# Patient Record
Sex: FEMALE | Race: WHITE | NOT HISPANIC OR LATINO | Employment: FULL TIME | URBAN - METROPOLITAN AREA
[De-identification: names, ages, dates, MRNs, and addresses within clinical notes are randomized per-mention and may not be internally consistent; named-entity substitution may affect disease eponyms.]

---

## 2017-01-01 ENCOUNTER — MEDICAL CORRESPONDENCE (OUTPATIENT)
Dept: HEALTH INFORMATION MANAGEMENT | Facility: CLINIC | Age: 52
End: 2017-01-01

## 2017-01-16 ENCOUNTER — MYC MEDICAL ADVICE (OUTPATIENT)
Dept: PEDIATRICS | Facility: CLINIC | Age: 52
End: 2017-01-16

## 2017-01-16 DIAGNOSIS — Z90.13 S/P BILATERAL MASTECTOMY: Primary | ICD-10-CM

## 2017-01-16 DIAGNOSIS — I89.0 LYMPHEDEMA: ICD-10-CM

## 2017-01-16 DIAGNOSIS — Z85.3 PERSONAL HISTORY OF BREAST CANCER: ICD-10-CM

## 2017-01-16 NOTE — TELEPHONE ENCOUNTER
Patient requesting referral for lymphatic drainage massage for lymphedema.  Will route to provider to review and advise.

## 2017-01-17 NOTE — TELEPHONE ENCOUNTER
[employer] message sent to patient requesting fax number to [employer] to fax referral.      Paige WhaleyCMA

## 2017-01-19 NOTE — TELEPHONE ENCOUNTER
Patient has not read [employer] message sent on 01/17/17.     Attempt #1:  Left message for patient to return call to clinic or to reply to [employer] message. Clinic number given.  Need fax number for [facility] [employer] to fax referral placed on 01/16/17.  Celia Hargrove CMA

## 2017-01-24 NOTE — TELEPHONE ENCOUNTER
Attempt #2:  Left message for patient to return call to clinic or reply to [employer] message. Clinic number given.  Need fax number for [employer] [facility] [employer] to fax referral that was placed on 01/16/17.  Celia Hargrove [facility]

## 2017-01-25 NOTE — TELEPHONE ENCOUNTER
From   Chanel Israel    To   Gabbie Hicks MD    Sent   1/24/2017  6:15 PM         Here is the information that was previously sent to you via [employer] on 2/26/2016:     [employer]   Luli Bardales [employer] AFAA   [address] [address] [place] [id number] [phone] / fax [phone]             Patient responded with referral contact info. Faxed referral as requested to number provided.Verified fax was success.  Sent [employer] to inform patient.  Miah Anne, CMA

## 2017-03-22 ENCOUNTER — OFFICE VISIT (OUTPATIENT)
Dept: PEDIATRICS | Facility: CLINIC | Age: 52
End: 2017-03-22
Payer: COMMERCIAL

## 2017-03-22 VITALS
HEIGHT: 62 IN | OXYGEN SATURATION: 96 % | SYSTOLIC BLOOD PRESSURE: 119 MMHG | DIASTOLIC BLOOD PRESSURE: 54 MMHG | HEART RATE: 104 BPM | BODY MASS INDEX: 27.03 KG/M2 | TEMPERATURE: 98.7 F | WEIGHT: 146.9 LBS

## 2017-03-22 DIAGNOSIS — H65.02 ACUTE SEROUS OTITIS MEDIA OF LEFT EAR, RECURRENCE NOT SPECIFIED: Primary | ICD-10-CM

## 2017-03-22 DIAGNOSIS — J20.9 ACUTE BRONCHITIS, UNSPECIFIED ORGANISM: ICD-10-CM

## 2017-03-22 PROCEDURE — 99213 OFFICE O/P EST LOW 20 MIN: CPT | Performed by: INTERNAL MEDICINE

## 2017-03-22 RX ORDER — CODEINE PHOSPHATE AND GUAIFENESIN 10; 100 MG/5ML; MG/5ML
1 SOLUTION ORAL EVERY 4 HOURS PRN
Qty: 120 ML | Refills: 0 | Status: SHIPPED | OUTPATIENT
Start: 2017-03-22 | End: 2017-06-12

## 2017-03-22 NOTE — PATIENT INSTRUCTIONS
Fluid in the Middle Ear, No Infection (Adult)  Earaches can happen without an infection. This occurs when air and fluid build up behind the eardrum causing a feeling of fullness and discomfort and reduced hearing. This is called otitis media with effusion (OME) or serous otitis media. It means there is fluid in the middle ear. It is not the same as acute otitis media, which is typically from infection.  OME can happen when you have a cold if congestion blocks the passage that drains the middle ear (eustachian tube). It may also occur with nasal allergies or after a bacterial middle ear infection.    The pain/discomfort may come and go. You may hear clicking or popping sounds when you chew or swallow. You may feel that your balance is off. Or you may hear ringing in the ear.  It often takes from several weeks up to 3 months for the fluid to clear on its own. Oral pain relievers and ear drops help if there is pain. Decongestants and antihistamines sometimes help. Antibiotics don't help since there is no infection. Your doctor may prescribe a nasal spray to help reduce swelling in the nose and eustachian tube. This can allow the ear to drain.  If it doesn't improve after 3 months, surgery may be used to drain the fluid and insert a small tube in the eardrum to allow continued drainage.  Because the middle ear fluid can become infected, it is important to watch for signs of an ear infection which may develop later. These signs include increased ear pain, fever, or drainage from the ear.  Home care  The following guidelines will help you care for yourself at home:    You may use acetaminophen or ibuprofen to control pain, unless another medicine was prescribed. [NOTE: If you have chronic liver or kidney disease or ever had a stomach ulcer or GI bleeding, talk with your doctor before using these medicines.] (Aspirin should never be used in anyone under 18 years of age who is ill with a fever. It may cause severe liver  damage.)    While not always helpful, you may use over-the-counter decongestants such as phenylephrine or pseudoephedrine. Don't use nasal spray decongestants more than 3 days. Longer use can make congestion worse. (Prescription nasal sprays from your doctor don't typically have those restrictions.)    Antihistamines may help if you are also having allergy symptoms.    You may use medicines such as guaifenesin to thin mucus and promote drainage.  Follow-up care  Follow up with your doctor or as advised if you are not feeling better after 3 days.  When to seek medical care  Get prompt medical attention if any of the following occur:    Ear pain gets worse or does not start to improve     Fever of 100.4 F (38 C) or higher, or as directed by your health care provider    Fluid or blood draining from the ear    Headache or sinus pain    Stiff neck    Unusual drowsiness or confusion    1637-0033 The [employer]. [address] [place] [address]. All rights reserved. This information is not intended as a substitute for professional medical care. Always follow your healthcare professional's instructions.        What Is Acute Bronchitis?  Acute or short-term bronchitis last for days or weeks. It occurs when the bronchial tubes (airways in the lungs) are irritated by a virus, bacteria, or allergen. This causes a cough that produces yellow or greenish mucus.  Inside healthy lungs    Air travels in and out of the lungs through the airways. The linings of these airways produce sticky mucus. This mucus traps particles that enter the lungs. Tiny structures called cilia then sweep the particles out of the airways.     Healthy airway: Airways are normally open. Air moves in and out easily.      Healthy cilia: Tiny, hairlike cilia sweep mucus and particles up and out of the airways.   Lungs with bronchitis  Bronchitis often occurs with a cold or the flu virus. The airways become inflamed (red and swollen). There  is a deep  hacking  cough from the extra mucus. Other symptoms may include:    Wheezing    Chest discomfort    Shortness of breath    Mild fever  A second infection, this time due to bacteria, may then occur. And airways irritated by allergens or smoke are more likely to get infected.        Inflamed airway: Inflammation and extra mucus narrow the airway, causing shortness of breath.      Impaired cilia: Extra mucus impairs cilia, causing congestion and wheezing. Smoking makes the problem worse.   Making a diagnosis  A physical exam, health history, and certain tests help your healthcare provider make the diagnosis.  Health history  Your healthcare provider will ask you about your symptoms.  The exam  Your provider listens to your chest for signs of congestion. He or she may also check your ears, nose, and throat.  Possible tests    A sputum test for bacteria. This requires a sample of mucus from the lungs.    A nasal or throat swab for bacterial infection.    A chest X-ray if your healthcare provider thinks you have pneumonia.    Tests to check for an underlying condition, such as allergies, asthma, or COPD. You may need to see a specialist for more lung function testing.  Treating a cough  The main treatment for bronchitis is easing symptoms. Avoiding smoke, allergens, and other things that trigger coughing can often help. If the infection is bacterial, you may be given antibiotics. During the illness, it's important to get plenty of sleep. To ease symptoms:    Don t smoke, and avoid secondhand smoke.    Use a humidifier, or breathe in steam from a hot shower. This may help loosen mucus.    Drink a lot of water and juice. They can soothe the throat and may help thin mucus.    Sit up or use extra pillows when in bed to help lessen coughing and congestion.    Ask your provider about using cough medicine, pain and fever medicine, or a decongestant.  Antibiotics  Most cases of bronchitis are caused by cold or flu  viruses. Antibiotics don t treat viral illness. Taking antibiotics when they are not needed increases your risk of getting an infection later that is antibiotic-resistant. Your provider will prescribe antibiotics if the infection is caused by bacteria. If they are prescribed:    Take the medicine until it is used up, even if symptoms have improved. If you don t, the bronchitis may come back.    Take them as directed. For instance, some medicines should be taken with food.    Ask your provider or pharmacist what side effects are common, and what to do about them.  Follow-up care  You should see your provider again in 2 to 3 weeks. By this time, symptoms should have improved. An infection that lasts longer may mean you have a more serious problem.  Prevention    Avoid tobacco smoke. If you smoke, quit. Stay away from smoky places. Ask friends and family not to smoke around you, or in your home or car.    Get checked for allergies.    Ask your provider about getting a yearly flu shot, and pneumococcal or pneumonia shots.    Wash your hands often. This helps reduce the chance of picking up viruses that cause colds and flu.  Call your healthcare provider if:    Symptoms worsen, or new symptoms develop.    Breathing problems worsen or  become severe.    Symptoms don t get better within a week, or within 3 days of taking antibiotics.     9440-9259 [employer]. [address] [place] [address], [place] [address]. All rights reserved. This information is not intended as a substitute for professional medical care. Always follow your healthcare professional's instructions.

## 2017-03-22 NOTE — MR AVS SNAPSHOT
After Visit Summary   3/22/2017    Chanel Israel    MRN: [id number]           Patient Information     Date Of Birth          1965        Visit Information        Provider Department      3/22/2017 4:00 PM Rafa Larsen MD [facility]        Today's Diagnoses     Acute serous otitis media of left ear, recurrence not specified    -  1    Acute bronchitis, unspecified organism          Care Instructions      Fluid in the Middle Ear, No Infection (Adult)  Earaches can happen without an infection. This occurs when air and fluid build up behind the eardrum causing a feeling of fullness and discomfort and reduced hearing. This is called otitis media with effusion (OME) or serous otitis media. It means there is fluid in the middle ear. It is not the same as acute otitis media, which is typically from infection.  OME can happen when you have a cold if congestion blocks the passage that drains the middle ear (eustachian tube). It may also occur with nasal allergies or after a bacterial middle ear infection.    The pain/discomfort may come and go. You may hear clicking or popping sounds when you chew or swallow. You may feel that your balance is off. Or you may hear ringing in the ear.  It often takes from several weeks up to 3 months for the fluid to clear on its own. Oral pain relievers and ear drops help if there is pain. Decongestants and antihistamines sometimes help. Antibiotics don't help since there is no infection. Your doctor may prescribe a nasal spray to help reduce swelling in the nose and eustachian tube. This can allow the ear to drain.  If it doesn't improve after 3 months, surgery may be used to drain the fluid and insert a small tube in the eardrum to allow continued drainage.  Because the middle ear fluid can become infected, it is important to watch for signs of an ear infection which may develop later. These signs include increased ear pain, fever, or  drainage from the ear.  Home care  The following guidelines will help you care for yourself at home:    You may use acetaminophen or ibuprofen to control pain, unless another medicine was prescribed. [NOTE: If you have chronic liver or kidney disease or ever had a stomach ulcer or GI bleeding, talk with your doctor before using these medicines.] (Aspirin should never be used in anyone under 18 years of age who is ill with a fever. It may cause severe liver damage.)    While not always helpful, you may use over-the-counter decongestants such as phenylephrine or pseudoephedrine. Don't use nasal spray decongestants more than 3 days. Longer use can make congestion worse. (Prescription nasal sprays from your doctor don't typically have those restrictions.)    Antihistamines may help if you are also having allergy symptoms.    You may use medicines such as guaifenesin to thin mucus and promote drainage.  Follow-up care  Follow up with your doctor or as advised if you are not feeling better after 3 days.  When to seek medical care  Get prompt medical attention if any of the following occur:    Ear pain gets worse or does not start to improve     Fever of 100.4 F (38 C) or higher, or as directed by your health care provider    Fluid or blood draining from the ear    Headache or sinus pain    Stiff neck    Unusual drowsiness or confusion    6579-6230 [employer] [address] [place] [address], [place] [id number]. All rights reserved. This information is not intended as a substitute for professional medical care. Always follow your healthcare professional's instructions.        What Is Acute Bronchitis?  Acute or short-term bronchitis last for days or weeks. It occurs when the bronchial tubes (airways in the lungs) are irritated by a virus, bacteria, or allergen. This causes a cough that produces yellow or greenish mucus.  Inside healthy lungs    Air travels in and out of the lungs through the airways. The linings of  these airways produce sticky mucus. This mucus traps particles that enter the lungs. Tiny structures called cilia then sweep the particles out of the airways.     Healthy airway: Airways are normally open. Air moves in and out easily.      Healthy cilia: Tiny, hairlike cilia sweep mucus and particles up and out of the airways.   Lungs with bronchitis  Bronchitis often occurs with a cold or the flu virus. The airways become inflamed (red and swollen). There is a deep  hacking  cough from the extra mucus. Other symptoms may include:    Wheezing    Chest discomfort    Shortness of breath    Mild fever  A second infection, this time due to bacteria, may then occur. And airways irritated by allergens or smoke are more likely to get infected.        Inflamed airway: Inflammation and extra mucus narrow the airway, causing shortness of breath.      Impaired cilia: Extra mucus impairs cilia, causing congestion and wheezing. Smoking makes the problem worse.   Making a diagnosis  A physical exam, health history, and certain tests help your healthcare provider make the diagnosis.  Health history  Your healthcare provider will ask you about your symptoms.  The exam  Your provider listens to your chest for signs of congestion. He or she may also check your ears, nose, and throat.  Possible tests    A sputum test for bacteria. This requires a sample of mucus from the lungs.    A nasal or throat swab for bacterial infection.    A chest X-ray if your healthcare provider thinks you have pneumonia.    Tests to check for an underlying condition, such as allergies, asthma, or COPD. You may need to see a specialist for more lung function testing.  Treating a cough  The main treatment for bronchitis is easing symptoms. Avoiding smoke, allergens, and other things that trigger coughing can often help. If the infection is bacterial, you may be given antibiotics. During the illness, it's important to get plenty of sleep. To ease  symptoms:    Don t smoke, and avoid secondhand smoke.    Use a humidifier, or breathe in steam from a hot shower. This may help loosen mucus.    Drink a lot of water and juice. They can soothe the throat and may help thin mucus.    Sit up or use extra pillows when in bed to help lessen coughing and congestion.    Ask your provider about using cough medicine, pain and fever medicine, or a decongestant.  Antibiotics  Most cases of bronchitis are caused by cold or flu viruses. Antibiotics don t treat viral illness. Taking antibiotics when they are not needed increases your risk of getting an infection later that is antibiotic-resistant. Your provider will prescribe antibiotics if the infection is caused by bacteria. If they are prescribed:    Take the medicine until it is used up, even if symptoms have improved. If you don t, the bronchitis may come back.    Take them as directed. For instance, some medicines should be taken with food.    Ask your provider or pharmacist what side effects are common, and what to do about them.  Follow-up care  You should see your provider again in 2 to 3 weeks. By this time, symptoms should have improved. An infection that lasts longer may mean you have a more serious problem.  Prevention    Avoid tobacco smoke. If you smoke, quit. Stay away from smoky places. Ask friends and family not to smoke around you, or in your home or car.    Get checked for allergies.    Ask your provider about getting a yearly flu shot, and pneumococcal or pneumonia shots.    Wash your hands often. This helps reduce the chance of picking up viruses that cause colds and flu.  Call your healthcare provider if:    Symptoms worsen, or new symptoms develop.    Breathing problems worsen or  become severe.    Symptoms don t get better within a week, or within 3 days of taking antibiotics.     9553-8067 [employer]. [address] [place] [address], [place] [address]. All rights reserved. This information is not  intended as a substitute for professional medical care. Always follow your healthcare professional's instructions.              Follow-ups after your visit        Your next 10 appointments already scheduled     May 01, 2017  4:15 PM CDT   [facility]    [address] [facility] [address] [phone]            May 23, 2017  3:45 PM CDT   LAB with LAB ONC [facility]    [address] [facility] [address] [phone]           Patient must bring picture ID.  Patient should be prepared to give a urine specimen  Please do not eat 10-12 hours before your appointment if you are coming in fasting for labs on lipids, cholesterol, or glucose (sugar).  Pregnant women should follow their Care Team instructions. Water with medications is okay. Do not drink coffee or other fluids.   If you have concerns about taking  your medications, please ask at office or if scheduling via [facility], send a message by clicking on Secure Messaging, Message Your Care Team.            May 23, 2017  4:30 PM CDT   [facility]    [address] [facility] [address] [phone]              Who to contact     If you have questions or need follow up information about today's clinic visit or your schedule please contact [facility] directly at [phone].  Normal or non-critical lab and imaging results will be communicated to you by MyChart, letter or phone within 4 business days after the clinic has received the results. If you do not hear from us within 7 days, please contact the clinic through MyChart or phone. If you have a critical or abnormal lab result, we will notify you by phone as soon as possible.  Submit refill requests through [employer] or call your  "pharmacy and they will forward the refill request to us. Please allow 3 business days for your refill to be completed.          Additional Information About Your Visit        [employer] Information     [employer] gives you secure access to your electronic health record. If you see a primary care provider, you can also send messages to your care team and make appointments. If you have questions, please call your primary care clinic.  If you do not have a primary care provider, please call [phone] and they will assist you.      [employer] is an electronic gateway that provides easy, online access to your medical records. With [employer], you can request a clinic appointment, read your test results, renew a prescription or communicate with your care team.     To access your existing account, please contact your [facility] or call [phone] for assistance.        Care EveryWhere ID     This is your Care EveryWhere ID. This could be used by other organizations to access your [place] medical records  [id number]        Your Vitals Were     Pulse Temperature Height Pulse Oximetry BMI (Body Mass Index)       104 98.7  F (37.1  C) (Temporal) 5' 2\" (1.575 m) 96% 26.87 kg/m2        Blood Pressure from Last 3 Encounters:   03/22/17 119/54   08/24/16 106/66   07/03/16 120/70    Weight from Last 3 Encounters:   03/22/17 146 lb 14.4 oz (66.6 kg)   08/24/16 146 lb 12.8 oz (66.6 kg)   07/03/16 149 lb 11.2 oz (67.9 kg)              Today, you had the following     No orders found for display         Today's Medication Changes          These changes are accurate as of: 3/22/17  4:40 PM.  If you have any questions, ask your nurse or doctor.               Start taking these medicines.        Dose/Directions    guaiFENesin-codeine 100-10 MG/5ML Soln solution   Commonly known as:  ROBITUSSIN AC   Used for:  Acute bronchitis, unspecified organism   Started by:  Rafa Larsen MD        Dose:  1 tsp. "   Take 5 mLs by mouth every 4 hours as needed for cough   Quantity:  120 mL   Refills:  0            Where to get your medicines      Some of these will need a paper prescription and others can be bought over the counter.  Ask your nurse if you have questions.     Bring a paper prescription for each of these medications     guaiFENesin-codeine 100-10 MG/5ML Soln solution                Primary Care Provider Office Phone # Fax #    Gabbie Hicks [phone]       [facility] [address]  [facility] [address]        Thank you!     Thank you for choosing [facility]  for your care. Our goal is always to provide you with excellent care. Hearing back from our patients is one way we can continue to improve our services. Please take a few minutes to complete the written survey that you may receive in the mail after your visit with us. Thank you!             Your Updated Medication List - Protect others around you: Learn how to safely use, store and throw away your medicines at www.disposemymeds.org.          This list is accurate as of: 3/22/17  4:40 PM.  Always use your most recent med list.                   Brand Name Dispense Instructions for use    CALCIUM + D PO      Take by mouth 2 times daily       Co Q 10 100 MG Caps      Take 1 capsule by mouth daily       flax seed oil 1000 MG capsule      Take 2 capsules (2,000 mg) by mouth 2 times daily       fluocinolone 0.025 % cream    SYNALAR    60 g    Apply topically 2 times daily Apply to affected areas of mouth as needed.       fluticasone 50 MCG/ACT spray    FLONASE     USE TWO SPRAYS IEN QD UTD       guaiFENesin-codeine 100-10 MG/5ML Soln solution    ROBITUSSIN AC    120 mL    Take 5 mLs by mouth every 4 hours as needed for cough       ketoconazole 2 % shampoo    NIZORAL         L-GLUTAMINE      1 capsule 2 times daily       MAGNESIUM PO      Take 1 capsule by mouth daily       MULTI-VITAMIN PO      Take by mouth  daily       order for DME     2 Units    Compression sleeve to bilateral UE.       PROBIOTIC DAILY PO      Take 1 capsule by mouth 2 times daily       sertraline 50 MG tablet    ZOLOFT    90 tablet    Take 1 tablet (50 mg) by mouth daily       SINGULAIR PO      Take 5 mg by mouth daily       SYNTHROID 88 MCG tablet   Generic drug:  levothyroxine          TRIPLE FLEX PO      Take by mouth 2 times daily

## 2017-03-22 NOTE — PROGRESS NOTES
SUBJECTIVE:                                                    Chanel Israel is a 51 year old female who presents to clinic today for the following health issues:      Acute Illness   Acute illness concerns: Cough, plugged ear   Onset: 1 week    Fever: no    Chills/Sweats: YES- chills    Headache (location?): no    Sinus Pressure:YES- post-nasal drainage    Conjunctivitis:  no    Ear Pain: YES: both, left is worse, both plugged    Rhinorrhea: YES    Congestion: YES    Sore Throat: no  , was in beginning      Cough: YES-productive of yellow sputum,     Wheeze: no     Decreased Appetite: YES    Nausea: YES    Vomiting: no     Diarrhea:  no     Dysuria/Freq.: no     Fatigue/Achiness: YES    Sick/Strep Exposure: YES- special      Pt. C/o dizziness as well.   Therapies Tried and outcome: Nyquil at night (helps sleep), 12 hour sudafed, Ibuprofen, Advil (relieve plugness in ears)      HPI: Cough and respiratory symptoms for past week and then today has sensation of a plugged left ear    PMH:   Patient Active Problem List   Diagnosis     Personal history of breast cancer     S/P bilateral mastectomy     Urge incontinence of urine     Overweight (BMI 25.0-29.9)     Mild recurrent major depression (H)     Family history of diabetes mellitus (DM)     Family history of thyroid disease     Plantar fasciitis, bilateral     Seasonal allergies     Mixed hyperlipidemia     Chronic rhinitis     Breast implant asymmetry     ACP (advance care planning)     Lymphedema     Dyslipidemia, goal LDL below 130     Family history of ischemic heart disease     Seasonal allergic rhinitis     Acquired hypothyroidism     Glaucoma suspect, bilateral     Family history of glaucoma     Past Surgical History:   Procedure Laterality Date     ADENOIDECTOMY       COLONOSCOPY N/A 8/22/2014    Procedure: COLONOSCOPY;  Surgeon: Duane, William Charles, MD;  Location: [facility]     COSMETIC SURGERY       CYSTOSCOPY       HC REMOVAL OF ANAL  FISSURE  2007     MASTECTOMY, SIMPLE RT/LT/MABLE       RECONSTRUCT BREAST BILATERAL  2008     TONSILLECTOMY         Social History   Substance Use Topics     Smoking status: Never Smoker     Smokeless tobacco: Never Used     Alcohol use No     Family History   Problem Relation Age of Onset     DIABETES Mother      Thyroid Disease Mother      Glaucoma Mother      Macular Degeneration Mother      DIABETES Sister      Thyroid Disease Sister      Depression Sister      Endometrial Cancer Sister 51     CANCER Father      skin cancer     Glaucoma Father      Coronary Artery Disease Brother       at 43         Current Outpatient Prescriptions   Medication Sig Dispense Refill     Montelukast Sodium (SINGULAIR PO) Take 5 mg by mouth daily       sertraline (ZOLOFT) 50 MG tablet Take 1 tablet (50 mg) by mouth daily 90 tablet 1     fluticasone (FLONASE) 50 MCG/ACT nasal spray USE TWO SPRAYS IEN QD UTD  0     ketoconazole (NIZORAL) 2 % shampoo        Probiotic Product (PROBIOTIC DAILY PO) Take 1 capsule by mouth 2 times daily       Glucosamine-Chondroitin-MSM (TRIPLE FLEX PO) Take by mouth 2 times daily       Calcium Carbonate-Vitamin D (CALCIUM + D PO) Take by mouth 2 times daily       order for DME Compression sleeve to bilateral UE. 2 Units 6     Flaxseed, Linseed, (FLAX SEED OIL) 1000 MG capsule Take 2 capsules (2,000 mg) by mouth 2 times daily       SYNTHROID 88 MCG tablet   3     fluocinolone (SYNALAR) 0.025 % cream Apply topically 2 times daily Apply to affected areas of mouth as needed. 60 g 5     Coenzyme Q10 (CO Q 10) 100 MG CAPS Take 1 capsule by mouth daily        Multiple Vitamin (MULTI-VITAMIN PO) Take by mouth daily        MAGNESIUM PO Take 1 capsule by mouth daily        L-GLUTAMINE 1 capsule 2 times daily        Allergies   Allergen Reactions     Erythromycin Nausea       ROS:  CONSTITUTIONAL:NEGATIVE for fever, chills, change in weight and chills  ENT/MOUTH: nasal congestion, postnasal drainage and sinus  "pressure  RESP:cough-productive  CV: NEGATIVE for chest pain, palpitations or peripheral edema    OBJECTIVE:                                                    /54 (BP Location: Right arm, Patient Position: Chair, Cuff Size: Adult Regular)  Pulse 104  Temp 98.7  F (37.1  C) (Temporal)  Ht 5' 2\" (1.575 m)  Wt 146 lb 14.4 oz (66.6 kg)  SpO2 96%  BMI 26.87 kg/m2  Body mass index is 26.87 kg/(m^2).     GENERAL: healthy, alert and no distress  HENT: normal cephalic/atraumatic, right ear: normal: no effusions, no erythema, normal landmarks, left ear: clear effusion and TM retracted, nose and mouth without ulcers or lesions, oropharynx clear and oral mucous membranes moist  NECK: no adenopathy, no asymmetry, masses, or scars and thyroid normal to palpation  RESP: lungs clear to auscultation - no rales, rhonchi or wheezes  CV: regular rates and rhythm, normal S1 S2, no S3 or S4 and no murmur, click or rub    Diagnostic Test Results:  none      ASSESSMENT/PLAN:                                                    1. Acute serous otitis media of left ear, recurrence not specified    We use Afrin 2 sprays each nostril twice a day for 3 days and then stop. At that time we'll have her restart her Flonase. Sudafed when necessary      2. Acute bronchitis, unspecified organism    Push fluids    - guaiFENesin-codeine (ROBITUSSIN AC) 100-10 MG/5ML SOLN solution; Take 5 mLs by mouth every 4 hours as needed for cough  Dispense: 120 mL; Refill: 0        Will call or return to clinic if worsening or symptoms not improving as discussed.  See Patient Instructions      Rafa Larsen MD  [facility]  "

## 2017-03-22 NOTE — NURSING NOTE
"No chief complaint on file.      Initial /54 (BP Location: Right arm, Patient Position: Chair, Cuff Size: Adult Regular)  Pulse 104  Temp 98.7  F (37.1  C) (Temporal)  Ht 5' 2\" (1.575 m)  Wt 146 lb 14.4 oz (66.6 kg)  SpO2 96%  BMI 26.87 kg/m2 Estimated body mass index is 26.87 kg/(m^2) as calculated from the following:    Height as of this encounter: 5' 2\" (1.575 m).    Weight as of this encounter: 146 lb 14.4 oz (66.6 kg).  Medication Reconciliation: complete     Jaimie Cotto, LITA    "

## 2017-05-01 ENCOUNTER — OFFICE VISIT (OUTPATIENT)
Dept: ENDOCRINOLOGY | Facility: CLINIC | Age: 52
End: 2017-05-01
Attending: FAMILY MEDICINE
Payer: COMMERCIAL

## 2017-05-01 VITALS
BODY MASS INDEX: 27.09 KG/M2 | HEIGHT: 62 IN | HEART RATE: 82 BPM | SYSTOLIC BLOOD PRESSURE: 122 MMHG | WEIGHT: 147.2 LBS | DIASTOLIC BLOOD PRESSURE: 54 MMHG

## 2017-05-01 DIAGNOSIS — E06.3 HASHIMOTO'S THYROIDITIS: Primary | ICD-10-CM

## 2017-05-01 PROCEDURE — 99213 OFFICE O/P EST LOW 20 MIN: CPT | Performed by: INTERNAL MEDICINE

## 2017-05-01 NOTE — LETTER
5/1/2017       RE: Chanel Israel  [address] [place] [id number]     Dear Colleague,    Thank you for referring your patient, Chanel Israel, to the [facility] at [facility]. Please see a copy of my visit note below.         Endocrinology Note         Chanel is a 51 year old female presents today for Hashimoto's hypothyroidism.    HPI  Chanel Israel is a 51 years old female with hypothyroidism, previous hx of breast cancer diagnosed 2007 s/p left mastectomy and prophylactic right mastectomy, chemotherapy who is here for hypothyroidism.    She was previously seen  at  [facility]. Last seen was 10/2016. She would like to refer her care to [facility] because it is closer to her.     She was diagnosed with Hashimoto's hypothyroidism about a year ago and was started on Synthroid since then. Her main symptom was hair loss that made her went in to have thyroid test. Other symptoms was fatigue, sluggish and weight gain. At the diagnosis August 2015, TSH was 16.49, FT4 0.71.     She is compliant with taking thyroid medication. She has strong family hx of hypothyroidism in her mother, maternal grandmother and sister.     Today, she is feeling well. She still noticed some hair loss and low energy. Otherwise, she feels fine. She denied chest pain, SOB, altered bowel movement. She tended to feels cold. She sleeps ok. She is not taking biotin.    Past Medical History  Past Medical History:   Diagnosis Date     Asthma, mild intermittent      Breast cancer (H)      Depression      Endometriosis      Heart murmur     Patient reported.     PONV (postoperative nausea and vomiting)        Allergies  Allergies   Allergen Reactions     Erythromycin Nausea     Medications  Current Outpatient Prescriptions   Medication Sig Dispense Refill     Montelukast Sodium (SINGULAIR PO) Take 5 mg by mouth daily        guaiFENesin-codeine (ROBITUSSIN AC) 100-10 MG/5ML SOLN solution Take 5 mLs by mouth every 4 hours as needed for cough 120 mL 0     sertraline (ZOLOFT) 50 MG tablet Take 1 tablet (50 mg) by mouth daily 90 tablet 1     fluticasone (FLONASE) 50 MCG/ACT nasal spray USE TWO SPRAYS IEN QD UTD  0     ketoconazole (NIZORAL) 2 % shampoo        Probiotic Product (PROBIOTIC DAILY PO) Take 1 capsule by mouth 2 times daily       Glucosamine-Chondroitin-MSM (TRIPLE FLEX PO) Take by mouth 2 times daily       Calcium Carbonate-Vitamin D (CALCIUM + D PO) Take by mouth 2 times daily       order for DME Compression sleeve to bilateral UE. 2 Units 6     Flaxseed, Linseed, (FLAX SEED OIL) 1000 MG capsule Take 2 capsules (2,000 mg) by mouth 2 times daily       SYNTHROID 88 MCG tablet   3     fluocinolone (SYNALAR) 0.025 % cream Apply topically 2 times daily Apply to affected areas of mouth as needed. 60 g 5     Coenzyme Q10 (CO Q 10) 100 MG CAPS Take 1 capsule by mouth daily        Multiple Vitamin (MULTI-VITAMIN PO) Take by mouth daily        MAGNESIUM PO Take 1 capsule by mouth daily        L-GLUTAMINE 1 capsule 2 times daily        Family History  family history includes CANCER in her father; Coronary Artery Disease in her brother; DIABETES in her mother and sister; Depression in her sister; Endometrial Cancer (age of onset: 51) in her sister; Glaucoma in her father and mother; Macular Degeneration in her mother; Thyroid Disease in her mother and sister.     Her mother, maternal grandmother and sister have hypothyroidism.     Social History  Social History   Substance Use Topics     Smoking status: Never Smoker     Smokeless tobacco: Never Used     Alcohol use No   lives with family  She is special .    ROS  Constitutional: no weight change, OK energy  Eyes: no vision change, diplopia or red eyes   Neck: no difficulty swallowing, no choking, no neck pain, no neck swelling  Cardiovascular: no chest pain,  "palpitations  Respiratory: no dyspnea, cough, shortness of breath or wheezing   GI: no nausea, vomiting, diarrhea or constipation, no abdominal pain   : no change in urine, no dysuria or hematuria  Musculoskeletal: no joint or muscle pain or swelling   Integumentary: no concerning lesions   Neuro: no loss of strength or sensation, no numbness or tingling, no tremor, no dizziness, no headache   Endo: no polyuria or polydipsia, +cold intolerance   Heme/Lymph: no concerning bumps, no bleeding problems   Allergy: no environmental allergies   Psych: no depression or anxiety, no sleep problems.    Physical Exam  /54  Pulse 82  Ht 1.568 m (5' 1.75\")  Wt 66.8 kg (147 lb 3.2 oz)  BMI 27.14 kg/m2  Body mass index is 27.14 kg/(m^2).  Constitutional: no distress, comfortable, pleasant   Eyes: anicteric, normal extra-ocular movements, no lid lag or retraction  Neck: firm thyroid but no thyromegaly, no discrete nodule  Cardiovascular: regular rate and rhythm, normal S1 and S2, no murmurs  Respiratory: clear to auscultation, no wheezes or crackles, normal breath sounds   Gastrointestinal:  nontender, no hepatomegaly, no masses   Musculoskeletal: no edema   Skin: no jaundice   Neurological: cranial nerves intact, 2+ reflexes at patella, normal gait, no tremor on outstretched hands bilaterally  Psychological: appropriate mood   Lymphatic: no cervical  lymphadenopathy.      RESULTS      Lab from [facility]  10/20/2016; TSH 0.41, FT4 1.38, FT3 2.8    ASSESSMENT:    Chanel Israel is a 51 years old female with hypothyroidism, previous hx of breast cancer diagnosed 2007 s/p left mastectomy and prophylactic right mastectomy, chemotherapy who is here for hypothyroidism.    1) Hashimoto's hypothyroidism: diagnosed in 2015 when presented with hair loss, low energy and some weight gain. She has been on Synthroid since then. Her current dose is 88 mcg daily. Last lab test was in 10/2016, TSH was 0.41 with FT4 " 1.38.  She is clinically euthyroid. I will check lab today and adjust dose accordingly.    PLAN:   - lab today for TSH and FT4  - will contact pt with result    Mariella Tripathi MD     Division of Diabetes and Endocrinology  Department of Medicine  [phone]        Again, thank you for allowing me to participate in the care of your patient.      Sincerely,    Mariella Tripathi MD

## 2017-05-01 NOTE — NURSING NOTE
"Chanel Israel's goals for this visit include:   Chief Complaint   Patient presents with     Thyroid Problem       She requests these members of her care team be copied on today's visit information: Gabbie Hicks     PCP: Gabbie Hicks    Referring Provider:  Gabbie Hicks MD  [facility]  [address] [place] [address]    Chief Complaint   Patient presents with     Thyroid Problem       Initial /54  Pulse 82  Ht 1.568 m (5' 1.75\")  Wt 66.8 kg (147 lb 3.2 oz)  BMI 27.14 kg/m2 Estimated body mass index is 27.14 kg/(m^2) as calculated from the following:    Height as of this encounter: 1.568 m (5' 1.75\").    Weight as of this encounter: 66.8 kg (147 lb 3.2 oz).  Medication Reconciliation: complete    Do you need any medication refills at today's visit? No    Val Pichardo LPN      "

## 2017-05-01 NOTE — PATIENT INSTRUCTIONS
Please allow 7-10 business days for your lab results. You will be notified by phone, letter, or My Chart after the provider has reviewed them.  Thank you.

## 2017-05-01 NOTE — PROGRESS NOTES
Endocrinology Note         Chanel is a 51 year old female presents today for Hashimoto's hypothyroidism.    HPI  Chanel Israel is a 51 years old female with hypothyroidism, previous hx of breast cancer diagnosed 2007 s/p left mastectomy and prophylactic right mastectomy, chemotherapy who is here for hypothyroidism.    She was previously seen  at  [facility]. Last seen was 10/2016. She would like to refer her care to [facility] because it is closer to her.     She was diagnosed with Hashimoto's hypothyroidism about a year ago and was started on Synthroid since then. Her main symptom was hair loss that made her went in to have thyroid test. Other symptoms was fatigue, sluggish and weight gain. At the diagnosis August 2015, TSH was 16.49, FT4 0.71.     She is compliant with taking thyroid medication. She has strong family hx of hypothyroidism in her mother, maternal grandmother and sister.     Today, she is feeling well. She still noticed some hair loss and low energy. Otherwise, she feels fine. She denied chest pain, SOB, altered bowel movement. She tended to feels cold. She sleeps ok. She is not taking biotin.    Past Medical History  Past Medical History:   Diagnosis Date     Asthma, mild intermittent      Breast cancer (H)      Depression      Endometriosis      Heart murmur     Patient reported.     PONV (postoperative nausea and vomiting)        Allergies  Allergies   Allergen Reactions     Erythromycin Nausea     Medications  Current Outpatient Prescriptions   Medication Sig Dispense Refill     Montelukast Sodium (SINGULAIR PO) Take 5 mg by mouth daily       guaiFENesin-codeine (ROBITUSSIN AC) 100-10 MG/5ML SOLN solution Take 5 mLs by mouth every 4 hours as needed for cough 120 mL 0     sertraline (ZOLOFT) 50 MG tablet Take 1 tablet (50 mg) by mouth daily 90 tablet 1     fluticasone (FLONASE) 50 MCG/ACT nasal spray USE TWO SPRAYS IEN QD UTD  0     ketoconazole (NIZORAL) 2  % shampoo        Probiotic Product (PROBIOTIC DAILY PO) Take 1 capsule by mouth 2 times daily       Glucosamine-Chondroitin-MSM (TRIPLE FLEX PO) Take by mouth 2 times daily       Calcium Carbonate-Vitamin D (CALCIUM + D PO) Take by mouth 2 times daily       order for DME Compression sleeve to bilateral UE. 2 Units 6     Flaxseed, Linseed, (FLAX SEED OIL) 1000 MG capsule Take 2 capsules (2,000 mg) by mouth 2 times daily       SYNTHROID 88 MCG tablet   3     fluocinolone (SYNALAR) 0.025 % cream Apply topically 2 times daily Apply to affected areas of mouth as needed. 60 g 5     Coenzyme Q10 (CO Q 10) 100 MG CAPS Take 1 capsule by mouth daily        Multiple Vitamin (MULTI-VITAMIN PO) Take by mouth daily        MAGNESIUM PO Take 1 capsule by mouth daily        L-GLUTAMINE 1 capsule 2 times daily        Family History  family history includes CANCER in her father; Coronary Artery Disease in her brother; DIABETES in her mother and sister; Depression in her sister; Endometrial Cancer (age of onset: 51) in her sister; Glaucoma in her father and mother; Macular Degeneration in her mother; Thyroid Disease in her mother and sister.     Her mother, maternal grandmother and sister have hypothyroidism.     Social History  Social History   Substance Use Topics     Smoking status: Never Smoker     Smokeless tobacco: Never Used     Alcohol use No   lives with family  She is special .    ROS  Constitutional: no weight change, OK energy  Eyes: no vision change, diplopia or red eyes   Neck: no difficulty swallowing, no choking, no neck pain, no neck swelling  Cardiovascular: no chest pain, palpitations  Respiratory: no dyspnea, cough, shortness of breath or wheezing   GI: no nausea, vomiting, diarrhea or constipation, no abdominal pain   : no change in urine, no dysuria or hematuria  Musculoskeletal: no joint or muscle pain or swelling   Integumentary: no concerning lesions   Neuro: no loss of strength or sensation, no  "numbness or tingling, no tremor, no dizziness, no headache   Endo: no polyuria or polydipsia, +cold intolerance   Heme/Lymph: no concerning bumps, no bleeding problems   Allergy: no environmental allergies   Psych: no depression or anxiety, no sleep problems.    Physical Exam  /54  Pulse 82  Ht 1.568 m (5' 1.75\")  Wt 66.8 kg (147 lb 3.2 oz)  BMI 27.14 kg/m2  Body mass index is 27.14 kg/(m^2).  Constitutional: no distress, comfortable, pleasant   Eyes: anicteric, normal extra-ocular movements, no lid lag or retraction  Neck: firm thyroid but no thyromegaly, no discrete nodule  Cardiovascular: regular rate and rhythm, normal S1 and S2, no murmurs  Respiratory: clear to auscultation, no wheezes or crackles, normal breath sounds   Gastrointestinal:  nontender, no hepatomegaly, no masses   Musculoskeletal: no edema   Skin: no jaundice   Neurological: cranial nerves intact, 2+ reflexes at patella, normal gait, no tremor on outstretched hands bilaterally  Psychological: appropriate mood   Lymphatic: no cervical  lymphadenopathy.      RESULTS      Lab from [facility] [place]  10/20/2016; TSH 0.41, FT4 1.38, FT3 2.8    ASSESSMENT:    Chanel Israel is a 51 years old female with hypothyroidism, previous hx of breast cancer diagnosed 2007 s/p left mastectomy and prophylactic right mastectomy, chemotherapy who is here for hypothyroidism.    1) Hashimoto's hypothyroidism: diagnosed in 2015 when presented with hair loss, low energy and some weight gain. She has been on Synthroid since then. Her current dose is 88 mcg daily. Last lab test was in 10/2016, TSH was 0.41 with FT4 1.38.  She is clinically euthyroid. I will check lab today and adjust dose accordingly.    PLAN:   - lab today for TSH and FT4  - will contact pt with result    Mariella Tripathi MD     Division of Diabetes and Endocrinology  Department of Medicine  [phone]      "

## 2017-05-01 NOTE — MR AVS SNAPSHOT
After Visit Summary   5/1/2017    Chanel Israel    MRN: [id number]           Patient Information     Date Of Birth          1965        Visit Information        Provider Department      5/1/2017 4:15 PM Mariella Tripathi MD [facility]        Care Instructions    Please allow 7-10 business days for your lab results. You will be notified by phone, letter, or My Chart after the provider has reviewed them.  Thank you.           Follow-ups after your visit        Your next 10 appointments already scheduled     May 23, 2017  3:45 PM CDT   LAB with LAB ONC [facility] ([facility])    [address] [place] [address] [phone]           Patient must bring picture ID.  Patient should be prepared to give a urine specimen  Please do not eat 10-12 hours before your appointment if you are coming in fasting for labs on lipids, cholesterol, or glucose (sugar).  Pregnant women should follow their Care Team instructions. Water with medications is okay. Do not drink coffee or other fluids.   If you have concerns about taking  your medications, please ask at office or if scheduling via Senseg, send a message by clicking on Secure Messaging, Message Your Care Team.            May 23, 2017  4:30 PM CDT   Return Visit with Ghislaine Lopez MD   [facility] ([facility])    [address] [place] [address] [phone]              Who to contact     If you have questions or need follow up information about today's clinic visit or your schedule please contact [facility] directly at [phone].  Normal or non-critical lab and imaging results will be communicated to you by MyChart, letter or phone within 4 business days after the clinic has received the results. If you do not hear from us within 7 days, please contact the clinic through MyChart or phone. If  "you have a critical or abnormal lab result, we will notify you by phone as soon as possible.  Submit refill requests through ONE [employer] or call your pharmacy and they will forward the refill request to us. Please allow 3 business days for your refill to be completed.          Additional Information About Your Visit        moneymeetshart Information     [employer] gives you secure access to your electronic health record. If you see a primary care provider, you can also send messages to your care team and make appointments. If you have questions, please call your primary care clinic.  If you do not have a primary care provider, please call [phone] and they will assist you.      [employer] is an electronic gateway that provides easy, online access to your medical records. With ONE [employer], you can request a clinic appointment, read your test results, renew a prescription or communicate with your care team.     To access your existing account, please contact your [facility] or call [phone] for assistance.        Care EveryWhere ID     This is your Care EveryWhere ID. This could be used by other organizations to access your [place] medical records  [id number]        Your Vitals Were     Pulse Height BMI (Body Mass Index)             82 1.568 m (5' 1.75\") 27.14 kg/m2          Blood Pressure from Last 3 Encounters:   05/01/17 122/54   03/22/17 119/54   08/24/16 106/66    Weight from Last 3 Encounters:   05/01/17 66.8 kg (147 lb 3.2 oz)   03/22/17 66.6 kg (146 lb 14.4 oz)   08/24/16 66.6 kg (146 lb 12.8 oz)              Today, you had the following     No orders found for display       Primary Care Provider Office Phone # Fax #    Gabbie Hicks [phone]       [facility] [id number]  [facility] [address]        Thank you!     Thank you for choosing [facility]  for your care. Our goal is always to provide you with excellent care. Hearing back " from our patients is one way we can continue to improve our services. Please take a few minutes to complete the written survey that you may receive in the mail after your visit with us. Thank you!             Your Updated Medication List - Protect others around you: Learn how to safely use, store and throw away your medicines at www.disposemymeds.org.          This list is accurate as of: 5/1/17  4:33 PM.  Always use your most recent med list.                   Brand Name Dispense Instructions for use    CALCIUM + D PO      Take by mouth 2 times daily       Co Q 10 100 MG Caps      Take 1 capsule by mouth daily       flax seed oil 1000 MG capsule      Take 2 capsules (2,000 mg) by mouth 2 times daily       fluocinolone 0.025 % cream    SYNALAR    60 g    Apply topically 2 times daily Apply to affected areas of mouth as needed.       fluticasone 50 MCG/ACT spray    FLONASE     Reported on 5/1/2017       guaiFENesin-codeine 100-10 MG/5ML Soln solution    ROBITUSSIN AC    120 mL    Take 5 mLs by mouth every 4 hours as needed for cough       ketoconazole 2 % shampoo    NIZORAL         L-GLUTAMINE      1 capsule 2 times daily       MAGNESIUM PO      Take 1 capsule by mouth daily       MULTI-VITAMIN PO      Take by mouth daily       order for DME     2 Units    Compression sleeve to bilateral UE.       PROBIOTIC DAILY PO      Take 1 capsule by mouth 2 times daily       sertraline 50 MG tablet    ZOLOFT    90 tablet    Take 1 tablet (50 mg) by mouth daily       SINGULAIR PO      Take 5 mg by mouth daily       SYNTHROID 88 MCG tablet   Generic drug:  levothyroxine      daily       TRIPLE FLEX PO      Take by mouth 2 times daily

## 2017-05-03 DIAGNOSIS — Z85.3 PERSONAL HISTORY OF BREAST CANCER: Primary | ICD-10-CM

## 2017-05-08 ENCOUNTER — MYC MEDICAL ADVICE (OUTPATIENT)
Dept: PEDIATRICS | Facility: CLINIC | Age: 52
End: 2017-05-08

## 2017-05-08 DIAGNOSIS — F33.0 MILD RECURRENT MAJOR DEPRESSION (H): ICD-10-CM

## 2017-05-09 ENCOUNTER — MYC MEDICAL ADVICE (OUTPATIENT)
Dept: PEDIATRICS | Facility: CLINIC | Age: 52
End: 2017-05-09

## 2017-05-09 ASSESSMENT — PATIENT HEALTH QUESTIONNAIRE - PHQ9
SUM OF ALL RESPONSES TO PHQ QUESTIONS 1-9: 1
10. IF YOU CHECKED OFF ANY PROBLEMS, HOW DIFFICULT HAVE THESE PROBLEMS MADE IT FOR YOU TO DO YOUR WORK, TAKE CARE OF THINGS AT HOME, OR GET ALONG WITH OTHER PEOPLE: NOT DIFFICULT AT ALL

## 2017-05-09 NOTE — TELEPHONE ENCOUNTER
Refill completed.   PHQ-9 SCORE 8/5/2015 3/25/2016 8/24/2016   Total Score - - -   Total Score MyChart - - -   Total Score 0 - -   Total Score - 1 1       MERISSA-7 SCORE 7/9/2014 3/25/2016 8/24/2016   Total Score 1 - -   Total Score - 0 1       Can we update her PHQ 9?  Then will route to Dr. Hicks so she is aware.

## 2017-05-09 NOTE — TELEPHONE ENCOUNTER
Routing refill request to provider for review/approval because:  Patient was to return in 6 months.  PHQ-9 is due.      Office Visit 8/24/16 with Dr. Hicks:    3. Mild recurrent major depression (H)  Stable, recheck in 6 months or sooner prn  - sertraline (ZOLOFT) 50 MG tablet; Take 1 tablet (50 mg) by mouth daily Dispense: 90 tablet; Refill: 1

## 2017-05-09 NOTE — TELEPHONE ENCOUNTER
Sent mychart to inform patient of refill and new mychart message sent with attached PHQ9 questionnaire to complete. Waiting on patient response.  Miah Anne, CMA

## 2017-05-09 NOTE — TELEPHONE ENCOUNTER
sertraline (ZOLOFT) 50 MG tablet     Last Written Prescription Date: 8/24/16  Last Fill Quantity: 90, # refills: 1  [place] Office Visit with [facility] [place] provider:  3/22/17   Next 5 appointments (look out 90 days)     May 23, 2017  4:30 PM CDT   Return Visit with Ghislaine Lopez MD   [facility] ([facility])    [address] [place] [address] [phone]                   Last PHQ-9 score on record=   PHQ-9 SCORE 8/24/2016   Total Score -   Total Score MyChart -   Total Score -   Total Score 1       5/8/17 6:23 PM   Dear Dr. Hicks,   I realize I'm overdue for a med check up and I apologize for not scheduling it sooner. 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 My prescription for 50 mg sertraline/Zoloft needs to be renewed. Would it be possible for you to refill the  prescription for me until I can make an appointment in June; when school is done? If so, the prescription can be filled at [facility] ([phone]). Thank you for considering my request.   Respectfully,   Ana Israel (Kathleen)

## 2017-05-10 ASSESSMENT — PATIENT HEALTH QUESTIONNAIRE - PHQ9: SUM OF ALL RESPONSES TO PHQ QUESTIONS 1-9: 1

## 2017-05-10 NOTE — TELEPHONE ENCOUNTER
PHQ-9 SCORE 3/25/2016 8/24/2016 5/9/2017   Total Score - - -   Total Score MyChart - - 1 (Minimal depression)   Total Score - - -   Total Score 1 1 -     Patient completed PHQ9 in another encounter.  Miah Anne, [facility]

## 2017-05-23 ENCOUNTER — ONCOLOGY VISIT (OUTPATIENT)
Dept: ONCOLOGY | Facility: CLINIC | Age: 52
End: 2017-05-23
Payer: COMMERCIAL

## 2017-05-23 VITALS
RESPIRATION RATE: 18 BRPM | BODY MASS INDEX: 26.68 KG/M2 | SYSTOLIC BLOOD PRESSURE: 120 MMHG | HEART RATE: 84 BPM | DIASTOLIC BLOOD PRESSURE: 64 MMHG | WEIGHT: 145 LBS | HEIGHT: 62 IN | OXYGEN SATURATION: 95 % | TEMPERATURE: 98.8 F

## 2017-05-23 DIAGNOSIS — Z85.3 PERSONAL HISTORY OF BREAST CANCER: ICD-10-CM

## 2017-05-23 DIAGNOSIS — E06.3 HASHIMOTO'S THYROIDITIS: ICD-10-CM

## 2017-05-23 DIAGNOSIS — Z85.3 PERSONAL HISTORY OF BREAST CANCER: Primary | ICD-10-CM

## 2017-05-23 DIAGNOSIS — Z90.13 S/P BILATERAL MASTECTOMY: ICD-10-CM

## 2017-05-23 LAB
ALBUMIN SERPL-MCNC: 3.8 G/DL (ref 3.4–5)
ALP SERPL-CCNC: 83 U/L (ref 40–150)
ALT SERPL W P-5'-P-CCNC: 29 U/L (ref 0–50)
AST SERPL W P-5'-P-CCNC: 19 U/L (ref 0–45)
BASOPHILS # BLD AUTO: 0 10E9/L (ref 0–0.2)
BASOPHILS NFR BLD AUTO: 0.1 %
BILIRUB DIRECT SERPL-MCNC: <0.1 MG/DL (ref 0–0.2)
BILIRUB SERPL-MCNC: 0.3 MG/DL (ref 0.2–1.3)
CREAT SERPL-MCNC: 0.8 MG/DL (ref 0.52–1.04)
DIFFERENTIAL METHOD BLD: NORMAL
EOSINOPHIL # BLD AUTO: 0.2 10E9/L (ref 0–0.7)
EOSINOPHIL NFR BLD AUTO: 1.9 %
ERYTHROCYTE [DISTWIDTH] IN BLOOD BY AUTOMATED COUNT: 13.3 % (ref 10–15)
GFR SERPL CREATININE-BSD FRML MDRD: 76 ML/MIN/1.7M2
HCT VFR BLD AUTO: 39.6 % (ref 35–47)
HGB BLD-MCNC: 13.5 G/DL (ref 11.7–15.7)
LYMPHOCYTES # BLD AUTO: 2.5 10E9/L (ref 0.8–5.3)
LYMPHOCYTES NFR BLD AUTO: 29.3 %
MCH RBC QN AUTO: 30.8 PG (ref 26.5–33)
MCHC RBC AUTO-ENTMCNC: 34.1 G/DL (ref 31.5–36.5)
MCV RBC AUTO: 90 FL (ref 78–100)
MONOCYTES # BLD AUTO: 0.5 10E9/L (ref 0–1.3)
MONOCYTES NFR BLD AUTO: 6.2 %
NEUTROPHILS # BLD AUTO: 5.2 10E9/L (ref 1.6–8.3)
NEUTROPHILS NFR BLD AUTO: 62.5 %
PLATELET # BLD AUTO: 217 10E9/L (ref 150–450)
PROT SERPL-MCNC: 7.6 G/DL (ref 6.8–8.8)
RBC # BLD AUTO: 4.38 10E12/L (ref 3.8–5.2)
T4 FREE SERPL-MCNC: 1.31 NG/DL (ref 0.76–1.46)
TSH SERPL DL<=0.05 MIU/L-ACNC: 0.36 MU/L (ref 0.4–4)
WBC # BLD AUTO: 8.4 10E9/L (ref 4–11)

## 2017-05-23 PROCEDURE — 84443 ASSAY THYROID STIM HORMONE: CPT | Performed by: INTERNAL MEDICINE

## 2017-05-23 PROCEDURE — 36415 COLL VENOUS BLD VENIPUNCTURE: CPT | Performed by: INTERNAL MEDICINE

## 2017-05-23 PROCEDURE — 85025 COMPLETE CBC W/AUTO DIFF WBC: CPT | Performed by: INTERNAL MEDICINE

## 2017-05-23 PROCEDURE — 80076 HEPATIC FUNCTION PANEL: CPT | Performed by: INTERNAL MEDICINE

## 2017-05-23 PROCEDURE — 82565 ASSAY OF CREATININE: CPT | Performed by: INTERNAL MEDICINE

## 2017-05-23 PROCEDURE — 99214 OFFICE O/P EST MOD 30 MIN: CPT | Performed by: INTERNAL MEDICINE

## 2017-05-23 PROCEDURE — 84439 ASSAY OF FREE THYROXINE: CPT | Performed by: INTERNAL MEDICINE

## 2017-05-23 ASSESSMENT — PAIN SCALES - GENERAL: PAINLEVEL: NO PAIN (0)

## 2017-05-23 NOTE — PROGRESS NOTES
"Oncology Follow-up visit:  Date on this visit: 5/23/2017      Primary Care Physician: Gabbie Pate   Dx: Breast cancer  She has a history of stage IIB breast cancer. She underwent L MRM and right prophylactic mastectomy on 5/14/2007. Pathology from the surgery showed  Grade 3 invasive ductal carcinoma, measuring 3.5x3.3 cm. One left axillary SLN was positive for malignancy and additional 13 resected left axillary LNs were negative for malignancy. 3 right  SLNs were negative for malignancy. The tumor was ER negative AL negative HER2 Lisa negative by FISH.  She was given adjuvant chemotherapy per clinical trial  with weekly Adriamycin IV x15 weeks and also took oral Cytoxan daily x15 weeks. She was seen by the genetic counselor and the testing was negative for BRCA 1 and 2.  She was under the care of Dr. Ramirez at [facility] and preferred to continue to f/up with oncologist and has transferred her care to us in 05/2014.         History Of Present Illness:  Ms. Israel is a 51 year old postmenopausal female who presents for f/up of Hx of triple negative breast cancer. Her main complaint today is encapsulation of left breast implant following revisional breast reconstruction. She underwent bilateral removal and replacement with alloderm and larger silicone implants by  Dr. Sarah Todd on 6/21/2016 (op note reviewed from [facility]).  She did well for about 6-7 months with the exception of s/o L \"frozen\" shoulder but in February developed s/o encapsulation of left implant with hardening of left implant. She has been seeing PT without relief. She has also been on Singulair which is apparently used to treat this condition. Her symptoms have not improved. She is pain free. She is otherwise feeling well. She has occasional loose stools. She is on Zoloft for depression. In addition, a complete 12 point  review of systems is negative.      Past Medical/Surgical History:  Past Medical History:   Diagnosis Date     Asthma, " "mild intermittent      Breast cancer (H)      Depression      Endometriosis      Heart murmur     Patient reported.     PONV (postoperative nausea and vomiting)      Past Surgical History:   Procedure Laterality Date     ADENOIDECTOMY       COLONOSCOPY N/A 8/22/2014    Procedure: COLONOSCOPY;  Surgeon: Duane, William Charles, MD;  Location: [facility] OR     COSMETIC SURGERY       CYSTOSCOPY       HC REMOVAL OF ANAL FISSURE  2007     MASTECTOMY, SIMPLE RT/LT/MABLE       RECONSTRUCT BREAST BILATERAL  2008     TONSILLECTOMY       FHx and social Hx reviewed.  Employer: [employer]. Never smoker.    Allergies:  Allergies as of 05/23/2017 - Teja as Reviewed 05/23/2017   Allergen Reaction Noted     Erythromycin Nausea 07/08/2013     Current Medications:  Current Outpatient Prescriptions   Medication     sertraline (ZOLOFT) 50 MG tablet     Montelukast Sodium (SINGULAIR PO)     guaiFENesin-codeine (ROBITUSSIN AC) 100-10 MG/5ML SOLN solution     fluticasone (FLONASE) 50 MCG/ACT nasal spray     ketoconazole (NIZORAL) 2 % shampoo     Probiotic Product (PROBIOTIC DAILY PO)     Glucosamine-Chondroitin-MSM (TRIPLE FLEX PO)     Calcium Carbonate-Vitamin D (CALCIUM + D PO)     order for DME     Flaxseed, Linseed, (FLAX SEED OIL) 1000 MG capsule     SYNTHROID 88 MCG tablet     fluocinolone (SYNALAR) 0.025 % cream     Coenzyme Q10 (CO Q 10) 100 MG CAPS     Multiple Vitamin (MULTI-VITAMIN PO)     MAGNESIUM PO     L-GLUTAMINE     No current facility-administered medications for this visit.        Physical Exam:  /64  Pulse 84  Temp 98.8  F (37.1  C) (Oral)  Resp 18  Ht 1.568 m (5' 1.73\")  Wt 65.8 kg (145 lb)  SpO2 95%  BMI 26.75 kg/m2      GENERAL APPEARANCE: healthy, alert and no distress     NECK: no adenopathy, no asymmetry or masses     LYMPHATICS: No cervical, supraclavicular, axillary  lymphadenopathy     RESP: lungs clear to auscultation - no rales, rhonchi or wheezes     CARDIOVASCULAR: regular rates and " rhythm, normal S1 S2, no S3 or S4 and no murmur.     ABDOMEN:  soft, nontender, no HSM or masses and bowel sounds normal     MUSCULOSKELETAL: extremities normal- no gross deformities noted, no evidence of inflammation in joints, FROM in all extremities. No edema b/l LE. + Lymphedema LUE, stable.     SKIN: no suspicious lesions or rashes     PSYCHIATRIC: mentation appears normal and affect normal  Chest wall: s/p bilateral mastectomy and reconstruction. L implant hardened and raised. No chest wall masses. No axillary lymphadenopathy b/l    Laboratory/Imaging Studies  Results for orders placed or performed in visit on 05/23/17   T4 free   Result Value Ref Range    T4 Free 1.31 0.76 - 1.46 ng/dL   TSH   Result Value Ref Range    TSH 0.36 (L) 0.40 - 4.00 mU/L   *CBC with platelets differential   Result Value Ref Range    WBC 8.4 4.0 - 11.0 10e9/L    RBC Count 4.38 3.8 - 5.2 10e12/L    Hemoglobin 13.5 11.7 - 15.7 g/dL    Hematocrit 39.6 35.0 - 47.0 %    MCV 90 78 - 100 fl    MCH 30.8 26.5 - 33.0 pg    MCHC 34.1 31.5 - 36.5 g/dL    RDW 13.3 10.0 - 15.0 %    Platelet Count 217 150 - 450 10e9/L    Diff Method Automated Method     % Neutrophils 62.5 %    % Lymphocytes 29.3 %    % Monocytes 6.2 %    % Eosinophils 1.9 %    % Basophils 0.1 %    Absolute Neutrophil 5.2 1.6 - 8.3 10e9/L    Absolute Lymphocytes 2.5 0.8 - 5.3 10e9/L    Absolute Monocytes 0.5 0.0 - 1.3 10e9/L    Absolute Eosinophils 0.2 0.0 - 0.7 10e9/L    Absolute Basophils 0.0 0.0 - 0.2 10e9/L   Creatinine   Result Value Ref Range    Creatinine 0.80 0.52 - 1.04 mg/dL    GFR Estimate 76 >60 mL/min/1.7m2    GFR Estimate If Black >90   GFR Calc   >60 mL/min/1.7m2   Hepatic panel   Result Value Ref Range    Bilirubin Direct <0.1 0.0 - 0.2 mg/dL    Bilirubin Total 0.3 0.2 - 1.3 mg/dL    Albumin 3.8 3.4 - 5.0 g/dL    Protein Total 7.6 6.8 - 8.8 g/dL    Alkaline Phosphatase 83 40 - 150 U/L    ALT 29 0 - 50 U/L    AST 19 0 - 45 U/L      ASSESSMENT/PLAN:  Chanel is a very pleasant 51 year old woman with Hx of stage IIB invasive ductal breast cancer, grade 3, triple negative, s/p L MRM in 05/2007, s/p adjuvant Adriamycin and Cytoxan, with no evidence of disease recurrence since.   1. Personal Hx of triple negative breast cancer - She prefers to continue to follow-up with us annually  with cbcd, creat, LFTs given prior exposure to chemotherapy. She is 10 years out from her treatment and could transfer all her f/up care to Dr. Hicks with annual labs as above when she is ready.  2. L breast implant encapsulation - patient plans to f/up with Dr. Todd in June. She has been on Singulair. She had multiple questions about utility of breast MRI or other chest wall imaging in this setting and I have asked her to check with Dr. Todd. If she were to have another surgery, I would probably obtain a breast MRI to r/o an underlying malignancy (though very, very unlikely) or to evaluate for another etiology of encapsulation.   3. Hypothyroidism- TSH slightly low but free T4 is normal today. Continue f/up with endocrinology. Continue Levothyroxine.   4. She will f/up with Dr. Hicks for all her other medical needs.  At the end of our visit patient verbalized understanding and concurred with the plan.

## 2017-05-23 NOTE — NURSING NOTE
"Oncology Rooming Note    May 23, 2017 4:23 PM   Chanel Israel is a 51 year old female who presents for:    Chief Complaint   Patient presents with     Oncology Clinic Visit     1 yr f/u     Initial Vitals: /64  Pulse 84  Temp 98.8  F (37.1  C) (Oral)  Resp 18  Ht 1.568 m (5' 1.73\")  Wt 65.8 kg (145 lb)  SpO2 95%  BMI 26.75 kg/m2 Estimated body mass index is 26.75 kg/(m^2) as calculated from the following:    Height as of this encounter: 1.568 m (5' 1.73\").    Weight as of this encounter: 65.8 kg (145 lb). Body surface area is 1.69 meters squared.  No Pain (0) Comment: Data Unavailable   No LMP recorded. Patient is postmenopausal.  Allergies reviewed: Yes  Medications reviewed: Yes    Medications: Medication refills not needed today.  Pharmacy name entered into VerbalizeIt: [facility] [address] [place] [address] [address] [facility]  [address]    Clinical concerns:     8 minutes for nursing intake (face to face time)     STEPHEN DODSON LPN              "

## 2017-05-23 NOTE — MR AVS SNAPSHOT
After Visit Summary   5/23/2017    Chanel Israel    MRN: [id number]           Patient Information     Date Of Birth          1965        Visit Information        Provider Department      5/23/2017 4:30 PM Ghislaine Lopez MD [facility]        Today's Diagnoses     Personal history of breast cancer    -  1    S/P bilateral mastectomy           Follow-ups after your visit        Your next 10 appointments already scheduled     Jun 27, 2017  8:00 AM CDT   Lab visit with LAB FIRST FLOOR [facility])    [address] [place] [address]   [phone]           Please do not eat 10-12 hours before your appointment if you are coming in fasting for labs on lipids, cholesterol, or glucose (sugar). Does not apply to pregnant women.  Water with medications is okay. Do not drink coffee or other fluids.  If you have concerns about taking  your medications, please send a message by clicking on Secure Messaging, Message Your Care Team.            Jun 27, 2017  8:20 AM CDT   Long Office Call with Gabbie Hicks MD   [facility])    [address] [place] [id number] [phone]            May 24, 2018  1:15 PM CDT   LAB with LAB ONC [facility])    [address] [place] [id number] [phone]           Patient must bring picture ID.  Patient should be prepared to give a urine specimen  Please do not eat 10-12 hours before your appointment if you are coming in fasting for labs on lipids, cholesterol, or glucose (sugar).  Pregnant women should follow their Care Team instructions. Water with medications is okay. Do not drink coffee or other fluids.   If you have concerns about taking  your medications, please ask at office or if scheduling via Guia[employer]olso, send a message by clicking on  Secure Messaging, Message Your Care Team.            May 24, 2018  2:00 PM CDT   Return Visit with Ghislaine Lopez MD   [facility] ([facility])    [address] [address] [place] [address] [phone]              Who to contact     If you have questions or need follow up information about today's clinic visit or your schedule please contact [facility] directly at [phone].  Normal or non-critical lab and imaging results will be communicated to you by [employer], letter or phone within 4 business days after the clinic has received the results. If you do not hear from us within 7 days, please contact the clinic through [employer] or phone. If you have a critical or abnormal lab result, we will notify you by phone as soon as possible.  Submit refill requests through Deligic or call your pharmacy and they will forward the refill request to us. Please allow 3 business days for your refill to be completed.          Additional Information About Your Visit        [employer] Information     Deligic gives you secure access to your electronic health record. If you see a primary care provider, you can also send messages to your care team and make appointments. If you have questions, please call your primary care clinic.  If you do not have a primary care provider, please call [phone] and they will assist you.      Deligic is an electronic gateway that provides easy, online access to your medical records. With Deligic, you can request a clinic appointment, read your test results, renew a prescription or communicate with your care team.     To access your existing account, please contact your [facility] or call [phone] for assistance.        Care EveryWhere ID     This is your Care EveryWhere ID. This could be used by other organizations to access your [place] medical records  [id number]        Your Vitals Were     Pulse  "Temperature Respirations Height Pulse Oximetry BMI (Body Mass Index)    84 98.8  F (37.1  C) (Oral) 18 1.568 m (5' 1.73\") 95% 26.75 kg/m2       Blood Pressure from Last 3 Encounters:   05/23/17 120/64   05/01/17 122/54   03/22/17 119/54    Weight from Last 3 Encounters:   05/23/17 65.8 kg (145 lb)   05/01/17 66.8 kg (147 lb 3.2 oz)   03/22/17 66.6 kg (146 lb 14.4 oz)               Primary Care Provider Office Phone # Fax #    Gabbie Hicks [phone]       [facility] [address]  [facility] [address]        Thank you!     Thank you for choosing [facility]  for your care. Our goal is always to provide you with excellent care. Hearing back from our patients is one way we can continue to improve our services. Please take a few minutes to complete the written survey that you may receive in the mail after your visit with us. Thank you!             Your Updated Medication List - Protect others around you: Learn how to safely use, store and throw away your medicines at www.disposemymeds.org.          This list is accurate as of: 5/23/17 11:59 PM.  Always use your most recent med list.                   Brand Name Dispense Instructions for use    CALCIUM + D PO      Take by mouth 2 times daily       Co Q 10 100 MG Caps      Take 1 capsule by mouth daily       flax seed oil 1000 MG capsule      Take 2 capsules (2,000 mg) by mouth 2 times daily       fluocinolone 0.025 % cream    SYNALAR    60 g    Apply topically 2 times daily Apply to affected areas of mouth as needed.       fluticasone 50 MCG/ACT spray    FLONASE     Reported on 5/1/2017       guaiFENesin-codeine 100-10 MG/5ML Soln solution    ROBITUSSIN AC    120 mL    Take 5 mLs by mouth every 4 hours as needed for cough       ketoconazole 2 % shampoo    NIZORAL         L-GLUTAMINE      1 capsule 2 times daily       MAGNESIUM PO      Take 1 capsule by mouth daily       MULTI-VITAMIN PO      Take by mouth daily    "    order for DME     2 Units    Compression sleeve to bilateral UE.       PROBIOTIC DAILY PO      Take 1 capsule by mouth 2 times daily       sertraline 50 MG tablet    ZOLOFT    90 tablet    Take 1 tablet (50 mg) by mouth daily       SINGULAIR PO      Take 5 mg by mouth daily       SYNTHROID 88 MCG tablet   Generic drug:  levothyroxine      daily       TRIPLE FLEX PO      Take by mouth 2 times daily

## 2017-05-25 ENCOUNTER — MYC MEDICAL ADVICE (OUTPATIENT)
Dept: ENDOCRINOLOGY | Facility: CLINIC | Age: 52
End: 2017-05-25

## 2017-05-25 DIAGNOSIS — E03.9 ACQUIRED HYPOTHYROIDISM: Primary | ICD-10-CM

## 2017-05-27 ENCOUNTER — MYC MEDICAL ADVICE (OUTPATIENT)
Dept: PEDIATRICS | Facility: CLINIC | Age: 52
End: 2017-05-27

## 2017-05-27 DIAGNOSIS — Z85.3 PERSONAL HISTORY OF BREAST CANCER: ICD-10-CM

## 2017-05-27 DIAGNOSIS — N64.89 BREAST ASYMMETRY: Primary | ICD-10-CM

## 2017-05-27 DIAGNOSIS — I89.0 LYMPHEDEMA: ICD-10-CM

## 2017-05-27 DIAGNOSIS — Z90.13 S/P BILATERAL MASTECTOMY: ICD-10-CM

## 2017-05-30 NOTE — TELEPHONE ENCOUNTER
Faxed new referrals to to [facility] at fax #: [phone]. Sent mychart to inform patient.  Miah Anne, CMA

## 2017-05-30 NOTE — TELEPHONE ENCOUNTER
Please inform Chanel-New referrals were placed made for plastic surgery and lymphedema therapy as mentioned in Patient's my chart message.  Please fax to both the places and update patient when done.

## 2017-05-30 NOTE — TELEPHONE ENCOUNTER
Routing [employer] message to Dr. Hicks  to please review when able.      Marybeth Hu RN, [facility]

## 2017-05-31 ENCOUNTER — MYC MEDICAL ADVICE (OUTPATIENT)
Dept: PEDIATRICS | Facility: CLINIC | Age: 52
End: 2017-05-31

## 2017-05-31 NOTE — TELEPHONE ENCOUNTER
5/31/17 5:18 PM   Hi Dr. Hicks,   Here's an update on the encapsulation. Dr. Todd will be performing  replacement of the left breast implant on Friday, June 16 at 1:00 p.m. at [facility]. I will schedule a pre-op physical with you .     Can you take care of the necessary paperwork regarding this surgery that is required by my health insurance. Thank you for your time.   Respectfully,   Ana (Chanel) Lindy     Patient sent above update (copied from new mychart message).    Received [facility] Provider Recommendation Form to be completed in response to recent referrals faxed. Unclear if form needs to be completed for both referrals placed.  Miah Anne, CMA

## 2017-06-02 NOTE — TELEPHONE ENCOUNTER
Provider Recommendation Form, Lymphedema Therapy Referral, and Plastic Surgery Referral faxed to [facility] [employer] Department attn: Referral entry at fax #: [phone]. Provider Recommendation Form was completed with both Lymphedema Therapy and Plastic Surgery referral information.  Staff received confirmation from fax machine that fax was sent successfully.  Provider Recommendation Form placed to be scanned into patient's chart.  Sent patient MyChart reply stating referrals have been placed and faxed to [facility].  Celia Hargrove CMA

## 2017-06-09 NOTE — TELEPHONE ENCOUNTER
Received fax from [facility] indicating that clinic needs to resubmit the provider recommendations form for each service category and also each facility. Staff completed new provider recommendation forms for the Lymphedema Therapy Referral and the Plastic Surgery Referral. Faxed completed forms to [facility] attn: Referral entry at fax #: [phone]. Verified fax was success.  Miah Anne, CMA

## 2017-06-12 ENCOUNTER — OFFICE VISIT (OUTPATIENT)
Dept: PEDIATRICS | Facility: CLINIC | Age: 52
End: 2017-06-12
Payer: COMMERCIAL

## 2017-06-12 VITALS
HEART RATE: 91 BPM | WEIGHT: 144 LBS | BODY MASS INDEX: 26.57 KG/M2 | SYSTOLIC BLOOD PRESSURE: 104 MMHG | TEMPERATURE: 97.7 F | OXYGEN SATURATION: 97 % | DIASTOLIC BLOOD PRESSURE: 60 MMHG

## 2017-06-12 DIAGNOSIS — Z01.818 PREOP GENERAL PHYSICAL EXAM: Primary | ICD-10-CM

## 2017-06-12 DIAGNOSIS — Z85.3 PERSONAL HISTORY OF BREAST CANCER: ICD-10-CM

## 2017-06-12 DIAGNOSIS — Z90.13 S/P BILATERAL MASTECTOMY: ICD-10-CM

## 2017-06-12 DIAGNOSIS — E03.9 ACQUIRED HYPOTHYROIDISM: ICD-10-CM

## 2017-06-12 DIAGNOSIS — F33.0 MILD RECURRENT MAJOR DEPRESSION (H): ICD-10-CM

## 2017-06-12 PROCEDURE — 99214 OFFICE O/P EST MOD 30 MIN: CPT | Performed by: FAMILY MEDICINE

## 2017-06-12 PROCEDURE — 93000 ELECTROCARDIOGRAM COMPLETE: CPT | Performed by: FAMILY MEDICINE

## 2017-06-12 ASSESSMENT — ANXIETY QUESTIONNAIRES
3. WORRYING TOO MUCH ABOUT DIFFERENT THINGS: NOT AT ALL
GAD7 TOTAL SCORE: 0
6. BECOMING EASILY ANNOYED OR IRRITABLE: NOT AT ALL
2. NOT BEING ABLE TO STOP OR CONTROL WORRYING: NOT AT ALL
1. FEELING NERVOUS, ANXIOUS, OR ON EDGE: NOT AT ALL
5. BEING SO RESTLESS THAT IT IS HARD TO SIT STILL: NOT AT ALL
7. FEELING AFRAID AS IF SOMETHING AWFUL MIGHT HAPPEN: NOT AT ALL

## 2017-06-12 ASSESSMENT — PATIENT HEALTH QUESTIONNAIRE - PHQ9: 5. POOR APPETITE OR OVEREATING: NOT AT ALL

## 2017-06-12 ASSESSMENT — PAIN SCALES - GENERAL: PAINLEVEL: MODERATE PAIN (4)

## 2017-06-12 NOTE — PROGRESS NOTES
[address] [address]  [phone]  Dept: [phone]    PRE-OP EVALUATION:  Today's date: 2017    Chanel Israel (: 1965) presents for pre-operative evaluation assessment as requested by Dr. Sarah Todd.  She requires evaluation and anesthesia risk assessment prior to undergoing surgery/procedure for treatment of left breast.  Proposed procedure: Left breast reconstruction    Date of Surgery/ Procedure: 17  Time of Surgery/ Procedure: 1:00pm  Hospital/Surgical Facility: [facility]  Fax number for surgical facility: [phone]  Primary Physician: Gabbie Hicks  Type of Anesthesia Anticipated: General    Patient has a Health Care Directive or Living Will:  NO    Preop Questions 2017   1.  Do you have a history of heart attack, stroke, stent, bypass or surgery on an artery in the head, neck, heart or legs? No   2.  Do you ever have any pain or discomfort in your chest? No   3.  Do you have a history of  Heart Failure? No   4.   Are you troubled by shortness of breath when:  walking on a level surface, or up a slight hill, or at night? No   5.  Do you currently have a cold, bronchitis or other respiratory infection? No   6.  Do you have a cough, shortness of breath, or wheezing? No   7.  Do you sometimes get pains in the calves of your legs when you walk? No   8. Do you or anyone in your family have previous history of blood clots? No   9.  Do you or does anyone in your family have a serious bleeding problem such as prolonged bleeding following surgeries or cuts? No   10. Have you ever had problems with anemia or been told to take iron pills? No   11. Have you had any abnormal blood loss such as black, tarry or bloody stools, or abnormal vaginal bleeding? No   12. Have you ever had a blood transfusion? No   13. Have you or any of your relatives ever had problems with anesthesia? YES - Nausea-requires Zofran    14. Do you have sleep apnea, excessive snoring or daytime drowsiness? No   15. Do you have any prosthetic heart valves? No   16. Do you have prosthetic joints? No   17. Is there any chance that you may be pregnant? No         HPI:                                                      Brief HPI related to upcoming procedure: A shunt is here for preop exam for upcoming breast reconstruction status post mastectomy-left      See problem list for active medical problems.  Problems all longstanding and stable, except as noted/documented.  See ROS for pertinent symptoms related to these conditions.                                                                                                  .    MEDICAL HISTORY:                                                      Patient Active Problem List    Diagnosis Date Noted     Glaucoma suspect, bilateral 09/14/2016     Priority: Medium     Family history of glaucoma 09/14/2016     Priority: Medium     Acquired hypothyroidism 06/13/2016     Priority: Medium     Seasonal allergic rhinitis 05/06/2016     Priority: Medium     Dyslipidemia, goal LDL below 130 03/25/2016     Priority: Medium     Pooled Cohorts Equation Calculator:  Risk factor: 0.9%         Family history of ischemic heart disease 03/25/2016     Priority: Medium     Lymphedema 03/01/2016     Priority: Medium     ACP (advance care planning) 08/28/2015     Priority: Medium     Advance Care Planning 8/29/2016: Receipt of ACP document:  Received: invalid HCD document dated 6-17-16.  Document not previously scanned. Validation form completed indicating invalid document. Copy sent to client with information and facilitation resources. Validation form sent to be scanned as notation of invalid document received.  Confirmed/documented designated decision maker(s).  Added by Claudette Kay RN Advance Care Planning Liaison with [facility]  Advance Care Planning 8/28/2015: Receipt of ACP document:  Received: [facility]  Directive which was witnessed or notarized on 3-5-13.  Document previously scanned on 8-25-15.  Validation form completed and sent to be scanned.  Code Status needs to be updated to reflect choices in most recent ACP document.  Confirmed/documented designated decision maker(s).  Added by Claudette Kay RN Advance Care Planning Liaison with [employer]           Breast implant asymmetry 08/25/2015     Priority: Medium     Mixed hyperlipidemia 08/24/2015     Priority: Medium     Chronic rhinitis 08/24/2015     Priority: Medium     Seasonal allergies 07/09/2014     Priority: Medium     Plantar fasciitis, bilateral 06/20/2014     Priority: Medium     Family history of diabetes mellitus (DM) 01/20/2014     Priority: Medium     Family history of thyroid disease 01/20/2014     Priority: Medium     Personal history of breast cancer 07/08/2013     Priority: Medium     S/P bilateral mastectomy 07/08/2013     Priority: Medium     Urge incontinence of urine 07/08/2013     Priority: Medium     Overweight (BMI 25.0-29.9) 07/08/2013     Priority: Medium     Mild recurrent major depression (H) 07/08/2013     Priority: Medium      Past Medical History:   Diagnosis Date     Asthma, mild intermittent      Breast cancer (H)      Depression      Endometriosis      Heart murmur     Patient reported.     PONV (postoperative nausea and vomiting)      Past Surgical History:   Procedure Laterality Date     ADENOIDECTOMY       COLONOSCOPY N/A 8/22/2014    Procedure: COLONOSCOPY;  Surgeon: Duane, William Charles, MD;  Location: [facility] OR     COSMETIC SURGERY       CYSTOSCOPY       HC REMOVAL OF ANAL FISSURE  2007     MASTECTOMY, SIMPLE RT/LT/MABLE       RECONSTRUCT BREAST BILATERAL  2008     TONSILLECTOMY       Current Outpatient Prescriptions   Medication Sig Dispense Refill     SLO-NIACIN PO Take 2 tablets by mouth At Bedtime       sertraline (ZOLOFT) 50 MG tablet Take 1 tablet (50 mg) by mouth daily 90 tablet 1     fluticasone (FLONASE) 50  MCG/ACT nasal spray Reported on 5/1/2017  0     Probiotic Product (PROBIOTIC DAILY PO) Take 1 capsule by mouth 2 times daily       Glucosamine-Chondroitin-MSM (TRIPLE FLEX PO) Take by mouth 2 times daily       Calcium Carbonate-Vitamin D (CALCIUM + D PO) Take by mouth 2 times daily       order for DME Compression sleeve to bilateral UE. 2 Units 6     Flaxseed, Linseed, (FLAX SEED OIL) 1000 MG capsule Take 2 capsules (2,000 mg) by mouth 2 times daily       SYNTHROID 88 MCG tablet Take 88 mcg by mouth Take once daily for 6 days and skip day 7  3     fluocinolone (SYNALAR) 0.025 % cream Apply topically 2 times daily Apply to affected areas of mouth as needed. 60 g 5     Coenzyme Q10 (CO Q 10) 100 MG CAPS Take 1 capsule by mouth daily        Multiple Vitamin (MULTI-VITAMIN PO) Take by mouth daily        MAGNESIUM PO Take 1 capsule by mouth daily        L-GLUTAMINE 1 capsule 2 times daily        ASPIRIN PO Take 81 mg by mouth daily       [DISCONTINUED] sertraline (ZOLOFT) 50 MG tablet Take 1 tablet (50 mg) by mouth daily 90 tablet 0     OTC products: None, except as noted above    Allergies   Allergen Reactions     Erythromycin Nausea      Latex Allergy: NO    Social History   Substance Use Topics     Smoking status: Never Smoker     Smokeless tobacco: Never Used     Alcohol use No     History   Drug Use No       REVIEW OF SYSTEMS:                                                    C: NEGATIVE for fever, chills, change in weight  I: NEGATIVE for worrisome rashes, moles or lesions  E: NEGATIVE for vision changes or irritation  E/M: NEGATIVE for ear, mouth and throat problems  R: NEGATIVE for significant cough or SOB  BREAST: History of left breast cancer-status post mastectomy  CV: NEGATIVE for chest pain, palpitations or peripheral edema  GI: NEGATIVE for nausea, abdominal pain, heartburn, or change in bowel habits  : NEGATIVE for frequency, dysuria, or hematuria  M: NEGATIVE for significant arthralgias or myalgia  N:  NEGATIVE for weakness, dizziness or paresthesias  E: NEGATIVE for temperature intolerance, skin/hair changes  ENDOCRINE: History of hypothyroidism  H: NEGATIVE for bleeding problems  P: NEGATIVE for changes in mood or affect  PSYCHIATRIC: History of depression    EXAM:                                                    /60  Pulse 91  Temp 97.7  F (36.5  C) (Oral)  Wt 144 lb (65.3 kg)  SpO2 97%  BMI 26.57 kg/m2    GENERAL APPEARANCE: healthy, alert and no distress     EYES: EOMI, PERRL     HENT: ear canals and TM's normal and nose and mouth without ulcers or lesions     NECK: no adenopathy, no asymmetry, masses, or scars and thyroid normal to palpation     RESP: lungs clear to auscultation - no rales, rhonchi or wheezes     CV: regular rates and rhythm, normal S1 S2, no S3 or S4 and no murmur, click or rub     ABDOMEN:  soft, nontender, no HSM or masses and bowel sounds normal     MS: extremities normal- no gross deformities noted, no evidence of inflammation in joints, FROM in all extremities.     SKIN: no suspicious lesions or rashes     NEURO: Normal strength and tone, sensory exam grossly normal, mentation intact and speech normal     PSYCH: mentation appears normal. and affect normal/bright     LYMPHATICS: No axillary, cervical, or supraclavicular nodes    DIAGNOSTICS:                                                    EKG: appears normal, NSR, normal axis, normal intervals, no acute ST/T changes c/w ischemia, no LVH by voltage criteria, unchanged from previous tracings  Lab Results   Component Value Date    WBC 8.4 05/23/2017     Lab Results   Component Value Date    RBC 4.38 05/23/2017     Lab Results   Component Value Date    HGB 13.5 05/23/2017     Lab Results   Component Value Date    HCT 39.6 05/23/2017     No components found for: MCT  Lab Results   Component Value Date    MCV 90 05/23/2017     Lab Results   Component Value Date    MCH 30.8 05/23/2017     Lab Results   Component Value Date     [facility] 34.1 05/23/2017     Lab Results   Component Value Date    RDW 13.3 05/23/2017     Lab Results   Component Value Date     05/23/2017         Recent Labs   Lab Test  05/23/17   1551  08/24/16   0913  05/03/12   HGB  13.5  14.1   < >   --    PLT  217  202   < >   --    POTASSIUM   --    --    --   4.4   CR  0.80  0.88   < >  0.84    < > = values in this interval not displayed.        IMPRESSION:                                                    Reason for surgery/procedure: Status post left mastectomy for left breast cancer//reconstruction  Diagnosis/reason for consult: Preoperative clearance    ASSESSMENT / PLAN:  (Z01.818) Preop general physical exam  (primary encounter diagnosis)  Comment:   Plan: EKG 12-lead complete w/read - Clinics        Patient is cleared for the procedure, recommended to hold Baby aspirin one week before the procedure    (Z90.13) S/P bilateral mastectomy  Comment:   Plan: For reconstruction    (Z85.3) Personal history of breast cancer  Comment:   Plan: as above    (F33.0) Mild recurrent major depression (H)  Comment:   Plan: sertraline (ZOLOFT) 50 MG tablet            (E03.9) Acquired hypothyroidism  Comment:   Plan: Continue current dose of levothyroxine        The proposed surgical procedure is considered LOW risk.    REVISED CARDIAC RISK INDEX  The patient has the following serious cardiovascular risks for perioperative complications such as (MI, PE, VFib and 3  AV Block):  No serious cardiac risks  INTERPRETATION: 0 risks: Class I (very low risk - 0.4% complication rate)    The patient has the following additional risks for perioperative complications:  No identified additional risks      ICD-10-CM    1. Preop general physical exam Z01.818 EKG 12-lead complete w/read - Clinics   2. S/P bilateral mastectomy Z90.13    3. Personal history of breast cancer Z85.3    4. Mild recurrent major depression (H) F33.0 sertraline (ZOLOFT) 50 MG tablet   5. Acquired hypothyroidism E03.9         RECOMMENDATIONS:                                                          --Patient is to take all scheduled medications on the day of surgery EXCEPT for modifications listed below.    APPROVAL GIVEN to proceed with proposed procedure, without further diagnostic evaluation       Signed Electronically by: Gabbie Hicks MD    Copy of this evaluation report is provided to requesting physician.    [facility] Preop Guidelines  Chart documentation done in part with Dragon Voice recognition Software. Although reviewed after completion, some word and grammatical error may remain.

## 2017-06-12 NOTE — MR AVS SNAPSHOT
After Visit Summary   6/12/2017    Chanel Israel    MRN: [id number]           Patient Information     Date Of Birth          1965        Visit Information        Provider Department      6/12/2017 2:40 PM Gabbie Hicks MD [facility]        Today's Diagnoses     Preop general physical exam    -  1    S/P bilateral mastectomy        Personal history of breast cancer        Mild recurrent major depression (H)        Acquired hypothyroidism          Care Instructions    Schedule for fasting labs and physical in 10/2017  Before Your Surgery      Call your surgeon if there is any change in your health. This includes signs of a cold or flu (such as a sore throat, runny nose, cough, rash or fever).    Do not smoke, drink alcohol or take over the counter medicine (unless your surgeon or primary care doctor tells you to) for the 24 hours before and after surgery.    If you take prescribed drugs: Follow your doctor s orders about which medicines to take and which to stop until after surgery.    Eating and drinking prior to surgery: follow the instructions from your surgeon    Take a shower or bath the night before surgery. Use the soap your surgeon gave you to gently clean your skin. If you do not have soap from your surgeon, use your regular soap. Do not shave or scrub the surgery site.  Wear clean pajamas and have clean sheets on your bed.           Follow-ups after your visit        Your next 10 appointments already scheduled     Jun 27, 2017  8:00 AM CDT   Lab visit with LAB FIRST FLOOR [facility] ([facility])    [address] [place] [address] [phone]           Please do not eat 10-12 hours before your appointment if you are coming in fasting for labs on lipids, cholesterol, or glucose (sugar). Does not apply to pregnant women.  Water with medications is okay. Do not drink coffee or other fluids.  If you  have concerns about taking  your medications, please send a message by clicking on Secure Messaging, Message Your Care Team.            Jul 24, 2017  9:40 AM CDT   MyChart Long with Gabbie Hicks MD   [facility])    [address] [place] [address] [phone]            May 24, 2018  1:15 PM CDT   LAB with LAB ONC [facility] ([facility])    [address] [place] [address] [phone]           Patient must bring picture ID.  Patient should be prepared to give a urine specimen  Please do not eat 10-12 hours before your appointment if you are coming in fasting for labs on lipids, cholesterol, or glucose (sugar).  Pregnant women should follow their Care Team instructions. Water with medications is okay. Do not drink coffee or other fluids.   If you have concerns about taking  your medications, please ask at office or if scheduling via [employer], send a message by clicking on Secure Messaging, Message Your Care Team.            May 24, 2018  2:00 PM CDT   Return Visit with Ghislaine Lopez MD   [facility])    [address] [place] [address] [phone]              Who to contact     If you have questions or need follow up information about today's clinic visit or your schedule please contact [facility] directly at [phone].  Normal or non-critical lab and imaging results will be communicated to you by MyChart, letter or phone within 4 business days after the clinic has received the results. If you do not hear from us within 7 days, please contact the clinic through [employer] or phone. If you have a critical or abnormal lab result, we will notify you by phone as soon as possible.  Submit refill requests through [employer] or call your pharmacy and they will forward the refill request to us. Please allow 3  business days for your refill to be completed.          Additional Information About Your Visit        [employer] Information     [email or web address] gives you secure access to your electronic health record. If you see a primary care provider, you can also send messages to your care team and make appointments. If you have questions, please call your primary care clinic.  If you do not have a primary care provider, please call [phone] and they will assist you.      [email or web address] is an electronic gateway that provides easy, online access to your medical records. With [email or web address], you can request a clinic appointment, read your test results, renew a prescription or communicate with your care team.     To access your existing account, please contact your [facility] or call [phone] for assistance.        Care EveryWhere ID     This is your Care EveryWhere ID. This could be used by other organizations to access your [place] medical records  [id number]        Your Vitals Were     Pulse Temperature Pulse Oximetry BMI (Body Mass Index)          91 97.7  F (36.5  C) (Oral) 97% 26.57 kg/m2         Blood Pressure from Last 3 Encounters:   06/12/17 104/60   05/23/17 120/64   05/01/17 122/54    Weight from Last 3 Encounters:   06/12/17 144 lb (65.3 kg)   05/23/17 145 lb (65.8 kg)   05/01/17 147 lb 3.2 oz (66.8 kg)              We Performed the Following     EKG 12-lead complete w/read - Clinics          Where to get your medicines      These medications were sent to [id number] - [facility] MYERS [facility] [place] AT [facility] [address] [place], [place] [address]     Phone:  [phone]     sertraline 50 MG tablet          Primary Care Provider Office Phone # Fax #    Gabbie Hicks [phone]       [facility] [address]  [facility] [address]        Thank you!     Thank you for choosing [facility]  for your care.  Our goal is always to provide you with excellent care. Hearing back from our patients is one way we can continue to improve our services. Please take a few minutes to complete the written survey that you may receive in the mail after your visit with us. Thank you!             Your Updated Medication List - Protect others around you: Learn how to safely use, store and throw away your medicines at www.disposemymeds.org.          This list is accurate as of: 6/12/17  3:22 PM.  Always use your most recent med list.                   Brand Name Dispense Instructions for use    ASPIRIN PO      Take 81 mg by mouth daily       CALCIUM + D PO      Take by mouth 2 times daily       Co Q 10 100 MG Caps      Take 1 capsule by mouth daily       flax seed oil 1000 MG capsule      Take 2 capsules (2,000 mg) by mouth 2 times daily       fluocinolone 0.025 % cream    SYNALAR    60 g    Apply topically 2 times daily Apply to affected areas of mouth as needed.       fluticasone 50 MCG/ACT spray    FLONASE     Reported on 5/1/2017       L-GLUTAMINE      1 capsule 2 times daily       MAGNESIUM PO      Take 1 capsule by mouth daily       MULTI-VITAMIN PO      Take by mouth daily       order for DME     2 Units    Compression sleeve to bilateral UE.       PROBIOTIC DAILY PO      Take 1 capsule by mouth 2 times daily       sertraline 50 MG tablet    ZOLOFT    90 tablet    Take 1 tablet (50 mg) by mouth daily       SLO-NIACIN PO      Take 2 tablets by mouth At Bedtime       SYNTHROID 88 MCG tablet   Generic drug:  levothyroxine      Take 88 mcg by mouth Take once daily for 6 days and skip day 7       TRIPLE FLEX PO      Take by mouth 2 times daily

## 2017-06-12 NOTE — LETTER
My Depression Action Plan  Name: Chanel Israel   Date of Birth 1965  Date: 6/12/2017    My doctor: Gabbie Hicks   My clinic: [address] [address] [phone]          GREEN    ZONE   Good Control    What it looks like:     Things are going generally well. You have normal up s and down s. You may even feel depressed from time to time, but bad moods usually last less than a day.   What you need to do:  1. Continue to care for yourself (see self care plan)  2. Check your depression survival kit and update it as needed  3. Follow your physician s recommendations including any medication.  4. Do not stop taking medication unless you consult with your physician first.           YELLOW         ZONE Getting Worse    What it looks like:     Depression is starting to interfere with your life.     It may be hard to get out of bed; you may be starting to isolate yourself from others.    Symptoms of depression are starting to last most all day and this has happened for several days.     You may have suicidal thoughts but they are not constant.   What you need to do:     1. Call your care team, your response to treatment will improve if you keep your care team informed of your progress. Yellow periods are signs an adjustment may need to be made.     2. Continue your self-care, even if you have to fake it!    3. Talk to someone in your support network    4. Open up your depression survival kit           RED    ZONE Medical Alert - Get Help    What it looks like:     Depression is seriously interfering with your life.     You may experience these or other symptoms: You can t get out of bed most days, can t work or engage in other necessary activities, you have trouble taking care of basic hygiene, or basic responsibilities, thoughts of suicide or death that will not go away, self-injurious behavior.     What you need to do:  1. Call your care team and  request a same-day appointment. If they are not available (weekends or after hours) call your local crisis line, emergency room or 911.      Electronically signed by: Gabbie Hicks, June 12, 2017    Depression Self Care Plan / Survival Kit    Self-Care for Depression  Here s the deal. Your body and mind are really not as separate as most people think.  What you do and think affects how you feel and how you feel influences what you do and think. This means if you do things that people who feel good do, it will help you feel better.  Sometimes this is all it takes.  There is also a place for medication and therapy depending on how severe your depression is, so be sure to consult with your medical provider and/ or Behavioral Health Consultant if your symptoms are worsening or not improving.     In order to better manage my stress, I will:    Exercise  Get some form of exercise, every day. This will help reduce pain and release endorphins, the  feel good  chemicals in your brain. This is almost as good as taking antidepressants!  This is not the same as joining a gym and then never going! (they count on that by the way ) It can be as simple as just going for a walk or doing some gardening, anything that will get you moving.      Hygiene   Maintain good hygiene (Get out of bed in the morning, Make your bed, Brush your teeth, Take a shower, and Get dressed like you were going to work, even if you are unemployed).  If your clothes don't fit try to get ones that do.    Diet  I will strive to eat foods that are good for me, drink plenty of water, and avoid excessive sugar, caffeine, alcohol, and other mood-altering substances.  Some foods that are helpful in depression are: complex carbohydrates, B vitamins, flaxseed, fish or fish oil, fresh fruits and vegetables.    Psychotherapy  I agree to participate in Individual Therapy (if recommended).    Medication  If prescribed medications, I agree to take them.  Missing  doses can result in serious side effects.  I understand that drinking alcohol, or other illicit drug use, may cause potential side effects.  I will not stop my medication abruptly without first discussing it with my provider.    Staying Connected With Others  I will stay in touch with my friends, family members, and my primary care provider/team.    Use your imagination  Be creative.  We all have a creative side; it doesn t matter if it s oil painting, sand castles, or mud pies! This will also kick up the endorphins.    Witness Beauty  (AKA stop and smell the roses) Take a look outside, even in mid-winter. Notice colors, textures. Watch the squirrels and birds.     Service to others  Be of service to others.  There is always someone else in need.  By helping others we can  get out of ourselves  and remember the really important things.  This also provides opportunities for practicing all the other parts of the program.    Humor  Laugh and be silly!  Adjust your TV habits for less news and crime-drama and more comedy.    Control your stress  Try breathing deep, massage therapy, biofeedback, and meditation. Find time to relax each day.     My support system    Clinic Contact:  Phone number:    Contact 1:  Phone number:    Contact 2:  Phone number:    [employer]/:  Phone number:    Therapist:  Phone number:    Local crisis center:    Phone number:    Other community support:  Phone number:

## 2017-06-13 ENCOUNTER — MYC MEDICAL ADVICE (OUTPATIENT)
Dept: PEDIATRICS | Facility: CLINIC | Age: 52
End: 2017-06-13

## 2017-06-13 ASSESSMENT — PATIENT HEALTH QUESTIONNAIRE - PHQ9: SUM OF ALL RESPONSES TO PHQ QUESTIONS 1-9: 2

## 2017-06-13 ASSESSMENT — ANXIETY QUESTIONNAIRES: GAD7 TOTAL SCORE: 0

## 2017-06-13 NOTE — TELEPHONE ENCOUNTER
Preop was faxed to hospital already with correct surgery date and hospital will be aware of surgery time change. No need to refax preop.    [facility] referral form completed and placed on Dr. Hicks's desk to review and enter number of visits information on form.    MA will send [employer] update to patient once form is completed and faxed.  Miah Anne, CMA

## 2017-06-13 NOTE — TELEPHONE ENCOUNTER
[facility] provider recommendation form faxed to : [phone].  Patient advised via my chart.    Nancy Mancilla CMA

## 2017-06-14 ENCOUNTER — MYC MEDICAL ADVICE (OUTPATIENT)
Dept: PEDIATRICS | Facility: CLINIC | Age: 52
End: 2017-06-14

## 2017-07-22 ENCOUNTER — OFFICE VISIT (OUTPATIENT)
Dept: URGENT CARE | Facility: URGENT CARE | Age: 52
End: 2017-07-22
Payer: COMMERCIAL

## 2017-07-22 VITALS
SYSTOLIC BLOOD PRESSURE: 116 MMHG | DIASTOLIC BLOOD PRESSURE: 73 MMHG | TEMPERATURE: 98.3 F | RESPIRATION RATE: 16 BRPM | OXYGEN SATURATION: 95 % | HEART RATE: 79 BPM | WEIGHT: 145.6 LBS | BODY MASS INDEX: 26.86 KG/M2

## 2017-07-22 DIAGNOSIS — L08.9: Primary | ICD-10-CM

## 2017-07-22 DIAGNOSIS — S40.812A: Primary | ICD-10-CM

## 2017-07-22 PROCEDURE — 99213 OFFICE O/P EST LOW 20 MIN: CPT | Performed by: FAMILY MEDICINE

## 2017-07-22 RX ORDER — KETOCONAZOLE 20 MG/G
CREAM TOPICAL 2 TIMES DAILY
Qty: 60 G | Refills: 3 | Status: SHIPPED | OUTPATIENT
Start: 2017-07-22 | End: 2018-06-28

## 2017-07-22 RX ORDER — SULFAMETHOXAZOLE/TRIMETHOPRIM 800-160 MG
1 TABLET ORAL 2 TIMES DAILY
Qty: 20 TABLET | Refills: 0 | Status: SHIPPED | OUTPATIENT
Start: 2017-07-22 | End: 2017-10-31

## 2017-07-22 RX ORDER — MUPIROCIN 20 MG/G
OINTMENT TOPICAL 2 TIMES DAILY
Qty: 22 G | Refills: 3 | Status: SHIPPED | OUTPATIENT
Start: 2017-07-22 | End: 2017-07-29

## 2017-07-22 NOTE — NURSING NOTE
"Chief Complaint   Patient presents with     Derm Problem     Rash under left arm x 1 day. Patient reports that she noticed the rash after shaving yesterday. The rash is red and raised. She has a history of lymphedema on left arm.       Initial /73  Pulse 79  Temp 98.3  F (36.8  C) (Oral)  Resp 16  Wt 145 lb 9.6 oz (66 kg)  SpO2 95%  BMI 26.86 kg/m2 Estimated body mass index is 26.86 kg/(m^2) as calculated from the following:    Height as of 5/23/17: 5' 1.73\" (1.568 m).    Weight as of this encounter: 145 lb 9.6 oz (66 kg).  Medication Reconciliation: complete   Izabella Bowser CMA      "

## 2017-07-22 NOTE — PATIENT INSTRUCTIONS
Understanding Impetigo  Impetigo is a common bacterial infection of the skin. It most often affects the face, arms, and legs. But it can appear on any part of the body. Anyone can have it, regardless of age. But it is most common in children. Impetigo is very contagious. This means it spreads easily to other people.  How to say it  [id number]   What causes impetigo?  Many types of bacteria live on normal, healthy skin. The bacteria usually don t cause problems. Impetigo happens when bacteria enter the skin through a scratch, break, sore, bite, or irritated spot. They then begin to grow out of control, leading to infection. There are two types of staphylococcus bacteria that cause impetigo. In certain cases, impetigo appears on skin that has no visible break. It may be more likely to occur on skin that has another skin problem, such as eczema. It may also be more common after a cold or other virus.  Symptoms of impetigo  Symptoms of this problem include:    Small, fluid-filled blisters on the skin that may itch, ooze, or crust    A yellow, honey-colored crust on the infected skin    Skin sores that spread with scratching    An itchy rash that spreads with scratching    Swollen lymph nodes  Treatment for impetigo  The goal is to treat the infection and prevent it from spreading to others.    You will likely be given an antibiotic to treat the infection. This may be a cream or ointment called muporicin to put on your skin. If the infection is severe or spreading, you may be given antibiotic medicine to take by mouth. Be sure to use this medicine as directed. Do not stop using it until you are told to stop, even if your skin gets better. If you stop too soon, the infection may come back and be harder to treat.    Avoid scratching or picking at your sores. It may help to cover affected areas with a bandage.    To prevent spreading the infection, wash your hands often. Avoid sharing personal items, towels, clothes,  pillows, and sheets with others. After each use, wash these items in hot water.    Clean the affected skin several times a day. Don t scrub. Instead, soak the area in warm, soapy water. This will help remove the crust that forms. For places that you can't soak, such as the face, place a clean, warm (not hot) washcloth on the affected area. Use a new washcloth and towel each time.  When to call your healthcare provider  Call your healthcare provider right away if you have any of these:    Fever of 100.4 F (38 C) or higher, or as directed    Increasing number of sores or spreading areas of redness after 2 days of treatment with antibiotics    Increasing swelling or pain    Increased amounts of fluid or pus coming from the sores    Unusual drowsiness, weakness, or change in behavior    Loss of appetite or vomiting   Date Last Reviewed: 5/1/2016 2000-2017 [employer]. [address] [place] [address]. All rights reserved. This information is not intended as a substitute for professional medical care. Always follow your healthcare professional's instructions.        Fungal Skin Infection (Tinea)  A fungal infection occurs when too much fungus grows on or in the body. Fungus normally lives on the skin in small amounts and does not cause harm. But when too much grows on the skin, it causes an infection. This is also known as tinea. Fungal skin infections are common and not usually serious.  The infection often starts as a small red area the size of a pea. The skin may turn dry and flaky. The area may itch. As the fungus grows, it spreads out in a red [place]. Because of how it looks, fungal skin infection is often called ringworm, but it is not caused by a worm. Fungal skin infections can occur on many parts of the body. They can grow on the head, chest, arms, or legs. They can occur on the buttocks. On the feet, fungal infection is known as  athlete s foot.  It causes itchy, sometimes painful  sores between the toes and the bottom or sides of the feet. In the groin, the rash is called  jock itch.   People with weak immune systems can get a fungal infection more easily. This includes people with diabetes or HIV, or who are being treated for cancer. In these cases, the fungal infection can spread and cause severe illness. Fungal infections are also more common in people who are overweight.  In most cases, treatment is done with antifungal cream or ointment. If the infection is on your scalp, you may take oral medicine. In some cases, a tiny piece of the skin (biopsy) may be taken. This is so it can be tested in a lab.  Common fungal infections are treated with creams on the skin or oral medicine.  Home care  Follow all instructions when using antifungal cream or ointment on your skin. Your healthcare provider may advise using cornstarch powder to keep your skin dry or petroleum jelly to provide a barrier.  General care:    If you were prescribed an oral medicine, read the patient information. Talk with your healthcare provider about the risks and side effects.    Let your skin dry completely after bathing. Carefully dry your feet and between your toes.    Dress in loose cotton clothing.    Don t scratch the affected area. This can delay healing and may spread the infection. It can also cause a bacterial infection.    Keep your skin clean, but don t wash the skin too much. This can irritate your skin.    Keep in mind that it may take a week before the fungus starts to go away. It can take 2 to 4 weeks to fully clear. To prevent it from coming back, use the medicine until the rash is all gone.  Follow-up care  Follow up with your healthcare provider if the rash does not get better after 10 days of treatment. Also follow up if the rash spreads to other parts of your body.  When to seek medical advice  Call your healthcare provider right away if any of these occur:    Fever of 100.4 F (38 C) or  higher    Redness or swelling that gets worse    Pain that gets worse    Foul-smelling fluid leaking from the skin  Date Last Reviewed: 11/1/2016 2000-2017 The "[employer]. [address] [place] [address], [place] [address]. All rights reserved. This information is not intended as a substitute for professional medical care. Always follow your healthcare professional's instructions.

## 2017-07-22 NOTE — PROGRESS NOTES
SUBJECTIVE:  Chief Complaint   Patient presents with     Derm Problem     Rash under left arm x 1 day. Patient reports that she noticed the rash after shaving yesterday. The rash is red and raised. She has a history of lymphedema on left arm.       Chanel Israel is a 52 year old female who presents to the clinic today for a rash.  Onset of rash was 1 day(s) ago.   Rash is sudden onset, still present and constant.  Location of the rash: left axilla.  Quality/symptoms of rash: itching, burning and redness   Symptoms are moderate and rash seems to be worsening.  Previous history of a similar rash? No  Recent exposure history: none known-  Started after shaving the axilla  Associated symptoms include: nothing.  Patient denies: fever, throat tightness, wheezing, cough, sore throat and URI symptoms.    Past Medical History:   Diagnosis Date     Asthma, mild intermittent      Breast cancer (H)      Depression      Endometriosis      Heart murmur     Patient reported.     PONV (postoperative nausea and vomiting)        ALLERGIES:  Erythromycin      Current Outpatient Prescriptions on File Prior to Visit:  SLO-NIACIN PO Take 2 tablets by mouth At Bedtime   ASPIRIN PO Take 81 mg by mouth daily   sertraline (ZOLOFT) 50 MG tablet Take 1 tablet (50 mg) by mouth daily   fluticasone (FLONASE) 50 MCG/ACT nasal spray Reported on 5/1/2017   Probiotic Product (PROBIOTIC DAILY PO) Take 1 capsule by mouth 2 times daily   Glucosamine-Chondroitin-MSM (TRIPLE FLEX PO) Take by mouth 2 times daily   Calcium Carbonate-Vitamin D (CALCIUM + D PO) Take by mouth 2 times daily   order for DME Compression sleeve to bilateral UE.   Flaxseed, Linseed, (FLAX SEED OIL) 1000 MG capsule Take 2 capsules (2,000 mg) by mouth 2 times daily   SYNTHROID 88 MCG tablet Take 88 mcg by mouth Take once daily for 6 days and skip day 7   fluocinolone (SYNALAR) 0.025 % cream Apply topically 2 times daily Apply to affected areas of mouth as needed.   Coenzyme  Q10 (CO Q 10) 100 MG CAPS Take 1 capsule by mouth daily    Multiple Vitamin (MULTI-VITAMIN PO) Take by mouth daily    MAGNESIUM PO Take 1 capsule by mouth daily    L-GLUTAMINE 1 capsule 2 times daily      No current facility-administered medications on file prior to visit.     Social History   Substance Use Topics     Smoking status: Never Smoker     Smokeless tobacco: Never Used     Alcohol use No       Family History   Problem Relation Age of Onset     DIABETES Mother      Thyroid Disease Mother      Glaucoma Mother      Macular Degeneration Mother      DIABETES Sister      Thyroid Disease Sister      Depression Sister      Endometrial Cancer Sister 51     CANCER Father      skin cancer     Glaucoma Father      Coronary Artery Disease Brother       at 43         ROS:  EYES: NEGATIVE for vision changes or irritation  ENT/MOUTH: NEGATIVE for ear, mouth and throat problems  RESP:NEGATIVE for significant cough or SOB  GI: NEGATIVE for nausea, abdominal pain, heartburn, or change in bowel habits    EXAM:   /73  Pulse 79  Temp 98.3  F (36.8  C) (Oral)  Resp 16  Wt 145 lb 9.6 oz (66 kg)  SpO2 95%  BMI 26.86 kg/m2  GENERAL: alert, no acute distress.  SKIN: Rash description:    Distribution: localized  Location: axilla  left  Color: red,  Lesion type: papular, patch, round, confluent with warmth and inflammation  GENERAL APPEARANCE: healthy, alert and no distress  EYES: EOMI,  PERRL, conjunctiva clear  NECK: supple, non-tender to palpation, no adenopathy noted  RESP: lungs clear to auscultation - no rales, rhonchi or wheezes  CV: regular rates and rhythm, normal S1 S2, no murmur noted    ASSESSMENT:  Abrasion of left axilla with infection, initial encounter      - sulfamethoxazole-trimethoprim (BACTRIM DS/SEPTRA DS) 800-160 MG per tablet; Take 1 tablet by mouth 2 times daily  - mupirocin (BACTROBAN) 2 % ointment; Apply topically 2 times daily for 7 days  - ketoconazole (NIZORAL) 2 % cream; Apply topically 2  times daily        Follow-up with primary clinic if not improving

## 2017-07-22 NOTE — MR AVS SNAPSHOT
After Visit Summary   7/22/2017    Chanel Israel    MRN: [id number]           Patient Information     Date Of Birth          1965        Visit Information        Provider Department      7/22/2017 1:05 PM Belem Borja MD [facility]        Today's Diagnoses     Abrasion of left axilla with infection, initial encounter    -  1      Care Instructions      Understanding Impetigo  Impetigo is a common bacterial infection of the skin. It most often affects the face, arms, and legs. But it can appear on any part of the body. Anyone can have it, regardless of age. But it is most common in children. Impetigo is very contagious. This means it spreads easily to other people.  How to say it  [id number]   What causes impetigo?  Many types of bacteria live on normal, healthy skin. The bacteria usually don t cause problems. Impetigo happens when bacteria enter the skin through a scratch, break, sore, bite, or irritated spot. They then begin to grow out of control, leading to infection. There are two types of staphylococcus bacteria that cause impetigo. In certain cases, impetigo appears on skin that has no visible break. It may be more likely to occur on skin that has another skin problem, such as eczema. It may also be more common after a cold or other virus.  Symptoms of impetigo  Symptoms of this problem include:    Small, fluid-filled blisters on the skin that may itch, ooze, or crust    A yellow, honey-colored crust on the infected skin    Skin sores that spread with scratching    An itchy rash that spreads with scratching    Swollen lymph nodes  Treatment for impetigo  The goal is to treat the infection and prevent it from spreading to others.    You will likely be given an antibiotic to treat the infection. This may be a cream or ointment called muporicin to put on your skin. If the infection is severe or spreading, you may be given antibiotic medicine to take by  mouth. Be sure to use this medicine as directed. Do not stop using it until you are told to stop, even if your skin gets better. If you stop too soon, the infection may come back and be harder to treat.    Avoid scratching or picking at your sores. It may help to cover affected areas with a bandage.    To prevent spreading the infection, wash your hands often. Avoid sharing personal items, towels, clothes, pillows, and sheets with others. After each use, wash these items in hot water.    Clean the affected skin several times a day. Don t scrub. Instead, soak the area in warm, soapy water. This will help remove the crust that forms. For places that you can't soak, such as the face, place a clean, warm (not hot) washcloth on the affected area. Use a new washcloth and towel each time.  When to call your healthcare provider  Call your healthcare provider right away if you have any of these:    Fever of 100.4 F (38 C) or higher, or as directed    Increasing number of sores or spreading areas of redness after 2 days of treatment with antibiotics    Increasing swelling or pain    Increased amounts of fluid or pus coming from the sores    Unusual drowsiness, weakness, or change in behavior    Loss of appetite or vomiting   Date Last Reviewed: 5/1/2016 2000-2017 [employer]. [address] [place] [address], [place] [id number]. All rights reserved. This information is not intended as a substitute for professional medical care. Always follow your healthcare professional's instructions.        Fungal Skin Infection (Tinea)  A fungal infection occurs when too much fungus grows on or in the body. Fungus normally lives on the skin in small amounts and does not cause harm. But when too much grows on the skin, it causes an infection. This is also known as tinea. Fungal skin infections are common and not usually serious.  The infection often starts as a small red area the size of a pea. The skin may turn dry and flaky. The  area may itch. As the fungus grows, it spreads out in a red [place]. Because of how it looks, fungal skin infection is often called ringworm, but it is not caused by a worm. Fungal skin infections can occur on many parts of the body. They can grow on the head, chest, arms, or legs. They can occur on the buttocks. On the feet, fungal infection is known as  athlete s foot.  It causes itchy, sometimes painful sores between the toes and the bottom or sides of the feet. In the groin, the rash is called  jock itch.   People with weak immune systems can get a fungal infection more easily. This includes people with diabetes or HIV, or who are being treated for cancer. In these cases, the fungal infection can spread and cause severe illness. Fungal infections are also more common in people who are overweight.  In most cases, treatment is done with antifungal cream or ointment. If the infection is on your scalp, you may take oral medicine. In some cases, a tiny piece of the skin (biopsy) may be taken. This is so it can be tested in a lab.  Common fungal infections are treated with creams on the skin or oral medicine.  Home care  Follow all instructions when using antifungal cream or ointment on your skin. Your healthcare provider may advise using cornstarch powder to keep your skin dry or petroleum jelly to provide a barrier.  General care:    If you were prescribed an oral medicine, read the patient information. Talk with your healthcare provider about the risks and side effects.    Let your skin dry completely after bathing. Carefully dry your feet and between your toes.    Dress in loose cotton clothing.    Don t scratch the affected area. This can delay healing and may spread the infection. It can also cause a bacterial infection.    Keep your skin clean, but don t wash the skin too much. This can irritate your skin.    Keep in mind that it may take a week before the fungus starts to go away. It can take 2 to 4 weeks to  fully clear. To prevent it from coming back, use the medicine until the rash is all gone.  Follow-up care  Follow up with your healthcare provider if the rash does not get better after 10 days of treatment. Also follow up if the rash spreads to other parts of your body.  When to seek medical advice  Call your healthcare provider right away if any of these occur:    Fever of 100.4 F (38 C) or higher    Redness or swelling that gets worse    Pain that gets worse    Foul-smelling fluid leaking from the skin  Date Last Reviewed: 11/1/2016 2000-2017 [employer]. [address] [place] [address]. All rights reserved. This information is not intended as a substitute for professional medical care. Always follow your healthcare professional's instructions.                Follow-ups after your visit        Your next 10 appointments already scheduled     May 24, 2018  3:45 PM CDT   LAB with LAB ONC [facility] ([facility])    [address] [address] [place] [address] [phone]           Patient must bring picture ID.  Patient should be prepared to give a urine specimen  Please do not eat 10-12 hours before your appointment if you are coming in fasting for labs on lipids, cholesterol, or glucose (sugar).  Pregnant women should follow their Care Team instructions. Water with medications is okay. Do not drink coffee or other fluids.   If you have concerns about taking  your medications, please ask at office or if scheduling via [email or web address], send a message by clicking on Secure Messaging, Message Your Care Team.            May 24, 2018  4:30 PM CDT   Return Visit with Ghislaine Lopez MD   [facility])    [address] [facility] [address] [phone]              Who to contact     If you have questions or need follow up information about today's clinic visit or your schedule please contact  [facility] directly at [phone].  Normal or non-critical lab and imaging results will be communicated to you by MyChart, letter or phone within 4 business days after the clinic has received the results. If you do not hear from us within 7 days, please contact the clinic through [employer] or phone. If you have a critical or abnormal lab result, we will notify you by phone as soon as possible.  Submit refill requests through [employer] or call your pharmacy and they will forward the refill request to us. Please allow 3 business days for your refill to be completed.          Additional Information About Your Visit        [employer] Information     [employer] gives you secure access to your electronic health record. If you see a primary care provider, you can also send messages to your care team and make appointments. If you have questions, please call your primary care clinic.  If you do not have a primary care provider, please call [phone] and they will assist you.        Care EveryWhere ID     This is your Care EveryWhere ID. This could be used by other organizations to access your [facility] medical records  [id number]        Your Vitals Were     Pulse Temperature Respirations Pulse Oximetry BMI (Body Mass Index)       79 98.3  F (36.8  C) (Oral) 16 95% 26.86 kg/m2        Blood Pressure from Last 3 Encounters:   07/22/17 116/73   06/12/17 104/60   05/23/17 120/64    Weight from Last 3 Encounters:   07/22/17 145 lb 9.6 oz (66 kg)   06/12/17 144 lb (65.3 kg)   05/23/17 145 lb (65.8 kg)              Today, you had the following     No orders found for display         Today's Medication Changes          These changes are accurate as of: 7/22/17  1:27 PM.  If you have any questions, ask your nurse or doctor.               Start taking these medicines.        Dose/Directions    ketoconazole 2 % cream   Commonly known as:  NIZORAL   Used for:  Abrasion of left axilla with infection, initial encounter    Started by:  Belem Borja MD        Apply topically 2 times daily   Quantity:  60 g   Refills:  3       mupirocin 2 % ointment   Commonly known as:  BACTROBAN   Used for:  Abrasion of left axilla with infection, initial encounter   Started by:  Belem Borja MD        Apply topically 2 times daily for 7 days   Quantity:  22 g   Refills:  3       sulfamethoxazole-trimethoprim 800-160 MG per tablet   Commonly known as:  BACTRIM DS/SEPTRA DS   Used for:  Abrasion of left axilla with infection, initial encounter   Started by:  Belem Borja MD        Dose:  1 tablet   Take 1 tablet by mouth 2 times daily   Quantity:  20 tablet   Refills:  0            Where to get your medicines      These medications were sent to [facility] [address] [place] [address] [place] - [address] [place] AT [facility] [address]  [address] [place] [address]     Phone:  [phone]     ketoconazole 2 % cream    mupirocin 2 % ointment    sulfamethoxazole-trimethoprim 800-160 MG per tablet                Primary Care Provider Office Phone # Fax #    Gabbie Hicks [phone]       [facility] [address]  [facility] [address]        Equal Access to Services     MIRIAM KOHLER AH: Hadii aad ku hadasho Soomaali, waaxda luqadaha, qaybta kaalmada adeegyada, waxay idiin hayaan ademerrill kharash luba . So [facility] [phone].    ATENCIÓN: Si habla español, tiene a ortiz disposición servicios gratuitos de asistencia lingüística. [facility] [phone].    We comply with applicable federal civil rights laws and [place] laws. We do not discriminate on the basis of race, color, national origin, age, disability sex, sexual orientation or gender identity.            Thank you!     Thank you for choosing [facility]  for your care. Our goal is always to provide you with excellent care. Hearing back from our patients is one way we can continue to improve our services. Please  take a few minutes to complete the written survey that you may receive in the mail after your visit with us. Thank you!             Your Updated Medication List - Protect others around you: Learn how to safely use, store and throw away your medicines at www.disposemymeds.org.          This list is accurate as of: 7/22/17  1:27 PM.  Always use your most recent med list.                   Brand Name Dispense Instructions for use Diagnosis    ASPIRIN PO      Take 81 mg by mouth daily        CALCIUM + D PO      Take by mouth 2 times daily        Co Q 10 100 MG Caps      Take 1 capsule by mouth daily        flax seed oil 1000 MG capsule      Take 2 capsules (2,000 mg) by mouth 2 times daily        fluocinolone 0.025 % cream    SYNALAR    60 g    Apply topically 2 times daily Apply to affected areas of mouth as needed.    Perleche       fluticasone 50 MCG/ACT spray    FLONASE     Reported on 5/1/2017        ketoconazole 2 % cream    NIZORAL    60 g    Apply topically 2 times daily    Abrasion of left axilla with infection, initial encounter       L-GLUTAMINE      1 capsule 2 times daily        MAGNESIUM PO      Take 1 capsule by mouth daily        MULTI-VITAMIN PO      Take by mouth daily        mupirocin 2 % ointment    BACTROBAN    22 g    Apply topically 2 times daily for 7 days    Abrasion of left axilla with infection, initial encounter       order for DME     2 Units    Compression sleeve to bilateral UE.    S/P bilateral mastectomy, Lymphedema       PROBIOTIC DAILY PO      Take 1 capsule by mouth 2 times daily        sertraline 50 MG tablet    ZOLOFT    90 tablet    Take 1 tablet (50 mg) by mouth daily    Mild recurrent major depression (H)       SLO-NIACIN PO      Take 2 tablets by mouth At Bedtime        sulfamethoxazole-trimethoprim 800-160 MG per tablet    BACTRIM DS/SEPTRA DS    20 tablet    Take 1 tablet by mouth 2 times daily    Abrasion of left axilla with infection, initial encounter       SYNTHROID 88 MCG  tablet   Generic drug:  levothyroxine      Take 88 mcg by mouth Take once daily for 6 days and skip day 7        TRIPLE FLEX PO      Take by mouth 2 times daily

## 2017-09-01 ENCOUNTER — OFFICE VISIT (OUTPATIENT)
Dept: OPTOMETRY | Facility: CLINIC | Age: 52
End: 2017-09-01
Payer: COMMERCIAL

## 2017-09-01 DIAGNOSIS — H52.223 REGULAR ASTIGMATISM OF BOTH EYES: ICD-10-CM

## 2017-09-01 DIAGNOSIS — H25.13 AGE-RELATED NUCLEAR CATARACT OF BOTH EYES: ICD-10-CM

## 2017-09-01 DIAGNOSIS — H40.003 GLAUCOMA SUSPECT, BILATERAL: ICD-10-CM

## 2017-09-01 DIAGNOSIS — H52.13 MYOPIA, BILATERAL: Primary | ICD-10-CM

## 2017-09-01 PROCEDURE — 92015 DETERMINE REFRACTIVE STATE: CPT | Performed by: OPTOMETRIST

## 2017-09-01 PROCEDURE — 92014 COMPRE OPH EXAM EST PT 1/>: CPT | Performed by: OPTOMETRIST

## 2017-09-01 ASSESSMENT — VISUAL ACUITY
METHOD: SNELLEN - LINEAR
OD_CC: 20/20
OS_CC: 20/40
CORRECTION_TYPE: GLASSES
OD_CC: 20/30
OS_SC: 20/20
OD_SC: 20/25
OS_CC: 20/20

## 2017-09-01 ASSESSMENT — REFRACTION_WEARINGRX
OD_SPHERE: -1.25
OD_SPHERE: -1.50
OD_CYLINDER: +0.50
OD_AXIS: 172
OS_AXIS: 176
OS_CYLINDER: +0.25
OD_ADD: +2.25
OS_SPHERE: -0.75
OS_ADD: +2.25
OS_SPHERE: -0.75
OS_ADD: +2.25
OS_CYLINDER: +0.25
OS_AXIS: 010
SPECS_TYPE: AUTO/PAL
OD_ADD: +2.25
OD_AXIS: 162
OD_CYLINDER: +0.50

## 2017-09-01 ASSESSMENT — TONOMETRY
OD_IOP_MMHG: 18
IOP_METHOD: TONOPEN
OS_IOP_MMHG: 20

## 2017-09-01 ASSESSMENT — EXTERNAL EXAM - RIGHT EYE: OD_EXAM: NORMAL

## 2017-09-01 ASSESSMENT — CONF VISUAL FIELD
OD_NORMAL: 1
METHOD: COUNTING FINGERS
OS_NORMAL: 1

## 2017-09-01 ASSESSMENT — CUP TO DISC RATIO
OD_RATIO: .5/.4
OS_RATIO: 0.65

## 2017-09-01 ASSESSMENT — REFRACTION_MANIFEST
OS_ADD: +2.25
OD_AXIS: 172
OD_SPHERE: -1.00
OS_CYLINDER: +0.25
OS_AXIS: 010
OS_SPHERE: -0.50
OD_ADD: +2.25
OD_CYLINDER: +0.50

## 2017-09-01 ASSESSMENT — EXTERNAL EXAM - LEFT EYE: OS_EXAM: NORMAL

## 2017-09-01 ASSESSMENT — SLIT LAMP EXAM - LIDS
COMMENTS: MEIBOMIAN GLAND DYSFUNCTION
COMMENTS: MEIBOMIAN GLAND DYSFUNCTION

## 2017-09-01 NOTE — PROGRESS NOTES
Chief Complaint   Patient presents with     COMPREHENSIVE EYE EXAM         Last Eye Exam: 1 year  Dilated Previously: Yes    What are you currently using to see?  glasses       Distance Vision Acuity: Satisfied with vision    Near Vision Acuity: Not satisfied     Eye Comfort: good  Do you use eye drops? : No  Occupation or Hobbies: special     [employer]of[employer]          Medical, surgical and family histories reviewed and updated 9/1/2017.       OBJECTIVE: See Ophthalmology exam    ASSESSMENT:    ICD-10-CM    1. Myopia, bilateral H52.13 REFRACTION   2. Regular astigmatism of both eyes H52.223 REFRACTION   3. Age-related nuclear cataract of both eyes H25.13 EYE EXAM (SIMPLE-NONBILLABLE)   4. Glaucoma suspect, bilateral H40.003 EYE EXAM (SIMPLE-NONBILLABLE)      PLAN:     Patient Instructions   Recommend new glasses.    You have the start of mild cataracts.  You may notice some blurred vision or glare with night driving.  It is important that you wear good sunglasses to protect your eyes from the ultraviolet light from the sun.  I recommend that you return in 1 year for an eye exam unless there are any sudden changes in your vision.    You are a glaucoma suspect.  We will continue to monitor your intraocular pressures and optic nerves.  Return for OCT/Visual Field.    Return in 1 year for a complete eye exam or sooner if needed.    Jeet Mccabe, OD

## 2017-09-01 NOTE — MR AVS SNAPSHOT
After Visit Summary   9/1/2017    Chanel Israel    MRN: [id number]           Patient Information     Date Of Birth          1965        Visit Information        Provider Department      9/1/2017 1:30 PM Jeet Mccabe OD [facility]        Today's Diagnoses     Myopia, bilateral    -  1    Regular astigmatism of both eyes        Age-related nuclear cataract of both eyes        Glaucoma suspect, bilateral          Care Instructions    Recommend new glasses.    You have the start of mild cataracts.  You may notice some blurred vision or glare with night driving.  It is important that you wear good sunglasses to protect your eyes from the ultraviolet light from the sun.  I recommend that you return in 1 year for an eye exam unless there are any sudden changes in your vision.    You are a glaucoma suspect.  We will continue to monitor your intraocular pressures and optic nerves.  Return for OCT/Visual Field.    Return in 1 year for a complete eye exam or sooner if needed.    Jeet Mccabe OD            Follow-ups after your visit        Your next 10 appointments already scheduled     Oct 20, 2017  9:30 AM CDT   MyChart Physical Adult with Gabbie Hicks MD   [facility])    [address] [place] [address] [phone]            May 24, 2018  3:45 PM CDT   LAB with LAB ONC [facility])    [address] [place] [address] [phone]           Patient must bring picture ID. Patient should be prepared to give a urine specimen  Please do not eat 10-12 hours before your appointment if you are coming in fasting for labs on lipids, cholesterol, or glucose (sugar). Pregnant women should follow their Care Team instructions. Water with medications is okay. Do not drink coffee or other fluids. If you have concerns about taking  your medications,  please ask at office or if scheduling via [employer], send a message by clicking on Secure Messaging, Message Your Care Team.            May 24, 2018  4:30 PM CDT   Return Visit with Ghislaine Lopez MD   [facility] ([facility])    [address] [address] [place] [address] [phone]              Who to contact     If you have questions or need follow up information about today's clinic visit or your schedule please contact [facility] directly at [phone].  Normal or non-critical lab and imaging results will be communicated to you by Onarohart, letter or phone within 4 business days after the clinic has received the results. If you do not hear from us within 7 days, please contact the clinic through [employer] or phone. If you have a critical or abnormal lab result, we will notify you by phone as soon as possible.  Submit refill requests through [employer] or call your pharmacy and they will forward the refill request to us. Please allow 3 business days for your refill to be completed.          Additional Information About Your Visit        [employer]aroharIn [employer] Information     [employer] gives you secure access to your electronic health record. If you see a primary care provider, you can also send messages to your care team and make appointments. If you have questions, please call your primary care clinic.  If you do not have a primary care provider, please call [phone] and they will assist you.      [employer] is an electronic gateway that provides easy, online access to your medical records. With [employer], you can request a clinic appointment, read your test results, renew a prescription or communicate with your care team.     To access your existing account, please contact your [facility] or call [phone] for assistance.        Care EveryWhere ID     This is your Care EveryWhere ID. This could be used by other organizations to access  your [facility] medical records  [id number]         Blood Pressure from Last 3 Encounters:   07/22/17 116/73   06/12/17 104/60   05/23/17 120/64    Weight from Last 3 Encounters:   07/22/17 66 kg (145 lb 9.6 oz)   06/12/17 65.3 kg (144 lb)   05/23/17 65.8 kg (145 lb)              We Performed the Following     EYE EXAM (SIMPLE-NONBILLABLE)     REFRACTION        Primary Care Provider Office Phone # Fax #    Gabbie Hicks [phone]       [address]  [facility] [address]        Equal Access to Services     [facility]: Hadii aad ku hadasho Soadalgisaali, waaxda luqadaha, qaybta kaalmada ademerrillyada, ezio verduzco . So [facility] [phone].    ATENCIÓN: Si habla español, tiene a ortiz disposición servicios gratuitos de asistencia lingüística. Llame al [phone].    We comply with applicable federal civil rights laws and [place] laws. We do not discriminate on the basis of race, color, national origin, age, disability sex, sexual orientation or gender identity.            Thank you!     Thank you for choosing [facility]  for your care. Our goal is always to provide you with excellent care. Hearing back from our patients is one way we can continue to improve our services. Please take a few minutes to complete the written survey that you may receive in the mail after your visit with us. Thank you!             Your Updated Medication List - Protect others around you: Learn how to safely use, store and throw away your medicines at www.disposemymeds.org.          This list is accurate as of: 9/1/17  2:43 PM.  Always use your most recent med list.                   Brand Name Dispense Instructions for use Diagnosis    ASPIRIN PO      Take 81 mg by mouth daily        CALCIUM + D PO      Take by mouth 2 times daily        Co Q 10 100 MG Caps      Take 1 capsule by mouth daily        flax seed oil 1000 MG capsule      Take 2 capsules (2,000 mg) by mouth 2 times  daily        fluocinolone 0.025 % cream    SYNALAR    60 g    Apply topically 2 times daily Apply to affected areas of mouth as needed.    Perleche       fluticasone 50 MCG/ACT spray    FLONASE     Reported on 5/1/2017        ketoconazole 2 % cream    NIZORAL    60 g    Apply topically 2 times daily    Abrasion of left axilla with infection, initial encounter       L-GLUTAMINE      1 capsule 2 times daily        MAGNESIUM PO      Take 1 capsule by mouth daily        MULTI-VITAMIN PO      Take by mouth daily        order for DME     2 Units    Compression sleeve to bilateral UE.    S/P bilateral mastectomy, Lymphedema       PROBIOTIC DAILY PO      Take 1 capsule by mouth 2 times daily        sertraline 50 MG tablet    ZOLOFT    90 tablet    Take 1 tablet (50 mg) by mouth daily    Mild recurrent major depression (H)       SLO-NIACIN PO      Take 2 tablets by mouth At Bedtime        sulfamethoxazole-trimethoprim 800-160 MG per tablet    BACTRIM DS/SEPTRA DS    20 tablet    Take 1 tablet by mouth 2 times daily    Abrasion of left axilla with infection, initial encounter       SYNTHROID 88 MCG tablet   Generic drug:  levothyroxine      Take 88 mcg by mouth Take once daily for 6 days and skip day 7        TRIPLE FLEX PO      Take by mouth 2 times daily

## 2017-10-05 ENCOUNTER — ALLIED HEALTH/NURSE VISIT (OUTPATIENT)
Dept: PEDIATRICS | Facility: CLINIC | Age: 52
End: 2017-10-05
Payer: COMMERCIAL

## 2017-10-05 DIAGNOSIS — Z23 NEED FOR PROPHYLACTIC VACCINATION AND INOCULATION AGAINST INFLUENZA: Primary | ICD-10-CM

## 2017-10-05 PROCEDURE — 90686 IIV4 VACC NO PRSV 0.5 ML IM: CPT

## 2017-10-05 PROCEDURE — 90471 IMMUNIZATION ADMIN: CPT

## 2017-10-05 PROCEDURE — 99207 ZZC NO CHARGE NURSE ONLY: CPT

## 2017-10-05 NOTE — PROGRESS NOTES
Injectable Influenza Immunization Documentation    1.  Is the person to be vaccinated sick today?   No    2. Does the person to be vaccinated have an allergy to a component   of the vaccine?   No    3. Has the person to be vaccinated ever had a serious reaction   to influenza vaccine in the past?   No    4. Has the person to be vaccinated ever had Guillain-Barré syndrome?   No    Form completed by Nancy Mancilla CMA

## 2017-10-05 NOTE — MR AVS SNAPSHOT
After Visit Summary   10/5/2017    Chanel Israel    MRN: [id number]           Patient Information     Date Of Birth          1965        Visit Information        Provider Department      10/5/2017 4:40 PM [facility]        Today's Diagnoses     Need for prophylactic vaccination and inoculation against influenza    -  1       Follow-ups after your visit        Your next 10 appointments already scheduled     Oct 31, 2017  7:30 AM CDT   MyChart Physical Adult with Gabbie Hicks MD   [facility])    [id number] [place] [address] [phone]            May 24, 2018  3:45 PM CDT   LAB with LAB ONC Replaced by [facility] ([facility])    [address] [place] [address] [phone]           Patient must bring picture ID. Patient should be prepared to give a urine specimen  Please do not eat 10-12 hours before your appointment if you are coming in fasting for labs on lipids, cholesterol, or glucose (sugar). Pregnant women should follow their Care Team instructions. Water with medications is okay. Do not drink coffee or other fluids. If you have concerns about taking  your medications, please ask at office or if scheduling via [employer], send a message by clicking on Secure Messaging, Message Your Care Team.            May 24, 2018  4:30 PM CDT   Return Visit with Ghislaine Lopez MD   [facility])    [address] [place] [address] [phone]              Who to contact     If you have questions or need follow up information about today's clinic visit or your schedule please contact [facility] directly at [phone].  Normal or non-critical lab and imaging results will be communicated to you by MyChart, letter or phone within 4 business days after the clinic has  received the results. If you do not hear from us within 7 days, please contact the clinic through [employer] or phone. If you have a critical or abnormal lab result, we will notify you by phone as soon as possible.  Submit refill requests through [employer] or call your pharmacy and they will forward the refill request to us. Please allow 3 business days for your refill to be completed.          Additional Information About Your Visit        [employer] Information     [employer] gives you secure access to your electronic health record. If you see a primary care provider, you can also send messages to your care team and make appointments. If you have questions, please call your primary care clinic.  If you do not have a primary care provider, please call [phone] and they will assist you.      [employer] is an electronic gateway that provides easy, online access to your medical records. With [employer], you can request a clinic appointment, read your test results, renew a prescription or communicate with your care team.     To access your existing account, please contact your [facility] or call [phone] for assistance.        Care EveryWhere ID     This is your Care EveryWhere ID. This could be used by other organizations to access your [place] medical records  [id number]         Blood Pressure from Last 3 Encounters:   07/22/17 116/73   06/12/17 104/60   05/23/17 120/64    Weight from Last 3 Encounters:   07/22/17 145 lb 9.6 oz (66 kg)   06/12/17 144 lb (65.3 kg)   05/23/17 145 lb (65.8 kg)              We Performed the Following     FLU VAC, SPLIT VIRUS IM > 3 YO (QUADRIVALENT) [88516]     Vaccine Administration, Initial [89885]        Primary Care Provider Office Phone # Fax #    Gabbie Hicks [phone] [address]  [facility] [address]        Equal Access to Services     MIRIAM KOHLER : Marci Brady, manav zaragoza, joshua woodruff  ezio adanmerrill espinoza'aan ah. So [facility] [phone].    ATENCIÓN: Si habla sarah, tiene a ortiz disposición servicios gratuitos de asistencia lingüística. Dora al [phone].    We comply with applicable federal civil rights laws and [place] laws. We do not discriminate on the basis of race, color, national origin, age, disability, sex, sexual orientation, or gender identity.            Thank you!     Thank you for choosing [facility]  for your care. Our goal is always to provide you with excellent care. Hearing back from our patients is one way we can continue to improve our services. Please take a few minutes to complete the written survey that you may receive in the mail after your visit with us. Thank you!             Your Updated Medication List - Protect others around you: Learn how to safely use, store and throw away your medicines at www.disposemymeds.org.          This list is accurate as of: 10/5/17  4:51 PM.  Always use your most recent med list.                   Brand Name Dispense Instructions for use Diagnosis    ASPIRIN PO      Take 81 mg by mouth daily        CALCIUM + D PO      Take by mouth 2 times daily        Co Q 10 100 MG Caps      Take 1 capsule by mouth daily        flax seed oil 1000 MG capsule      Take 2 capsules (2,000 mg) by mouth 2 times daily        fluocinolone 0.025 % cream    SYNALAR    60 g    Apply topically 2 times daily Apply to affected areas of mouth as needed.    Perleche       fluticasone 50 MCG/ACT spray    FLONASE     Reported on 5/1/2017        ketoconazole 2 % cream    NIZORAL    60 g    Apply topically 2 times daily    Abrasion of left axilla with infection, initial encounter       L-GLUTAMINE      1 capsule 2 times daily        MAGNESIUM PO      Take 1 capsule by mouth daily        MULTI-VITAMIN PO      Take by mouth daily        order for DME     2 Units    Compression sleeve to bilateral UE.    S/P bilateral mastectomy,  Lymphedema       PROBIOTIC DAILY PO      Take 1 capsule by mouth 2 times daily        sertraline 50 MG tablet    ZOLOFT    90 tablet    Take 1 tablet (50 mg) by mouth daily    Mild recurrent major depression (H)       SLO-NIACIN PO      Take 2 tablets by mouth At Bedtime        sulfamethoxazole-trimethoprim 800-160 MG per tablet    BACTRIM DS/SEPTRA DS    20 tablet    Take 1 tablet by mouth 2 times daily    Abrasion of left axilla with infection, initial encounter       SYNTHROID 88 MCG tablet   Generic drug:  levothyroxine      Take 88 mcg by mouth Take once daily for 6 days and skip day 7        TRIPLE FLEX PO      Take by mouth 2 times daily

## 2017-10-05 NOTE — NURSING NOTE
Chanelizzy Israel comes into clinic today at the request of Dr. Hicks Ordering Provider for Flu Shot.          This service provided today was under the supervising provider of the day Dr. Laureano, who was available if needed.    Reason for visit: Flu shot    Nancy Mancilla

## 2017-10-23 DIAGNOSIS — E78.2 MIXED HYPERLIPIDEMIA: Primary | ICD-10-CM

## 2017-10-31 ENCOUNTER — OFFICE VISIT (OUTPATIENT)
Dept: PEDIATRICS | Facility: CLINIC | Age: 52
End: 2017-10-31
Payer: COMMERCIAL

## 2017-10-31 VITALS
DIASTOLIC BLOOD PRESSURE: 64 MMHG | OXYGEN SATURATION: 96 % | BODY MASS INDEX: 26.5 KG/M2 | HEIGHT: 62 IN | HEART RATE: 90 BPM | TEMPERATURE: 99.4 F | SYSTOLIC BLOOD PRESSURE: 100 MMHG | WEIGHT: 144 LBS

## 2017-10-31 DIAGNOSIS — J30.89 SEASONAL ALLERGIC RHINITIS DUE TO OTHER ALLERGIC TRIGGER, UNSPECIFIED CHRONICITY: ICD-10-CM

## 2017-10-31 DIAGNOSIS — E03.9 ACQUIRED HYPOTHYROIDISM: ICD-10-CM

## 2017-10-31 DIAGNOSIS — Z90.13 S/P BILATERAL MASTECTOMY: ICD-10-CM

## 2017-10-31 DIAGNOSIS — F33.0 MILD RECURRENT MAJOR DEPRESSION (H): ICD-10-CM

## 2017-10-31 DIAGNOSIS — Z85.3 PERSONAL HISTORY OF BREAST CANCER: ICD-10-CM

## 2017-10-31 DIAGNOSIS — E78.2 MIXED HYPERLIPIDEMIA: ICD-10-CM

## 2017-10-31 DIAGNOSIS — Z00.00 ROUTINE GENERAL MEDICAL EXAMINATION AT A HEALTH CARE FACILITY: Primary | ICD-10-CM

## 2017-10-31 LAB
CHOLEST SERPL-MCNC: 192 MG/DL
HDLC SERPL-MCNC: 60 MG/DL
LDLC SERPL CALC-MCNC: 104 MG/DL
NONHDLC SERPL-MCNC: 132 MG/DL
T4 FREE SERPL-MCNC: 1.36 NG/DL (ref 0.76–1.46)
TRIGL SERPL-MCNC: 141 MG/DL
TSH SERPL DL<=0.005 MIU/L-ACNC: 1.44 MU/L (ref 0.4–4)

## 2017-10-31 PROCEDURE — 84443 ASSAY THYROID STIM HORMONE: CPT | Performed by: INTERNAL MEDICINE

## 2017-10-31 PROCEDURE — 36415 COLL VENOUS BLD VENIPUNCTURE: CPT | Performed by: INTERNAL MEDICINE

## 2017-10-31 PROCEDURE — 80061 LIPID PANEL: CPT | Performed by: INTERNAL MEDICINE

## 2017-10-31 PROCEDURE — 84439 ASSAY OF FREE THYROXINE: CPT | Performed by: INTERNAL MEDICINE

## 2017-10-31 PROCEDURE — 99396 PREV VISIT EST AGE 40-64: CPT | Performed by: FAMILY MEDICINE

## 2017-10-31 ASSESSMENT — ANXIETY QUESTIONNAIRES
1. FEELING NERVOUS, ANXIOUS, OR ON EDGE: NOT AT ALL
2. NOT BEING ABLE TO STOP OR CONTROL WORRYING: SEVERAL DAYS
GAD7 TOTAL SCORE: 2
3. WORRYING TOO MUCH ABOUT DIFFERENT THINGS: SEVERAL DAYS
5. BEING SO RESTLESS THAT IT IS HARD TO SIT STILL: NOT AT ALL
7. FEELING AFRAID AS IF SOMETHING AWFUL MIGHT HAPPEN: NOT AT ALL
6. BECOMING EASILY ANNOYED OR IRRITABLE: NOT AT ALL

## 2017-10-31 ASSESSMENT — PAIN SCALES - GENERAL: PAINLEVEL: NO PAIN (0)

## 2017-10-31 ASSESSMENT — PATIENT HEALTH QUESTIONNAIRE - PHQ9
5. POOR APPETITE OR OVEREATING: NOT AT ALL
SUM OF ALL RESPONSES TO PHQ QUESTIONS 1-9: 3

## 2017-10-31 NOTE — PATIENT INSTRUCTIONS
Schedule for recheck in 6 months  Have a copy of plastic surgery referrals      Preventive Health Recommendations  Female Ages 50 - 64    Yearly exam: See your health care provider every year in order to  o Review health changes.   o Discuss preventive care.    o Review your medicines if your doctor has prescribed any.      Get a Pap test every three years (unless you have an abnormal result and your provider advises testing more often).    If you get Pap tests with HPV test, you only need to test every 5 years, unless you have an abnormal result.     You do not need a Pap test if your uterus was removed (hysterectomy) and you have not had cancer.    You should be tested each year for STDs (sexually transmitted diseases) if you're at risk.     Have a mammogram every 1 to 2 years.    Have a colonoscopy at age 50, or have a yearly FIT test (stool test). These exams screen for colon cancer.      Have a cholesterol test every 5 years, or more often if advised.    Have a diabetes test (fasting glucose) every three years. If you are at risk for diabetes, you should have this test more often.     If you are at risk for osteoporosis (brittle bone disease), think about having a bone density scan (DEXA).    Shots: Get a flu shot each year. Get a tetanus shot every 10 years.    Nutrition:     Eat at least 5 servings of fruits and vegetables each day.    Eat whole-grain bread, whole-wheat pasta and brown rice instead of white grains and rice.    Talk to your provider about Calcium and Vitamin D.     Lifestyle    Exercise at least 150 minutes a week (30 minutes a day, 5 days a week). This will help you control your weight and prevent disease.    Limit alcohol to one drink per day.    No smoking.     Wear sunscreen to prevent skin cancer.     See your dentist every six months for an exam and cleaning.    See your eye doctor every 1 to 2 years.

## 2017-10-31 NOTE — MR AVS SNAPSHOT
After Visit Summary   10/31/2017    Chanel Israel    MRN: [id number]           Patient Information     Date Of Birth          1965        Visit Information        Provider Department      10/31/2017 7:30 AM Gabbie Hicks MD [facility]        Today's Diagnoses     Routine general medical examination at a health care facility    -  1    Mild recurrent major depression (H)        S/P bilateral mastectomy        Personal history of breast cancer          Care Instructions    Schedule for recheck in 6 months  Have a copy of plastic surgery referrals      Preventive Health Recommendations  Female Ages 50 - 64    Yearly exam: See your health care provider every year in order to  o Review health changes.   o Discuss preventive care.    o Review your medicines if your doctor has prescribed any.      Get a Pap test every three years (unless you have an abnormal result and your provider advises testing more often).    If you get Pap tests with HPV test, you only need to test every 5 years, unless you have an abnormal result.     You do not need a Pap test if your uterus was removed (hysterectomy) and you have not had cancer.    You should be tested each year for STDs (sexually transmitted diseases) if you're at risk.     Have a mammogram every 1 to 2 years.    Have a colonoscopy at age 50, or have a yearly FIT test (stool test). These exams screen for colon cancer.      Have a cholesterol test every 5 years, or more often if advised.    Have a diabetes test (fasting glucose) every three years. If you are at risk for diabetes, you should have this test more often.     If you are at risk for osteoporosis (brittle bone disease), think about having a bone density scan (DEXA).    Shots: Get a flu shot each year. Get a tetanus shot every 10 years.    Nutrition:     Eat at least 5 servings of fruits and vegetables each day.    Eat whole-grain bread, whole-wheat pasta and brown rice  instead of white grains and rice.    Talk to your provider about Calcium and Vitamin D.     Lifestyle    Exercise at least 150 minutes a week (30 minutes a day, 5 days a week). This will help you control your weight and prevent disease.    Limit alcohol to one drink per day.    No smoking.     Wear sunscreen to prevent skin cancer.     See your dentist every six months for an exam and cleaning.    See your eye doctor every 1 to 2 years.            Follow-ups after your visit        Additional Services     PLASTIC SURGERY REFERRAL       Your provider has referred you to: Dr. KELY DORANTES at [facility] on 12/28/17    Please be aware that coverage of these services is subject to the terms and limitations of your health insurance plan.  Call member services at your health plan with any benefit or coverage questions.      Please bring the following with you to your appointment:    (1) Any X-Rays, CTs or MRIs which have been performed.  Contact the facility where they were done to arrange for  prior to your scheduled appointment.    (2) List of current medications  (3) This referral request   (4) Any documents/labs given to you for this referral                  Follow-up notes from your care team     Return in about 6 months (around 4/30/2018) for Medication check.      Your next 10 appointments already scheduled     May 24, 2018  3:45 PM CDT   LAB with LAB ONC Critical access hospital ([facility])    [address] [place] [address] [phone]           Please do not eat 10-12 hours before your appointment if you are coming in fasting for labs on lipids, cholesterol, or glucose (sugar). This does not apply to pregnant women. Water, hot tea and black coffee (with nothing added) are okay. Do not drink other fluids, diet soda or chew gum.            May 24, 2018  4:30 PM CDT   Return Visit with Ghislaine Lopez MD   [facility]  "([facility])    [address] [place] [address] [phone]              Who to contact     If you have questions or need follow up information about today's clinic visit or your schedule please contact [facility] directly at [phone].  Normal or non-critical lab and imaging results will be communicated to you by MyChart, letter or phone within 4 business days after the clinic has received the results. If you do not hear from us within 7 days, please contact the clinic through [employer] or phone. If you have a critical or abnormal lab result, we will notify you by phone as soon as possible.  Submit refill requests through [employer] or call your pharmacy and they will forward the refill request to us. Please allow 3 business days for your refill to be completed.          Additional Information About Your Visit        [employer] Information     [employer] gives you secure access to your electronic health record. If you see a primary care provider, you can also send messages to your care team and make appointments. If you have questions, please call your primary care clinic.  If you do not have a primary care provider, please call [phone] and they will assist you.      [employer] is an electronic gateway that provides easy, online access to your medical records. With [employer], you can request a clinic appointment, read your test results, renew a prescription or communicate with your care team.     To access your existing account, please contact your [facility] or call [phone] for assistance.        Care EveryWhere ID     This is your Care EveryWhere ID. This could be used by other organizations to access your [place] medical records  [id number]        Your Vitals Were     Pulse Temperature Height Pulse Oximetry BMI (Body Mass Index)       90 99.4  F (37.4  C) (Oral) 5' 2\" (1.575 m) 96% 26.34 kg/m2        Blood Pressure from Last 3 " Encounters:   10/31/17 100/64   07/22/17 116/73   06/12/17 104/60    Weight from Last 3 Encounters:   10/31/17 144 lb (65.3 kg)   07/22/17 145 lb 9.6 oz (66 kg)   06/12/17 144 lb (65.3 kg)              We Performed the Following     PLASTIC SURGERY REFERRAL          Where to get your medicines      These medications were sent to [facility] [id number] - [place] MYERS  [address] [place] AT [facility] [address] [place] [address]     Phone:  [phone]     sertraline 50 MG tablet          Primary Care Provider Office Phone # Fax #    Gabbie Hicks [phone] [address]  [facility] [address]        Equal Access to Services     [facility]: Hadii aad ku hadasho Soomaali, waaxda luqadaha, qaybta kaalmada adeegyada, ezio asherin hayaan izzy verduzco . So [facility] [phone].    ATENCIÓN: Si habla español, tiene a ortiz disposición servicios gratuitos de asistencia lingüística. Llame al [phone].    We comply with applicable federal civil rights laws and [place] laws. We do not discriminate on the basis of race, color, national origin, age, disability, sex, sexual orientation, or gender identity.            Thank you!     Thank you for choosing [facility]  for your care. Our goal is always to provide you with excellent care. Hearing back from our patients is one way we can continue to improve our services. Please take a few minutes to complete the written survey that you may receive in the mail after your visit with us. Thank you!             Your Updated Medication List - Protect others around you: Learn how to safely use, store and throw away your medicines at www.disposemymeds.org.          This list is accurate as of: 10/31/17  8:11 AM.  Always use your most recent med list.                   Brand Name Dispense Instructions for use Diagnosis    ASPIRIN PO      Take 81 mg by mouth daily        CALCIUM + D PO      Take  by mouth 2 times daily        Co Q 10 100 MG Caps      Take 1 capsule by mouth daily        flax seed oil 1000 MG capsule      Take 2 capsules (2,000 mg) by mouth 2 times daily        fluocinolone 0.025 % cream    SYNALAR    60 g    Apply topically 2 times daily Apply to affected areas of mouth as needed.    Perleche       fluticasone 50 MCG/ACT spray    FLONASE     Reported on 5/1/2017        ketoconazole 2 % cream    NIZORAL    60 g    Apply topically 2 times daily    Abrasion of left axilla with infection, initial encounter       L-GLUTAMINE      1 capsule 2 times daily        MAGNESIUM PO      Take 1 capsule by mouth daily        MULTI-VITAMIN PO      Take by mouth daily        order for DME     2 Units    Compression sleeve to bilateral UE.    S/P bilateral mastectomy, Lymphedema       PROBIOTIC DAILY PO      Take 1 capsule by mouth 2 times daily        sertraline 50 MG tablet    ZOLOFT    90 tablet    Take 1 tablet (50 mg) by mouth daily    Mild recurrent major depression (H)       SLO-NIACIN PO      Take 2 tablets by mouth At Bedtime        SYNTHROID 88 MCG tablet   Generic drug:  levothyroxine      Take 88 mcg by mouth Take once daily for 6 days and skip day 7        TRIPLE FLEX PO      Take by mouth 2 times daily

## 2017-10-31 NOTE — NURSING NOTE
"Chief Complaint   Patient presents with     Physical       Initial /64  Pulse 90  Temp 99.4  F (37.4  C) (Oral)  Ht 5' 2\" (1.575 m)  Wt 144 lb (65.3 kg)  SpO2 96%  BMI 26.34 kg/m2 Estimated body mass index is 26.34 kg/(m^2) as calculated from the following:    Height as of this encounter: 5' 2\" (1.575 m).    Weight as of this encounter: 144 lb (65.3 kg).  BP completed using cuff size: alta Anne CMA    "

## 2017-10-31 NOTE — LETTER
My Depression Action Plan  Name: Chanel Israel   Date of Birth 1965  Date: 10/31/2017    My doctor: Gabbie Hicks   My clinic: [address] [address] [phone]          GREEN    ZONE   Good Control    What it looks like:     Things are going generally well. You have normal up s and down s. You may even feel depressed from time to time, but bad moods usually last less than a day.   What you need to do:  1. Continue to care for yourself (see self care plan)  2. Check your depression survival kit and update it as needed  3. Follow your physician s recommendations including any medication.  4. Do not stop taking medication unless you consult with your physician first.           YELLOW         ZONE Getting Worse    What it looks like:     Depression is starting to interfere with your life.     It may be hard to get out of bed; you may be starting to isolate yourself from others.    Symptoms of depression are starting to last most all day and this has happened for several days.     You may have suicidal thoughts but they are not constant.   What you need to do:     1. Call your care team, your response to treatment will improve if you keep your care team informed of your progress. Yellow periods are signs an adjustment may need to be made.     2. Continue your self-care, even if you have to fake it!    3. Talk to someone in your support network    4. Open up your depression survival kit           RED    ZONE Medical Alert - Get Help    What it looks like:     Depression is seriously interfering with your life.     You may experience these or other symptoms: You can t get out of bed most days, can t work or engage in other necessary activities, you have trouble taking care of basic hygiene, or basic responsibilities, thoughts of suicide or death that will not go away, self-injurious behavior.     What you need to do:  1. Call your care team and  request a same-day appointment. If they are not available (weekends or after hours) call your local crisis line, emergency room or 911.      Electronically signed by: Gabbie Hicks, October 31, 2017    Depression Self Care Plan / Survival Kit    Self-Care for Depression  Here s the deal. Your body and mind are really not as separate as most people think.  What you do and think affects how you feel and how you feel influences what you do and think. This means if you do things that people who feel good do, it will help you feel better.  Sometimes this is all it takes.  There is also a place for medication and therapy depending on how severe your depression is, so be sure to consult with your medical provider and/ or Behavioral Health Consultant if your symptoms are worsening or not improving.     In order to better manage my stress, I will:    Exercise  Get some form of exercise, every day. This will help reduce pain and release endorphins, the  feel good  chemicals in your brain. This is almost as good as taking antidepressants!  This is not the same as joining a gym and then never going! (they count on that by the way ) It can be as simple as just going for a walk or doing some gardening, anything that will get you moving.      Hygiene   Maintain good hygiene (Get out of bed in the morning, Make your bed, Brush your teeth, Take a shower, and Get dressed like you were going to work, even if you are unemployed).  If your clothes don't fit try to get ones that do.    Diet  I will strive to eat foods that are good for me, drink plenty of water, and avoid excessive sugar, caffeine, alcohol, and other mood-altering substances.  Some foods that are helpful in depression are: complex carbohydrates, B vitamins, flaxseed, fish or fish oil, fresh fruits and vegetables.    Psychotherapy  I agree to participate in Individual Therapy (if recommended).    Medication  If prescribed medications, I agree to take them.  Missing  doses can result in serious side effects.  I understand that drinking alcohol, or other illicit drug use, may cause potential side effects.  I will not stop my medication abruptly without first discussing it with my provider.    Staying Connected With Others  I will stay in touch with my friends, family members, and my primary care provider/team.    Use your imagination  Be creative.  We all have a creative side; it doesn t matter if it s oil painting, sand castles, or mud pies! This will also kick up the endorphins.    Witness Beauty  (AKA stop and smell the roses) Take a look outside, even in mid-winter. Notice colors, textures. Watch the squirrels and birds.     Service to others  Be of service to others.  There is always someone else in need.  By helping others we can  get out of ourselves  and remember the really important things.  This also provides opportunities for practicing all the other parts of the program.    Humor  Laugh and be silly!  Adjust your TV habits for less news and crime-drama and more comedy.    Control your stress  Try breathing deep, massage therapy, biofeedback, and meditation. Find time to relax each day.     My support system    Clinic Contact:  Phone number:    Contact 1:  Phone number:    Contact 2:  Phone number:    Latter-day/:  Phone number:    Therapist:  Phone number:    Local crisis center:    Phone number:    Other community support:  Phone number:
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
## 2017-11-01 ASSESSMENT — ANXIETY QUESTIONNAIRES: GAD7 TOTAL SCORE: 2

## 2017-11-06 ENCOUNTER — DOCUMENTATION ONLY (OUTPATIENT)
Dept: LAB | Facility: CLINIC | Age: 52
End: 2017-11-06

## 2017-11-06 DIAGNOSIS — Z85.3 PERSONAL HISTORY OF BREAST CANCER: Primary | ICD-10-CM

## 2017-11-06 DIAGNOSIS — Z85.3 PERSONAL HISTORY OF BREAST CANCER: ICD-10-CM

## 2017-11-06 DIAGNOSIS — T14.8XXA BRUISE: ICD-10-CM

## 2017-11-06 LAB
BASOPHILS # BLD AUTO: 0 10E9/L (ref 0–0.2)
BASOPHILS NFR BLD AUTO: 0.3 %
DIFFERENTIAL METHOD BLD: NORMAL
EOSINOPHIL # BLD AUTO: 0.1 10E9/L (ref 0–0.7)
EOSINOPHIL NFR BLD AUTO: 1.1 %
ERYTHROCYTE [DISTWIDTH] IN BLOOD BY AUTOMATED COUNT: 12.7 % (ref 10–15)
HCT VFR BLD AUTO: 38.4 % (ref 35–47)
HGB BLD-MCNC: 13.2 G/DL (ref 11.7–15.7)
LYMPHOCYTES # BLD AUTO: 2.5 10E9/L (ref 0.8–5.3)
LYMPHOCYTES NFR BLD AUTO: 33 %
MCH RBC QN AUTO: 30.9 PG (ref 26.5–33)
MCHC RBC AUTO-ENTMCNC: 34.4 G/DL (ref 31.5–36.5)
MCV RBC AUTO: 90 FL (ref 78–100)
MONOCYTES # BLD AUTO: 0.4 10E9/L (ref 0–1.3)
MONOCYTES NFR BLD AUTO: 5.8 %
NEUTROPHILS # BLD AUTO: 4.5 10E9/L (ref 1.6–8.3)
NEUTROPHILS NFR BLD AUTO: 59.8 %
PLATELET # BLD AUTO: 231 10E9/L (ref 150–450)
RBC # BLD AUTO: 4.27 10E12/L (ref 3.8–5.2)
WBC # BLD AUTO: 7.6 10E9/L (ref 4–11)

## 2017-11-06 PROCEDURE — 36415 COLL VENOUS BLD VENIPUNCTURE: CPT | Performed by: FAMILY MEDICINE

## 2017-11-06 PROCEDURE — 85025 COMPLETE CBC W/AUTO DIFF WBC: CPT | Performed by: FAMILY MEDICINE

## 2017-11-06 NOTE — PROGRESS NOTES
Routing pended orders to Dr. Hicks to review. Patient is requesting CBC done.    Marybeth Hu RN, [facility]

## 2017-11-07 NOTE — PROGRESS NOTES
Con Honeycutt,  Your test results for hemoglobin and blood counts are both normal. This is reassuring.   Let me know if you have any questions. Take care.  Gabibe Hicks MD

## 2017-11-08 ENCOUNTER — MYC MEDICAL ADVICE (OUTPATIENT)
Dept: ENDOCRINOLOGY | Facility: CLINIC | Age: 52
End: 2017-11-08

## 2017-11-08 ENCOUNTER — TELEPHONE (OUTPATIENT)
Dept: ENDOCRINOLOGY | Facility: CLINIC | Age: 52
End: 2017-11-08

## 2017-11-08 DIAGNOSIS — E03.9 HYPOTHYROIDISM, UNSPECIFIED TYPE: Primary | ICD-10-CM

## 2017-11-08 NOTE — TELEPHONE ENCOUNTER
Received result note from Dr. Petty as follows:    Please let the pt know that her thyroid test is normal. How much Synthroid is she currently taking now?     Thanks,  Mariella Khan voicemail for patient to contact our office.     Sadie Flores RN  Endocrine Care Coordinator  [facility]

## 2017-11-09 RX ORDER — LEVOTHYROXINE SODIUM 75 MCG
75 TABLET ORAL DAILY
Qty: 90 TABLET | Refills: 3 | Status: SHIPPED | OUTPATIENT
Start: 2017-11-09 | End: 2018-08-22

## 2017-11-09 NOTE — TELEPHONE ENCOUNTER
See [facility] messages from 11/8/17 for further details.       Sadie Flores, RN  Endocrine Care Coordinator  [facility]

## 2017-12-27 ENCOUNTER — MYC MEDICAL ADVICE (OUTPATIENT)
Dept: PEDIATRICS | Facility: CLINIC | Age: 52
End: 2017-12-27

## 2017-12-27 DIAGNOSIS — Z90.13 S/P BILATERAL MASTECTOMY: ICD-10-CM

## 2017-12-27 DIAGNOSIS — I89.0 LYMPHEDEMA: Primary | ICD-10-CM

## 2017-12-27 DIAGNOSIS — Z85.3 PERSONAL HISTORY OF BREAST CANCER: ICD-10-CM

## 2017-12-28 ENCOUNTER — TRANSFERRED RECORDS (OUTPATIENT)
Dept: HEALTH INFORMATION MANAGEMENT | Facility: CLINIC | Age: 52
End: 2017-12-28

## 2018-01-02 NOTE — TELEPHONE ENCOUNTER
"Patient responded to [employer] message stating that she needs a referral \"from 01/01/17 to 12/31/17. So yes a new referral would be fine to cover the dates of service\". Routing to PCP for referral.  Celia Hargrove CMA    "

## 2018-01-03 NOTE — TELEPHONE ENCOUNTER
Patient states the only referral that needs to be faxed is the one for [employer].  [facility] at [phone] and they advised me to fax the referral to [facility] Claim Department Attn: Referral Entry Fax # [phone].  Referral faxed patient advised.    Nancy Mancilla CMA

## 2018-01-11 NOTE — TELEPHONE ENCOUNTER
Received [facility] Provider Recommendation Form for referral placed. Completed form and faxed to  at fax# [phone]. Placed form to be scanned into chart.  Miah Anne, CMA

## 2018-02-14 ENCOUNTER — OFFICE VISIT (OUTPATIENT)
Dept: OPTOMETRY | Facility: CLINIC | Age: 53
End: 2018-02-14
Payer: COMMERCIAL

## 2018-02-14 DIAGNOSIS — H40.003 GLAUCOMA SUSPECT, BILATERAL: Primary | ICD-10-CM

## 2018-02-14 PROCEDURE — 92083 EXTENDED VISUAL FIELD XM: CPT | Performed by: OPTOMETRIST

## 2018-02-14 PROCEDURE — 99213 OFFICE O/P EST LOW 20 MIN: CPT | Performed by: OPTOMETRIST

## 2018-02-14 PROCEDURE — 92133 CPTRZD OPH DX IMG PST SGM ON: CPT | Performed by: OPTOMETRIST

## 2018-02-14 ASSESSMENT — VISUAL ACUITY
OS_CC: 20/20
OD_CC: 20/20
METHOD: SNELLEN - LINEAR

## 2018-02-14 ASSESSMENT — TONOMETRY
OD_IOP_MMHG: 14
IOP_METHOD: TONOPEN
OS_IOP_MMHG: 13

## 2018-02-14 ASSESSMENT — SLIT LAMP EXAM - LIDS
COMMENTS: MEIBOMIAN GLAND DYSFUNCTION
COMMENTS: MEIBOMIAN GLAND DYSFUNCTION

## 2018-02-14 ASSESSMENT — EXTERNAL EXAM - RIGHT EYE: OD_EXAM: NORMAL

## 2018-02-14 ASSESSMENT — EXTERNAL EXAM - LEFT EYE: OS_EXAM: NORMAL

## 2018-02-14 ASSESSMENT — CUP TO DISC RATIO
OS_RATIO: 0.65
OD_RATIO: .5/.4

## 2018-02-14 NOTE — LETTER
2/14/2018         RE: Chanel Israel  [address]  [place] [facility] [address]        Dear Colleague,    Thank you for referring your patient, Chanel Israel, to the [facility]. Please see a copy of my visit note below.    CHIEF COMPLAINT:   Chief Complaint   Patient presents with     Glaucoma Suspect Evaluation          OBJECTIVE:     See ophthalmology exam    ASSESSMENT:         ICD-10-CM    1. Glaucoma suspect, bilateral H40.003 OCT Optic Nerve RNFL Optovue OU (both eyes)     HVF 24-2 OU   Suspicious optic nerves both eyes  IOPs- within normal limits both eyes   Baseline OCT- within normal limits  Visual field- stable- within normal limits  Thick corneas both eyes     PLAN:      Patient Instructions   You are a glaucoma suspect.  We will continue to monitor your intraocular pressures and optic nerves.     Return in 1 year for a complete eye exam or sooner if needed.    Jeet Mccabe, OD            Again, thank you for allowing me to participate in the care of your patient.        Sincerely,        Jeet Mccabe, OD

## 2018-02-14 NOTE — PROGRESS NOTES
CHIEF COMPLAINT:   Chief Complaint   Patient presents with     Glaucoma Suspect Evaluation          OBJECTIVE:     See ophthalmology exam    ASSESSMENT:         ICD-10-CM    1. Glaucoma suspect, bilateral H40.003 OCT Optic Nerve RNFL Optovue OU (both eyes)     HVF 24-2 OU   Suspicious optic nerves both eyes  IOPs- within normal limits both eyes   Baseline OCT- within normal limits  Visual field- stable- within normal limits  Thick corneas both eyes     PLAN:      Patient Instructions   You are a glaucoma suspect.  We will continue to monitor your intraocular pressures and optic nerves.     Return in 1 year for a complete eye exam or sooner if needed.    Jeet Mccabe, OD

## 2018-02-14 NOTE — PATIENT INSTRUCTIONS
You are a glaucoma suspect.  We will continue to monitor your intraocular pressures and optic nerves.     Return in 1 year for a complete eye exam or sooner if needed.    Jeet Mccabe, ABY

## 2018-02-14 NOTE — MR AVS SNAPSHOT
After Visit Summary   2/14/2018    Chanel Israel    MRN: [id number]           Patient Information     Date Of Birth          1965        Visit Information        Provider Department      2/14/2018 4:00 PM Jeet Mccabe, ABY [facility]        Today's Diagnoses     Glaucoma suspect, bilateral    -  1      Care Instructions    You are a glaucoma suspect.  We will continue to monitor your intraocular pressures and optic nerves.     Return in 1 year for a complete eye exam or sooner if needed.    Jeet Mccabe OD            Follow-ups after your visit        Follow-up notes from your care team     Return in about 1 year (around 2/14/2019) for Annual Visit.      Your next 10 appointments already scheduled     May 24, 2018  3:45 PM CDT   LAB with LAB ONC [facility] ([facility])    [address] [place] [address] [id number] [phone]           Please do not eat 10-12 hours before your appointment if you are coming in fasting for labs on lipids, cholesterol, or glucose (sugar). This does not apply to pregnant women. Water, hot tea and black coffee (with nothing added) are okay. Do not drink other fluids, diet soda or chew gum.            May 24, 2018  4:30 PM CDT   Return Visit with Ghislaine Lopez MD   [facility]    [address] [place] [address] [id number] [phone]              Who to contact     If you have questions or need follow up information about today's clinic visit or your schedule please contact [facility] directly at [phone].  Normal or non-critical lab and imaging results will be communicated to you by MyChart, letter or phone within 4 business days after the clinic has received the results. If you do not hear from us within 7 days, please contact the clinic through MyChart or phone. If you have a critical or abnormal lab result, we  will notify you by phone as soon as possible.  Submit refill requests through [employer] or call your pharmacy and they will forward the refill request to us. Please allow 3 business days for your refill to be completed.          Additional Information About Your Visit        [email or web address] Information     [employer] gives you secure access to your electronic health record. If you see a primary care provider, you can also send messages to your care team and make appointments. If you have questions, please call your primary care clinic.  If you do not have a primary care provider, please call [phone] and they will assist you.      Strutta is an electronic gateway that provides easy, online access to your medical records. With [employer], you can request a clinic appointment, read your test results, renew a prescription or communicate with your care team.     To access your existing account, please contact your [facility] or call [phone] for assistance.        Care EveryWhere ID     This is your Care EveryWhere ID. This could be used by other organizations to access your [facility] medical records  [id number]         Blood Pressure from Last 3 Encounters:   10/31/17 100/64   07/22/17 116/73   06/12/17 104/60    Weight from Last 3 Encounters:   10/31/17 65.3 kg (144 lb)   07/22/17 66 kg (145 lb 9.6 oz)   06/12/17 65.3 kg (144 lb)              We Performed the Following     HVF 24-2 OU     OCT Optic Nerve RNFL Optovue OU (both eyes)        Primary Care Provider Office Phone # Fax #    Gabbie Hicks [phone]       [address]  [facility] [address]        Equal Access to Services     [facility]: Hadii aad ku hadasho Soomaali, waaxda luqadaha, qaybta kaalmada adeallen, ezio velasquez. So [facility] [phone].    ATENCIÓN: Si habla español, tiene a ortiz disposición servicios gratuitos de asistencia lingüística. Llame al [phone].    We  comply with applicable federal civil rights laws and [place] laws. We do not discriminate on the basis of race, color, national origin, age, disability, sex, sexual orientation, or gender identity.            Thank you!     Thank you for choosing [facility]  for your care. Our goal is always to provide you with excellent care. Hearing back from our patients is one way we can continue to improve our services. Please take a few minutes to complete the written survey that you may receive in the mail after your visit with us. Thank you!             Your Updated Medication List - Protect others around you: Learn how to safely use, store and throw away your medicines at www.disposemymeds.org.          This list is accurate as of 2/14/18  5:03 PM.  Always use your most recent med list.                   Brand Name Dispense Instructions for use Diagnosis    ASPIRIN PO      Take 81 mg by mouth daily        CALCIUM + D PO      Take by mouth 2 times daily        Co Q 10 100 MG Caps      Take 1 capsule by mouth daily        flax seed oil 1000 MG capsule      Take 2 capsules (2,000 mg) by mouth 2 times daily        fluocinolone 0.025 % cream    SYNALAR    60 g    Apply topically 2 times daily Apply to affected areas of mouth as needed.    Perleche       fluticasone 50 MCG/ACT spray    FLONASE     Reported on 5/1/2017        ketoconazole 2 % cream    NIZORAL    60 g    Apply topically 2 times daily    Abrasion of left axilla with infection, initial encounter       L-GLUTAMINE      1 capsule 2 times daily        MAGNESIUM PO      Take 1 capsule by mouth daily        MULTI-VITAMIN PO      Take by mouth daily        order for DME     2 Units    Compression sleeve to bilateral UE.    S/P bilateral mastectomy, Lymphedema       PROBIOTIC DAILY PO      Take 1 capsule by mouth 2 times daily        sertraline 50 MG tablet    ZOLOFT    90 tablet    Take 1 tablet (50 mg) by mouth daily    Mild recurrent major depression  (H)       SLO-NIACIN PO      Take 2 tablets by mouth At Bedtime        SYNTHROID 75 MCG tablet   Generic drug:  levothyroxine     90 tablet    Take 1 tablet (75 mcg) by mouth daily    Hypothyroidism, unspecified type       TRIPLE FLEX PO      Take by mouth 2 times daily

## 2018-02-16 ENCOUNTER — NURSE TRIAGE (OUTPATIENT)
Dept: NURSING | Facility: CLINIC | Age: 53
End: 2018-02-16

## 2018-02-17 NOTE — TELEPHONE ENCOUNTER
"  Additional Information    Negative: Severe difficulty breathing (e.g., struggling for each breath, speaks in single words, stridor)    Negative: Sounds like a life-threatening emergency to the triager    Negative: [1] Diagnosed strep throat AND [2] taking antibiotic AND [3] symptoms continue    Negative: Throat culture results, call about    Negative: Productive cough is main symptom    Negative: Non-productive cough is main symptom    Negative: Hoarseness is main symptom    Negative: Runny nose is main symptom    Negative: [1] Drooling or spitting out saliva (because can't swallow) AND [2] normal breathing    Negative: Unable to open mouth completely    Negative: [1] Difficulty breathing AND [2] not severe    Negative: Fever > 104 F (40 C)    Negative: [1] Refuses to drink anything AND [2] for > 12 hours    Negative: [1] Drinking very little AND [2] dehydration suspected (e.g., no urine > 12 hours, very dry mouth, very lightheaded)    Negative: Patient sounds very sick or weak to the triager    Negative: SEVERE (e.g., excruciating) throat pain    Negative: [1] Pus on tonsils (back of throat) AND [2]  fever AND [3] swollen neck lymph nodes (\"glands\")    Negative: [1] Rash AND [2] widespread (especially chest and abdomen)    Negative: Earache also present    Negative: Fever present > 3 days (72 hours)    Negative: Diabetes mellitus or weak immune system (e.g., HIV positive, cancer chemo, splenectomy, organ transplant)    Negative: History of rheumatic fever    Negative: [1] Adult is leaving on a trip AND [2] requests an antibiotic NOW    Negative: [1] Positive throat culture or rapid strep test (according to lab, PCP, caller, etc.) AND [2] NO  standing order to call in prescription for antibiotic    Negative: [1] Exposure to family member (or spouse or boyfriend/girlfriend) with test-proven strep AND [2] within last 10 days    Negative: [1] Sore throat is the only symptom AND [2] present > 48 hours    Negative: [1] " Sore throat with cough/cold symptoms AND [2] present > 5 days    Negative: [1] Sore throat is the only symptom AND [2] sore throat present < 48 hours  (all triage questions negative)    [1] Sore throat with cough/cold symptoms AND [2] present < 5 days   (all triage questions negative)    Protocols used: SORE THROAT-ADULT-AH    Pt. Calls and says that she has a fever. Fever = 101-oral. Pt. Also has a sore throat, aches, and lethargy. Denies any cough.

## 2018-03-02 ENCOUNTER — TELEPHONE (OUTPATIENT)
Dept: PEDIATRICS | Facility: CLINIC | Age: 53
End: 2018-03-02

## 2018-03-02 NOTE — TELEPHONE ENCOUNTER
Left message for patient to return clinic call regarding scheduling. Patient needs a Return  appointment for 6 month med check  with Dr Hicks on or after 4/30/18. Number to clinic and Mychart option given, please assist in scheduling once patient returns clinic call.    Call Center OKAY TO SCHEDULE.    mychart msg sent 2/5/18    Thanks,   Georgie Mccormick  Primary Care   [facility] [place]

## 2018-04-11 ENCOUNTER — OFFICE VISIT (OUTPATIENT)
Dept: PEDIATRICS | Facility: CLINIC | Age: 53
End: 2018-04-11
Payer: COMMERCIAL

## 2018-04-11 VITALS
WEIGHT: 146.1 LBS | TEMPERATURE: 97 F | OXYGEN SATURATION: 97 % | DIASTOLIC BLOOD PRESSURE: 78 MMHG | HEART RATE: 81 BPM | HEIGHT: 62 IN | SYSTOLIC BLOOD PRESSURE: 112 MMHG | BODY MASS INDEX: 26.89 KG/M2

## 2018-04-11 DIAGNOSIS — F33.0 MILD RECURRENT MAJOR DEPRESSION (H): ICD-10-CM

## 2018-04-11 PROCEDURE — 99213 OFFICE O/P EST LOW 20 MIN: CPT | Performed by: FAMILY MEDICINE

## 2018-04-11 ASSESSMENT — ANXIETY QUESTIONNAIRES
7. FEELING AFRAID AS IF SOMETHING AWFUL MIGHT HAPPEN: NOT AT ALL
2. NOT BEING ABLE TO STOP OR CONTROL WORRYING: NOT AT ALL
6. BECOMING EASILY ANNOYED OR IRRITABLE: NOT AT ALL
1. FEELING NERVOUS, ANXIOUS, OR ON EDGE: NOT AT ALL
3. WORRYING TOO MUCH ABOUT DIFFERENT THINGS: NOT AT ALL
5. BEING SO RESTLESS THAT IT IS HARD TO SIT STILL: NOT AT ALL
GAD7 TOTAL SCORE: 0

## 2018-04-11 ASSESSMENT — PAIN SCALES - GENERAL: PAINLEVEL: NO PAIN (0)

## 2018-04-11 ASSESSMENT — PATIENT HEALTH QUESTIONNAIRE - PHQ9: 5. POOR APPETITE OR OVEREATING: NOT AT ALL

## 2018-04-11 NOTE — NURSING NOTE
"Chief Complaint   Patient presents with     Recheck Medication       Initial /78 (BP Location: Right arm, Patient Position: Sitting, Cuff Size: Adult Regular)  Pulse 81  Temp 97  F (36.1  C) (Oral)  Ht 5' 2\" (1.575 m)  Wt 146 lb 1.6 oz (66.3 kg)  SpO2 97%  BMI 26.72 kg/m2 Estimated body mass index is 26.72 kg/(m^2) as calculated from the following:    Height as of this encounter: 5' 2\" (1.575 m).    Weight as of this encounter: 146 lb 1.6 oz (66.3 kg).  Medication Reconciliation: complete  Miah Anne, LITA    "

## 2018-04-11 NOTE — MR AVS SNAPSHOT
After Visit Summary   4/11/2018    Chanel Israel    MRN: [id number]           Patient Information     Date Of Birth          1965        Visit Information        Provider Department      4/11/2018 5:30 PM Gabbie Hicks MD [facility]        Today's Diagnoses     Mild recurrent major depression (H)          Care Instructions    Schedule for fasting labs and physical in 6 months          Follow-ups after your visit        Follow-up notes from your care team     Return in about 6 months (around 10/11/2018) for Fasting lab work, Physical Exam.      Your next 10 appointments already scheduled     Jun 19, 2018  3:45 PM CDT   LAB with LAB ON[facility] ([facility])    [address] [place] [address] [id number] [phone]           Please do not eat 10-12 hours before your appointment if you are coming in fasting for labs on lipids, cholesterol, or glucose (sugar). This does not apply to pregnant women. Water, hot tea and black coffee (with nothing added) are okay. Do not drink other fluids, diet soda or chew gum.            Jun 19, 2018  4:30 PM CDT   Return Visit with Ghislaine Lopez MD   [facility]    [address] [place] [address] [id number] [phone]              Who to contact     If you have questions or need follow up information about today's clinic visit or your schedule please contact [facility] directly at [phone].  Normal or non-critical lab and imaging results will be communicated to you by MyChart, letter or phone within 4 business days after the clinic has received the results. If you do not hear from us within 7 days, please contact the clinic through MyChart or phone. If you have a critical or abnormal lab result, we will notify you by phone as soon as possible.  Submit refill requests through [employer] or call your  "pharmacy and they will forward the refill request to us. Please allow 3 business days for your refill to be completed.          Additional Information About Your Visit        [address] Information     [employer] gives you secure access to your electronic health record. If you see a primary care provider, you can also send messages to your care team and make appointments. If you have questions, please call your primary care clinic.  If you do not have a primary care provider, please call [phone] and they will assist you.      [employer]You is an electronic gateway that provides easy, online access to your medical records. With [employer], you can request a clinic appointment, read your test results, renew a prescription or communicate with your care team.     To access your existing account, please contact your [facility] or call [phone] for assistance.        Care EveryWhere ID     This is your Care EveryWhere ID. This could be used by other organizations to access your [facility] medical records  [id number]        Your Vitals Were     Pulse Temperature Height Pulse Oximetry BMI (Body Mass Index)       81 97  F (36.1  C) (Oral) 5' 2\" (1.575 m) 97% 26.72 kg/m2        Blood Pressure from Last 3 Encounters:   04/11/18 112/78   10/31/17 100/64   07/22/17 116/73    Weight from Last 3 Encounters:   04/11/18 146 lb 1.6 oz (66.3 kg)   10/31/17 144 lb (65.3 kg)   07/22/17 145 lb 9.6 oz (66 kg)              Today, you had the following     No orders found for display         Where to get your medicines      These medications were sent to [id number] - [place] MYERS  [address] [place] AT [facility] [address]  [address] [place] [address]     Phone:  [phone]     sertraline 50 MG tablet          Primary Care Provider Office Phone # Fax #    Gabbie Hicks [phone] [address]  [facility] [address]        Equal Access to " Services     [facility]: Hadii aad ku hadparasjarocho Yakelinmelissa, wadipeshda luqadaha, qaybta kaalmada antionette, ezio verduzco . So [facility] [phone].    ATENCIÓN: Si habla sarah, tiene a ortiz disposición servicios gratuitos de asistencia lingüística. Llame al [phone].    We comply with applicable federal civil rights laws and [place] laws. We do not discriminate on the basis of race, color, national origin, age, disability, sex, sexual orientation, or gender identity.            Thank you!     Thank you for choosing [facility]  for your care. Our goal is always to provide you with excellent care. Hearing back from our patients is one way we can continue to improve our services. Please take a few minutes to complete the written survey that you may receive in the mail after your visit with us. Thank you!             Your Updated Medication List - Protect others around you: Learn how to safely use, store and throw away your medicines at www.disposemymeds.org.          This list is accurate as of 4/11/18  6:01 PM.  Always use your most recent med list.                   Brand Name Dispense Instructions for use Diagnosis    ASPIRIN PO      Take 81 mg by mouth daily        CALCIUM + D PO      Take by mouth 2 times daily        Co Q 10 100 MG Caps      Take 1 capsule by mouth daily        flax seed oil 1000 MG capsule      Take 2 capsules (2,000 mg) by mouth 2 times daily        fluocinolone 0.025 % cream    SYNALAR    60 g    Apply topically 2 times daily Apply to affected areas of mouth as needed.    Perleche       fluticasone 50 MCG/ACT spray    FLONASE     Reported on 5/1/2017        ketoconazole 2 % cream    NIZORAL    60 g    Apply topically 2 times daily    Abrasion of left axilla with infection, initial encounter       L-GLUTAMINE      1 capsule 2 times daily        MAGNESIUM PO      Take 1 capsule by mouth daily        MULTI-VITAMIN PO      Take by mouth daily         order for DME     2 Units    Compression sleeve to bilateral UE.    S/P bilateral mastectomy, Lymphedema       PROBIOTIC DAILY PO      Take 1 capsule by mouth 2 times daily        sertraline 50 MG tablet    ZOLOFT    90 tablet    Take 1 tablet (50 mg) by mouth daily    Mild recurrent major depression (H)       SLO-NIACIN PO      Take 2 tablets by mouth At Bedtime        SYNTHROID 75 MCG tablet   Generic drug:  levothyroxine     90 tablet    Take 1 tablet (75 mcg) by mouth daily    Hypothyroidism, unspecified type       TRIPLE FLEX PO      Take by mouth 2 times daily

## 2018-04-11 NOTE — PROGRESS NOTES
SUBJECTIVE:   Chanel Israel is a 52 year old female who presents to clinic today for the following health issues:      Depression Followup    Status since last visit: Stable     See PHQ-9 for current symptoms.  Other associated symptoms: None    Complicating factors:   Significant life event:  No   Current substance abuse:  None  Anxiety or Panic symptoms:  No    PHQ-9 8/24/2016 6/12/2017 10/31/2017   Total Score 1 2 3   Q9: Suicide Ideation Not at all Not at all Not at all     In the past two weeks have you had thoughts of suicide or self-harm?  No.    Do you have concerns about your personal safety or the safety of others?   No  PHQ-9  English  PHQ-9   Any Language  Suicide Assessment Five-step Evaluation and Treatment (SAFE-T)    Amount of exercise or physical activity: 4-5 days/week for an average of 15-30 minutes    Problems taking medications regularly: No    Medication side effects: none    Diet: regular (no restrictions)            Problem list and histories reviewed & adjusted, as indicated.  Additional history: as documented    Patient Active Problem List   Diagnosis     Personal history of breast cancer     S/P bilateral mastectomy     Urge incontinence of urine     Overweight (BMI 25.0-29.9)     Mild recurrent major depression (H)     Family history of diabetes mellitus (DM)     Family history of thyroid disease     Plantar fasciitis, bilateral     Seasonal allergies     Mixed hyperlipidemia     Chronic rhinitis     Breast implant asymmetry     ACP (advance care planning)     Lymphedema     Dyslipidemia, goal LDL below 130     Family history of ischemic heart disease     Seasonal allergic rhinitis     Acquired hypothyroidism     Glaucoma suspect, bilateral     Family history of glaucoma     Age-related nuclear cataract of both eyes     Seasonal allergic rhinitis due to other allergic trigger, unspecified chronicity     Past Surgical History:   Procedure Laterality Date     ADENOIDECTOMY        COLONOSCOPY N/A 2014    Procedure: COLONOSCOPY;  Surgeon: Duane, William Charles, MD;  Location: [facility] OR     COSMETIC SURGERY       CYSTOSCOPY       HC REMOVAL OF ANAL FISSURE  2007     MASTECTOMY, SIMPLE RT/LT/MABLE       RECONSTRUCT BREAST BILATERAL  2008     TONSILLECTOMY         Social History   Substance Use Topics     Smoking status: Never Smoker     Smokeless tobacco: Never Used     Alcohol use No     Family History   Problem Relation Age of Onset     DIABETES Mother      Thyroid Disease Mother      Glaucoma Mother      Macular Degeneration Mother      Hypertension Mother      DIABETES Sister      Thyroid Disease Sister      Depression Sister      Endometrial Cancer Sister 51     CANCER Father      skin cancer     Glaucoma Father      Hypertension Father      Coronary Artery Disease Brother       at 43     CEREBROVASCULAR DISEASE Maternal Grandfather          Current Outpatient Prescriptions   Medication Sig Dispense Refill     sertraline (ZOLOFT) 50 MG tablet Take 1 tablet (50 mg) by mouth daily 90 tablet 3     SYNTHROID 75 MCG tablet Take 1 tablet (75 mcg) by mouth daily 90 tablet 3     ketoconazole (NIZORAL) 2 % cream Apply topically 2 times daily 60 g 3     SLO-NIACIN PO Take 2 tablets by mouth At Bedtime       ASPIRIN PO Take 81 mg by mouth daily       fluticasone (FLONASE) 50 MCG/ACT nasal spray Reported on 2017  0     Probiotic Product (PROBIOTIC DAILY PO) Take 1 capsule by mouth 2 times daily       Glucosamine-Chondroitin-MSM (TRIPLE FLEX PO) Take by mouth 2 times daily       Calcium Carbonate-Vitamin D (CALCIUM + D PO) Take by mouth 2 times daily       order for DME Compression sleeve to bilateral UE. 2 Units 6     Flaxseed, Linseed, (FLAX SEED OIL) 1000 MG capsule Take 2 capsules (2,000 mg) by mouth 2 times daily       fluocinolone (SYNALAR) 0.025 % cream Apply topically 2 times daily Apply to affected areas of mouth as needed. 60 g 5     Coenzyme Q10 (CO Q 10) 100 MG CAPS Take 1 capsule by  "mouth daily        Multiple Vitamin (MULTI-VITAMIN PO) Take by mouth daily        MAGNESIUM PO Take 1 capsule by mouth daily        L-GLUTAMINE 1 capsule 2 times daily        [DISCONTINUED] sertraline (ZOLOFT) 50 MG tablet Take 1 tablet (50 mg) by mouth daily 90 tablet 1     Allergies   Allergen Reactions     Erythromycin Nausea     Recent Labs   Lab Test  10/31/17   0731  05/23/17   1551   08/24/16   0913  05/19/16   1617  03/25/16   0900  05/03/12   LDL  104*   --    --   156*   --   139*   < >   --    HDL  60   --    --   65   --   62   < >   --    TRIG  141   --    --   114   --   167*   < >   --    ALT   --   29   --   49  44   --    < >  28   CR   --   0.80   --   0.88  0.80   --    < >  0.84   GFRESTIMATED   --   76   --   68  75   --    < >  >60   GFRESTBLACK   --   >90   GFR Calc     --   82  >90   GFR Calc     --    < >  >60   POTASSIUM   --    --    --    --    --    --    --   4.4   TSH  1.44  0.36*   < >  0.56   --    --    < >   --     < > = values in this interval not displayed.      BP Readings from Last 3 Encounters:   04/11/18 112/78   10/31/17 100/64   07/22/17 116/73    Wt Readings from Last 3 Encounters:   04/11/18 146 lb 1.6 oz (66.3 kg)   10/31/17 144 lb (65.3 kg)   07/22/17 145 lb 9.6 oz (66 kg)                  Labs reviewed in EPIC    Reviewed and updated as needed this visit by clinical staff       Reviewed and updated as needed this visit by Provider         ROS:  CONSTITUTIONAL: NEGATIVE for fever, chills, change in weight  RESP: NEGATIVE for significant cough or SOB  CV: NEGATIVE for chest pain, palpitations or peripheral edema  ENDOCRINE: Hx thyroid disease  PSYCHIATRIC: NEGATIVE for changes in mood or affect and HX depression    OBJECTIVE:     /78 (BP Location: Right arm, Patient Position: Sitting, Cuff Size: Adult Regular)  Pulse 81  Temp 97  F (36.1  C) (Oral)  Ht 5' 2\" (1.575 m)  Wt 146 lb 1.6 oz (66.3 kg)  SpO2 97%  BMI 26.72 kg/m2  Body " mass index is 26.72 kg/(m^2).  GENERAL: healthy, alert and no distress  NECK: no adenopathy, no asymmetry, masses, or scars and thyroid normal to palpation  RESP: lungs clear to auscultation - no rales, rhonchi or wheezes  CV: regular rate and rhythm, normal S1 S2, no S3 or S4, no murmur, click or rub, no peripheral edema and peripheral pulses strong  PSYCH: mentation appears normal, affect normal/bright    Diagnostic Test Results:  none     ASSESSMENT/PLAN:     Depression; recurrent episode-- Mild   Associated with the following complications:    None   Plan:  No changes in the patient's current treatment plan          1. Mild recurrent major depression (H)  Stable, continue with Zoloft 50 mg daily, recheck in 6 months at the time of physical or sooner if needed.  - sertraline (ZOLOFT) 50 MG tablet; Take 1 tablet (50 mg) by mouth daily  Dispense: 90 tablet; Refill: 3    Chart documentation done in part with Dragon Voice recognition Software. Although reviewed after completion, some word and grammatical error may remain.    See Patient Instructions    Gabbie Hicks MD  [facility]

## 2018-04-12 ASSESSMENT — PATIENT HEALTH QUESTIONNAIRE - PHQ9: SUM OF ALL RESPONSES TO PHQ QUESTIONS 1-9: 3

## 2018-04-12 ASSESSMENT — ANXIETY QUESTIONNAIRES: GAD7 TOTAL SCORE: 0

## 2018-04-17 DIAGNOSIS — F33.0 MILD RECURRENT MAJOR DEPRESSION (H): ICD-10-CM

## 2018-04-17 NOTE — TELEPHONE ENCOUNTER
sertraline (ZOLOFT) 50 MG tablet 90 tablet 3 4/11/2018  No   Sig: Take 1 tablet (50 mg) by mouth daily     SSRIs Protocol Passed4/17 1:19 AM   PHQ-9 score less than 5 in past 6 months    Patient is age 18 or older    No active pregnancy on record    No positive pregnancy test in last 12 months    Recent (6 mo) or future (30 days) visit within the authorizing provider's specialty     Please note that a different pharmacy is requesting the above Rx. Resent refills. Estela Amin RN

## 2018-06-19 ENCOUNTER — ONCOLOGY VISIT (OUTPATIENT)
Dept: ONCOLOGY | Facility: CLINIC | Age: 53
End: 2018-06-19
Payer: COMMERCIAL

## 2018-06-19 VITALS
BODY MASS INDEX: 26.68 KG/M2 | WEIGHT: 145 LBS | RESPIRATION RATE: 16 BRPM | SYSTOLIC BLOOD PRESSURE: 103 MMHG | HEIGHT: 62 IN | OXYGEN SATURATION: 98 % | TEMPERATURE: 97.8 F | DIASTOLIC BLOOD PRESSURE: 61 MMHG | HEART RATE: 88 BPM

## 2018-06-19 DIAGNOSIS — Z85.3 PERSONAL HISTORY OF BREAST CANCER: ICD-10-CM

## 2018-06-19 DIAGNOSIS — Z85.3 PERSONAL HISTORY OF BREAST CANCER: Primary | ICD-10-CM

## 2018-06-19 DIAGNOSIS — Z80.3 FHX: BREAST CANCER: ICD-10-CM

## 2018-06-19 DIAGNOSIS — Z90.13 S/P BILATERAL MASTECTOMY: ICD-10-CM

## 2018-06-19 LAB
ALBUMIN SERPL-MCNC: 3.5 G/DL (ref 3.4–5)
ALP SERPL-CCNC: 80 U/L (ref 40–150)
ALT SERPL W P-5'-P-CCNC: 41 U/L (ref 0–50)
AST SERPL W P-5'-P-CCNC: 30 U/L (ref 0–45)
BASOPHILS # BLD AUTO: 0 10E9/L (ref 0–0.2)
BASOPHILS NFR BLD AUTO: 0.5 %
BILIRUB DIRECT SERPL-MCNC: <0.1 MG/DL (ref 0–0.2)
BILIRUB SERPL-MCNC: 0.3 MG/DL (ref 0.2–1.3)
CREAT SERPL-MCNC: 0.82 MG/DL (ref 0.52–1.04)
DIFFERENTIAL METHOD BLD: NORMAL
EOSINOPHIL # BLD AUTO: 0.1 10E9/L (ref 0–0.7)
EOSINOPHIL NFR BLD AUTO: 1.9 %
ERYTHROCYTE [DISTWIDTH] IN BLOOD BY AUTOMATED COUNT: 12.4 % (ref 10–15)
GFR SERPL CREATININE-BSD FRML MDRD: 73 ML/MIN/1.7M2
HCT VFR BLD AUTO: 40.5 % (ref 35–47)
HGB BLD-MCNC: 13.4 G/DL (ref 11.7–15.7)
IMM GRANULOCYTES # BLD: 0 10E9/L (ref 0–0.4)
IMM GRANULOCYTES NFR BLD: 0.3 %
LYMPHOCYTES # BLD AUTO: 1.9 10E9/L (ref 0.8–5.3)
LYMPHOCYTES NFR BLD AUTO: 29 %
MCH RBC QN AUTO: 30 PG (ref 26.5–33)
MCHC RBC AUTO-ENTMCNC: 33.1 G/DL (ref 31.5–36.5)
MCV RBC AUTO: 91 FL (ref 78–100)
MONOCYTES # BLD AUTO: 0.4 10E9/L (ref 0–1.3)
MONOCYTES NFR BLD AUTO: 6.5 %
NEUTROPHILS # BLD AUTO: 4 10E9/L (ref 1.6–8.3)
NEUTROPHILS NFR BLD AUTO: 61.8 %
PLATELET # BLD AUTO: 234 10E9/L (ref 150–450)
PROT SERPL-MCNC: 7.3 G/DL (ref 6.8–8.8)
RBC # BLD AUTO: 4.46 10E12/L (ref 3.8–5.2)
WBC # BLD AUTO: 6.4 10E9/L (ref 4–11)

## 2018-06-19 PROCEDURE — 80076 HEPATIC FUNCTION PANEL: CPT | Performed by: INTERNAL MEDICINE

## 2018-06-19 PROCEDURE — 99214 OFFICE O/P EST MOD 30 MIN: CPT | Performed by: INTERNAL MEDICINE

## 2018-06-19 PROCEDURE — 36415 COLL VENOUS BLD VENIPUNCTURE: CPT | Performed by: INTERNAL MEDICINE

## 2018-06-19 PROCEDURE — 85025 COMPLETE CBC W/AUTO DIFF WBC: CPT | Performed by: INTERNAL MEDICINE

## 2018-06-19 PROCEDURE — 82565 ASSAY OF CREATININE: CPT | Performed by: INTERNAL MEDICINE

## 2018-06-19 ASSESSMENT — PAIN SCALES - GENERAL: PAINLEVEL: NO PAIN (0)

## 2018-06-19 NOTE — LETTER
6/19/2018         RE: Chanel Israel  [address]  [facility] [address]        Dear Colleague,    Thank you for referring your patient, Chanel Israel, to the [facility]. Please see a copy of my visit note below.    Oncology Follow-up visit:  Date on this visit: Jun 19, 2018      Primary Care Physician: Gabbie Pate   Dx: Breast cancer  She has a history of stage IIB breast cancer. She underwent L MRM and right prophylactic mastectomy on 5/14/2007. Pathology from the surgery showed  Grade 3 invasive ductal carcinoma, measuring 3.5x3.3 cm. One left axillary SLN was positive for malignancy and additional 13 resected left axillary LNs were negative for malignancy. 3 right  SLNs were negative for malignancy. The tumor was ER negative UT negative HER2 Lisa negative by FISH.  She was given adjuvant chemotherapy per clinical trial  with weekly Adriamycin IV x15 weeks and also took oral Cytoxan daily x15 weeks. She was seen by the genetic counselor and the testing was negative for BRCA 1 and 2.  She was under the care of Dr. Ramirez at [facility] and preferred to continue to f/up with oncologist and has transferred her care to us in 05/2014.     History Of Present Illness:  Ms. Israel is a 52 year old postmenopausal female who presents for f/up of Hx of triple negative breast cancer.  She underwent bilateral removal and replacement with alloderm and larger silicone implants by  Dr. Sarah Todd on 6/21/2016 (op note reviewed from [facility]).  In February developed s/o encapsulation of left implant with hardening of left implant. She ended up undergoing fat grafting from the abdomen and flanks to the bilateral breasts for contour abnormalities, by Dr. Todd on 12/28/2017 ([facility] records reviewed). The patient is happy with the cosmetic outcome.  She is pain free. She is otherwise feeling well. Unfortunately, her sister was diagnosed with breast cancer at the age of 56. Her  sister had endometrial cancer 5 years ago. Her sister's cancer was ER+MI+ and her sister was on oral HRT for years prior. They are two youngest sisters of 7 siblings. None of the other siblings have malignancy. Pt tested negative for BRCA1/2. Her sister will be tested for Juan syndrome.   Chanel is feeling well.  She remains on Zoloft for depression.   In addition, a complete 12 point  review of systems is negative.      Past Medical/Surgical History:  Past Medical History:   Diagnosis Date     Asthma, mild intermittent      Breast cancer (H)      Depression      Endometriosis      Heart murmur     Patient reported.     PONV (postoperative nausea and vomiting)      Past Surgical History:   Procedure Laterality Date     ADENOIDECTOMY       COLONOSCOPY N/A 8/22/2014    Procedure: COLONOSCOPY;  Surgeon: Duane, William Charles, MD;  Location: [facility] OR     COSMETIC SURGERY       CYSTOSCOPY       HC REMOVAL OF ANAL FISSURE  2007     MASTECTOMY, SIMPLE RT/LT/MABLE       RECONSTRUCT BREAST BILATERAL  2008     TONSILLECTOMY       FHx and social Hx reviewed.  Employer: [employer]. Never smoker.    Allergies:  Allergies as of 06/19/2018 - Teja as Reviewed 04/11/2018   Allergen Reaction Noted     Erythromycin Nausea 07/08/2013     Current Medications:  Current Outpatient Prescriptions   Medication     ASPIRIN PO     Calcium Carbonate-Vitamin D (CALCIUM + D PO)     Coenzyme Q10 (CO Q 10) 100 MG CAPS     Flaxseed, Linseed, (FLAX SEED OIL) 1000 MG capsule     fluocinolone (SYNALAR) 0.025 % cream     fluticasone (FLONASE) 50 MCG/ACT nasal spray     Glucosamine-Chondroitin-MSM (TRIPLE FLEX PO)     ketoconazole (NIZORAL) 2 % cream     L-GLUTAMINE     MAGNESIUM PO     Multiple Vitamin (MULTI-VITAMIN PO)     order for DME     Probiotic Product (PROBIOTIC DAILY PO)     sertraline (ZOLOFT) 50 MG tablet     SLO-NIACIN PO     SYNTHROID 75 MCG tablet     No current facility-administered medications for this visit.   "      Physical Exam:    /61  Pulse 88  Temp 97.8  F (36.6  C)  Resp 16  Ht 1.575 m (5' 2\")  Wt 65.8 kg (145 lb)  SpO2 98%  BMI 26.52 kg/m2      GENERAL APPEARANCE: healthy, alert and no distress     NECK: no adenopathy, no asymmetry or masses     LYMPHATICS: No cervical, supraclavicular, axillary  lymphadenopathy     RESP: lungs clear to auscultation - no rales, rhonchi or wheezes     CARDIOVASCULAR: regular rates and rhythm, normal S1 S2, no S3 or S4 and no murmur.     ABDOMEN:  soft, nontender, no HSM or masses and bowel sounds normal     MUSCULOSKELETAL: extremities normal- no gross deformities noted, no evidence of inflammation in joints, FROM in all extremities. No edema b/l LE.     SKIN: no suspicious lesions or rashes     PSYCHIATRIC: mentation appears normal and affect normal  Chest wall: s/p bilateral mastectomy and reconstruction. L implant hardened and raised. No chest wall masses. No axillary lymphadenopathy b/l    Laboratory/Imaging Studies  Orders Only on 06/19/2018   Component Date Value Ref Range Status     WBC 06/19/2018 6.4  4.0 - 11.0 10e9/L Final     RBC Count 06/19/2018 4.46  3.8 - 5.2 10e12/L Final     Hemoglobin 06/19/2018 13.4  11.7 - 15.7 g/dL Final     Hematocrit 06/19/2018 40.5  35.0 - 47.0 % Final     MCV 06/19/2018 91  78 - 100 fl Final     MCH 06/19/2018 30.0  26.5 - 33.0 pg Final     MCHC 06/19/2018 33.1  31.5 - 36.5 g/dL Final     RDW 06/19/2018 12.4  10.0 - 15.0 % Final     Platelet Count 06/19/2018 234  150 - 450 10e9/L Final     Diff Method 06/19/2018 Automated Method   Final     % Neutrophils 06/19/2018 61.8  % Final     % Lymphocytes 06/19/2018 29.0  % Final     % Monocytes 06/19/2018 6.5  % Final     % Eosinophils 06/19/2018 1.9  % Final     % Basophils 06/19/2018 0.5  % Final     % Immature Granulocytes 06/19/2018 0.3  % Final     Absolute Neutrophil 06/19/2018 4.0  1.6 - 8.3 10e9/L Final     Absolute Lymphocytes 06/19/2018 1.9  0.8 - 5.3 10e9/L Final     Absolute " Monocytes 06/19/2018 0.4  0.0 - 1.3 10e9/L Final     Absolute Eosinophils 06/19/2018 0.1  0.0 - 0.7 10e9/L Final     Absolute Basophils 06/19/2018 0.0  0.0 - 0.2 10e9/L Final     Abs Immature Granulocytes 06/19/2018 0.0  0 - 0.4 10e9/L Final     Creatinine 06/19/2018 0.82  0.52 - 1.04 mg/dL Final     GFR Estimate 06/19/2018 73  >60 mL/min/1.7m2 Final    Non  GFR Calc     GFR Estimate If Black 06/19/2018 88  >60 mL/min/1.7m2 Final    African American GFR Calc     Bilirubin Direct 06/19/2018 <0.1  0.0 - 0.2 mg/dL Final     Bilirubin Total 06/19/2018 0.3  0.2 - 1.3 mg/dL Final     Albumin 06/19/2018 3.5  3.4 - 5.0 g/dL Final     Protein Total 06/19/2018 7.3  6.8 - 8.8 g/dL Final     Alkaline Phosphatase 06/19/2018 80  40 - 150 U/L Final     ALT 06/19/2018 41  0 - 50 U/L Final     AST 06/19/2018 30  0 - 45 U/L Final       ASSESSMENT/PLAN:  Chanel is a very pleasant 52 year old woman with Hx of stage IIB invasive ductal breast cancer, grade 3, triple negative, s/p L MRM in 05/2007, s/p adjuvant Adriamycin and Cytoxan, with no evidence of disease recurrence since.   1. Personal Hx of triple negative breast cancer - She prefers to continue to follow-up with us annually  with cbcd, creat, LFTs given prior exposure to chemotherapy. She is 11 years out from her treatment and could transfer all her f/up care to Dr. Hicks with annual labs as above when she is ready.  2. Hypothyroidism- TSH and free T4 normal in Oct 2017. Continue f/up with endocrinology. Continue Levothyroxine.   3. She will f/up with Dr. Hicks for all her other medical needs.  4. FHx of breast cancer in sister at 56- sister will be undergoing genetic testing for inherited breast cancer predisposition with her oncologist. Patient tested negative for BRCA 1 and 2.  At the end of our visit patient verbalized understanding and concurred with the plan.      Again, thank you for allowing me to participate in the care of your patient.         Sincerely,        Ghislaine Lopez MD, MD

## 2018-06-19 NOTE — NURSING NOTE
"Oncology Rooming Note    June 19, 2018 4:38 PM   Chanel Israel is a 52 year old female who presents for:    Chief Complaint   Patient presents with     Oncology Clinic Visit     f/u 1 year breast ca     Initial Vitals: /61  Pulse 88  Temp 97.8  F (36.6  C)  Resp 16  Ht 1.575 m (5' 2\")  Wt 65.8 kg (145 lb)  SpO2 98%  BMI 26.52 kg/m2 Estimated body mass index is 26.52 kg/(m^2) as calculated from the following:    Height as of this encounter: 1.575 m (5' 2\").    Weight as of this encounter: 65.8 kg (145 lb). Body surface area is 1.7 meters squared.  No Pain (0) Comment: Data Unavailable   No LMP recorded. Patient is postmenopausal.  Allergies reviewed: Yes  Medications reviewed: Yes    Medications: Medication refills not needed today.  Pharmacy name entered into [facility]: [facility] [address] [place] [address] [address] [facility]  [address]        5 minutes for nursing intake (face to face time)     Val Pichardo LPN              "

## 2018-06-19 NOTE — MR AVS SNAPSHOT
After Visit Summary   6/19/2018    Chanel Israel    MRN: [id number]           Patient Information     Date Of Birth          1965        Visit Information        Provider Department      6/19/2018 4:30 PM Ghislaine Lopez MD [facility]        Today's Diagnoses     Personal history of breast cancer    -  1    FHx: breast cancer           Follow-ups after your visit        Your next 10 appointments already scheduled     Jun 20, 2019  3:30 PM CDT   LAB with LAB ONC [facility] ([facility])    [address] [place] [id number] [phone]           Please do not eat 10-12 hours before your appointment if you are coming in fasting for labs on lipids, cholesterol, or glucose (sugar). This does not apply to pregnant women. Water, hot tea and black coffee (with nothing added) are okay. Do not drink other fluids, diet soda or chew gum.            Jun 20, 2019  4:30 PM CDT   Return Visit with Ghislaine Lopez MD   [facility] ([facility])    [address] [address] [place] [id number] [phone]              Future tests that were ordered for you today     Open Future Orders        Priority Expected Expires Ordered    CBC with platelets differential Routine  6/19/2019 6/19/2018    Hepatic panel Routine  6/19/2019 6/19/2018    Creatinine Routine  6/19/2019 6/19/2018            Who to contact     If you have questions or need follow up information about today's clinic visit or your schedule please contact [facility] directly at [phone].  Normal or non-critical lab and imaging results will be communicated to you by MyChart, letter or phone within 4 business days after the clinic has received the results. If you do not hear from us within 7 days, please contact the clinic through MyChart or phone. If you have a critical or abnormal lab result, we will notify  "you by phone as soon as possible.  Submit refill requests through HelpAround or call your pharmacy and they will forward the refill request to us. Please allow 3 business days for your refill to be completed.          Additional Information About Your Visit        HelpAround Information     HelpAround gives you secure access to your electronic health record. If you see a primary care provider, you can also send messages to your care team and make appointments. If you have questions, please call your primary care clinic.  If you do not have a primary care provider, please call [phone] and they will assist you.      HelpAround is an electronic gateway that provides easy, online access to your medical records. With HelpAround, you can request a clinic appointment, read your test results, renew a prescription or communicate with your care team.     To access your existing account, please contact your [facility] or call [phone] for assistance.        Care EveryWhere ID     This is your Care EveryWhere ID. This could be used by other organizations to access your [facility] medical records  [id number]        Your Vitals Were     Pulse Temperature Respirations Height Pulse Oximetry BMI (Body Mass Index)    88 97.8  F (36.6  C) 16 1.575 m (5' 2\") 98% 26.52 kg/m2       Blood Pressure from Last 3 Encounters:   06/19/18 103/61   04/11/18 112/78   10/31/17 100/64    Weight from Last 3 Encounters:   06/19/18 65.8 kg (145 lb)   04/11/18 66.3 kg (146 lb 1.6 oz)   10/31/17 65.3 kg (144 lb)               Primary Care Provider Office Phone # Fax #    Gabbie Hicks [phone]       [address]  [facility] [address]        Equal Access to Services     FLORENTINO KOHLER : Marci Brady, wadipeshda sydniadaha, qaheidy kaalmada antionette, ezio velasquez. So [facility] [phone].    ATENCIÓN: Si habla español, tiene a ortiz disposición servicios gratuitos de " asistencia lingüística. Dora al [phone].    We comply with applicable federal civil rights laws and [place] laws. We do not discriminate on the basis of race, color, national origin, age, disability, sex, sexual orientation, or gender identity.            Thank you!     Thank you for choosing [facility]  for your care. Our goal is always to provide you with excellent care. Hearing back from our patients is one way we can continue to improve our services. Please take a few minutes to complete the written survey that you may receive in the mail after your visit with us. Thank you!             Your Updated Medication List - Protect others around you: Learn how to safely use, store and throw away your medicines at www.disposemymeds.org.          This list is accurate as of 6/19/18 11:59 PM.  Always use your most recent med list.                   Brand Name Dispense Instructions for use Diagnosis    ASPIRIN PO      Take 81 mg by mouth daily        CALCIUM + D PO      Take by mouth 2 times daily        Co Q 10 100 MG Caps      Take 1 capsule by mouth daily        flax seed oil 1000 MG capsule      Take 2 capsules (2,000 mg) by mouth 2 times daily        fluocinolone 0.025 % cream    SYNALAR    60 g    Apply topically 2 times daily Apply to affected areas of mouth as needed.    Perleche       fluticasone 50 MCG/ACT spray    FLONASE     Reported on 5/1/2017        ketoconazole 2 % cream    NIZORAL    60 g    Apply topically 2 times daily    Abrasion of left axilla with infection, initial encounter       L-GLUTAMINE      1 capsule 2 times daily        MAGNESIUM PO      Take 1 capsule by mouth daily        MULTI-VITAMIN PO      Take by mouth daily        order for DME     2 Units    Compression sleeve to bilateral UE.    S/P bilateral mastectomy, Lymphedema       PROBIOTIC DAILY PO      Take 1 capsule by mouth 2 times daily        sertraline 50 MG tablet    ZOLOFT    90 tablet    TAKE ONE TABLET BY  MOUTH ONCE DAILY    Mild recurrent major depression (H)       SLO-NIACIN PO      Take 2 tablets by mouth At Bedtime        SYNTHROID 75 MCG tablet   Generic drug:  levothyroxine     90 tablet    Take 1 tablet (75 mcg) by mouth daily    Hypothyroidism, unspecified type       TRIPLE FLEX PO      Take by mouth 2 times daily        VITAMIN B-12 PO      Take by mouth daily    Personal history of breast cancer, FHx: breast cancer       ZINC PO      Take by mouth daily    Personal history of breast cancer, FHx: breast cancer

## 2018-06-19 NOTE — PROGRESS NOTES
Oncology Follow-up visit:  Date on this visit: Jun 19, 2018      Primary Care Physician: Gabbie Pate   Dx: Breast cancer  She has a history of stage IIB breast cancer. She underwent L MRM and right prophylactic mastectomy on 5/14/2007. Pathology from the surgery showed  Grade 3 invasive ductal carcinoma, measuring 3.5x3.3 cm. One left axillary SLN was positive for malignancy and additional 13 resected left axillary LNs were negative for malignancy. 3 right  SLNs were negative for malignancy. The tumor was ER negative SC negative HER2 Lisa negative by FISH.  She was given adjuvant chemotherapy per clinical trial  with weekly Adriamycin IV x15 weeks and also took oral Cytoxan daily x15 weeks. She was seen by the genetic counselor and the testing was negative for BRCA 1 and 2.  She was under the care of Dr. Ramirez at [facility] and preferred to continue to f/up with oncologist and has transferred her care to us in 05/2014.     History Of Present Illness:  Ms. Israel is a 52 year old postmenopausal female who presents for f/up of Hx of triple negative breast cancer.  She underwent bilateral removal and replacement with alloderm and larger silicone implants by  Dr. Sarah Todd on 6/21/2016 (op note reviewed from [facility]).  In February developed s/o encapsulation of left implant with hardening of left implant. She ended up undergoing fat grafting from the abdomen and flanks to the bilateral breasts for contour abnormalities, by Dr. Todd on 12/28/2017 ([facility] records reviewed). The patient is happy with the cosmetic outcome.  She is pain free. She is otherwise feeling well. Unfortunately, her sister was diagnosed with breast cancer at the age of 56. Her sister had endometrial cancer 5 years ago. Her sister's cancer was ER+SC+ and her sister was on oral HRT for years prior. They are two youngest sisters of 7 siblings. None of the other siblings have malignancy. Pt tested negative for BRCA1/2. Her sister will be tested  "for Juan syndrome.   Chanel is feeling well.  She remains on Zoloft for depression.   In addition, a complete 12 point  review of systems is negative.      Past Medical/Surgical History:  Past Medical History:   Diagnosis Date     Asthma, mild intermittent      Breast cancer (H)      Depression      Endometriosis      Heart murmur     Patient reported.     PONV (postoperative nausea and vomiting)      Past Surgical History:   Procedure Laterality Date     ADENOIDECTOMY       COLONOSCOPY N/A 8/22/2014    Procedure: COLONOSCOPY;  Surgeon: Duane, William Charles, MD;  Location: [facility] OR     COSMETIC SURGERY       CYSTOSCOPY       HC REMOVAL OF ANAL FISSURE  2007     MASTECTOMY, SIMPLE RT/LT/MABLE       RECONSTRUCT BREAST BILATERAL  2008     TONSILLECTOMY       FHx and social Hx reviewed.  Employer: [employer]. Never smoker.    Allergies:  Allergies as of 06/19/2018 - Teja as Reviewed 04/11/2018   Allergen Reaction Noted     Erythromycin Nausea 07/08/2013     Current Medications:  Current Outpatient Prescriptions   Medication     ASPIRIN PO     Calcium Carbonate-Vitamin D (CALCIUM + D PO)     Coenzyme Q10 (CO Q 10) 100 MG CAPS     Flaxseed, Linseed, (FLAX SEED OIL) 1000 MG capsule     fluocinolone (SYNALAR) 0.025 % cream     fluticasone (FLONASE) 50 MCG/ACT nasal spray     Glucosamine-Chondroitin-MSM (TRIPLE FLEX PO)     ketoconazole (NIZORAL) 2 % cream     L-GLUTAMINE     MAGNESIUM PO     Multiple Vitamin (MULTI-VITAMIN PO)     order for DME     Probiotic Product (PROBIOTIC DAILY PO)     sertraline (ZOLOFT) 50 MG tablet     SLO-NIACIN PO     SYNTHROID 75 MCG tablet     No current facility-administered medications for this visit.        Physical Exam:    /61  Pulse 88  Temp 97.8  F (36.6  C)  Resp 16  Ht 1.575 m (5' 2\")  Wt 65.8 kg (145 lb)  SpO2 98%  BMI 26.52 kg/m2      GENERAL APPEARANCE: healthy, alert and no distress     NECK: no adenopathy, no asymmetry or masses     LYMPHATICS: No " cervical, supraclavicular, axillary  lymphadenopathy     RESP: lungs clear to auscultation - no rales, rhonchi or wheezes     CARDIOVASCULAR: regular rates and rhythm, normal S1 S2, no S3 or S4 and no murmur.     ABDOMEN:  soft, nontender, no HSM or masses and bowel sounds normal     MUSCULOSKELETAL: extremities normal- no gross deformities noted, no evidence of inflammation in joints, FROM in all extremities. No edema b/l LE.     SKIN: no suspicious lesions or rashes     PSYCHIATRIC: mentation appears normal and affect normal  Chest wall: s/p bilateral mastectomy and reconstruction. L implant hardened and raised. No chest wall masses. No axillary lymphadenopathy b/l    Laboratory/Imaging Studies  Orders Only on 06/19/2018   Component Date Value Ref Range Status     WBC 06/19/2018 6.4  4.0 - 11.0 10e9/L Final     RBC Count 06/19/2018 4.46  3.8 - 5.2 10e12/L Final     Hemoglobin 06/19/2018 13.4  11.7 - 15.7 g/dL Final     Hematocrit 06/19/2018 40.5  35.0 - 47.0 % Final     MCV 06/19/2018 91  78 - 100 fl Final     MCH 06/19/2018 30.0  26.5 - 33.0 pg Final     MCHC 06/19/2018 33.1  31.5 - 36.5 g/dL Final     RDW 06/19/2018 12.4  10.0 - 15.0 % Final     Platelet Count 06/19/2018 234  150 - 450 10e9/L Final     Diff Method 06/19/2018 Automated Method   Final     % Neutrophils 06/19/2018 61.8  % Final     % Lymphocytes 06/19/2018 29.0  % Final     % Monocytes 06/19/2018 6.5  % Final     % Eosinophils 06/19/2018 1.9  % Final     % Basophils 06/19/2018 0.5  % Final     % Immature Granulocytes 06/19/2018 0.3  % Final     Absolute Neutrophil 06/19/2018 4.0  1.6 - 8.3 10e9/L Final     Absolute Lymphocytes 06/19/2018 1.9  0.8 - 5.3 10e9/L Final     Absolute Monocytes 06/19/2018 0.4  0.0 - 1.3 10e9/L Final     Absolute Eosinophils 06/19/2018 0.1  0.0 - 0.7 10e9/L Final     Absolute Basophils 06/19/2018 0.0  0.0 - 0.2 10e9/L Final     Abs Immature Granulocytes 06/19/2018 0.0  0 - 0.4 10e9/L Final     Creatinine 06/19/2018 0.82   0.52 - 1.04 mg/dL Final     GFR Estimate 06/19/2018 73  >60 mL/min/1.7m2 Final    Non  GFR Calc     GFR Estimate If Black 06/19/2018 88  >60 mL/min/1.7m2 Final    African American GFR Calc     Bilirubin Direct 06/19/2018 <0.1  0.0 - 0.2 mg/dL Final     Bilirubin Total 06/19/2018 0.3  0.2 - 1.3 mg/dL Final     Albumin 06/19/2018 3.5  3.4 - 5.0 g/dL Final     Protein Total 06/19/2018 7.3  6.8 - 8.8 g/dL Final     Alkaline Phosphatase 06/19/2018 80  40 - 150 U/L Final     ALT 06/19/2018 41  0 - 50 U/L Final     AST 06/19/2018 30  0 - 45 U/L Final       ASSESSMENT/PLAN:  Chanel is a very pleasant 52 year old woman with Hx of stage IIB invasive ductal breast cancer, grade 3, triple negative, s/p L MRM in 05/2007, s/p adjuvant Adriamycin and Cytoxan, with no evidence of disease recurrence since.   1. Personal Hx of triple negative breast cancer - She prefers to continue to follow-up with us annually  with cbcd, creat, LFTs given prior exposure to chemotherapy. She is 11 years out from her treatment and could transfer all her f/up care to Dr. Hicks with annual labs as above when she is ready.  2. Hypothyroidism- TSH and free T4 normal in Oct 2017. Continue f/up with endocrinology. Continue Levothyroxine.   3. She will f/up with Dr. Hicks for all her other medical needs.  4. FHx of breast cancer in sister at 56- sister will be undergoing genetic testing for inherited breast cancer predisposition with her oncologist. Patient tested negative for BRCA 1 and 2.  At the end of our visit patient verbalized understanding and concurred with the plan.    Addendum: Pt proceeded with genetic testing for 28 genes associated with myRisk panel after meeting with genetic counselor - (APC, LENNY, BARD1, BRCA1, BRCA2, BRIP1, BMPR1A, CDH1, CDK4, CDKN2A (p14ARF and c13XGS3s), CHEK2, EPCAM, GREM1, MLH1, MSH2, MSH6, MUTYH, NBN, PALB2, PMS2, POLD1, POLE, PTEN, RAD51C, RAD51D, SMAD4, STK11, and TP53). Results will be available in  3-4 weeks.

## 2018-06-28 ENCOUNTER — OFFICE VISIT (OUTPATIENT)
Dept: FAMILY MEDICINE | Facility: CLINIC | Age: 53
End: 2018-06-28
Payer: COMMERCIAL

## 2018-06-28 VITALS
WEIGHT: 147 LBS | HEIGHT: 62 IN | SYSTOLIC BLOOD PRESSURE: 118 MMHG | BODY MASS INDEX: 27.05 KG/M2 | RESPIRATION RATE: 16 BRPM | OXYGEN SATURATION: 96 % | TEMPERATURE: 98.8 F | DIASTOLIC BLOOD PRESSURE: 72 MMHG | HEART RATE: 88 BPM

## 2018-06-28 DIAGNOSIS — M25.50 MULTIPLE JOINT PAIN: ICD-10-CM

## 2018-06-28 DIAGNOSIS — E03.9 ACQUIRED HYPOTHYROIDISM: ICD-10-CM

## 2018-06-28 DIAGNOSIS — R53.83 FATIGUE, UNSPECIFIED TYPE: Primary | ICD-10-CM

## 2018-06-28 LAB
ALBUMIN SERPL-MCNC: 3.6 G/DL (ref 3.4–5)
ALP SERPL-CCNC: 73 U/L (ref 40–150)
ALT SERPL W P-5'-P-CCNC: 44 U/L (ref 0–50)
ANION GAP SERPL CALCULATED.3IONS-SCNC: 6 MMOL/L (ref 3–14)
AST SERPL W P-5'-P-CCNC: 30 U/L (ref 0–45)
BASOPHILS # BLD AUTO: 0 10E9/L (ref 0–0.2)
BASOPHILS NFR BLD AUTO: 0.3 %
BILIRUB SERPL-MCNC: 0.3 MG/DL (ref 0.2–1.3)
BUN SERPL-MCNC: 14 MG/DL (ref 7–30)
CALCIUM SERPL-MCNC: 9.9 MG/DL (ref 8.5–10.1)
CHLORIDE SERPL-SCNC: 103 MMOL/L (ref 94–109)
CO2 SERPL-SCNC: 31 MMOL/L (ref 20–32)
CREAT SERPL-MCNC: 0.94 MG/DL (ref 0.52–1.04)
CRP SERPL-MCNC: <2.9 MG/L (ref 0–8)
DIFFERENTIAL METHOD BLD: NORMAL
EOSINOPHIL # BLD AUTO: 0.1 10E9/L (ref 0–0.7)
EOSINOPHIL NFR BLD AUTO: 1.7 %
ERYTHROCYTE [DISTWIDTH] IN BLOOD BY AUTOMATED COUNT: 13 % (ref 10–15)
ERYTHROCYTE [SEDIMENTATION RATE] IN BLOOD BY WESTERGREN METHOD: 14 MM/H (ref 0–30)
GFR SERPL CREATININE-BSD FRML MDRD: 62 ML/MIN/1.7M2
GLUCOSE SERPL-MCNC: 91 MG/DL (ref 70–99)
HCT VFR BLD AUTO: 41 % (ref 35–47)
HGB BLD-MCNC: 13.9 G/DL (ref 11.7–15.7)
LYMPHOCYTES # BLD AUTO: 2.2 10E9/L (ref 0.8–5.3)
LYMPHOCYTES NFR BLD AUTO: 33.6 %
MCH RBC QN AUTO: 30.9 PG (ref 26.5–33)
MCHC RBC AUTO-ENTMCNC: 33.9 G/DL (ref 31.5–36.5)
MCV RBC AUTO: 91 FL (ref 78–100)
MONOCYTES # BLD AUTO: 0.5 10E9/L (ref 0–1.3)
MONOCYTES NFR BLD AUTO: 8 %
NEUTROPHILS # BLD AUTO: 3.7 10E9/L (ref 1.6–8.3)
NEUTROPHILS NFR BLD AUTO: 56.4 %
PLATELET # BLD AUTO: 243 10E9/L (ref 150–450)
POTASSIUM SERPL-SCNC: 4.1 MMOL/L (ref 3.4–5.3)
PROT SERPL-MCNC: 7.1 G/DL (ref 6.8–8.8)
RBC # BLD AUTO: 4.5 10E12/L (ref 3.8–5.2)
SODIUM SERPL-SCNC: 140 MMOL/L (ref 133–144)
TSH SERPL DL<=0.005 MIU/L-ACNC: 0.73 MU/L (ref 0.4–4)
WBC # BLD AUTO: 6.5 10E9/L (ref 4–11)

## 2018-06-28 PROCEDURE — 85025 COMPLETE CBC W/AUTO DIFF WBC: CPT | Performed by: PHYSICIAN ASSISTANT

## 2018-06-28 PROCEDURE — 99214 OFFICE O/P EST MOD 30 MIN: CPT | Performed by: PHYSICIAN ASSISTANT

## 2018-06-28 PROCEDURE — 80053 COMPREHEN METABOLIC PANEL: CPT | Performed by: PHYSICIAN ASSISTANT

## 2018-06-28 PROCEDURE — 84443 ASSAY THYROID STIM HORMONE: CPT | Performed by: PHYSICIAN ASSISTANT

## 2018-06-28 PROCEDURE — 85652 RBC SED RATE AUTOMATED: CPT | Performed by: PHYSICIAN ASSISTANT

## 2018-06-28 PROCEDURE — 36415 COLL VENOUS BLD VENIPUNCTURE: CPT | Performed by: PHYSICIAN ASSISTANT

## 2018-06-28 PROCEDURE — 86140 C-REACTIVE PROTEIN: CPT | Performed by: PHYSICIAN ASSISTANT

## 2018-06-28 PROCEDURE — 83516 IMMUNOASSAY NONANTIBODY: CPT | Performed by: PHYSICIAN ASSISTANT

## 2018-06-28 ASSESSMENT — PAIN SCALES - GENERAL: PAINLEVEL: NO PAIN (0)

## 2018-06-28 NOTE — PATIENT INSTRUCTIONS
Follow up with us if no improvement over the next week.   Return urgently if any change in symptoms  Like increasing pain, fever,  Or other change in symptoms.       At [facility], we strive to deliver an exceptional experience to you, every time we see you.  If you receive a survey in the mail, please send us back your thoughts. We really do value your feedback.    Suggested websites for health information:  Www.[facility].org : Up to date and easily searchable information on multiple topics.  Www.medlineplus.gov : medication info, interactive tutorials, watch real surgeries online  Www.familydoctor.org : good info from the Academy of Family Physicians  Www.cdc.gov : public health info, travel advisories, epidemics (H1N1)  Www.aap.org : children's health info, normal development, vaccinations  Www.health.[facility].[place].us : [place] dept of health, public health issues in [place], N1N1    Your care team:     Family Medicine   MAX Bonds MD Emily Bunt, APRN [facility]   S. MD Em Kiran MD Angela Wermerskirchen, MD         Clinic hours: Monday - Wednesday 7 am-7 pm   Thursdays and Fridays 7 am-5 pm.     [facility]: Monday - Friday 11 am-9 pm,   Saturday and Sunday 9 am-5 pm.    [facility]: Monday -Thursday 8 am-8 pm; Friday 8 am-6 pm; Saturday and Sunday 9 am-5 pm.     [facility]: Monday Thursday 8 am   7 pm; Friday 8 am   6 pm    Clinic: [phone]   [facility]: [phone]   [facility]: [phone]

## 2018-06-28 NOTE — MR AVS SNAPSHOT
After Visit Summary   6/28/2018    Chanel Israel    MRN: [id number]           Patient Information     Date Of Birth          1965        Visit Information        Provider Department      6/28/2018 1:40 PM Juli Lieberman PA-C [facility]        Today's Diagnoses     Fatigue, unspecified type    -  1    Multiple joint pain        Acquired hypothyroidism          Care Instructions    Follow up with us if no improvement over the next week.   Return urgently if any change in symptoms  Like increasing pain, fever,  Or other change in symptoms.       At [facility], we strive to deliver an exceptional experience to you, every time we see you.  If you receive a survey in the mail, please send us back your thoughts. We really do value your feedback.    Suggested websites for health information:  Www.[facility].[employer] : Up to date and easily searchable information on multiple topics.  Www.[employer]ToTally.gov : medication info, interactive tutorials, watch real surgeries online  Www.familydoctor.org : good info from the Academy of Family Physicians  Www.cdc.gov : public health info, travel advisories, epidemics (H1N1)  Www.aap.org : children's health info, normal development, vaccinations  Www.health.[facility].[place].us : [place] dept of health, public health issues in [place], N1N1    Your care team:     Family Medicine   MAX Bonds MD Emily Bunt, APRN [facility]   S. MD Em Kiran MD Angela Wermerskirchen, MD         Clinic hours: Monday - Wednesday 7 am-7 pm   Thursdays and Fridays 7 am-5 pm.     [facility]: Monday - Friday 11 am-9 pm,   Saturday and Sunday 9 am-5 pm.    [facility]: Monday -Thursday 8 am-8 pm; Friday 8 am-6 pm; Saturday and Sunday 9 am-5 pm.     [facility]: Monday - Thursday 8 am - 7 pm; Friday 8 am - 6 pm    Clinic: [phone]   [facility]: [phone]   [facility]: [phone]                  Follow-ups after your visit        Your next 10 appointments already scheduled     Jun 20, 2019  3:30 PM CDT   LAB with LAB ONC [facility] ([facility])    [address] [place] [address] [phone]           Please do not eat 10-12 hours before your appointment if you are coming in fasting for labs on lipids, cholesterol, or glucose (sugar). This does not apply to pregnant women. Water, hot tea and black coffee (with nothing added) are okay. Do not drink other fluids, diet soda or chew gum.            Jun 20, 2019  4:30 PM CDT   Return Visit with Ghislaine Lopez MD   [facility]    [address] [facility] [address] [phone]              Who to contact     If you have questions or need follow up information about today's clinic visit or your schedule please contact [facility] directly at [phone].  Normal or non-critical lab and imaging results will be communicated to you by MyChart, letter or phone within 4 business days after the clinic has received the results. If you do not hear from us within 7 days, please contact the clinic through Cloud[employer] or phone. If you have a critical or abnormal lab result, we will notify you by phone as soon as possible.  Submit refill requests through My Digital Shield or call your pharmacy and they will forward the refill request to us. Please allow 3 business days for your refill to be completed.          Additional Information About Your Visit        CloudFactoryhart Information     My Digital Shield gives you secure access to your electronic health record. If you see a primary care provider, you can also send messages to your care team and make appointments. If you have questions, please call your primary care clinic.  If you do not have a primary care provider, please call [phone]  "and they will assist you.        Care EveryWhere ID     This is your Care EveryWhere ID. This could be used by other organizations to access your [place] medical records  [id number]        Your Vitals Were     Pulse Temperature Respirations Height Pulse Oximetry Breastfeeding?    88 98.8  F (37.1  C) (Oral) 16 1.575 m (5' 2\") 96% No    BMI (Body Mass Index)                   26.89 kg/m2            Blood Pressure from Last 3 Encounters:   06/28/18 118/72   06/19/18 103/61   04/11/18 112/78    Weight from Last 3 Encounters:   06/28/18 66.7 kg (147 lb)   06/19/18 65.8 kg (145 lb)   04/11/18 66.3 kg (146 lb 1.6 oz)              We Performed the Following     CBC with platelets differential     Comprehensive metabolic panel     CRP inflammation     ESR: Erythrocyte sedimentation rate     Tissue transglutaminase paras IgA and IgG     TSH with free T4 reflex        Primary Care Provider Office Phone # Fax #    Gabbie Hicks [phone]       [address]  [facility] [address]        Equal Access to Services     [facility]: Hadii valentian ku hadasho Somelissa, waaxda luqadaha, qaybta kaalmajessy adan, ezio verduzco . [facility] [place] [phone].    ATENCIÓN: Si habla español, tiene a ortiz disposición servicios gratuitos de asistencia lingüística. [facility] [phone].    We comply with applicable federal civil rights laws and [place] laws. We do not discriminate on the basis of race, color, national origin, age, disability, sex, sexual orientation, or gender identity.            Thank you!     Thank you for choosing [facility]  for your care. Our goal is always to provide you with excellent care. Hearing back from our patients is one way we can continue to improve our services. Please take a few minutes to complete the written survey that you may receive in the mail after your visit with us. Thank you!             Your Updated Medication List - Protect others " around you: Learn how to safely use, store and throw away your medicines at www.disposemymeds.org.          This list is accurate as of 6/28/18  2:24 PM.  Always use your most recent med list.                   Brand Name Dispense Instructions for use Diagnosis    ASPIRIN PO      Take 81 mg by mouth daily        CALCIUM + D PO      Take by mouth 2 times daily        Co Q 10 100 MG Caps      Take 1 capsule by mouth daily        flax seed oil 1000 MG capsule      Take 2 capsules (2,000 mg) by mouth 2 times daily        fluocinolone 0.025 % cream    SYNALAR    60 g    Apply topically 2 times daily Apply to affected areas of mouth as needed.    Perleche       fluticasone 50 MCG/ACT spray    FLONASE     Reported on 5/1/2017        L-GLUTAMINE      1 capsule 2 times daily        MAGNESIUM PO      Take 1 capsule by mouth daily        MULTI-VITAMIN PO      Take by mouth daily        order for DME     2 Units    Compression sleeve to bilateral UE.    S/P bilateral mastectomy, Lymphedema       PROBIOTIC DAILY PO      Take 1 capsule by mouth 2 times daily        sertraline 50 MG tablet    ZOLOFT    90 tablet    TAKE ONE TABLET BY MOUTH ONCE DAILY    Mild recurrent major depression (H)       SYNTHROID 75 MCG tablet   Generic drug:  levothyroxine     90 tablet    Take 1 tablet (75 mcg) by mouth daily    Hypothyroidism, unspecified type       TRIPLE FLEX PO      Take by mouth 2 times daily        VITAMIN B-12 PO      Take by mouth daily    Personal history of breast cancer, FHx: breast cancer       ZINC PO      Take by mouth daily    Personal history of breast cancer, FHx: breast cancer

## 2018-06-28 NOTE — PROGRESS NOTES
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    Monday had eyebrows done and eyelids yellow but went away but rest of day all wanted to do was sleep.   Does have hashimotos.   No ticks or rashes  No fever.  No sore throat.   Hips hurt and knees hurt.  Feet have been stiff. No pain of finger joints  Wonders about celiac disease  Just saw oncologist- survivor of breast cancer    Problem list and histories reviewed & adjusted, as indicated.  Additional history: as documented    Patient Active Problem List   Diagnosis     Personal history of breast cancer     S/P bilateral mastectomy     Urge incontinence of urine     Overweight (BMI 25.0-29.9)     Mild recurrent major depression (H)     Family history of diabetes mellitus (DM)     Family history of thyroid disease     Plantar fasciitis, bilateral     Seasonal allergies     Mixed hyperlipidemia     Chronic rhinitis     Breast implant asymmetry     ACP (advance care planning)     Lymphedema     Dyslipidemia, goal LDL below 130     Family history of ischemic heart disease     Seasonal allergic rhinitis     Acquired hypothyroidism     Glaucoma suspect, bilateral     Family history of glaucoma     Age-related nuclear cataract of both eyes     Seasonal allergic rhinitis due to other allergic trigger, unspecified chronicity     Past Surgical History:   Procedure Laterality " Date     ADENOIDECTOMY       COLONOSCOPY N/A 2014    Procedure: COLONOSCOPY;  Surgeon: Duane, William Charles, MD;  Location: [facility] OR     COSMETIC SURGERY       CYSTOSCOPY       HC REMOVAL OF ANAL FISSURE       MASTECTOMY, SIMPLE RT/LT/MABLE       RECONSTRUCT BREAST BILATERAL       TONSILLECTOMY         Social History   Substance Use Topics     Smoking status: Never Smoker     Smokeless tobacco: Never Used     Alcohol use No     Family History   Problem Relation Age of Onset     Diabetes Mother      Thyroid Disease Mother      Glaucoma Mother      Macular Degeneration Mother      Hypertension Mother      Diabetes Sister      Thyroid Disease Sister      Depression Sister      Endometrial Cancer Sister 51     Cancer Father      skin cancer     Glaucoma Father      Hypertension Father      Coronary Artery Disease Brother       at 43     Cerebrovascular Disease Maternal Grandfather          Current Outpatient Prescriptions   Medication Sig Dispense Refill     Calcium Carbonate-Vitamin D (CALCIUM + D PO) Take by mouth 2 times daily       Coenzyme Q10 (CO Q 10) 100 MG CAPS Take 1 capsule by mouth daily        Cyanocobalamin (VITAMIN B-12 PO) Take by mouth daily       Flaxseed, Linseed, (FLAX SEED OIL) 1000 MG capsule Take 2 capsules (2,000 mg) by mouth 2 times daily       fluocinolone (SYNALAR) 0.025 % cream Apply topically 2 times daily Apply to affected areas of mouth as needed. 60 g 5     fluticasone (FLONASE) 50 MCG/ACT nasal spray Reported on 2017  0     Glucosamine-Chondroitin-MSM (TRIPLE FLEX PO) Take by mouth 2 times daily       L-GLUTAMINE 1 capsule 2 times daily        MAGNESIUM PO Take 1 capsule by mouth daily        Multiple Vitamin (MULTI-VITAMIN PO) Take by mouth daily        Multiple Vitamins-Minerals (ZINC PO) Take by mouth daily       order for DME Compression sleeve to bilateral UE. 2 Units 6     Probiotic Product (PROBIOTIC DAILY PO) Take 1 capsule by mouth 2 times daily        "sertraline (ZOLOFT) 50 MG tablet TAKE ONE TABLET BY MOUTH ONCE DAILY 90 tablet 3     SYNTHROID 75 MCG tablet Take 1 tablet (75 mcg) by mouth daily 90 tablet 3     ASPIRIN PO Take 81 mg by mouth daily         Reviewed and updated as needed this visit by clinical staff  Tobacco  Allergies  Meds  Problems  Med Hx  Surg Hx  Fam Hx  Soc Hx        Reviewed and updated as needed this visit by Provider  Tobacco  Allergies  Meds  Problems  Med Hx  Surg Hx  Fam Hx  Soc Hx          ROS:  Constitutional, HEENT, cardiovascular, pulmonary, gi and gu systems are negative, except as otherwise noted.    OBJECTIVE:     /72 (BP Location: Right arm, Patient Position: Chair, Cuff Size: Adult Regular)  Pulse 88  Temp 98.8  F (37.1  C) (Oral)  Resp 16  Ht 1.575 m (5' 2\")  Wt 66.7 kg (147 lb)  SpO2 96%  Breastfeeding? No  BMI 26.89 kg/m2  Body mass index is 26.89 kg/(m^2).  GENERAL: healthy, alert and no distress  EYES: Eyes grossly normal to inspection, PERRL and conjunctivae and sclerae normal  EYES: Eyes grossly normal to inspection, PERRL, EOMI and no jaundice   NECK: no adenopathy, no asymmetry, masses, or scars and thyroid normal to palpation  RESP: lungs clear to auscultation - no rales, rhonchi or wheezes  CV: regular rate and rhythm, normal S1 S2, no S3 or S4, no murmur, click or rub, no peripheral edema and peripheral pulses strong  ABDOMEN: soft, nontender, no hepatosplenomegaly, no masses and bowel sounds normal  MS: no gross musculoskeletal defects noted, no edema  PSYCH: mentation appears normal, affect normal/bright, judgement and insight intact and appearance well groomed    Diagnostic Test Results:  Results for orders placed or performed in visit on 06/28/18   CBC with platelets differential   Result Value Ref Range    WBC 6.5 4.0 - 11.0 10e9/L    RBC Count 4.50 3.8 - 5.2 10e12/L    Hemoglobin 13.9 11.7 - 15.7 g/dL    Hematocrit 41.0 35.0 - 47.0 %    MCV 91 78 - 100 fl    MCH 30.9 26.5 - 33.0 pg " "   MCHC 33.9 31.5 - 36.5 g/dL    RDW 13.0 10.0 - 15.0 %    Platelet Count 243 150 - 450 10e9/L    Diff Method Automated Method     % Neutrophils 56.4 %    % Lymphocytes 33.6 %    % Monocytes 8.0 %    % Eosinophils 1.7 %    % Basophils 0.3 %    Absolute Neutrophil 3.7 1.6 - 8.3 10e9/L    Absolute Lymphocytes 2.2 0.8 - 5.3 10e9/L    Absolute Monocytes 0.5 0.0 - 1.3 10e9/L    Absolute Eosinophils 0.1 0.0 - 0.7 10e9/L    Absolute Basophils 0.0 0.0 - 0.2 10e9/L   CRP inflammation   Result Value Ref Range    CRP Inflammation <2.9 0.0 - 8.0 mg/L   ESR: Erythrocyte sedimentation rate   Result Value Ref Range    Sed Rate 14 0 - 30 mm/h   TSH with free T4 reflex   Result Value Ref Range    TSH 0.73 0.40 - 4.00 mU/L   Comprehensive metabolic panel   Result Value Ref Range    Sodium 140 133 - 144 mmol/L    Potassium 4.1 3.4 - 5.3 mmol/L    Chloride 103 94 - 109 mmol/L    Carbon Dioxide 31 20 - 32 mmol/L    Anion Gap 6 3 - 14 mmol/L    Glucose 91 70 - 99 mg/dL    Urea Nitrogen 14 7 - 30 mg/dL    Creatinine 0.94 0.52 - 1.04 mg/dL    GFR Estimate 62 >60 mL/min/1.7m2    GFR Estimate If Black 75 >60 mL/min/1.7m2    Calcium 9.9 8.5 - 10.1 mg/dL    Bilirubin Total 0.3 0.2 - 1.3 mg/dL    Albumin 3.6 3.4 - 5.0 g/dL    Protein Total 7.1 6.8 - 8.8 g/dL    Alkaline Phosphatase 73 40 - 150 U/L    ALT 44 0 - 50 U/L    AST 30 0 - 45 U/L   Tissue transglutaminase paras IgA and IgG   Result Value Ref Range    Tissue Transglutaminase Antibody IgA 1 <7 U/mL    Tissue Transglutaminase Paras IgG PENDING <7 U/mL       ASSESSMENT/PLAN:         BMI:   Estimated body mass index is 26.89 kg/(m^2) as calculated from the following:    Height as of this encounter: 1.575 m (5' 2\").    Weight as of this encounter: 66.7 kg (147 lb).   Weight management plan: Discussed healthy diet and exercise guidelines and patient will follow up in 6 months in clinic to re-evaluate.      1. Fatigue, unspecified type  Rule out anemia and check thyroid function-on replacement.  " Rule out celiac since patient concerned.  Exam unremarkable.   No known ticks.  No rashes.  If symptoms not improving consider lyme and ehrlichiosis   - CBC with platelets differential  - CRP inflammation  - ESR: Erythrocyte sedimentation rate  - Comprehensive metabolic panel  - Tissue transglutaminase paras IgA and IgG    2. Multiple joint pain    - CBC with platelets differential  - CRP inflammation  - ESR: Erythrocyte sedimentation rate  - Comprehensive metabolic panel  - Tissue transglutaminase paras IgA and IgG    3. Acquired hypothyroidism    - TSH with free T4 reflex    Patient Instructions     Follow up with us if no improvement over the next week.   Return urgently if any change in symptoms  Like increasing pain, fever,  Or other change in symptoms.       At [facility], we strive to deliver an exceptional experience to you, every time we see you.  If you receive a survey in the mail, please send us back your thoughts. We really do value your feedback.    Suggested websites for health information:  Www.[employer] : Up to date and easily searchable information on multiple topics.  Www.OROS.gov : medication info, interactive tutorials, watch real surgeries online  Www.familydoctor.org : good info from the Academy of Family Physicians  Www.cdc.gov : public health info, travel advisories, epidemics (H1N1)  Www.aap.org : children's health info, normal development, vaccinations  Www.health.[facility].[place].us : [place] dept of health, public health issues in [place], N1N1    Your care team:     Family Medicine   MAX Bonds MD Emily Bunt, APRN CNP   S. MD Em Kiran MD Angela Wermerskirchen, MD         Clinic hours: Monday - Wednesday 7 am-7 pm   Thursdays and Fridays 7 am-5 pm.     [facility]: Monday - Friday 11 am-9 pm,   Saturday and Sunday 9 am-5 pm.    [facility]: Monday -Thursday 8 am-8 pm; Friday 8 am-6 pm;  Saturday and Sunday 9 am-5 pm.     [facility]: Monday - Thursday 8 am - 7 pm; Friday 8 am - 6 pm    Clinic: [phone]   [facility]: [phone]   [facility]: [phone]           CC chart to PCP for sergei Lieberman PA-C  [facility]

## 2018-06-28 NOTE — PROGRESS NOTES
Nereida Buchanan  Your thyroid function, inflammatory markers, electrolytes, kidney function and liver function were normal.   Your blood counts and hemoglobin (iron stores) were normal.   Please call or MyChart my office with any questions or concerns.    Juli Lieberman, PAC

## 2018-06-29 ENCOUNTER — MYC MEDICAL ADVICE (OUTPATIENT)
Dept: PEDIATRICS | Facility: CLINIC | Age: 53
End: 2018-06-29

## 2018-06-29 DIAGNOSIS — Z80.3 FAMILY HISTORY OF BREAST CANCER IN SISTER: Primary | ICD-10-CM

## 2018-06-29 LAB
TTG IGA SER-ACNC: 1 U/ML
TTG IGG SER-ACNC: <1 U/ML

## 2018-06-29 NOTE — TELEPHONE ENCOUNTER
[place] Alarm message to Dr. Hicks's covering providers.    Ayana Coronado RN,   [facility], [facility]

## 2018-06-29 NOTE — PROGRESS NOTES
Nereida Buchanan  Your celiac tests were negative.   Please call or MyChart my office with any questions or concerns.    Follow up if no improvement in symptoms over the next week. Return urgently if any change in symptoms.    Juli Lieberman, PAC

## 2018-07-05 DIAGNOSIS — M25.50 MULTIPLE JOINT PAIN: ICD-10-CM

## 2018-07-05 DIAGNOSIS — R53.83 OTHER FATIGUE: Primary | ICD-10-CM

## 2018-07-06 ENCOUNTER — TELEPHONE (OUTPATIENT)
Dept: ENDOCRINOLOGY | Facility: CLINIC | Age: 53
End: 2018-07-06

## 2018-07-06 ENCOUNTER — ONCOLOGY VISIT (OUTPATIENT)
Dept: ONCOLOGY | Facility: CLINIC | Age: 53
End: 2018-07-06
Payer: COMMERCIAL

## 2018-07-06 DIAGNOSIS — R53.83 OTHER FATIGUE: ICD-10-CM

## 2018-07-06 DIAGNOSIS — M25.50 MULTIPLE JOINT PAIN: ICD-10-CM

## 2018-07-06 DIAGNOSIS — Z85.3 PERSONAL HISTORY OF BREAST CANCER: Primary | ICD-10-CM

## 2018-07-06 LAB — MISCELLANEOUS TEST: NORMAL

## 2018-07-06 PROCEDURE — 86431 RHEUMATOID FACTOR QUANT: CPT | Performed by: INTERNAL MEDICINE

## 2018-07-06 PROCEDURE — 36415 COLL VENOUS BLD VENIPUNCTURE: CPT | Performed by: INTERNAL MEDICINE

## 2018-07-06 PROCEDURE — 86038 ANTINUCLEAR ANTIBODIES: CPT | Performed by: INTERNAL MEDICINE

## 2018-07-06 PROCEDURE — 96040 ZZC GENETIC COUNSELING, EACH 30 MIN: CPT | Performed by: GENETIC COUNSELOR, MS

## 2018-07-06 NOTE — MR AVS SNAPSHOT
After Visit Summary   7/6/2018    Chanel Israel    MRN: [id number]           Patient Information     Date Of Birth          1965        Visit Information        Provider Department      7/6/2018 9:00 AM Jaimie Mak [facility]        Today's Diagnoses     Personal history of breast cancer    -  1      Care Instructions        Assessing Cancer Risk  Only about 5-10% of cancers are thought to be due to an inherited cancer susceptibility gene.    These families often have:    Several people with the same or related types of cancer    Cancers diagnosed at a young age (before age 50)    Individuals with more than one primary cancer    Multiple generations of the family affected with cancer    Some people may be candidates for genetic testing of more than one gene.  For these families, genetic testing using a cancer panel may be offered.  These panels will test different genes known to increase the risk for breast, ovarian, uterine, and/or other cancers. All of the genes discussed below have published clinical management guidelines for individuals who are found to carry a mutation. The purpose of this handout is to serve as a brief summary of the genes analyzed by the panels used to inquire about hereditary breast and gynecologic cancer:  LENNY, BRCA1, BRCA2, BRIP1, CDH1, CHEK2, MLH1, MSH2, MSH6, PMS2, EPCAM, PTEN, PALB2, RAD51C, RAD51D, and TP53.  ______________________________________________________________________________  Hereditary Breast and Ovarian Cancer Syndrome   (BRCA1 and BRCA2)  A single mutation in one of the copies of BRCA1 or BRCA2 increases the risk for breast and ovarian cancer, among others.  The risk for pancreatic cancer and melanoma may also be slightly increased in some families.  The chart below shows the chance that someone with a BRCA mutation would develop cancer in his or her lifetime1,2,3,4.        A person s ethnic background is also  important to consider, as individuals of Ashkenazi Amish ancestry have a higher chance of having a BRCA gene mutation.  There are three BRCA mutations that occur more frequently in this population.    Juan Syndrome   (MLH1, MSH2, MSH6, PMS2, and EPCAM)  Currently five genes are known to cause Juan Syndrome: MLH1, MSH2, MSH6, PMS2, and EPCAM.  A single mutation in one of the Juan Syndrome genes increases the risk for colon, endometrial, ovarian, and stomach cancers.  Other cancers that occur less commonly in Juan Syndrome include urinary tract, skin, and brain cancers.  The chart below shows the chance that a person with Juan syndrome would develop cancer in his or her lifetime5.      *Cancer risk varies depending on Juan syndrome gene found    Cowden Syndrome   (PTEN)  Cowden syndrome is a hereditary condition that increases the risk for breast, thyroid, endometrial, colon, and kidney cancer.  Cowden syndrome is caused by a mutation in the PTEN gene.  A single mutation in one of the copies of PTEN causes Cowden syndrome and increases cancer risk.  The chart below shows the chance that someone with a PTEN mutation would develop cancer in their lifetime6,7.  Other benign features seen in some individuals with Cowden syndrome include benign skin lesions (facial papules, keratoses, lipomas), learning disability, autism, thyroid nodules, colon polyps, and larger head size.      *One recent study found breast cancer risk to be increased to 85%    Li-Fraumeni Syndrome   (TP53)  Li-Fraumeni Syndrome (LFS) is a cancer predisposition syndrome caused by a mutation in the TP53 gene. A single mutation in one of the copies of TP53 increases the risk for multiple cancers. Individuals with LFS are at an increased risk for developing cancer at a young age. The lifetime risk for development of a LFS-associated cancer is 50% by age 30 and 90% by age 60.     Core Cancers: Sarcomas, Breast, Brain, Lung, Leukemias/Lymphomas,  Adrenocortical carcinomas  Other Cancers: Gastrointestinal, Thyroid, Skin, Genitourinary    Hereditary Diffuse Gastric Cancer   (CDH1)  Currently, one gene is known to cause hereditary diffuse gastric cancer (HDGC): CDH1.  Individuals with HDGC are at increased risk for diffuse gastric cancer and lobular breast cancer. Of people diagnosed with HDGC, 30-50% have a mutation in the CDH1 gene.  This suggests there are likely other genes that may cause HDGC that have not been identified yet.      Lifetime Cancer Risks    General Population HDGC    Diffuse Gastric  <1% ~80%   Breast 12% 39-52%         Additional Genes  LENNY  LENNY is a moderate-risk breast cancer gene. Women who have a mutation in LENNY can have between a 2-4 fold increased risk for breast cancer compared to the general population8. LENNY mutations have also been associated with increased risk for pancreatic cancer, however an estimate of this cancer risk is not well understood9. Individuals who inherit two LENNY mutations have a condition called ataxia-telangiectasia (AT).  This rare autosomal recessive condition affects the nervous system and immune system, and is associated with progressive cerebellar ataxia beginning in childhood.  Individuals with ataxia-telangiectasia often have a weakened immune system and have an increased risk for childhood cancers.    PALB2  Mutations in PALB2 have been shown to increase the risk of breast cancer up to 33-58% in some families; where individuals fall within this risk range is dependent upon family [id number]. PALB2 mutations have also been associated with increased risk for pancreatic cancer, although this risk has not been quantified yet.  Individuals who inherit two PALB2 mutations--one from their mother and one from their father--have a condition called Fanconi Anemia.  This rare autosomal recessive condition is associated with short stature, developmental delay, bone marrow failure, and increased risk for childhood  cancers.    CHEK2   CHEK2 is a moderate-risk breast cancer gene.  Women who have a mutation in CHEK2 have around a 2-fold increased risk for breast cancer compared to the general population, and this risk may be higher depending upon family history.11,12,13 Mutations in CHEK2 have also been shown to increase the risk of a number of other cancers, including colon and prostate, however these cancer risks are currently not well understood.    BRIP1, RAD51C and RAD51D  Mutations in BRIP1, RAD51C, and RAD51D have been shown to increase the risk of ovarian cancer and possibly female breast cancer as well14,15 .       Lifetime Cancer Risk    General Population BRIP1 RAD51C RAD51D   Ovarian 1-2% ~5-8% ~5-9% ~7-15%               Inheritance  All of the cancer syndromes reviewed above are inherited in an autosomal dominant pattern.  This means that if a parent has a mutation, each of his or her children will have a 50% chance of inheriting that same mutation.  Therefore, each child--male or female--would have a 50% chance of being at increased risk for developing cancer.      Image obtained from Genetics Home Reference, 2013     Mutations in some genes can occur de aby, which means that a person s mutation occurred for the first time in them and was not inherited from a parent.  Now that they have the mutation, however, it can be passed on to future generations.    Genetic Testing  Genetic testing involves a blood test and will look at the genetic information in the LENNY, BRCA1, BRCA2, BRIP1, CDH1, CHEK2, MLH1, MSH2, MSH6, PMS2, EPCAM, PTEN, PALB2, RAD51C, RAD51D, and TP53 genes for any harmful mutations that are associated with increased cancer risk.  If possible, it is recommended that the person(s) who has had cancer be tested before other family members.  That person will give us the most useful information about whether or not a specific gene is associated with the cancer in the family.    Results  There are three  possible results of genetic testing:    Positive--a harmful mutation was identified in one or more of the genes    Negative--no mutation was identified in any of the genes on this panel    Variant of unknown significance--a variation in one of the genes was identified, but it is unclear how this impacts cancer risk in the family    Advantages and Disadvantages   There are advantages and disadvantages to genetic testing.    Advantages    May clarify your cancer risk    Can help you make medical decisions    May explain the cancers in your family    May give useful information to your family members (if you share your results)    Disadvantages    Possible negative emotional impact of learning about inherited cancer risk    Uncertainty in interpreting a negative test result in some situations    Possible genetic discrimination concerns (see below)    Genetic Information Nondiscrimination Act (NAVNEET)  NAVNEET is a federal law that protects individuals from health insurance or employment discrimination based on a genetic test result alone.  Although rare, there are currently no legal discrimination protections in terms of life insurance, long term care, or disability insurances.  Visit the National [employer] [place] website to learn more.    Reducing Cancer Risk  All of the genes described above have nationally recognized cancer screening guidelines that would be recommended for individuals who test positive.  In addition to increased cancer screening, surgeries may be offered or recommended to reduce cancer risk.  Recommendations are based upon an individual s genetic test result as well as their personal and family history of cancer.    Questions to Think About Regarding Genetic Testing:    What effect will the test result have on me and my relationship with my family members if I have an inherited gene mutation?  If I don t have a gene mutation?    Should I share my test results, and how will my family react  to this news, which may also affect them?    Are my children ready to learn new information that may one day affect their own health?    Hereditary Cancer Resources    FORCE: Facing Our Risk of Cancer Empowered facingourrisk.org   Bright Pink bebrightpink.org   Li-Fraumeni Syndrome Association lfsassociation.org   PTEN World PTENworld.W-locate   No stomach for cancer, Inc. nostomachforcancer.org   Stomach cancer relief network Scrnet.org   Collaborative Group of the Americas on Inherited Colorectal Cancer (CGA) cgaicc.com    Cancer Care cancercare.org   American Cancer Society (ACS) cancer.org   National Cancer [place]man (NCI) cancer.gov     Cancer Risk Management Program [phone]-CANCER ([phone])  ? Komal Caitlin, MS, [facility]  [phone]  X Jaimie Obdulio, MS, [facility]  [phone]  ? Lulu Olea, MS, [facility]  [phone]  ? La Dalton, MS, [facility]  [phone]  ? Drew Cha, MS, [facility]  [phone]    References  1. Day SNYDER, Spenser PDP, Angela S, Risch VALE, Rommel JE, Tarah JL, Luz Marinaman N, Vitor H, Cathryn O, Harika A, Walter B, Francisca P, Aaron S, Iván DM, Del N, Huyen E, Terrence H, Cedeno E, Lubinski J, Gronwald J, Shahnaz B, Tulinius H, Thorlacius S, Eerola H, Lynn H, Irina K, Fatimah OP. Average risks of breast and ovarian cancer associated with BRCA1 or BRCA2 mutations detected in case series unselected for family history: a combined analysis of 222 studies. Am J Hum Sharon. 2003;72:1117-30.  2. Isatu N, Naheed M, Eladio G.  BRCA1 and BRCA2 Hereditary Breast and Ovarian Cancer. Gene Reviews online. 2013.  3. Dg YC, Darcy S, Kyree G, Vega S. Breast cancer risk among male BRCA1 and BRCA2 mutation carriers. J Natl Cancer Inst. 2007;99:1811-4.  4. Taco FORMAN, Mell I, Austin J, Andrei E, Shavonne ER, Nirali F. Risk of breast cancer in male BRCA2 carriers. J Med Sharon. 2010;47:710-1.  5. National Comprehensive Cancer Network. Clinical practice guidelines in oncology, colorectal cancer  screening. Available online (registration required). 2015.  6. Coy MH, Frank J, Garrison J, Luan LA, Shaheen MS, Eng C. Lifetime cancer risks in individuals with germline PTEN mutations. Clin Cancer Res. 2012;18:400-7.  7. Saray CARTAGENA. Cowden Syndrome: A Critical Review of the Clinical Literature. J Sharon . 2009:18:13-27.  8. Breana A, Deondre D, Amanda S, Nannette P, Lacey T, Shameka M, Ramón B, Suellen H, Live R, Audrey K, Delia L, Taco DG, Iván D, Aldo DF, Tayo MR, The Breast Cancer Susceptibility Collaboration (UK) & Umesh WASHBURN. LENNY mutations that cause ataxia-telangiectasia are breast cancer susceptibility alleles. Nature Genetics. 2006;38:873-875  9. Davis N , Marisol Y, Nadege J, Ethan L, John GM , Vickie ML, Romaninger S, Ham AG, Syngal S, Ana ML, Lilia J , Vivi R, Niya SZ, August JR, Oz VE, Garth M, Vogelstein B, Amy N, Leonor RH, John KW, and Zepeda AP. LENNY mutations in patients with hereditary pancreatic cancer. Cancer Discover. 2012;2:41-46  10. Day BOLANOS et al. Breast-Cancer Risk in Families with Mutations in PALB2. NEJM. 2014; 371(6):497-506.  11. CHEK2 Breast Cancer Case-Control Consortium. CHEK2*1100delC and susceptibility to breast cancer: A collaborative analysis involving 10,860 breast cancer cases and 9,065 controls from 10 studies. Am J Hum Sharon, 74 (2004), pp. 6478-7326  12. Sergio T, Caro S, Bonnie K, et al. Spectrum of Mutations in BRCA1, BRCA2, CHEK2, and TP53 in Families at High Risk of Breast Cancer. PATTI. 2006;295(12):5760-4166.   13. Neal ALMAGUER, Ellis TREJO, Yuriy SNYDER, et al. Risk of breast cancer in women with a CHEK2 mutation with and without a family history of breast cancer. J Clin Oncol. 2011;29:5171-5588.  14. Alejo H, Courtney E, Faustina SJ, et al. Contribution of germline mutations in the RAD51B, RAD51C, and RAD51D genes to ovarian cancer in the population. J Clin Oncol. 2015;33(26):9595-4796.  Doi:10.1200/JCO.2015.61.2408.  15. Seda T, Flor DF, Tiff P, et al. Mutations in BRIP1 confer high risk of ovarian cancer. Cinthia Sharon. 2011;43(11):2431-7156. doi:10.1038/ng.955.            Follow-ups after your visit        Your next 10 appointments already scheduled     Aug 22, 2018  1:15 PM CDT   Return Visit with Jaimie Mak GC   [facility])    [address] [place] [id number] [phone]            Jun 21, 2019 11:45 AM CDT   LAB with LAB ONC [facility])    [address] [place] [id number] [phone]           Please do not eat 10-12 hours before your appointment if you are coming in fasting for labs on lipids, cholesterol, or glucose (sugar). This does not apply to pregnant women. Water, hot tea and black coffee (with nothing added) are okay. Do not drink other fluids, diet soda or chew gum.            Jun 21, 2019 12:30 PM CDT   Return Visit with Ghislaine Lopez MD   [facility])    [address] [place] [id number] [phone]              Who to contact     If you have questions or need follow up information about today's clinic visit or your schedule please contact [facility] directly at [phone].  Normal or non-critical lab and imaging results will be communicated to you by MyChart, letter or phone within 4 business days after the clinic has received the results. If you do not hear from us within 7 days, please contact the clinic through MyChart or phone. If you have a critical or abnormal lab result, we will notify you by phone as soon as possible.  Submit refill requests through Pay with a Tweet or call your pharmacy and they will forward the refill request to us. Please allow 3 business days for your refill to be completed.          Additional Information About Your Visit         [employer] Information     [employer] gives you secure access to your electronic health record. If you see a primary care provider, you can also send messages to your care team and make appointments. If you have questions, please call your primary care clinic.  If you do not have a primary care provider, please call [phone] and they will assist you.      [employer] is an electronic gateway that provides easy, online access to your medical records. With [employer], you can request a clinic appointment, read your test results, renew a prescription or communicate with your care team.     To access your existing account, please contact your [facility] or call [phone] for assistance.        Care EveryWhere ID     This is your Care EveryWhere ID. This could be used by other organizations to access your [place] medical records  [id number]         Blood Pressure from Last 3 Encounters:   06/28/18 118/72   06/19/18 103/61   04/11/18 112/78    Weight from Last 3 Encounters:   06/28/18 66.7 kg (147 lb)   06/19/18 65.8 kg (145 lb)   04/11/18 66.3 kg (146 lb 1.6 oz)               Primary Care Provider Office Phone # Fax #    Gabbie Hicks [phone]       [address] [facility] [address]        Equal Access to Services     [facility] : Hadii aad ku hadasho Soomaali, waaxda luqadaha, qaybta kaalmada adeegyada, waxay idiin hayaan izzy verduzco . So [facility] [phone].    ATENCIÓN: Si habla español, tiene a ortiz disposición servicios gratuitos de asistencia lingüística. Llame al [phone].    We comply with applicable federal civil rights laws and [place] laws. We do not discriminate on the basis of race, color, national origin, age, disability, sex, sexual orientation, or gender identity.            Thank you!     Thank you for choosing [facility]  for your care. Our goal is always to provide you with excellent care. Hearing back from our  patients is one way we can continue to improve our services. Please take a few minutes to complete the written survey that you may receive in the mail after your visit with us. Thank you!             Your Updated Medication List - Protect others around you: Learn how to safely use, store and throw away your medicines at www.disposemymeds.org.          This list is accurate as of 7/6/18  9:56 AM.  Always use your most recent med list.                   Brand Name Dispense Instructions for use Diagnosis    ASPIRIN PO      Take 81 mg by mouth daily        CALCIUM + D PO      Take by mouth 2 times daily        Co Q 10 100 MG Caps      Take 1 capsule by mouth daily        flax seed oil 1000 MG capsule      Take 2 capsules (2,000 mg) by mouth 2 times daily        fluocinolone 0.025 % cream    SYNALAR    60 g    Apply topically 2 times daily Apply to affected areas of mouth as needed.    Perleche       fluticasone 50 MCG/ACT spray    FLONASE     Reported on 5/1/2017        L-GLUTAMINE      1 capsule 2 times daily        MAGNESIUM PO      Take 1 capsule by mouth daily        MULTI-VITAMIN PO      Take by mouth daily        order for DME     2 Units    Compression sleeve to bilateral UE.    S/P bilateral mastectomy, Lymphedema       PROBIOTIC DAILY PO      Take 1 capsule by mouth 2 times daily        sertraline 50 MG tablet    ZOLOFT    90 tablet    TAKE ONE TABLET BY MOUTH ONCE DAILY    Mild recurrent major depression (H)       SYNTHROID 75 MCG tablet   Generic drug:  levothyroxine     90 tablet    Take 1 tablet (75 mcg) by mouth daily    Hypothyroidism, unspecified type       TRIPLE FLEX PO      Take by mouth 2 times daily        VITAMIN B-12 PO      Take by mouth daily    Personal history of breast cancer, FHx: breast cancer       ZINC PO      Take by mouth daily    Personal history of breast cancer, FHx: breast cancer

## 2018-07-06 NOTE — PATIENT INSTRUCTIONS
Assessing Cancer Risk  Only about 5-10% of cancers are thought to be due to an inherited cancer susceptibility gene.    These families often have:    Several people with the same or related types of cancer    Cancers diagnosed at a young age (before age 50)    Individuals with more than one primary cancer    Multiple generations of the family affected with cancer    Some people may be candidates for genetic testing of more than one gene.  For these families, genetic testing using a cancer panel may be offered.  These panels will test different genes known to increase the risk for breast, ovarian, uterine, and/or other cancers. All of the genes discussed below have published clinical management guidelines for individuals who are found to carry a mutation. The purpose of this handout is to serve as a brief summary of the genes analyzed by the panels used to inquire about hereditary breast and gynecologic cancer:  LENNY, BRCA1, BRCA2, BRIP1, CDH1, CHEK2, MLH1, MSH2, MSH6, PMS2, EPCAM, PTEN, PALB2, RAD51C, RAD51D, and TP53.  ______________________________________________________________________________  Hereditary Breast and Ovarian Cancer Syndrome   (BRCA1 and BRCA2)  A single mutation in one of the copies of BRCA1 or BRCA2 increases the risk for breast and ovarian cancer, among others.  The risk for pancreatic cancer and melanoma may also be slightly increased in some families.  The chart below shows the chance that someone with a BRCA mutation would develop cancer in his or her lifetime1,2,3,4.        A person s ethnic background is also important to consider, as individuals of Ashkenazi Faith ancestry have a higher chance of having a BRCA gene mutation.  There are three BRCA mutations that occur more frequently in this population.    Juan Syndrome   (MLH1, MSH2, MSH6, PMS2, and EPCAM)  Currently five genes are known to cause Juan Syndrome: MLH1, MSH2, MSH6, PMS2, and EPCAM.  A single mutation in one of the  Juan Syndrome genes increases the risk for colon, endometrial, ovarian, and stomach cancers.  Other cancers that occur less commonly in Juan Syndrome include urinary tract, skin, and brain cancers.  The chart below shows the chance that a person with Juan syndrome would develop cancer in his or her lifetime5.      *Cancer risk varies depending on Juan syndrome gene found    Cowden Syndrome   (PTEN)  Cowden syndrome is a hereditary condition that increases the risk for breast, thyroid, endometrial, colon, and kidney cancer.  Cowden syndrome is caused by a mutation in the PTEN gene.  A single mutation in one of the copies of PTEN causes Cowden syndrome and increases cancer risk.  The chart below shows the chance that someone with a PTEN mutation would develop cancer in their lifetime6,7.  Other benign features seen in some individuals with Cowden syndrome include benign skin lesions (facial papules, keratoses, lipomas), learning disability, autism, thyroid nodules, colon polyps, and larger head size.      *One recent study found breast cancer risk to be increased to 85%    Li-Fraumeni Syndrome   (TP53)  Li-Fraumeni Syndrome (LFS) is a cancer predisposition syndrome caused by a mutation in the TP53 gene. A single mutation in one of the copies of TP53 increases the risk for multiple cancers. Individuals with LFS are at an increased risk for developing cancer at a young age. The lifetime risk for development of a LFS-associated cancer is 50% by age 30 and 90% by age 60.     Core Cancers: Sarcomas, Breast, Brain, Lung, Leukemias/Lymphomas, Adrenocortical carcinomas  Other Cancers: Gastrointestinal, Thyroid, Skin, Genitourinary    Hereditary Diffuse Gastric Cancer   (CDH1)  Currently, one gene is known to cause hereditary diffuse gastric cancer (HDGC): CDH1.  Individuals with HDGC are at increased risk for diffuse gastric cancer and lobular breast cancer. Of people diagnosed with HDGC, 30-50% have a mutation in the  CDH1 gene.  This suggests there are likely other genes that may cause HDGC that have not been identified yet.      Lifetime Cancer Risks    General Population HDGC    Diffuse Gastric  <1% ~80%   Breast 12% 39-52%         Additional Genes  LENNY  LENNY is a moderate-risk breast cancer gene. Women who have a mutation in LENNY can have between a 2-4 fold increased risk for breast cancer compared to the general population8. LENNY mutations have also been associated with increased risk for pancreatic cancer, however an estimate of this cancer risk is not well understood9. Individuals who inherit two LENNY mutations have a condition called ataxia-telangiectasia (AT).  This rare autosomal recessive condition affects the nervous system and immune system, and is associated with progressive cerebellar ataxia beginning in childhood.  Individuals with ataxia-telangiectasia often have a weakened immune system and have an increased risk for childhood cancers.    PALB2  Mutations in PALB2 have been shown to increase the risk of breast cancer up to 33-58% in some families; where individuals fall within this risk range is dependent upon family [id number]. PALB2 mutations have also been associated with increased risk for pancreatic cancer, although this risk has not been quantified yet.  Individuals who inherit two PALB2 mutations--one from their mother and one from their father--have a condition called Fanconi Anemia.  This rare autosomal recessive condition is associated with short stature, developmental delay, bone marrow failure, and increased risk for childhood cancers.    CHEK2   CHEK2 is a moderate-risk breast cancer gene.  Women who have a mutation in CHEK2 have around a 2-fold increased risk for breast cancer compared to the general population, and this risk may be higher depending upon family history.11,12,13 Mutations in CHEK2 have also been shown to increase the risk of a number of other cancers, including colon and prostate,  however these cancer risks are currently not well understood.    BRIP1, RAD51C and RAD51D  Mutations in BRIP1, RAD51C, and RAD51D have been shown to increase the risk of ovarian cancer and possibly female breast cancer as well14,15 .       Lifetime Cancer Risk    General Population BRIP1 RAD51C RAD51D   Ovarian 1-2% ~5-8% ~5-9% ~7-15%               Inheritance  All of the cancer syndromes reviewed above are inherited in an autosomal dominant pattern.  This means that if a parent has a mutation, each of his or her children will have a 50% chance of inheriting that same mutation.  Therefore, each child--male or female--would have a 50% chance of being at increased risk for developing cancer.      Image obtained from [employer] Home Reference, 2013     Mutations in some genes can occur de aby, which means that a person s mutation occurred for the first time in them and was not inherited from a parent.  Now that they have the mutation, however, it can be passed on to future generations.    Genetic Testing  Genetic testing involves a blood test and will look at the genetic information in the LENNY, BRCA1, BRCA2, BRIP1, CDH1, CHEK2, MLH1, MSH2, MSH6, PMS2, EPCAM, PTEN, PALB2, RAD51C, RAD51D, and TP53 genes for any harmful mutations that are associated with increased cancer risk.  If possible, it is recommended that the person(s) who has had cancer be tested before other family members.  That person will give us the most useful information about whether or not a specific gene is associated with the cancer in the family.    Results  There are three possible results of genetic testing:    Positive--a harmful mutation was identified in one or more of the genes    Negative--no mutation was identified in any of the genes on this panel    Variant of unknown significance--a variation in one of the genes was identified, but it is unclear how this impacts cancer risk in the family    Advantages and Disadvantages   There are advantages  and disadvantages to genetic testing.    Advantages    May clarify your cancer risk    Can help you make medical decisions    May explain the cancers in your family    May give useful information to your family members (if you share your results)    Disadvantages    Possible negative emotional impact of learning about inherited cancer risk    Uncertainty in interpreting a negative test result in some situations    Possible genetic discrimination concerns (see below)    Genetic Information Nondiscrimination Act (NAVNEET)  NAVNEET is a federal law that protects individuals from health insurance or employment discrimination based on a genetic test result alone.  Although rare, there are currently no legal discrimination protections in terms of life insurance, long term care, or disability insurances.  Visit the [employer] [place] website to learn more.    Reducing Cancer Risk  All of the genes described above have nationally recognized cancer screening guidelines that would be recommended for individuals who test positive.  In addition to increased cancer screening, surgeries may be offered or recommended to reduce cancer risk.  Recommendations are based upon an individual s genetic test result as well as their personal and family history of cancer.    Questions to Think About Regarding Genetic Testing:    What effect will the test result have on me and my relationship with my family members if I have an inherited gene mutation?  If I don t have a gene mutation?    Should I share my test results, and how will my family react to this news, which may also affect them?    Are my children ready to learn new information that may one day affect their own health?    Hereditary Cancer Resources    FORCE: Facing Our Risk of Cancer Empowered facingourrisk.org   Bright Pink bebrightpink.org   Li-Fraumeni Syndrome Association lfsassociation.org   PTEN World PTENworld.com   No stomach for cancer, Inc.  nostomachforcancer.org   Stomach cancer relief network Scrnet.org   Collaborative Group of the Americas on Inherited Colorectal Cancer (CGA) cgaicc.com    Cancer Care cancercare.org   American Cancer Society (ACS) cancer.org   National Cancer Scott (NCI) cancer.gov     Cancer Risk Management Program [phone]-CANCER ([phone])  ? Komal Tucker, MS, [facility]  [phone]  X Jaimie Mak, MS, [facility]  [phone]  ? Lulu Olea, MS, [facility]  [phone]  ? La Dalton, MS, [facility]  [phone]  ? Drew Cha, MS, [facility]  [phone]    References  1. Day SNYDER, Spenser PDP, Angela S, Manuel VALE, Rommel JE, Tarah JL, Syed N, Vitor H, Cathryn O, Harika A, Walter B, Francisca P, Manelías S, Iván DM, Del N, Huyen E, Terrence H, Wilian E, Rosalino J, Groncharli J, Shahnaz B, Tulinius H, Thorlacius S, Eerola H, Nevcallylinna H, Irina K, Fatimah OP. Average risks of breast and ovarian cancer associated with BRCA1 or BRCA2 mutations detected in case series unselected for family history: a combined analysis of 222 studies. Am J Hum Sharon. 2003;72:1117-30.  2. Isatu N, Naheed M, Hendricks G.  BRCA1 and BRCA2 Hereditary Breast and Ovarian Cancer. Gene Reviews online. 2013.  3. Dg YC, Darcy S, Kyree G, Vega S. Breast cancer risk among male BRCA1 and BRCA2 mutation carriers. J Natl Cancer Inst. 2007;99:1811-4.  4. Taco FORMAN, Mell I, Austin J, Andrei E, Shavonne ER, Nirali F. Risk of breast cancer in male BRCA2 carriers. J Med Sharon. 2010;47:710-1.  5. National Comprehensive Cancer Network. Clinical practice guidelines in oncology, colorectal cancer screening. Available online (registration required). 2015.  6. Coy ROSARIO, Frank OLSON, Garrison OLSON, Luan LA, Shaheen MS, Eng C. Lifetime cancer risks in individuals with germline PTEN mutations. Clin Cancer Res. 2012;18:400-7.  7. Saray CARTAGENA. Cowden Syndrome: A Critical Review of the Clinical Literature. J Sharon . 2009:18:13-27.  8. Breana SNYDER, Deondre TREJO, Amanda S, Nannette P,  Lacey T, Shameka M, Ramón B, Suellen H, Live R, Audrey K, Delia L, Taco DG, Iván D, Aldo DF, Tayo MR, The Breast Cancer Susceptibility Collaboration () & Umesh WASHBURN. LENNY mutations that cause ataxia-telangiectasia are breast cancer susceptibility alleles. Nature Genetics. 2006;38:873-875  9. Davis N , Marisol Y, Nadege J, Ethan L, John GM , Vickie ML, Gallinger S, Ham AG, Syngal S, Ana ML, Lilia J , Vivi R, Niya SZ, Estaylor JR, Oz VE, Garth M, Vogelstein B, Amy N, Leonor RH, John KW, and Katelyn AP. LENNY mutations in patients with hereditary pancreatic cancer. Cancer Discover. 2012;2:41-46  10. Day BOLANOS, et al. Breast-Cancer Risk in Families with Mutations in PALB2. NEJM. 2014; 371(6):497-506.  11. CHEK2 Breast Cancer Case-Control Consortium. CHEK2*1100delC and susceptibility to breast cancer: A collaborative analysis involving 10,860 breast cancer cases and 9,065 controls from 10 studies. Am J Hum Sharon, 74 (2004), pp. 6666-0749  12. Sergio T, Caro S, Bonnie K, et al. Spectrum of Mutations in BRCA1, BRCA2, CHEK2, and TP53 in Families at High Risk of Breast Cancer. PATTI. 2006;295(12):5376-1351.   13. Neal C, Ellis D, Yuriy A, et al. Risk of breast cancer in women with a CHEK2 mutation with and without a family history of breast cancer. J Clin Oncol. 2011;29:2576-3304.  14. Alejo H, Courtney E, Faustina SJ, et al. Contribution of germline mutations in the RAD51B, RAD51C, and RAD51D genes to ovarian cancer in the population. J Clin Oncol. 2015;33(26):7835-6953. Doi:10.1200/JCO.2015.61.2408.  15. Seda LIMA, Flor GREEN, Tiff P, et al. Mutations in BRIP1 confer high risk of ovarian cancer. Cinthia Sharon. 2011;43(11):5558-5359. doi:10.1038/ng.955.

## 2018-07-06 NOTE — LETTER
Cancer Risk Management  Program Locations    [facility]  [facility]  Mailing Address  Cancer Risk Management Program  [facility]  [address]  [place] [address]    New patient appointments  [phone]  July 6, 2018    Chanel Israel  [address]  [place] [address]      Dear Chanel,    It was a pleasure meeting with you at the [facility] on 7/6/2018.  Here is a copy of the progress note from your recent genetic counseling visit to the Cancer Risk Management Program.  If you have any additional questions, please feel free to call.    Referring Provider: Yenifer Laureano MD PhD    Presenting Information:   I met with Chanel Israel today for genetic counseling at the Cancer Risk Management Program at the [facility] to discuss her personal and family history of breast cancer.  She is here today to review this history, cancer screening recommendations, and available genetic testing options.    Personal History:  Chanel is a 53 year old female.  She was diagnosed with invasive ductal triple negative breast cancer in 2007 at the age of 41. She underwent a bilateral mastectomy and chemotherapy. She does not have any other personal history of cancer.  She reports she now has clinical breast exams. She retains her uterus and ovaries, and has not used hormone replacement therapy. She reported past OCP use for about 15 years. She reports having a normal colonoscopy in 2014; she states she has never had colon polyps. She does not report any skin abnormalities. She does not smoke, and reports having about one alcoholic drink per week.     Family History: (Please see scanned pedigree for detailed family history information)    Sister, age 57, had DCIS of the right breast at 57,  and endometrial cancer at 52. She had genetic testing via the Comprehensive Cancer panel at [facility] (46 genes, APC, LENNY, AXIN2, BAP1, BARD1, BMPR1A, BRCA1, BRCA2, BRIP1, CDH1, CDK4, CDKN2A, CHEK2, EPCAM, FANCC, FH, FLCN, HOXB13, MET, MITF, MLH1, MSH2, MSH6, MUTYH, NBN, NF1, NTHL1, PALB2, PMS2, POLD1, POLE, POT1, PTEN, RAD51C, RAD51D, RECQL, SCG5/GREM1, SDHB, SDHC, SDHD, SMAD4, STK11, TP53, TSC1, TSC2, VHL). She was negative for harmful mutations in these genes, but did have a variant of uncertain significance in PMS2. Chanel had attended her sister's genetic counseling appointments with her.    Two sisters and her mother have had hysterectomies; these have been for non-cancerous indications.    Her brother and father both had non-melanoma skin cancer of the head.    Maternal uncle had prostate cancer in his 80s.    Maternal great-grandmother had suspected breast cancer in her 50s.    Paternal grandmother had lung cancer in her 80s; she was a smoker.    Paternal aunt had bladder cancer in her 70s; she was a smoker.     Paternal grandfather had prostate cancer.     Her maternal ethnicity is English, Ethiopian, and Maori. Her paternal ethnicity is English and Maori.  There is no known Ashkenazi Synagogue ancestry on either side of her family.     Discussion:    Chanel's personal history of early-onset, triple negative breast cancer is suggestive of a hereditary cancer syndrome.    We discussed the natural history and genetics of hereditary breast cancer. A detailed handout regarding BRCA1/2 and other breast and gynecologic cancer risk genes and the information we discussed was provided to Chanel at the end of our appointment today and can be found in the after visit summary.  Topics included: inheritance pattern, cancer risks, cancer screening recommendations, and also risks, benefits and limitations of testing.    We did review the PMS2 VUS in her sister. We discussed that it is not clear if this variant in the PMS2  gene is associated with increased cancer risk. This variant may be a benign change that does not increase cancer risk, or it may be a harmful mutation that causes Juan syndrome.  Juan syndrome can be caused by a mutation in one of five genes:  MLH1, MSH2, MSH6, PMS2, and EPCAM.  Juan syndrome may present with polyps, but typically a small number.  The highest cancer risks associated with Juan syndrome include colon cancer, endometrial/uterine cancer, gastric cancer, and ovarian cancer. We discussed that at this time it is uncertain if her sister has Juan syndrome. We reviewed that there is a 50/50 chance for Chanel to also carry this variant. However, medical management decisions are not based on VUSs, due to the uncertainty.     Based on her personal and family history, Chanel meets current National Comprehensive Cancer Network (NCCN) criteria for genetic testing of BRCA1/2.  She did have previous negative testing for BRCA1/2 in 2007. However, NCCN guidelines state that there may be a role for multi-gene panel genetic testing for patients who have previously tested negative for a single hereditary cancer syndrome, but have a suspicious personal or family history.    We discussed that there are additional genes that could cause increased risk for breast cancer.  As many of these genes present with overlapping features in a family and accurate cancer risk cannot always be established based upon the pedigree analysis alone, it would be reasonable for Chanel to consider panel genetic testing to analyze multiple genes at once. Chanel had already heard about this type of testing, and was interested in the [facility] update test. This testing is available to individuals who have had previous testing at [facility] that was negative.     Genetic testing is available for 28 genes associated with hereditary cancer: Broadcast [employer] panel (APC, LENNY, BARD1, BRCA1, BRCA2, BRIP1, BMPR1A, CDH1, CDK4, CDKN2A (p14ARF and  [id number]), CHEK2, EPCAM, GREM1, MLH1, MSH2, MSH6, MUTYH, NBN, PALB2, PMS2, POLD1, POLE, PTEN, RAD51C, RAD51D, SMAD4, STK11, and TP53).    We discussed that many of the genes in the myRisk panel have known risks and published management guidelines: Hereditary Breast and Ovarian Cancer syndrome (BRCA1, BRCA2), Juan syndrome (MLH1, MSH2, MSH6, PMS2, EPCAM), Familial Adenomatous Polyposis syndrome (APC), Hereditary Gastric Cancer syndrome (CDH1), Familial Atypical Multiple Mole Melanoma syndrome (CDK4, CDKN2A), Juvenile Polyposis syndrome (BMPR1A, SMAD4), Cowden syndrome (PTEN), Li Fraumeni syndrome (TP53), Peutz-Jeghers syndrome (STK11), and MUTYH Associated Polyposis syndrome (MUTYH).     The LENNY, BRIP1, CHEK2, GREM1, NBN , PALB2, POLD1, POLE, RAD51C, and RAD51D genes are associated with increased cancer risk and have published management guidelines for certain cancers.      The BARD1 gene is associated with increased cancer risk and may allow us to make medical recommendations when mutations are identified.    Consent was obtained and genetic testing for the myRisk panel was sent to "[facility]. Turnaround time: 2-3 weeks.    Medical Management: For Chanel, we reviewed that the information from genetic testing may determine:    additional cancer screening for which Chanel may qualify (i.e. more frequent colonoscopies, more frequent dermatologic exams, etc.),    options for risk reducing surgeries Chanel could consider (i.e. surgery to remove the ovaries and/or uterus),      and targeted chemotherapies if she were to develop certain cancers in the future (i.e. immunotherapy for individuals with Juan syndrome, PARP inhibitors, etc.).     These recommendations and possible targeted chemotherapies will be discussed in detail once genetic testing is completed.     Plan:  1) Today Chanel elected to proceed with Ray.  2) This information should be available in 2-3 weeks.  3) Chanel will return  to clinic to discuss the results in four weeks.     Face to face time: 35 minutes    Jaimie Mak MS, [facility]  Licensed Genetic Counselor  P. [phone]  F. [phone]

## 2018-07-06 NOTE — PROGRESS NOTES
7/6/2018    Referring Provider: Yenifer Laureano MD PhD    Presenting Information:   I met with Chanel Israel today for genetic counseling at the Cancer Risk Management Program at the [facility] to discuss her personal and family history of breast cancer.  She is here today to review this history, cancer screening recommendations, and available genetic testing options.    Personal History:  Chanel is a 53 year old female.  She was diagnosed with invasive ductal triple negative breast cancer in 2007 at the age of 41. She underwent a bilateral mastectomy and chemotherapy. She does not have any other personal history of cancer.  She reports she now has clinical breast exams. She retains her uterus and ovaries, and has not used hormone replacement therapy. She reported past OCP use for about 15 years. She reports having a normal colonoscopy in 2014; she states she has never had colon polyps. She does not report any skin abnormalities. She does not smoke, and reports having about one alcoholic drink per week.     Family History: (Please see scanned pedigree for detailed family history information)    Sister, age 57, had DCIS of the right breast at 57, and endometrial cancer at 52. She had genetic testing via the Comprehensive Cancer panel at [facility] (46 genes, APC, LENNY, AXIN2, BAP1, BARD1, BMPR1A, BRCA1, BRCA2, BRIP1, CDH1, CDK4, CDKN2A, CHEK2, EPCAM, FANCC, FH, FLCN, HOXB13, MET, MITF, MLH1, MSH2, MSH6, MUTYH, NBN, NF1, NTHL1, PALB2, PMS2, POLD1, POLE, POT1, PTEN, RAD51C, RAD51D, RECQL, SCG5/GREM1, SDHB, SDHC, SDHD, SMAD4, STK11, TP53, TSC1, TSC2, VHL). She was negative for harmful mutations in these genes, but did have a variant of uncertain significance in PMS2. Chanel had attended her sister's genetic counseling appointments with her.    Two sisters and her mother have had hysterectomies; these have been for non-cancerous indications.    Her brother and father both had non-melanoma skin cancer of the  head.    Maternal uncle had prostate cancer in his 80s.    Maternal great-grandmother had suspected breast cancer in her 50s.    Paternal grandmother had lung cancer in her 80s; she was a smoker.    Paternal aunt had bladder cancer in her 70s; she was a smoker.     Paternal grandfather had prostate cancer.     Her maternal ethnicity is English, Syriac, and Croatian. Her paternal ethnicity is English and Croatian.  There is no known Ashkenazi Sikh ancestry on either side of her family.     Discussion:    Chanel's personal history of early-onset, triple negative breast cancer is suggestive of a hereditary cancer syndrome.    We discussed the natural history and genetics of hereditary breast cancer. A detailed handout regarding BRCA1/2 and other breast and gynecologic cancer risk genes and the information we discussed was provided to Chanel at the end of our appointment today and can be found in the after visit summary.  Topics included: inheritance pattern, cancer risks, cancer screening recommendations, and also risks, benefits and limitations of testing.    We did review the PMS2 VUS in her sister. We discussed that it is not clear if this variant in the PMS2 gene is associated with increased cancer risk. This variant may be a benign change that does not increase cancer risk, or it may be a harmful mutation that causes Juan syndrome.  Juan syndrome can be caused by a mutation in one of five genes:  MLH1, MSH2, MSH6, PMS2, and EPCAM.  Juan syndrome may present with polyps, but typically a small number.  The highest cancer risks associated with Juan syndrome include colon cancer, endometrial/uterine cancer, gastric cancer, and ovarian cancer. We discussed that at this time it is uncertain if her sister has Juan syndrome. We reviewed that there is a 50/50 chance for Chanel to also carry this variant. However, medical management decisions are not based on VUSs, due to the uncertainty.     Based on her personal  and family history, Chanel meets current National Comprehensive Cancer Network (NCCN) criteria for genetic testing of BRCA1/2.  She did have previous negative testing for BRCA1/2 in 2007. However, NCCN guidelines state that there may be a role for multi-gene panel genetic testing for patients who have previously tested negative for a single hereditary cancer syndrome, but have a suspicious personal or family history.    We discussed that there are additional genes that could cause increased risk for breast cancer.  As many of these genes present with overlapping features in a family and accurate cancer risk cannot always be established based upon the pedigree analysis alone, it would be reasonable for Chanel to consider panel genetic testing to analyze multiple genes at once. Chanel had already heard about this type of testing, and was interested in the [employer] update test. This testing is available to individuals who have had previous testing at [facility] that was negative.     Genetic testing is available for 28 genes associated with hereditary cancer: [facility] panel (APC, LENNY, BARD1, BRCA1, BRCA2, BRIP1, BMPR1A, CDH1, CDK4, CDKN2A (p14ARF and [id number]), CHEK2, EPCAM, GREM1, MLH1, MSH2, MSH6, MUTYH, NBN, PALB2, PMS2, POLD1, POLE, PTEN, RAD51C, RAD51D, SMAD4, STK11, and TP53).    We discussed that many of the genes in the [employer] panel have known risks and published management guidelines: Hereditary Breast and Ovarian Cancer syndrome (BRCA1, BRCA2), Juan syndrome (MLH1, MSH2, MSH6, PMS2, EPCAM), Familial Adenomatous Polyposis syndrome (APC), Hereditary Gastric Cancer syndrome (CDH1), Familial Atypical Multiple Mole Melanoma syndrome (CDK4, CDKN2A), Juvenile Polyposis syndrome (BMPR1A, SMAD4), Cowden syndrome (PTEN), Li Fraumeni syndrome (TP53), Peutz-Jeghers syndrome (STK11), and MUTYH Associated Polyposis syndrome (MUTYH).     The LENNY, BRIP1, CHEK2, GREM1, NBN , PALB2, POLD1, POLE, RAD51C, and RAD51D genes  are associated with increased cancer risk and have published management guidelines for certain cancers.      The BARD1 gene is associated with increased cancer risk and may allow us to make medical recommendations when mutations are identified.    Consent was obtained and genetic testing for the Ray panel was sent to [facility]. Turnaround time: 2-3 weeks.    Medical Management: For Chanel, we reviewed that the information from genetic testing may determine:    additional cancer screening for which Chanel may qualify (i.e. more frequent colonoscopies, more frequent dermatologic exams, etc.),    options for risk reducing surgeries Chanel could consider (i.e. surgery to remove the ovaries and/or uterus),      and targeted chemotherapies if she were to develop certain cancers in the future (i.e. immunotherapy for individuals with Juan syndrome, PARP inhibitors, etc.).     These recommendations and possible targeted chemotherapies will be discussed in detail once genetic testing is completed.     Plan:  1) Today Chanel elected to proceed with Ray.  2) This information should be available in 2-3 weeks.  3) Chanel will return to clinic to discuss the results in four weeks.     Face to face time: 35 minutes    Jaimie Mak MS, [facility]  Licensed Genetic Counselor  P. [phone]  F. [phone]

## 2018-07-06 NOTE — TELEPHONE ENCOUNTER
Patient is calling to see if she can get in sooner for her Hashimoto s Disease follow up. She states she has not been feeling good. Please advise.

## 2018-07-06 NOTE — LETTER
7/6/2018         RE: Chanel Israel  [address]  [place] [address]        Dear Colleague,    Thank you for referring your patient, Chanel Israel, to the [facility]. Please see a copy of my visit note below.    7/6/2018    Referring Provider: Yenifer Laureano MD PhD    Presenting Information:   I met with Chanel Israel today for genetic counseling at the Cancer Risk Management Program at the [facility] to discuss her personal and family history of breast cancer.  She is here today to review this history, cancer screening recommendations, and available genetic testing options.    Personal History:  Chanel is a 53 year old female.  She was diagnosed with invasive ductal triple negative breast cancer in 2007 at the age of 41. She underwent a bilateral mastectomy and chemotherapy. She does not have any other personal history of cancer.  She reports she now has clinical breast exams. She retains her uterus and ovaries, and has not used hormone replacement therapy. She reported past OCP use for about 15 years. She reports having a normal colonoscopy in 2014; she states she has never had colon polyps. She does not report any skin abnormalities. She does not smoke, and reports having about one alcoholic drink per week.     Family History: (Please see scanned pedigree for detailed family history information)    Sister, age 57, had DCIS of the right breast at 57, and endometrial cancer at 52. She had genetic testing via the Comprehensive Cancer panel at [facility] (46 genes, APC, LENNY, AXIN2, BAP1, BARD1, BMPR1A, BRCA1, BRCA2, BRIP1, CDH1, CDK4, CDKN2A, CHEK2, EPCAM, FANCC, FH, FLCN, HOXB13, MET, MITF, MLH1, MSH2, MSH6, MUTYH, NBN, NF1, NTHL1, PALB2, PMS2, POLD1, POLE, POT1, PTEN, RAD51C, RAD51D, RECQL, SCG5/GREM1, SDHB, SDHC, SDHD, SMAD4, STK11, TP53, TSC1, TSC2, VHL). She was negative for harmful mutations in these genes, but did have a variant of uncertain  significance in PMS2. Chanel had attended her sister's genetic counseling appointments with her.    Two sisters and her mother have had hysterectomies; these have been for non-cancerous indications.    Her brother and father both had non-melanoma skin cancer of the head.    Maternal uncle had prostate cancer in his 80s.    Maternal great-grandmother had suspected breast cancer in her 50s.    Paternal grandmother had lung cancer in her 80s; she was a smoker.    Paternal aunt had bladder cancer in her 70s; she was a smoker.     Paternal grandfather had prostate cancer.     Her maternal ethnicity is English, Norwegian, and Arabic. Her paternal ethnicity is English and Arabic.  There is no known Ashkenazi Gnosticist ancestry on either side of her family.     Discussion:    Chanel's personal history of early-onset, triple negative breast cancer is suggestive of a hereditary cancer syndrome.    We discussed the natural history and genetics of hereditary breast cancer. A detailed handout regarding BRCA1/2 and other breast and gynecologic cancer risk genes and the information we discussed was provided to Chanel at the end of our appointment today and can be found in the after visit summary.  Topics included: inheritance pattern, cancer risks, cancer screening recommendations, and also risks, benefits and limitations of testing.    We did review the PMS2 VUS in her sister. We discussed that it is not clear if this variant in the PMS2 gene is associated with increased cancer risk. This variant may be a benign change that does not increase cancer risk, or it may be a harmful mutation that causes Juan syndrome.  Juan syndrome can be caused by a mutation in one of five genes:  MLH1, MSH2, MSH6, PMS2, and EPCAM.  Juan syndrome may present with polyps, but typically a small number.  The highest cancer risks associated with Juan syndrome include colon cancer, endometrial/uterine cancer, gastric cancer, and ovarian cancer. We  discussed that at this time it is uncertain if her sister has Juan syndrome. We reviewed that there is a 50/50 chance for Chanel to also carry this variant. However, medical management decisions are not based on VUSs, due to the uncertainty.     Based on her personal and family history, Chanel meets current National Comprehensive Cancer Network (NCCN) criteria for genetic testing of BRCA1/2.  She did have previous negative testing for BRCA1/2 in 2007. However, NCCN guidelines state that there may be a role for multi-gene panel genetic testing for patients who have previously tested negative for a single hereditary cancer syndrome, but have a suspicious personal or family history.    We discussed that there are additional genes that could cause increased risk for breast cancer.  As many of these genes present with overlapping features in a family and accurate cancer risk cannot always be established based upon the pedigree analysis alone, it would be reasonable for Chanel to consider panel genetic testing to analyze multiple genes at once. Chanel had already heard about this type of testing, and was interested in the [employer] update test. This testing is available to individuals who have had previous testing at [facility] that was negative.     Genetic testing is available for 28 genes associated with hereditary cancer: [facility] panel (APC, LENNY, BARD1, BRCA1, BRCA2, BRIP1, BMPR1A, CDH1, CDK4, CDKN2A (p14ARF and s04GTK5e), CHEK2, EPCAM, GREM1, MLH1, MSH2, MSH6, MUTYH, NBN, PALB2, PMS2, POLD1, POLE, PTEN, RAD51C, RAD51D, SMAD4, STK11, and TP53).    We discussed that many of the genes in the [facility] panel have known risks and published management guidelines: Hereditary Breast and Ovarian Cancer syndrome (BRCA1, BRCA2), Juan syndrome (MLH1, MSH2, MSH6, PMS2, EPCAM), Familial Adenomatous Polyposis syndrome (APC), Hereditary Gastric Cancer syndrome (CDH1), Familial Atypical Multiple Mole Melanoma syndrome (CDK4,  CDKN2A), Juvenile Polyposis syndrome (BMPR1A, SMAD4), Cowden syndrome (PTEN), Li Fraumeni syndrome (TP53), Peutz-Jeghers syndrome (STK11), and MUTYH Associated Polyposis syndrome (MUTYH).     The LENNY, BRIP1, CHEK2, GREM1, NBN , PALB2, POLD1, POLE, RAD51C, and RAD51D genes are associated with increased cancer risk and have published management guidelines for certain cancers.      The BARD1 gene is associated with increased cancer risk and may allow us to make medical recommendations when mutations are identified.    Consent was obtained and genetic testing for the myRisk panel was sent to [facility]. Turnaround time: 2-3 weeks.    Medical Management: For Chanel, we reviewed that the information from genetic testing may determine:    additional cancer screening for which Chanel may qualify (i.e. more frequent colonoscopies, more frequent dermatologic exams, etc.),    options for risk reducing surgeries Chanel could consider (i.e. surgery to remove the ovaries and/or uterus),      and targeted chemotherapies if she were to develop certain cancers in the future (i.e. immunotherapy for individuals with Juan syndrome, PARP inhibitors, etc.).     These recommendations and possible targeted chemotherapies will be discussed in detail once genetic testing is completed.     Plan:  1) Today Chanel elected to proceed with Ray.  2) This information should be available in 2-3 weeks.  3) Chanel will return to clinic to discuss the results in four weeks.     Face to face time: 35 minutes    Jaimie Mak MS, [facility]  Licensed Genetic Counselor  P. [phone]  F. [phone]        Again, thank you for allowing me to participate in the care of your patient.        Sincerely,        Jaimie Mak, GC

## 2018-07-09 LAB
ANA SER QL IF: NEGATIVE
RHEUMATOID FACT SER NEPH-ACNC: <20 IU/ML (ref 0–20)

## 2018-07-09 NOTE — TELEPHONE ENCOUNTER
Left message for patient to contact clinic.    Sadie Flores, RN  Endocrine Care Coordinator  [facility]

## 2018-07-12 ENCOUNTER — MYC MEDICAL ADVICE (OUTPATIENT)
Dept: PEDIATRICS | Facility: CLINIC | Age: 53
End: 2018-07-12

## 2018-07-12 DIAGNOSIS — I89.0 LYMPHEDEMA: Primary | ICD-10-CM

## 2018-07-12 DIAGNOSIS — Z90.13 S/P BILATERAL MASTECTOMY: ICD-10-CM

## 2018-07-12 DIAGNOSIS — Z85.3 PERSONAL HISTORY OF BREAST CANCER: ICD-10-CM

## 2018-07-12 NOTE — TELEPHONE ENCOUNTER
Sent patient a [employer] message with 's note.  Informed patient  will return 7/23/18.    Ayana Coronado RN,   [facility]

## 2018-07-12 NOTE — TELEPHONE ENCOUNTER
It sounds like she needs retroactive referral for the last year. I'm not certain if retroactive referral will work.     Sometimes, the insurance company decides how many visits to approve. It is best to wait for Dr. Hicks to review and address.

## 2018-07-12 NOTE — TELEPHONE ENCOUNTER
Routed [employer] message to Dr. Hicks's covering provider.    Ayana Coronado RN,   [facility]

## 2018-07-13 NOTE — TELEPHONE ENCOUNTER
Chanel Israel   to Patient Referral Request Pool   12:55 PM   Miah, thanks for your reply. According to [facility] they don t have this on file. Could you please resubmit it?   Thank you,   Ana

## 2018-07-13 NOTE — TELEPHONE ENCOUNTER
In original [employer] message sent by patient on 7/11/18 she indicated a message was sent in late June regarding the referral. Unclear if she is indicating this year. Chart reviewed and no messages have been received regarding patients ongoing referral need (and accompanying [facility] Provider Recommendation Form) this year. There were multiple requests for other referrals this year (genetic counseling and dermatology).     See mychart encounter 12/27/17. Referral and form completed for PT for 29 visits for 1/1/17-12/31/17. Is patient needing the referral placed for 2018 visits now?    Printed form and holding on my desk. Left voicemail for patient to return call to me specifically regarding this. Sent [employer] message as well updating patient.  Miah Anne, CMA

## 2018-07-13 NOTE — TELEPHONE ENCOUNTER
July 12, 2018   Chanel Prince Lindy   to Patient Referral Request Pool  7:12 PM   Dear Ayana,   I have submitted these claims retroactively with success in the past.   Dr. Hicks staff has completed this for me. Could you please forward the request to them, as they originally made the error in the dates of service?   Respectfully, kalee cramer          Patient responded with above message.  Miah Anne, CMA

## 2018-07-13 NOTE — TELEPHONE ENCOUNTER
Printed original 2017 referral and form scanned to chart and faxed to [facility] again. Received confirmation from [employer] that fax was sent successfully.  Mailed copy to patient.  Miah Anne, CMA

## 2018-08-05 LAB — LAB SCANNED RESULT: NORMAL

## 2018-08-22 ENCOUNTER — ONCOLOGY VISIT (OUTPATIENT)
Dept: ONCOLOGY | Facility: CLINIC | Age: 53
End: 2018-08-22
Payer: COMMERCIAL

## 2018-08-22 DIAGNOSIS — Z80.3 FHX: BREAST CANCER: ICD-10-CM

## 2018-08-22 DIAGNOSIS — Z85.3 PERSONAL HISTORY OF BREAST CANCER: Primary | ICD-10-CM

## 2018-08-22 PROCEDURE — 99207 ZZC NO CHARGE LOS: CPT | Performed by: GENETIC COUNSELOR, MS

## 2018-08-22 NOTE — PROGRESS NOTES
"8/22/2018    Referring Provider: Yenifer Laureano MD PhD    Presenting Information:  Chanel Israel returned to the Cancer Risk Management Program at the [facility] to discuss her genetic testing results. Her blood was drawn on 7/6/2018.  MyRisk testing was ordered from [facility]. This testing was done because of her personal and family history of breast cancer and family history of uterine cancer.    Genetic Testing Result: NEGATIVE  Chanel is negative for mutations in APC, LENNY, BARD1, BRCA1, BRCA2, BRIP1, BMPR1A, CDH1, CDK4, CDKN2A, CHEK2, EPCAM, GREM1, MLH1, MSH2, MSH6, MUTYH, NBN, PALB2, PMS2, PTEN, POLD1, POLE, RAD51C, RAD51D, SMAD4, STK11, and TP53 genes. No mutations were found in any of the 28 genes analyzed.  This test involved sequencing and deletion/duplication analysis of all genes with the exception of EPCAM (deletions only), select regions of GREM1, and select regions of POLE and POLD1.    Testing did not detect a mutation associated with  Hereditary Breast and Ovarian Cancer syndrome (BRCA1, BRCA2), Juan syndrome (MLH1, MSH2, MSH6, PMS2, EPCAM), Familial Adenomatous Polyposis syndrome (APC), Hereditary Diffuse Gastric Cancer syndrome (CDH1), Familial Atypical Multiple Mole Melanoma syndrome (CDK4, CDKN2A), Juvenile Polyposis syndrome (BMPR1A, SMAD4), Cowden syndrome (PTEN), Li Fraumeni syndrome (TP53), Peutz-Jeghers syndrome (STK11), or MUTYH Associated Polyposis syndrome (MUTYH).    A copy of the test report can be found in the Laboratory tab, dated 7/6/2018, and named \"[facility]\". The report is scanned in as a linked document.    Interpretation:  We discussed several different interpretations of this negative test result.    1. One explanation may be that there is a different gene or combination of genes and environment that are associated with the cancers in Chanel and/or her relatives.    2. There is also a small possibility that there is a mutation in one of these " genes, and we could not find it with our current testing methods.       Screening:  Based on this negative test result, it is important for Chanel and her relatives to refer back to the family history for appropriate cancer screening.      Due to Chanel's family history of uterine cancer, she (and other close relatives of her sister who had uterine cancer) remains at slightly increased risk for uterine cancer.  There are significant limitations of uterine cancer screening; as such, this screening is not typically recommended.  That being said, women in this family should discuss the signs and symptoms of uterine cancer with their primary OB/GYN provider, as they may have individualized recommendations.    Chanel s close female relatives remain at increased risk for breast cancer given their family history.  Breast cancer screening is generally recommended to begin approximately 10 years younger than the earliest age of breast cancer diagnosis in the family, or at age 40, whichever comes first.  In this family, screening may begin at age 32.  Breast screening options should be discussed with an individual's primary care provider and a genetic counselor, to determine what screening is appropriate, or if additional screening (such as breast MRI) is necessary based on personal/family history factors.      Other population cancer screening, such as recommendations by the American Cancer Society and the National Comprehensive Cancer Network (NCCN), are also appropriate for Chanel and her family.  These screening recommendations may change if there are changes to Chanel's personal and/or family history.  Final screening recommendations should be made by each individual's managing physician.    Inheritance:  We discussed that Chanel did not pass on an identifiable mutation in these genes to her children based on this test result.  Mutations in these genes do not skip generations.      Summary:  We do not have an  explanation for Chanel's personal or family history of cancer.  Because of that, it is important that she continue with cancer screening based on her personal and family history as discussed above. Genetic testing is rapidly advancing, and new cancer susceptibility genes will most likely be identified in the future.  Therefore, I encouraged Chanel to contact me annually or if there are changes in her personal or family history.  This may change how we assess her cancer risk, screening, and the testing we would offer.    Plan:  1. I provided Chanel with a copy of her test results today.  2. She plans to follow-up with her physicians as needed.  3. She should contact me annually, or sooner if her family history changes.    If Chanel has any further questions, I encouraged her to contact me at [phone].    Time spent face to face: 10 minutes.    Jaimie Mak MS, [facility]  Licensed Genetic Counselor  P. [phone]  F. [phone]

## 2018-08-22 NOTE — MR AVS SNAPSHOT
After Visit Summary   8/22/2018    Chanel Israel    MRN: [id number]           Patient Information     Date Of Birth          1965        Visit Information        Provider Department      8/22/2018 1:15 PM Jaimie Mak GC [facility]        Today's Diagnoses     Personal history of breast cancer    -  1    FHx: breast cancer           Follow-ups after your visit        Your next 10 appointments already scheduled     Oct 19, 2018  9:40 AM CDT   LAB with LAB FIRST FLOOR [facility])    [id number] [place] [address]   [phone]           Please do not eat 10-12 hours before your appointment if you are coming in fasting for labs on lipids, cholesterol, or glucose (sugar). This does not apply to pregnant women. Water, hot tea and black coffee (with nothing added) are okay. Do not drink other fluids, diet soda or chew gum.            Oct 19, 2018 10:10 AM CDT   MyChart Physical Adult with Gabbie Hicks MD   [facility])    [address] [place] [address]   [phone]            Nov 19, 2018  8:30 AM CST   Return Visit with Mariella Tripathi MD   [facility])    [address] [place] [address]   [phone]            Jun 21, 2019 11:45 AM CDT   LAB with LAB ONC [facility])    [address] [place] [address]   [phone]           Please do not eat 10-12 hours before your appointment if you are coming in fasting for labs on lipids, cholesterol, or glucose (sugar). This does not apply to pregnant women. Water, hot tea and black coffee (with nothing added) are okay. Do not drink other fluids, diet soda or chew gum.            Jun 21, 2019 12:30 PM CDT   Return Visit with Ghislaine Lopez MD     [facility] ([facility])    [address] [place] [address] [phone]              Who to contact     If you have questions or need follow up information about today's clinic visit or your schedule please contact [facility] directly at [phone].  Normal or non-critical lab and imaging results will be communicated to you by MyChart, letter or phone within 4 business days after the clinic has received the results. If you do not hear from us within 7 days, please contact the clinic through [employer] or phone. If you have a critical or abnormal lab result, we will notify you by phone as soon as possible.  Submit refill requests through [employer] or call your pharmacy and they will forward the refill request to us. Please allow 3 business days for your refill to be completed.          Additional Information About Your Visit        [employer] Information     [employer] gives you secure access to your electronic health record. If you see a primary care provider, you can also send messages to your care team and make appointments. If you have questions, please call your primary care clinic.  If you do not have a primary care provider, please call [phone] and they will assist you.      [employer] is an electronic gateway that provides easy, online access to your medical records. With [employer], you can request a clinic appointment, read your test results, renew a prescription or communicate with your care team.     To access your existing account, please contact your [facility] or call [phone] for assistance.        Care EveryWhere ID     This is your Care EveryWhere ID. This could be used by other organizations to access your [place] medical records  [id number]         Blood Pressure from Last 3 Encounters:   06/28/18 118/72   06/19/18 103/61   04/11/18 112/78    Weight from Last 3 Encounters:   06/28/18 66.7 kg (147 lb)    06/19/18 65.8 kg (145 lb)   04/11/18 66.3 kg (146 lb 1.6 oz)              Today, you had the following     No orders found for display       Primary Care Provider Office Phone # Fax #    Gabbie Hicks [phone] [address]  [facility] [address]        Equal Access to Services     [facility]: Hadii aad ku hadasho Soomaali, waaxda luqadaha, qaybta kaalmada adeegyada, waxay idiin hayaan adeeg khangelikash la'aan . So [facility] [phone].    ATENCIÓN: Si habla español, tiene a ortiz disposición servicios gratuitos de asistencia lingüística. Llame al [phone].    We comply with applicable federal civil rights laws and [place] laws. We do not discriminate on the basis of race, color, national origin, age, disability, sex, sexual orientation, or gender identity.            Thank you!     Thank you for choosing [facility]  for your care. Our goal is always to provide you with excellent care. Hearing back from our patients is one way we can continue to improve our services. Please take a few minutes to complete the written survey that you may receive in the mail after your visit with us. Thank you!             Your Updated Medication List - Protect others around you: Learn how to safely use, store and throw away your medicines at www.disposemymeds.org.          This list is accurate as of 8/22/18  1:38 PM.  Always use your most recent med list.                   Brand Name Dispense Instructions for use Diagnosis    ASPIRIN PO      Take 81 mg by mouth daily        CALCIUM + D PO      Take by mouth 2 times daily        Co Q 10 100 MG Caps      Take 1 capsule by mouth daily        flax seed oil 1000 MG capsule      Take 2 capsules (2,000 mg) by mouth 2 times daily        fluocinolone 0.025 % cream    SYNALAR    60 g    Apply topically 2 times daily Apply to affected areas of mouth as needed.    Perleche       fluticasone 50 MCG/ACT spray    FLONASE     Reported on 5/1/2017         L-GLUTAMINE      1 capsule 2 times daily        MAGNESIUM PO      Take 1 capsule by mouth daily        MULTI-VITAMIN PO      Take by mouth daily        order for DME     2 Units    Compression sleeve to bilateral UE.    S/P bilateral mastectomy, Lymphedema       PROBIOTIC DAILY PO      Take 1 capsule by mouth 2 times daily        sertraline 50 MG tablet    ZOLOFT    90 tablet    TAKE ONE TABLET BY MOUTH ONCE DAILY    Mild recurrent major depression (H)       SYNTHROID 75 MCG tablet   Generic drug:  levothyroxine     90 tablet    Take 1 tablet (75 mcg) by mouth daily    Hypothyroidism, unspecified type       TRIPLE FLEX PO      Take by mouth 2 times daily        VITAMIN B-12 PO      Take by mouth daily    Personal history of breast cancer, FHx: breast cancer       ZINC PO      Take by mouth daily    Personal history of breast cancer, FHx: breast cancer

## 2018-08-22 NOTE — LETTER
Cancer Risk Management  Program Locations    [facility]  [facility]  Mailing Address  Cancer Risk Management Program  [address]  [place] [address]    New patient appointments  [phone]  August 22, 2018    Chanel Israel  [address]  [facility] [address]      Dear Chanel,    It was a pleasure meeting with you at the [facility] on 8/22.  Here is a copy of the progress note from your recent genetic counseling visit to the Cancer Risk Management Program.  If you have any additional questions, please feel free to call.    Referring Provider: Yenifer Laureano MD PhD    Presenting Information:  Chanel Israel returned to the Cancer Risk Management Program at the [facility] to discuss her genetic testing results. Her blood was drawn on 7/6/2018.  MyRisk testing was ordered from [facility]. This testing was done because of her personal and family history of breast cancer and family history of uterine cancer.    Genetic Testing Result: NEGATIVE  Chanel is negative for mutations in APC, LENNY, BARD1, BRCA1, BRCA2, BRIP1, BMPR1A, CDH1, CDK4, CDKN2A, CHEK2, EPCAM, GREM1, MLH1, MSH2, MSH6, MUTYH, NBN, PALB2, PMS2, PTEN, POLD1, POLE, RAD51C, RAD51D, SMAD4, STK11, and TP53 genes. No mutations were found in any of the 28 genes analyzed.  This test involved sequencing and deletion/duplication analysis of all genes with the exception of EPCAM (deletions only), select regions of GREM1, and select regions of POLE and POLD1.    Testing did not detect a mutation associated with  Hereditary Breast and Ovarian Cancer syndrome (BRCA1, BRCA2), Juan syndrome (MLH1, MSH2, MSH6, PMS2, EPCAM), Familial Adenomatous Polyposis syndrome (APC), Hereditary Diffuse Gastric Cancer  syndrome (CDH1), Familial Atypical Multiple Mole Melanoma syndrome (CDK4, CDKN2A), Juvenile Polyposis syndrome (BMPR1A, SMAD4), Cowden syndrome (PTEN), Li Fraumeni syndrome (TP53), Peutz-Jeghers syndrome (STK11), or MUTYH Associated Polyposis syndrome (MUTYH).    Interpretation:  We discussed several different interpretations of this negative test result.    1. One explanation may be that there is a different gene or combination of genes and environment that are associated with the cancers in Chanel and/or her relatives.    2. There is also a small possibility that there is a mutation in one of these genes, and we could not find it with our current testing methods.       Screening:  Based on this negative test result, it is important for Chanel and her relatives to refer back to the family history for appropriate cancer screening.      Due to Chanel's family history of uterine cancer, she (and other close relatives of her sister who had uterine cancer) remains at slightly increased risk for uterine cancer.  There are significant limitations of uterine cancer screening; as such, this screening is not typically recommended.  That being said, women in this family should discuss the signs and symptoms of uterine cancer with their primary OB/GYN provider, as they may have individualized recommendations.    Chanel s close female relatives remain at increased risk for breast cancer given their family history.  Breast cancer screening is generally recommended to begin approximately 10 years younger than the earliest age of breast cancer diagnosis in the family, or at age 40, whichever comes first.  In this family, screening may begin at age 32.  Breast screening options should be discussed with an individual's primary care provider and a genetic counselor, to determine what screening is appropriate, or if additional screening (such as breast MRI) is necessary based on personal/family history factors.      Other  population cancer screening, such as recommendations by the American Cancer Society and the National Comprehensive Cancer Network (NCCN), are also appropriate for Chanel and her family.  These screening recommendations may change if there are changes to Chanel's personal and/or family history.  Final screening recommendations should be made by each individual's managing physician.    Inheritance:  We discussed that Chanel did not pass on an identifiable mutation in these genes to her children based on this test result.  Mutations in these genes do not skip generations.      Summary:  We do not have an explanation for Chanel's personal or family history of cancer.  Because of that, it is important that she continue with cancer screening based on her personal and family history as discussed above. Genetic testing is rapidly advancing, and new cancer susceptibility genes will most likely be identified in the future.  Therefore, I encouraged Chanel to contact me annually or if there are changes in her personal or family history.  This may change how we assess her cancer risk, screening, and the testing we would offer.    Plan:  1. I provided Chanel with a copy of her test results today.  2. She plans to follow-up with her physicians as needed.  3. She should contact me annually, or sooner if her family history changes.    If Chanel has any further questions, I encouraged her to contact me at [phone].    Jaimie Mak MS, [facility]  Licensed Genetic Counselor  P. [phone], F. [phone]

## 2018-08-22 NOTE — LETTER
"    8/22/2018         RE: Chanel Israel  [address]  [facility] [address]        Dear Colleague,    Thank you for referring your patient, Chanel Israel, to the [facility]. Please see a copy of my visit note below.    8/22/2018    Referring Provider: Yenifer Laureano MD PhD    Presenting Information:  Chanel Israel returned to the Cancer Risk Management Program at the [facility] to discuss her genetic testing results. Her blood was drawn on 7/6/2018.  MyRisk testing was ordered from [facility]. This testing was done because of her personal and family history of breast cancer and family history of uterine cancer.    Genetic Testing Result: NEGATIVE  Chanel is negative for mutations in APC, LENNY, BARD1, BRCA1, BRCA2, BRIP1, BMPR1A, CDH1, CDK4, CDKN2A, CHEK2, EPCAM, GREM1, MLH1, MSH2, MSH6, MUTYH, NBN, PALB2, PMS2, PTEN, POLD1, POLE, RAD51C, RAD51D, SMAD4, STK11, and TP53 genes. No mutations were found in any of the 28 genes analyzed.  This test involved sequencing and deletion/duplication analysis of all genes with the exception of EPCAM (deletions only), select regions of GREM1, and select regions of POLE and POLD1.    Testing did not detect a mutation associated with  Hereditary Breast and Ovarian Cancer syndrome (BRCA1, BRCA2), Juan syndrome (MLH1, MSH2, MSH6, PMS2, EPCAM), Familial Adenomatous Polyposis syndrome (APC), Hereditary Diffuse Gastric Cancer syndrome (CDH1), Familial Atypical Multiple Mole Melanoma syndrome (CDK4, CDKN2A), Juvenile Polyposis syndrome (BMPR1A, SMAD4), Cowden syndrome (PTEN), Li Fraumeni syndrome (TP53), Peutz-Jeghers syndrome (STK11), or MUTYH Associated Polyposis syndrome (MUTYH).    A copy of the test report can be found in the Laboratory tab, dated 7/6/2018, and named \"[facility]\". The report is scanned in as a linked document.    Interpretation:  We discussed several different interpretations of this negative " test result.    1. One explanation may be that there is a different gene or combination of genes and environment that are associated with the cancers in Chanel and/or her relatives.    2. There is also a small possibility that there is a mutation in one of these genes, and we could not find it with our current testing methods.       Screening:  Based on this negative test result, it is important for Chanel and her relatives to refer back to the family history for appropriate cancer screening.      Due to Chanel's family history of uterine cancer, she (and other close relatives of her sister who had uterine cancer) remains at slightly increased risk for uterine cancer.  There are significant limitations of uterine cancer screening; as such, this screening is not typically recommended.  That being said, women in this family should discuss the signs and symptoms of uterine cancer with their primary OB/GYN provider, as they may have individualized recommendations.    Chanel s close female relatives remain at increased risk for breast cancer given their family history.  Breast cancer screening is generally recommended to begin approximately 10 years younger than the earliest age of breast cancer diagnosis in the family, or at age 40, whichever comes first.  In this family, screening may begin at age 32.  Breast screening options should be discussed with an individual's primary care provider and a genetic counselor, to determine what screening is appropriate, or if additional screening (such as breast MRI) is necessary based on personal/family history factors.      Other population cancer screening, such as recommendations by the American Cancer Society and the National Comprehensive Cancer Network (NCCN), are also appropriate for Chanel and her family.  These screening recommendations may change if there are changes to Chanel's personal and/or family history.  Final screening recommendations should be made by  each individual's managing physician.    Inheritance:  We discussed that Chanel did not pass on an identifiable mutation in these genes to her children based on this test result.  Mutations in these genes do not skip generations.      Summary:  We do not have an explanation for Chanel's personal or family history of cancer.  Because of that, it is important that she continue with cancer screening based on her personal and family history as discussed above. Genetic testing is rapidly advancing, and new cancer susceptibility genes will most likely be identified in the future.  Therefore, I encouraged Chanel to contact me annually or if there are changes in her personal or family history.  This may change how we assess her cancer risk, screening, and the testing we would offer.    Plan:  1. I provided Chanel with a copy of her test results today.  2. She plans to follow-up with her physicians as needed.  3. She should contact me annually, or sooner if her family history changes.    If Chanel has any further questions, I encouraged her to contact me at [phone].    Time spent face to face: 10 minutes.    Jaimie Mak MS, [facility]  Licensed Genetic Counselor  P. [phone]  F. [phone]        Again, thank you for allowing me to participate in the care of your patient.        Sincerely,        Jaimie Mak, GC

## 2018-09-08 ENCOUNTER — MYC MEDICAL ADVICE (OUTPATIENT)
Dept: PEDIATRICS | Facility: CLINIC | Age: 53
End: 2018-09-08

## 2018-10-15 DIAGNOSIS — Z00.00 ROUTINE GENERAL MEDICAL EXAMINATION AT A HEALTH CARE FACILITY: Primary | ICD-10-CM

## 2018-10-15 DIAGNOSIS — Z13.6 CARDIOVASCULAR SCREENING; LDL GOAL LESS THAN 160: ICD-10-CM

## 2018-10-22 ENCOUNTER — OFFICE VISIT (OUTPATIENT)
Dept: PEDIATRICS | Facility: CLINIC | Age: 53
End: 2018-10-22
Payer: COMMERCIAL

## 2018-10-22 VITALS
TEMPERATURE: 98.1 F | HEART RATE: 87 BPM | WEIGHT: 143.3 LBS | BODY MASS INDEX: 26.37 KG/M2 | OXYGEN SATURATION: 96 % | DIASTOLIC BLOOD PRESSURE: 73 MMHG | HEIGHT: 62 IN | SYSTOLIC BLOOD PRESSURE: 109 MMHG

## 2018-10-22 DIAGNOSIS — Z23 NEED FOR PROPHYLACTIC VACCINATION AND INOCULATION AGAINST INFLUENZA: ICD-10-CM

## 2018-10-22 DIAGNOSIS — Z85.3 PERSONAL HISTORY OF BREAST CANCER: ICD-10-CM

## 2018-10-22 DIAGNOSIS — L57.0 ACTINIC KERATOSES: ICD-10-CM

## 2018-10-22 DIAGNOSIS — Z80.3 FHX: BREAST CANCER: ICD-10-CM

## 2018-10-22 DIAGNOSIS — E78.5 HYPERLIPIDEMIA LDL GOAL <160: ICD-10-CM

## 2018-10-22 DIAGNOSIS — Z90.13 S/P BILATERAL MASTECTOMY: ICD-10-CM

## 2018-10-22 DIAGNOSIS — E66.3 OVERWEIGHT (BMI 25.0-29.9): ICD-10-CM

## 2018-10-22 DIAGNOSIS — Z11.4 SCREENING FOR HUMAN IMMUNODEFICIENCY VIRUS: ICD-10-CM

## 2018-10-22 DIAGNOSIS — F33.0 MILD RECURRENT MAJOR DEPRESSION (H): ICD-10-CM

## 2018-10-22 DIAGNOSIS — Z00.00 ROUTINE GENERAL MEDICAL EXAMINATION AT A HEALTH CARE FACILITY: ICD-10-CM

## 2018-10-22 DIAGNOSIS — Z00.00 ROUTINE GENERAL MEDICAL EXAMINATION AT A HEALTH CARE FACILITY: Primary | ICD-10-CM

## 2018-10-22 DIAGNOSIS — Z80.8 FAMILY HISTORY OF SKIN CANCER: ICD-10-CM

## 2018-10-22 DIAGNOSIS — E03.9 ACQUIRED HYPOTHYROIDISM: ICD-10-CM

## 2018-10-22 DIAGNOSIS — Z13.6 CARDIOVASCULAR SCREENING; LDL GOAL LESS THAN 160: ICD-10-CM

## 2018-10-22 LAB
ALBUMIN SERPL-MCNC: 3.8 G/DL (ref 3.4–5)
ALP SERPL-CCNC: 81 U/L (ref 40–150)
ALT SERPL W P-5'-P-CCNC: 39 U/L (ref 0–50)
AST SERPL W P-5'-P-CCNC: 29 U/L (ref 0–45)
BASOPHILS # BLD AUTO: 0 10E9/L (ref 0–0.2)
BASOPHILS NFR BLD AUTO: 0.4 %
BILIRUB DIRECT SERPL-MCNC: 0.1 MG/DL (ref 0–0.2)
BILIRUB SERPL-MCNC: 0.5 MG/DL (ref 0.2–1.3)
CHOLEST SERPL-MCNC: 268 MG/DL
CREAT SERPL-MCNC: 0.87 MG/DL (ref 0.52–1.04)
DIFFERENTIAL METHOD BLD: NORMAL
EOSINOPHIL # BLD AUTO: 0.1 10E9/L (ref 0–0.7)
EOSINOPHIL NFR BLD AUTO: 1.2 %
ERYTHROCYTE [DISTWIDTH] IN BLOOD BY AUTOMATED COUNT: 12.5 % (ref 10–15)
GFR SERPL CREATININE-BSD FRML MDRD: 68 ML/MIN/1.7M2
HCT VFR BLD AUTO: 41.1 % (ref 35–47)
HDLC SERPL-MCNC: 65 MG/DL
HGB BLD-MCNC: 13.7 G/DL (ref 11.7–15.7)
IMM GRANULOCYTES # BLD: 0 10E9/L (ref 0–0.4)
IMM GRANULOCYTES NFR BLD: 0.2 %
LDLC SERPL CALC-MCNC: 175 MG/DL
LYMPHOCYTES # BLD AUTO: 1.5 10E9/L (ref 0.8–5.3)
LYMPHOCYTES NFR BLD AUTO: 29.8 %
MCH RBC QN AUTO: 30.1 PG (ref 26.5–33)
MCHC RBC AUTO-ENTMCNC: 33.3 G/DL (ref 31.5–36.5)
MCV RBC AUTO: 90 FL (ref 78–100)
MONOCYTES # BLD AUTO: 0.4 10E9/L (ref 0–1.3)
MONOCYTES NFR BLD AUTO: 6.9 %
NEUTROPHILS # BLD AUTO: 3.1 10E9/L (ref 1.6–8.3)
NEUTROPHILS NFR BLD AUTO: 61.5 %
NONHDLC SERPL-MCNC: 203 MG/DL
PLATELET # BLD AUTO: 224 10E9/L (ref 150–450)
PROT SERPL-MCNC: 7.5 G/DL (ref 6.8–8.8)
RBC # BLD AUTO: 4.55 10E12/L (ref 3.8–5.2)
TRIGL SERPL-MCNC: 139 MG/DL
WBC # BLD AUTO: 5.1 10E9/L (ref 4–11)

## 2018-10-22 PROCEDURE — 99396 PREV VISIT EST AGE 40-64: CPT | Mod: 25 | Performed by: FAMILY MEDICINE

## 2018-10-22 PROCEDURE — 90471 IMMUNIZATION ADMIN: CPT | Performed by: FAMILY MEDICINE

## 2018-10-22 PROCEDURE — 90682 RIV4 VACC RECOMBINANT DNA IM: CPT | Performed by: FAMILY MEDICINE

## 2018-10-22 PROCEDURE — 82565 ASSAY OF CREATININE: CPT | Performed by: INTERNAL MEDICINE

## 2018-10-22 PROCEDURE — 36415 COLL VENOUS BLD VENIPUNCTURE: CPT | Performed by: FAMILY MEDICINE

## 2018-10-22 PROCEDURE — 87389 HIV-1 AG W/HIV-1&-2 AB AG IA: CPT | Performed by: FAMILY MEDICINE

## 2018-10-22 PROCEDURE — 85025 COMPLETE CBC W/AUTO DIFF WBC: CPT | Performed by: INTERNAL MEDICINE

## 2018-10-22 PROCEDURE — 80076 HEPATIC FUNCTION PANEL: CPT | Performed by: INTERNAL MEDICINE

## 2018-10-22 PROCEDURE — 80061 LIPID PANEL: CPT | Performed by: INTERNAL MEDICINE

## 2018-10-22 ASSESSMENT — ANXIETY QUESTIONNAIRES
GAD7 TOTAL SCORE: 0
3. WORRYING TOO MUCH ABOUT DIFFERENT THINGS: NOT AT ALL
1. FEELING NERVOUS, ANXIOUS, OR ON EDGE: NOT AT ALL
6. BECOMING EASILY ANNOYED OR IRRITABLE: NOT AT ALL
5. BEING SO RESTLESS THAT IT IS HARD TO SIT STILL: NOT AT ALL
7. FEELING AFRAID AS IF SOMETHING AWFUL MIGHT HAPPEN: NOT AT ALL
2. NOT BEING ABLE TO STOP OR CONTROL WORRYING: NOT AT ALL

## 2018-10-22 ASSESSMENT — PAIN SCALES - GENERAL: PAINLEVEL: NO PAIN (0)

## 2018-10-22 ASSESSMENT — PATIENT HEALTH QUESTIONNAIRE - PHQ9: 5. POOR APPETITE OR OVEREATING: NOT AT ALL

## 2018-10-22 NOTE — PROGRESS NOTES
SUBJECTIVE:   CC: Chanel Israel is an 53 year old woman who presents for preventive health visit.     Healthy Habits:    Do you get at least three servings of calcium containing foods daily (dairy, green leafy vegetables, etc.)? yes and no, taking calcium and/or vitamin D supplement: yes    Amount of exercise or daily activities, outside of work: 3-4 day(s) per week    Problems taking medications regularly No    Medication side effects: No    Have you had an eye exam in the past two years? yes    Do you see a dentist twice per year? yes    Do you have sleep apnea, excessive snoring or daytime drowsiness?no    Depression Followup    Status since last visit: Stable     See PHQ-9 for current symptoms.  Other associated symptoms: None    Complicating factors:   Significant life event:  No   Current substance abuse:  None  Anxiety or Panic symptoms:  No    PHQ 6/12/2017 10/31/2017 4/11/2018   PHQ-9 Total Score 2 3 3   Q9: Suicide Ideation Not at all Not at all Not at all     In the past two weeks have you had thoughts of suicide or self-harm?  No.    Do you have concerns about your personal safety or the safety of others?   No  PHQ-9  English  PHQ-9   Any Language  Suicide Assessment Five-step Evaluation and Treatment (SAFE-T)    Today's PHQ-2 Score:   PHQ-2 ( 1999 [employer]) 10/22/2018 4/11/2018   Q1: Little interest or pleasure in doing things 0 0   Q2: Feeling down, depressed or hopeless 0 0   PHQ-2 Score 0 0   Q1: Little interest or pleasure in doing things - -   Q2: Feeling down, depressed or hopeless - -   PHQ-2 Score - -       Abuse: Current or Past(Physical, Sexual or Emotional)- No  Do you feel safe in your environment - Yes    Social History   Substance Use Topics     Smoking status: Never Smoker     Smokeless tobacco: Never Used     Alcohol use No     If you drink alcohol do you typically have >3 drinks per day or >7 drinks per week? No                     Reviewed orders with patient.  Reviewed  health maintenance and updated orders accordingly - Yes  Labs reviewed in EPIC  BP Readings from Last 3 Encounters:   10/22/18 109/73   06/28/18 118/72   06/19/18 103/61    Wt Readings from Last 3 Encounters:   10/22/18 143 lb 4.8 oz (65 kg)   06/28/18 147 lb (66.7 kg)   06/19/18 145 lb (65.8 kg)                  Patient Active Problem List   Diagnosis     Personal history of breast cancer     S/P bilateral mastectomy     Urge incontinence of urine     Overweight (BMI 25.0-29.9)     Mild recurrent major depression (H)     Family history of diabetes mellitus (DM)     Family history of thyroid disease     Plantar fasciitis, bilateral     Seasonal allergies     Mixed hyperlipidemia     Chronic rhinitis     Breast implant asymmetry     ACP (advance care planning)     Lymphedema     Family history of ischemic heart disease     Seasonal allergic rhinitis     Acquired hypothyroidism     Glaucoma suspect, bilateral     Family history of glaucoma     Age-related nuclear cataract of both eyes     Seasonal allergic rhinitis due to other allergic trigger, unspecified chronicity     Family history of breast cancer in sister     Family history of skin cancer     Actinic keratoses     Hyperlipidemia LDL goal <160     Past Surgical History:   Procedure Laterality Date     ADENOIDECTOMY       COLONOSCOPY N/A 8/22/2014    Procedure: COLONOSCOPY;  Surgeon: Duane, William Charles, MD;  Location: [facility]     COSMETIC SURGERY       CYSTOSCOPY       HC REMOVAL OF ANAL FISSURE  2007     MASTECTOMY, SIMPLE RT/LT/MABLE       RECONSTRUCT BREAST BILATERAL  2008     TONSILLECTOMY         Social History   Substance Use Topics     Smoking status: Never Smoker     Smokeless tobacco: Never Used     Alcohol use No     Family History   Problem Relation Age of Onset     Diabetes Mother      Thyroid Disease Mother      Glaucoma Mother      Macular Degeneration Mother      Hypertension Mother      Diabetes Sister      Thyroid Disease Sister      Depression  Sister      Endometrial Cancer Sister 51     Cancer Father      skin cancer     Glaucoma Father      Hypertension Father      Coronary Artery Disease Brother       at 43     Cerebrovascular Disease Maternal Grandfather          Current Outpatient Prescriptions   Medication Sig Dispense Refill     ASPIRIN PO Take 81 mg by mouth daily       Calcium Carbonate-Vitamin D (CALCIUM + D PO) Take by mouth 2 times daily       Coenzyme Q10 (CO Q 10) 100 MG CAPS Take 1 capsule by mouth daily        Cyanocobalamin (VITAMIN B-12 PO) Take by mouth daily       Flaxseed, Linseed, (FLAX SEED OIL) 1000 MG capsule Take 2 capsules (2,000 mg) by mouth 2 times daily       fluocinolone (SYNALAR) 0.025 % cream Apply topically 2 times daily Apply to affected areas of mouth as needed. 60 g 5     fluticasone (FLONASE) 50 MCG/ACT nasal spray Reported on 2017  0     Glucosamine-Chondroitin-MSM (TRIPLE FLEX PO) Take by mouth 2 times daily       L-GLUTAMINE 1 capsule 2 times daily        MAGNESIUM PO Take 1 capsule by mouth daily        Multiple Vitamin (MULTI-VITAMIN PO) Take by mouth daily        Multiple Vitamins-Minerals (ZINC PO) Take by mouth daily       order for DME Compression sleeve to bilateral UE. 2 Units 6     Probiotic Product (PROBIOTIC DAILY PO) Take 1 capsule by mouth 2 times daily       sertraline (ZOLOFT) 50 MG tablet Take 1 tablet (50 mg) by mouth daily 90 tablet 3     SYNTHROID 75 MCG tablet Take 1 tablet (75 mcg) by mouth daily 90 tablet 1     [DISCONTINUED] sertraline (ZOLOFT) 50 MG tablet TAKE ONE TABLET BY MOUTH ONCE DAILY 90 tablet 3     Allergies   Allergen Reactions     Erythromycin Nausea     Recent Labs   Lab Test  10/22/18   0939  18   1403  18   1607  10/31/17   0731   16   0913  12   LDL  175*   --    --   104*   --   156*   < >   --    HDL  65   --    --   60   --   65   < >   --    TRIG  139   --    --   141   --   114   < >   --    ALT  39  44  41   --    < >  49   < >  28   CR   0.87  0.94  0.82   --    < >  0.88   < >  0.84   GFRESTIMATED  68  62  73   --    < >  68   < >  >60   GFRESTBLACK  82  75  88   --    < >  82   < >  >60   POTASSIUM   --   4.1   --    --    --    --    --   4.4   TSH   --   0.73   --   1.44   < >  0.56   < >   --     < > = values in this interval not displayed.        Alternate mammogram schedule due to breast cancer history -status post bilateral mastectomy    Pertinent mammograms are reviewed under the imaging tab.  History of abnormal Pap smear: NO - age 30- 65 PAP every 3 years recommended  PAP / HPV 8/24/2016   PAP NIL     Reviewed and updated as needed this visit by clinical staff  Tobacco  Allergies  Meds  Med Hx  Surg Hx  Fam Hx  Soc Hx        Reviewed and updated as needed this visit by Provider          Past Medical History:   Diagnosis Date     Asthma, mild intermittent      Breast cancer (H)      Depression      Endometriosis      Heart murmur     Patient reported.     PONV (postoperative nausea and vomiting)       Past Surgical History:   Procedure Laterality Date     ADENOIDECTOMY       COLONOSCOPY N/A 8/22/2014    Procedure: COLONOSCOPY;  Surgeon: Duane, William Charles, MD;  Location: [facility]     COSMETIC SURGERY       CYSTOSCOPY       HC REMOVAL OF ANAL FISSURE  2007     MASTECTOMY, SIMPLE RT/LT/MABLE       RECONSTRUCT BREAST BILATERAL  2008     TONSILLECTOMY       Obstetric History     No data available          ROS:  CONSTITUTIONAL: NEGATIVE for fever, chills, change in weight  INTEGUMENTARY/SKIN: NEGATIVE for worrisome rashes, moles or lesions  EYES: NEGATIVE for vision changes or irritation  ENT: NEGATIVE for ear, mouth and throat problems  RESP: NEGATIVE for significant cough or SOB  BREAST: History of breast cancer, status post bilateral mastectomy  CV: NEGATIVE for chest pain, palpitations or peripheral edema  GI: NEGATIVE for nausea, abdominal pain, heartburn, or change in bowel habits  : NEGATIVE for unusual urinary or vaginal  "symptoms. No vaginal bleeding.  MUSCULOSKELETAL: NEGATIVE for significant arthralgias or myalgia  NEURO: NEGATIVE for weakness, dizziness or paresthesias  ENDOCRINE: NEGATIVE for temperature intolerance, skin/hair changes  ENDOCRINE: POSITIVE  for HX thyroid disease  HEME/ALLERGY/IMMUNE: NEGATIVE for bleeding problems  PSYCHIATRIC: NEGATIVE for changes in mood or affect  PSYCHIATRIC: History of depression    OBJECTIVE:   /73 (BP Location: Right arm, Patient Position: Sitting, Cuff Size: Adult Regular)  Pulse 87  Temp 98.1  F (36.7  C) (Oral)  Ht 5' 2.25\" (1.581 m)  Wt 143 lb 4.8 oz (65 kg)  SpO2 96%  BMI 26 kg/m2  EXAM:  GENERAL APPEARANCE: healthy, alert and no distress  EYES: Eyes grossly normal to inspection, PERRL and conjunctivae and sclerae normal  HENT: ear canals and TM's normal, nose and mouth without ulcers or lesions, oropharynx clear and oral mucous membranes moist  NECK: no adenopathy, no asymmetry, masses, or scars and thyroid normal to palpation  RESP: lungs clear to auscultation - no rales, rhonchi or wheezes  BREAST: Deferred, patient sees oncology, status post bilateral mastectomy  CV: regular rate and rhythm, normal S1 S2, no S3 or S4, no murmur, click or rub, no peripheral edema and peripheral pulses strong  ABDOMEN: soft, nontender, no hepatosplenomegaly, no masses and bowel sounds normal   (female): normal female external genitalia, normal urethral meatus, vaginal mucosal atrophy noted, normal cervix, adnexae, and uterus without masses or abnormal discharge  MS: no musculoskeletal defects are noted and gait is age appropriate without ataxia  SKIN: Actinic keratosis spots on the face on the left thigh  NEURO: Normal strength and tone, sensory exam grossly normal, mentation intact and speech normal  PSYCH: mentation appears normal and affect normal/bright    Diagnostic Test Results:  Results for orders placed or performed in visit on 10/22/18 (from the past 24 hour(s))   CBC with " platelets differential   Result Value Ref Range    WBC 5.1 4.0 - 11.0 10e9/L    RBC Count 4.55 3.8 - 5.2 10e12/L    Hemoglobin 13.7 11.7 - 15.7 g/dL    Hematocrit 41.1 35.0 - 47.0 %    MCV 90 78 - 100 fl    MCH 30.1 26.5 - 33.0 pg    MCHC 33.3 31.5 - 36.5 g/dL    RDW 12.5 10.0 - 15.0 %    Platelet Count 224 150 - 450 10e9/L    Diff Method Automated Method     % Neutrophils 61.5 %    % Lymphocytes 29.8 %    % Monocytes 6.9 %    % Eosinophils 1.2 %    % Basophils 0.4 %    % Immature Granulocytes 0.2 %    Absolute Neutrophil 3.1 1.6 - 8.3 10e9/L    Absolute Lymphocytes 1.5 0.8 - 5.3 10e9/L    Absolute Monocytes 0.4 0.0 - 1.3 10e9/L    Absolute Eosinophils 0.1 0.0 - 0.7 10e9/L    Absolute Basophils 0.0 0.0 - 0.2 10e9/L    Abs Immature Granulocytes 0.0 0 - 0.4 10e9/L   Hepatic panel   Result Value Ref Range    Bilirubin Direct 0.1 0.0 - 0.2 mg/dL    Bilirubin Total 0.5 0.2 - 1.3 mg/dL    Albumin 3.8 3.4 - 5.0 g/dL    Protein Total 7.5 6.8 - 8.8 g/dL    Alkaline Phosphatase 81 40 - 150 U/L    ALT 39 0 - 50 U/L    AST 29 0 - 45 U/L   Creatinine   Result Value Ref Range    Creatinine 0.87 0.52 - 1.04 mg/dL    GFR Estimate 68 >60 mL/min/1.7m2    GFR Estimate If Black 82 >60 mL/min/1.7m2   Lipid panel reflex to direct LDL Fasting   Result Value Ref Range    Cholesterol 268 (H) <200 mg/dL    Triglycerides 139 <150 mg/dL    HDL Cholesterol 65 >49 mg/dL    LDL Cholesterol Calculated 175 (H) <100 mg/dL    Non HDL Cholesterol 203 (H) <130 mg/dL       ASSESSMENT/PLAN:   1. Routine general medical examination at a health care facility  Discussed on regular exercises, daily calcium intake, healthy eating, self breast exams monthly and routine dental checks  - HIV Antigen Antibody Combo    2. Need for prophylactic vaccination and inoculation against influenza    - FLU VACCINE, (RIV4) RECOMBINANT HA  , IM (FluBlok, egg free) [28034]- >18 YRS ([facility] recommended  50-64 YRS)  - Vaccine Administration, Initial [20087]    3. Mild recurrent  major depression (H)  Stable, continue with Zoloft 50 mg daily, follow for recheck in 1 year or sooner if needed.  - sertraline (ZOLOFT) 50 MG tablet; Take 1 tablet (50 mg) by mouth daily  Dispense: 90 tablet; Refill: 3    4. Screening for human immunodeficiency virus    - HIV Antigen Antibody Combo    5. Family history of skin cancer    - DERMATOLOGY REFERRAL    6. Actinic keratoses    - DERMATOLOGY REFERRAL    7. S/P bilateral mastectomy  Continue to follow-up with oncology as scheduled    8. Hyperlipidemia LDL goal <160  Lab Results   Component Value Date    CHOL 268 10/22/2018     Lab Results   Component Value Date    HDL 65 10/22/2018     Lab Results   Component Value Date     10/22/2018     Lab Results   Component Value Date    TRIG 139 10/22/2018     Lab Results   Component Value Date    CHOLHDLRATIO 4.7 08/24/2015     Reviewed moderately elevated fasting cholesterol numbers, patient has stopped taking niacin.    Will start on Niacin   patient is not sure what dose she was on before on the over-the-counter niacin  She will my chart provided with the dose that she has been taking  We will recheck cholesterol in 3 months  Emphasized on healthy eating, regular exercises, continue with weight loss    9. Overweight (BMI 25.0-29.9)  Wt Readings from Last 5 Encounters:   10/22/18 143 lb 4.8 oz (65 kg)   06/28/18 147 lb (66.7 kg)   06/19/18 145 lb (65.8 kg)   04/11/18 146 lb 1.6 oz (66.3 kg)   10/31/17 144 lb (65.3 kg)       Emphasized on weight loss, portion control, low calorie and low fat diet, healthy eating, regular exercises.      10. Acquired hypothyroidism  Patient sees endocrinologist      COUNSELING:   Reviewed preventive health counseling, as reflected in patient instructions  Special attention given to:        Regular exercise       Healthy diet/nutrition       Vision screening       Immunizations    Vaccinated for: Influenza             Osteoporosis Prevention/Bone Health       Colon cancer  "screening       HIV screeninx in teen years, 1x in adult years, and at intervals if high risk       (Julienne)menopause management       The 10-year ASCVD risk score (Candyloi DE JESUS Jr, et al., 2013) is: 1.5%    Values used to calculate the score:      Age: 53 years      Sex: Female      Is Non- : No      Diabetic: No      Tobacco smoker: No      Systolic Blood Pressure: 109 mmHg      Is BP treated: No      HDL Cholesterol: 65 mg/dL      Total Cholesterol: 268 mg/dL       Advance Care Planning    BP Readings from Last 1 Encounters:   10/22/18 109/73     Estimated body mass index is 26 kg/(m^2) as calculated from the following:    Height as of this encounter: 5' 2.25\" (1.581 m).    Weight as of this encounter: 143 lb 4.8 oz (65 kg).      Weight management plan: Discussed healthy diet and exercise guidelines and patient will follow up in 12 months in clinic to re-evaluate.     reports that she has never smoked. She has never used smokeless tobacco.      Counseling Resources:  ATP IV Guidelines  Pooled Cohorts Equation Calculator  Breast Cancer Risk Calculator  FRAX Risk Assessment  ICSI Preventive Guidelines  Dietary Guidelines for Americans, 2010  USDA's MyPlate  ASA Prophylaxis  Lung CA Screening    Gabbie Hicks MD  [facility]  Chart documentation done in part with Dragon Voice recognition Software. Although reviewed after completion, some word and grammatical error may remain.    Injectable Influenza Immunization Documentation    1.  Is the person to be vaccinated sick today?   No    2. Does the person to be vaccinated have an allergy to a component   of the vaccine?   No  Egg Allergy Algorithm Link    3. Has the person to be vaccinated ever had a serious reaction   to influenza vaccine in the past?   No    4. Has the person to be vaccinated ever had Guillain-Barré syndrome?   No    Form completed by Linda Diaz CMA           "

## 2018-10-22 NOTE — PATIENT INSTRUCTIONS
Get the flu shot today  Schedule for skin consult      Preventive Health Recommendations  Female Ages 50 - 64    Yearly exam: See your health care provider every year in order to  o Review health changes.   o Discuss preventive care.    o Review your medicines if your doctor has prescribed any.      Get a Pap test every three years (unless you have an abnormal result and your provider advises testing more often).    If you get Pap tests with HPV test, you only need to test every 5 years, unless you have an abnormal result.     You do not need a Pap test if your uterus was removed (hysterectomy) and you have not had cancer.    You should be tested each year for STDs (sexually transmitted diseases) if you're at risk.     Have a mammogram every 1 to 2 years.    Have a colonoscopy at age 50, or have a yearly FIT test (stool test). These exams screen for colon cancer.      Have a cholesterol test every 5 years, or more often if advised.    Have a diabetes test (fasting glucose) every three years. If you are at risk for diabetes, you should have this test more often.     If you are at risk for osteoporosis (brittle bone disease), think about having a bone density scan (DEXA).    Shots: Get a flu shot each year. Get a tetanus shot every 10 years.    Nutrition:     Eat at least 5 servings of fruits and vegetables each day.    Eat whole-grain bread, whole-wheat pasta and brown rice instead of white grains and rice.    Get adequate Calcium and Vitamin D.     Lifestyle    Exercise at least 150 minutes a week (30 minutes a day, 5 days a week). This will help you control your weight and prevent disease.    Limit alcohol to one drink per day.    No smoking.     Wear sunscreen to prevent skin cancer.     See your dentist every six months for an exam and cleaning.    See your eye doctor every 1 to 2 years.

## 2018-10-22 NOTE — MR AVS SNAPSHOT
After Visit Summary   10/22/2018    Chanel Israel    MRN: [id number]           Patient Information     Date Of Birth          1965        Visit Information        Provider Department      10/22/2018 9:30 AM Gabbie Hicks MD [facility]        Today's Diagnoses     Routine general medical examination at a health care facility    -  1    Need for prophylactic vaccination and inoculation against influenza        Mild recurrent major depression (H)        Screening for human immunodeficiency virus        Family history of skin cancer        Actinic keratoses        S/P bilateral mastectomy        Hyperlipidemia LDL goal <160        Overweight (BMI 25.0-29.9)        Acquired hypothyroidism          Care Instructions    Get the flu shot today  Schedule for skin consult      Preventive Health Recommendations  Female Ages 50 - 64    Yearly exam: See your health care provider every year in order to  o Review health changes.   o Discuss preventive care.    o Review your medicines if your doctor has prescribed any.      Get a Pap test every three years (unless you have an abnormal result and your provider advises testing more often).    If you get Pap tests with HPV test, you only need to test every 5 years, unless you have an abnormal result.     You do not need a Pap test if your uterus was removed (hysterectomy) and you have not had cancer.    You should be tested each year for STDs (sexually transmitted diseases) if you're at risk.     Have a mammogram every 1 to 2 years.    Have a colonoscopy at age 50, or have a yearly FIT test (stool test). These exams screen for colon cancer.      Have a cholesterol test every 5 years, or more often if advised.    Have a diabetes test (fasting glucose) every three years. If you are at risk for diabetes, you should have this test more often.     If you are at risk for osteoporosis (brittle bone disease), think about having a bone  density scan (DEXA).    Shots: Get a flu shot each year. Get a tetanus shot every 10 years.    Nutrition:     Eat at least 5 servings of fruits and vegetables each day.    Eat whole-grain bread, whole-wheat pasta and brown rice instead of white grains and rice.    Get adequate Calcium and Vitamin D.     Lifestyle    Exercise at least 150 minutes a week (30 minutes a day, 5 days a week). This will help you control your weight and prevent disease.    Limit alcohol to one drink per day.    No smoking.     Wear sunscreen to prevent skin cancer.     See your dentist every six months for an exam and cleaning.    See your eye doctor every 1 to 2 years.            Follow-ups after your visit        Additional Services     DERMATOLOGY REFERRAL       Your provider has referred you to: [facility]: [facility] [phone]   [email or web address]    Please be aware that coverage of these services is subject to the terms and limitations of your health insurance plan.  Call member services at your health plan with any benefit or coverage questions.      Please bring the following with you to your appointment:    (1) Any X-Rays, CTs or MRIs which have been performed.  Contact the facility where they were done to arrange for  prior to your scheduled appointment.    (2) List of current medications  (3) This referral request   (4) Any documents/labs given to you for this referral                  Your next 10 appointments already scheduled     Nov 19, 2018  8:30 AM CST   Return Visit with Mariella Tripathi MD   [facility])    [address] [place] [address] [phone]            Jun 21, 2019 11:45 AM CDT   LAB with LAB ONC [facility] ([facility])    [address] [facility] [address] [phone]           Please do not eat  10-12 hours before your appointment if you are coming in fasting for labs on lipids, cholesterol, or glucose (sugar). This does not apply to pregnant women. Water, hot tea and black coffee (with nothing added) are okay. Do not drink other fluids, diet soda or chew gum.            Jun 21, 2019 12:30 PM CDT   Return Visit with Ghislaine Lopez MD   [facility]    [address] [place] [address] [phone]              Who to contact     If you have questions or need follow up information about today's clinic visit or your schedule please contact [facility] directly at [phone].  Normal or non-critical lab and imaging results will be communicated to you by ZAPITANOhart, letter or phone within 4 business days after the clinic has received the results. If you do not hear from us within 7 days, please contact the clinic through ZAPITANOhart or phone. If you have a critical or abnormal lab result, we will notify you by phone as soon as possible.  Submit refill requests through PriceBaba or call your pharmacy and they will forward the refill request to us. Please allow 3 business days for your refill to be completed.          Additional Information About Your Visit        PriceBaba Information     [employer] gives you secure access to your electronic health record. If you see a primary care provider, you can also send messages to your care team and make appointments. If you have questions, please call your primary care clinic.  If you do not have a primary care provider, please call [phone] and they will assist you.      PriceBaba is an electronic gateway that provides easy, online access to your medical records. With [employer]aba, you can request a clinic appointment, read your test results, renew a prescription or communicate with your care team.     To access your existing account, please contact your [facility] or call  "[phone] for assistance.        Care EveryWhere ID     This is your Care EveryWhere ID. This could be used by other organizations to access your [place] medical records  [id number]        Your Vitals Were     Pulse Temperature Height Pulse Oximetry BMI (Body Mass Index)       87 98.1  F (36.7  C) (Oral) 5' 2.25\" (1.581 m) 96% 26 kg/m2        Blood Pressure from Last 3 Encounters:   10/22/18 109/73   06/28/18 118/72   06/19/18 103/61    Weight from Last 3 Encounters:   10/22/18 143 lb 4.8 oz (65 kg)   06/28/18 147 lb (66.7 kg)   06/19/18 145 lb (65.8 kg)              We Performed the Following     DERMATOLOGY REFERRAL     FLU VACCINE, (RIV4) RECOMBINANT HA  , IM (FluBlok, egg free) [60090]- >18 YRS ([facility] recommended  50-64 YRS)     HIV Antigen Antibody Combo     Vaccine Administration, Initial [33381]          Today's Medication Changes          These changes are accurate as of 10/22/18 10:17 AM.  If you have any questions, ask your nurse or doctor.               These medicines have changed or have updated prescriptions.        Dose/Directions    sertraline 50 MG tablet   Commonly known as:  ZOLOFT   This may have changed:  See the new instructions.   Used for:  Mild recurrent major depression (H)   Changed by:  Gabbie Hicks MD        Dose:  50 mg   Take 1 tablet (50 mg) by mouth daily   Quantity:  90 tablet   Refills:  3            Where to get your medicines      These medications [facility] to [facility] [address] [place] [address] JASON MYERS [place] [address] [place] AT [facility] [address]  [address] [place] [address]     Phone:  [phone]     sertraline 50 MG tablet                Primary Care Provider Office Phone # Fax #    Gabbie Hicks [phone] [address]  [facility] [address]        Equal Access to Services     MIRIAM KOHLER AH: Hadii valentina ruiz Somelissa, waaxda luqadaha, qaybta kaalsuhas adan, waxaraceli velasquez. So " [facility] [phone].    ATENCIÓN: Si jay smith, tiene a ortiz disposición servicios gratuitos de asistencia lingüística. Dora valdez [phone].    We comply with applicable federal civil rights laws and [place] laws. We do not discriminate on the basis of race, color, national origin, age, disability, sex, sexual orientation, or gender identity.            Thank you!     Thank you for choosing [facility]  for your care. Our goal is always to provide you with excellent care. Hearing back from our patients is one way we can continue to improve our services. Please take a few minutes to complete the written survey that you may receive in the mail after your visit with us. Thank you!             Your Updated Medication List - Protect others around you: Learn how to safely use, store and throw away your medicines at www.disposemymeds.org.          This list is accurate as of 10/22/18 10:17 AM.  Always use your most recent med list.                   Brand Name Dispense Instructions for use Diagnosis    ASPIRIN PO      Take 81 mg by mouth daily        CALCIUM + D PO      Take by mouth 2 times daily        Co Q 10 100 MG Caps      Take 1 capsule by mouth daily        flax seed oil 1000 MG capsule      Take 2 capsules (2,000 mg) by mouth 2 times daily        fluocinolone 0.025 % cream    SYNALAR    60 g    Apply topically 2 times daily Apply to affected areas of mouth as needed.    Perleche       fluticasone 50 MCG/ACT spray    FLONASE     Reported on 5/1/2017        L-GLUTAMINE      1 capsule 2 times daily        MAGNESIUM PO      Take 1 capsule by mouth daily        MULTI-VITAMIN PO      Take by mouth daily        order for DME     2 Units    Compression sleeve to bilateral UE.    S/P bilateral mastectomy, Lymphedema       PROBIOTIC DAILY PO      Take 1 capsule by mouth 2 times daily        sertraline 50 MG tablet    ZOLOFT    90 tablet    Take 1 tablet (50 mg) by mouth daily    Mild recurrent major  depression (H)       SYNTHROID 75 MCG tablet   Generic drug:  levothyroxine     90 tablet    Take 1 tablet (75 mcg) by mouth daily    Hypothyroidism, unspecified type       TRIPLE FLEX PO      Take by mouth 2 times daily        VITAMIN B-12 PO      Take by mouth daily    Personal history of breast cancer, FHx: breast cancer       ZINC PO      Take by mouth daily    Personal history of breast cancer, FHx: breast cancer

## 2018-10-23 LAB — HIV 1+2 AB+HIV1 P24 AG SERPL QL IA: NONREACTIVE

## 2018-10-23 ASSESSMENT — ANXIETY QUESTIONNAIRES: GAD7 TOTAL SCORE: 0

## 2018-10-23 ASSESSMENT — PATIENT HEALTH QUESTIONNAIRE - PHQ9: SUM OF ALL RESPONSES TO PHQ QUESTIONS 1-9: 2

## 2018-10-23 NOTE — PROGRESS NOTES
Con Buchanan,  Your test results for HIV screening is negative, this is good and reassuring.   Let me know if you have any questions. Take care.  Gabbie Hicks MD

## 2018-10-24 ENCOUNTER — MYC MEDICAL ADVICE (OUTPATIENT)
Dept: DERMATOLOGY | Facility: CLINIC | Age: 53
End: 2018-10-24

## 2018-10-30 ENCOUNTER — OFFICE VISIT (OUTPATIENT)
Dept: DERMATOLOGY | Facility: CLINIC | Age: 53
End: 2018-10-30
Attending: FAMILY MEDICINE
Payer: COMMERCIAL

## 2018-10-30 DIAGNOSIS — L82.1 SEBORRHEIC KERATOSIS: ICD-10-CM

## 2018-10-30 DIAGNOSIS — L57.0 AK (ACTINIC KERATOSIS): ICD-10-CM

## 2018-10-30 DIAGNOSIS — L57.8 SUN-DAMAGED SKIN: Primary | ICD-10-CM

## 2018-10-30 DIAGNOSIS — D22.9 MULTIPLE BENIGN NEVI: ICD-10-CM

## 2018-10-30 DIAGNOSIS — L81.4 SOLAR LENTIGO: ICD-10-CM

## 2018-10-30 DIAGNOSIS — D18.01 CHERRY ANGIOMA: ICD-10-CM

## 2018-10-30 PROCEDURE — 99214 OFFICE O/P EST MOD 30 MIN: CPT | Mod: 25 | Performed by: DERMATOLOGY

## 2018-10-30 PROCEDURE — 17003 DESTRUCT PREMALG LES 2-14: CPT | Performed by: DERMATOLOGY

## 2018-10-30 PROCEDURE — 17000 DESTRUCT PREMALG LESION: CPT | Performed by: DERMATOLOGY

## 2018-10-30 RX ORDER — HYDROCORTISONE 2.5 %
CREAM (GRAM) TOPICAL PRN
COMMUNITY

## 2018-10-30 ASSESSMENT — PAIN SCALES - GENERAL: PAINLEVEL: NO PAIN (0)

## 2018-10-30 NOTE — PATIENT INSTRUCTIONS
Cryotherapy    What is it?    Use of a very cold liquid, such as liquid nitrogen, to freeze and destroy abnormal skin cells that need to be removed    What should I expect?    Tenderness and redness    A small blister that might grow and fill with dark purple blood. There may be crusting.    More than one treatment may be needed if the lesions do not go away.    How do I care for the treated area?    Gently wash the area with your hands when bathing.    Use a thin layer of Vaseline to help with healing. You may use a Band-Aid.     The area should heal within 7-10 days and may leave behind a pink or lighter color.     Do not use an antibiotic or Neosporin ointment.     You may take acetaminophen (Tylenol) for pain.     Call your Doctor if you have:    Severe pain    Signs of infection (warmth, redness, cloudy yellow drainage, and or a bad smell)    Questions or concerns    Who should I call with questions?       [facility]: [phone]       [facility]: [phone]       For urgent needs outside of business hours call the [facility] at [phone]        and ask for the dermatology resident on call

## 2018-10-30 NOTE — NURSING NOTE
Chanel Israel's goals for this visit include:   Chief Complaint   Patient presents with     Skin Check     area of concern right and left cheek, left thigh and finger webs no hx skin cancer     Eczema     uses hydrocortisone with flares       She requests these members of her care team be copied on today's visit information:     PCP: Gabbie Hicks    Referring Provider:  Gabbie Hicks MD  [address] [place] [address]    There were no vitals taken for this visit.    Do you need any medication refills at today's visit? Sarah Acevedo LPN

## 2018-10-30 NOTE — LETTER
10/30/2018         RE: Chanel Israel  [address]  [facility] [address]        Dear Colleague,    Thank you for referring your patient, Chanel Israel, to the [facility]. Please see a copy of my visit note below.    [facility] Dermatology Note      Dermatology Problem List:  1.Relevant medical history : breast cancer s/p bilateral mastectomy and chemotherapy  2. History of telogen effluvium  3. History of SK  4. History of perleche  5. AKs: cryo    Encounter Date: Oct 30, 2018    CC:  Chief Complaint   Patient presents with     Skin Check     area of concern right and left cheek, left thigh and finger webs no hx skin cancer     Eczema     uses hydrocortisone with flares         History of Present Illness:  Ms. Chanel Israel is a 53 year old female who presents as a referral from Dr. Hicks.. She was last seen by Dr. Ospina in 2015 for hair loss. She currently uses Head and Shoulders shampoo, which keeps her scalp under control. She also uses hydrocortisone 2.5% occasionally for eczema. She has a spot of concern on her right cheek that is red. It has been there a couple months but she thinks it maybe irritated because she just had a facial. She also is concerned about a spot on her left hand, a bump that just appeared recently. She wonders if it is a wart. She has a rough spot on the left cheek. It is not tender and has not bled. No other concerns addressed today.    Past Medical History:   Patient Active Problem List   Diagnosis     Personal history of breast cancer     S/P bilateral mastectomy     Urge incontinence of urine     Overweight (BMI 25.0-29.9)     Mild recurrent major depression (H)     Family history of diabetes mellitus (DM)     Family history of thyroid disease     Plantar fasciitis, bilateral     Seasonal allergies     Mixed hyperlipidemia     Chronic rhinitis     Breast implant asymmetry     ACP (advance care  planning)     Lymphedema     Family history of ischemic heart disease     Seasonal allergic rhinitis     Acquired hypothyroidism     Glaucoma suspect, bilateral     Family history of glaucoma     Age-related nuclear cataract of both eyes     Seasonal allergic rhinitis due to other allergic trigger, unspecified chronicity     Family history of breast cancer in sister     Family history of skin cancer     Actinic keratoses     Hyperlipidemia LDL goal <160     Past Medical History:   Diagnosis Date     Asthma, mild intermittent      Breast cancer (H)      Depression      Endometriosis      Heart murmur     Patient reported.     PONV (postoperative nausea and vomiting)      Past Surgical History:   Procedure Laterality Date     ADENOIDECTOMY       COLONOSCOPY N/A 2014    Procedure: COLONOSCOPY;  Surgeon: Duane, William Charles, MD;  Location: [facility] OR     COSMETIC SURGERY       CYSTOSCOPY       HC REMOVAL OF ANAL FISSURE       MASTECTOMY, SIMPLE RT/LT/MABLE       RECONSTRUCT BREAST BILATERAL       TONSILLECTOMY         Social History:  Patient  reports that she has never smoked. She has never used smokeless tobacco. She reports that she does not drink alcohol or use illicit drugs. Patient works as a special eduction teacher.     Family History:  Family History   Problem Relation Age of Onset     Diabetes Mother      Thyroid Disease Mother      Glaucoma Mother      Macular Degeneration Mother      Hypertension Mother      Diabetes Sister      Thyroid Disease Sister      Depression Sister      Endometrial Cancer Sister 51     Cancer Father      skin cancer     Glaucoma Father      Hypertension Father      Coronary Artery Disease Brother       at 43     Cerebrovascular Disease Maternal Grandfather    Family history of NMSC. Family history of eczema.    Medications:  Current Outpatient Prescriptions   Medication Sig Dispense Refill     ASPIRIN PO Take 81 mg by mouth daily       Calcium Carbonate-Vitamin D  (CALCIUM + D PO) Take by mouth 2 times daily       Coenzyme Q10 (CO Q 10) 100 MG CAPS Take 1 capsule by mouth daily        Cyanocobalamin (VITAMIN B-12 PO) Take by mouth daily       Flaxseed, Linseed, (FLAX SEED OIL) 1000 MG capsule Take 2 capsules (2,000 mg) by mouth 2 times daily       fluocinolone (SYNALAR) 0.025 % cream Apply topically 2 times daily Apply to affected areas of mouth as needed. 60 g 5     fluticasone (FLONASE) 50 MCG/ACT nasal spray Reported on 5/1/2017  0     Glucosamine-Chondroitin-MSM (TRIPLE FLEX PO) Take by mouth 2 times daily       hydrocortisone 2.5 % cream Apply topically as needed       L-GLUTAMINE 1 capsule 2 times daily        MAGNESIUM PO Take 1 capsule by mouth daily        Multiple Vitamin (MULTI-VITAMIN PO) Take by mouth daily        Multiple Vitamins-Minerals (ZINC PO) Take by mouth daily       NIACIN CR PO Take 500 mg by mouth       order for DME Compression sleeve to bilateral UE. 2 Units 6     Probiotic Product (PROBIOTIC DAILY PO) Take 1 capsule by mouth 2 times daily       sertraline (ZOLOFT) 50 MG tablet Take 1 tablet (50 mg) by mouth daily 90 tablet 3     SYNTHROID 75 MCG tablet Take 1 tablet (75 mcg) by mouth daily 90 tablet 1       Allergies   Allergen Reactions     Erythromycin Nausea       Review of Systems:  -Constitutional: Patient is otherwise feeling well, in usual state of health.   -Skin: As above in HPI. No additional skin concerns.    Physical exam:  Vitals: There were no vitals taken for this visit.  GEN: This is a well developed, well-nourished female in no acute distress, in a pleasant mood.    SKIN: Total skin excluding the undergarment areas was performed. The exam included the head/face, neck, both arms, chest, back, abdomen, both legs, digits and/or nails and buttocks.   -Roland Type I/II  -Numerous brown macules on sun exposed areas.  -3 mm nonspecific pink papule between second third fingers on left dorsal hand near the MCPs (patient area of  concern)  -There are erythematous macules with overyling adherent scale on the right jawline and left cheek.   -There are dome shaped bright red papules on the trunk.   -There are waxy stuck on tan to brown papules on the trunk, and extremities.  - Multiple regular brown pigmented macules and papules are identified   -No other lesions of concern on areas examined.     Impression/Plan:  1. Sun damaged skin with solar lentigines    Recommend sunscreens SPF #30 or greater, protective clothing and avoidance of tanning beds. Reapply every 2 hours.     Recommended yearly skin checks given degree of sun damage    2. Benign findings including: seborrheic keratosis, cherry angiomas, likely acquired digital fibrokeratoma on the left hand    No further intervention required. Patient to report changes.     Patient reassured of the benign nature of these lesions.    3. Actinic keratosis, left and right cheek. Discussed precancerous nature of these lesions. Recommended treatment to prevent progression to a squamous cell carcinoma.     Cryotherapy procedure note: After verbal consent and discussion of risks and benefits including but no limited to dyspigmentation/scar, blister, and pain, 2 was(were) treated with 1-2mm freeze border for 2 cycles with liquid nitrogen. Post cryotherapy instructions were provided.     4. Multiple clinically benign nevi    No further intervention required. Patient to report changes.     Patient reassured of the benign nature of these lesions.    Follow-up in 1 year, earlier for new or changing lesions.       Staff Involved:  Scribe/Staff    Scribe Disclosure  I, Padma Monge, am serving as a scribe to document services personally performed by Dr. Laly Santiago MD, based on data collection and the provider's statements to me.     Provider Disclosure:   The documentation recorded by the scribe accurately reflects the services I personally performed and the decisions made by me.    Laly Santiago,  MD    Department of Dermatology  [facility]: Phone: [phone], Fax:[phone]  [facility]: Phone: [phone], Fax: [phone]              Again, thank you for allowing me to participate in the care of your patient.        Sincerely,        Laly Santiago MD

## 2018-10-30 NOTE — MR AVS SNAPSHOT
After Visit Summary   10/30/2018    Chanel Israel    MRN: [id number]           Patient Information     Date Of Birth          1965        Visit Information        Provider Department      10/30/2018 7:30 AM Laly Santiago MD [facility]        Today's Diagnoses     Sun-damaged skin    -  1    Solar lentigo        AK (actinic keratosis)        Cherry angioma        Seborrheic keratosis        Multiple benign nevi          Care Instructions    Cryotherapy    What is it?    Use of a very cold liquid, such as liquid nitrogen, to freeze and destroy abnormal skin cells that need to be removed    What should I expect?    Tenderness and redness    A small blister that might grow and fill with dark purple blood. There may be crusting.    More than one treatment may be needed if the lesions do not go away.    How do I care for the treated area?    Gently wash the area with your hands when bathing.    Use a thin layer of Vaseline to help with healing. You may use a Band-Aid.     The area should heal within 7-10 days and may leave behind a pink or lighter color.     Do not use an antibiotic or Neosporin ointment.     You may take acetaminophen (Tylenol) for pain.     Call your Doctor if you have:    Severe pain    Signs of infection (warmth, redness, cloudy yellow drainage, and or a bad smell)    Questions or concerns    Who should I call with questions?       [facility]: [phone]       [facility]: [phone]       For urgent needs outside of business hours call the [facility] at [phone]        and ask for the dermatology resident on call            Follow-ups after your visit        Follow-up notes from your care team     Return in about 1 year (around 10/30/2019) for skin check.      Your next 10 appointments already scheduled     Nov 19, 2018  8:30 AM CST   [facility] Visit with Mariella  MD Deuce   [facility]    [address] [facility] [id number] [phone]            Jun 21, 2019 11:45 AM CDT   LAB with LAB ONC [facility] ([facility])    [id number] [facility] [id number] [phone]           Please do not eat 10-12 hours before your appointment if you are coming in fasting for labs on lipids, cholesterol, or glucose (sugar). This does not apply to pregnant women. Water, hot tea and black coffee (with nothing added) are okay. Do not drink other fluids, diet soda or chew gum.            Jun 21, 2019 12:30 PM CDT   Return Visit with Ghislaine Lopez MD   [facility]    [address] [facility] [id number] [phone]              Who to contact     If you have questions or need follow up information about today's clinic visit or your schedule please contact [facility] directly at [phone].  Normal or non-critical lab and imaging results will be communicated to you by MyChart, letter or phone within 4 business days after the clinic has received the results. If you do not hear from us within 7 days, please contact the clinic through MyChart or phone. If you have a critical or abnormal lab result, we will notify you by phone as soon as possible.  Submit refill requests through [facility] or call your pharmacy and they will forward the refill request to us. Please allow 3 business days for your refill to be completed.          Additional Information About Your Visit        [facility] Information     [facility] gives you secure access to your electronic health record. If you see a primary care provider, you can also send messages to your care team and make appointments. If you have questions, please call your primary care clinic.  If you do not have a primary care provider, please call [phone]  and they will assist you.      [employer] is an electronic gateway that provides easy, online access to your medical records. With [employer] [facility], you can request a clinic appointment, read your test results, renew a prescription or communicate with your care team.     To access your existing account, please contact your [facility] or call [phone] for assistance.        Care EveryWhere ID     This is your Care EveryWhere ID. This could be used by other organizations to access your [facility] medical records  [id number]         Blood Pressure from Last 3 Encounters:   10/22/18 109/73   06/28/18 118/72   06/19/18 103/61    Weight from Last 3 Encounters:   10/22/18 65 kg (143 lb 4.8 oz)   06/28/18 66.7 kg (147 lb)   06/19/18 65.8 kg (145 lb)              We Performed the Following     DESTRUCT PREMALIGNANT LESION, 2-14     DESTRUCT PREMALIGNANT LESION, FIRST        Primary Care Provider Office Phone # Fax #    Gabbie Hicks [phone] [address]  [facility] [address]        Equal Access to Services     FLORENTINO [facility] : Hadii aad ku hadasho Soomaali, waaxda luqadaha, qaybta kaalmada adeegyada, ezio verduzco . So [facility] [phone].    ATENCIÓN: Si habla español, tiene a ortiz disposición servicios gratlorettaos de asistencia lingüística. Llame al [phone].    We comply with applicable federal civil rights laws and [place] laws. We do not discriminate on the basis of race, color, national origin, age, disability, sex, sexual orientation, or gender identity.            Thank you!     Thank you for choosing [facility]  for your care. Our goal is always to provide you with excellent care. Hearing back from our patients is one way we can continue to improve our services. Please take a few minutes to complete the written survey that you may receive in the mail after your visit with us. Thank you!             Your Updated  Medication List - Protect others around you: Learn how to safely use, store and throw away your medicines at www.disposemymeds.org.          This list is accurate as of 10/30/18  8:14 AM.  Always use your most recent med list.                   Brand Name Dispense Instructions for use Diagnosis    ASPIRIN PO      Take 81 mg by mouth daily        CALCIUM + D PO      Take by mouth 2 times daily        Co Q 10 100 MG Caps      Take 1 capsule by mouth daily        flax seed oil 1000 MG capsule      Take 2 capsules (2,000 mg) by mouth 2 times daily        fluocinolone 0.025 % cream    SYNALAR    60 g    Apply topically 2 times daily Apply to affected areas of mouth as needed.    Perleche       fluticasone 50 MCG/ACT spray    FLONASE     Reported on 5/1/2017        hydrocortisone 2.5 % cream      Apply topically as needed        L-GLUTAMINE      1 capsule 2 times daily        MAGNESIUM PO      Take 1 capsule by mouth daily        MULTI-VITAMIN PO      Take by mouth daily        NIACIN CR PO      Take 500 mg by mouth        order for DME     2 Units    Compression sleeve to bilateral UE.    S/P bilateral mastectomy, Lymphedema       PROBIOTIC DAILY PO      Take 1 capsule by mouth 2 times daily        sertraline 50 MG tablet    ZOLOFT    90 tablet    Take 1 tablet (50 mg) by mouth daily    Mild recurrent major depression (H)       SYNTHROID 75 MCG tablet   Generic drug:  levothyroxine     90 tablet    Take 1 tablet (75 mcg) by mouth daily    Hypothyroidism, unspecified type       TRIPLE FLEX PO      Take by mouth 2 times daily        VITAMIN B-12 PO      Take by mouth daily    Personal history of breast cancer, FHx: breast cancer       ZINC PO      Take by mouth daily    Personal history of breast cancer, FHx: breast cancer

## 2018-11-19 ENCOUNTER — OFFICE VISIT (OUTPATIENT)
Dept: ENDOCRINOLOGY | Facility: CLINIC | Age: 53
End: 2018-11-19
Payer: COMMERCIAL

## 2018-11-19 VITALS
DIASTOLIC BLOOD PRESSURE: 78 MMHG | HEIGHT: 62 IN | SYSTOLIC BLOOD PRESSURE: 127 MMHG | WEIGHT: 145.72 LBS | HEART RATE: 102 BPM | BODY MASS INDEX: 26.82 KG/M2 | OXYGEN SATURATION: 96 %

## 2018-11-19 DIAGNOSIS — E03.9 HYPOTHYROIDISM, UNSPECIFIED TYPE: ICD-10-CM

## 2018-11-19 DIAGNOSIS — R53.83 FATIGUE, UNSPECIFIED TYPE: Primary | ICD-10-CM

## 2018-11-19 LAB
T4 FREE SERPL-MCNC: 1.16 NG/DL (ref 0.76–1.46)
TSH SERPL DL<=0.005 MIU/L-ACNC: 1.76 MU/L (ref 0.4–4)

## 2018-11-19 PROCEDURE — 82306 VITAMIN D 25 HYDROXY: CPT | Performed by: INTERNAL MEDICINE

## 2018-11-19 PROCEDURE — 36415 COLL VENOUS BLD VENIPUNCTURE: CPT | Performed by: INTERNAL MEDICINE

## 2018-11-19 PROCEDURE — 84439 ASSAY OF FREE THYROXINE: CPT | Performed by: INTERNAL MEDICINE

## 2018-11-19 PROCEDURE — 84443 ASSAY THYROID STIM HORMONE: CPT | Performed by: INTERNAL MEDICINE

## 2018-11-19 PROCEDURE — 99214 OFFICE O/P EST MOD 30 MIN: CPT | Performed by: INTERNAL MEDICINE

## 2018-11-19 RX ORDER — LEVOTHYROXINE SODIUM 75 MCG
75 TABLET ORAL DAILY
Qty: 90 TABLET | Refills: 3 | Status: SHIPPED | OUTPATIENT
Start: 2018-11-19 | End: 2019-10-22

## 2018-11-19 RX ORDER — LEVOTHYROXINE SODIUM 75 MCG
75 TABLET ORAL DAILY
Qty: 90 TABLET | Refills: 1 | Status: CANCELLED | OUTPATIENT
Start: 2018-11-19

## 2018-11-19 NOTE — NURSING NOTE
"Chanel Israel's goals for this visit include: Thyroid follow up  She requests these members of her care team be copied on today's visit information: Yes    PCP: Gabbie Hicks    Referring Provider:  No referring provider defined for this encounter.    /78 (BP Location: Left arm, Patient Position: Chair, Cuff Size: Adult Regular)  Ht 1.576 m (5' 2.05\")  Wt 66.1 kg (145 lb 11.6 oz)  BMI 26.61 kg/m2    Do you need any medication refills at today's visit? Yes/synthroid    "

## 2018-11-19 NOTE — LETTER
11/19/2018         RE: Chanel Israel  [address]  [facility] [address]        Dear Colleague,    Thank you for referring your patient, Chanel Israel, to the [facility]. Please see a copy of my visit note below.         Endocrinology Note         Chanel is a 53 year old female presents today for Hashimoto's hypothyroidism.    HPI  Chanel Israel is a 53 years old female with hypothyroidism, hyperlipidemia, previous hx of breast cancer diagnosed 2007 s/p left mastectomy and prophylactic right mastectomy, chemotherapy who is here for f/u hypothyroidism.    She was diagnosed with Hashimoto's hypothyroidism in August2015 when she presented with hair loss, fatigue, sluggish and weight gain. At the diagnosis, TSH was 16.49, FT4 0.71.  She was then started on Synthroid since.     She has been on brand name Synthroid 75 mcg daily. She is feeling well. She has some chronic fatigue but no major changes. She denied chest pain, SOB, altered bowel movement. She tended to feels cold. She sleeps ok. She is not taking biotin. She is compliant with taking thyroid medication. She is now back on niacin for hyperlipidemia. She could not tolerate statin due to lymphedema.    She has strong family hx of hypothyroidism in her mother, maternal grandmother and sister.     Past Medical History  Past Medical History:   Diagnosis Date     Asthma, mild intermittent      Breast cancer (H)      Depression      Endometriosis      Heart murmur     Patient reported.     PONV (postoperative nausea and vomiting)        Allergies  Allergies   Allergen Reactions     Erythromycin Nausea     Medications  Current Outpatient Prescriptions   Medication Sig Dispense Refill     Calcium Carbonate-Vitamin D (CALCIUM + D PO) Take by mouth 2 times daily       Coenzyme Q10 (CO Q 10) 100 MG CAPS Take 1 capsule by mouth daily        Cyanocobalamin (VITAMIN B-12 PO) Take by mouth daily       Flaxseed, Linseed,  (FLAX SEED OIL) 1000 MG capsule Take 2 capsules (2,000 mg) by mouth 2 times daily       fluocinolone (SYNALAR) 0.025 % cream Apply topically 2 times daily Apply to affected areas of mouth as needed. 60 g 5     fluticasone (FLONASE) 50 MCG/ACT nasal spray Reported on 5/1/2017  0     Glucosamine-Chondroitin-MSM (TRIPLE FLEX PO) Take by mouth 2 times daily       hydrocortisone 2.5 % cream Apply topically as needed       L-GLUTAMINE 1 capsule 2 times daily        MAGNESIUM PO Take 1 capsule by mouth daily        Multiple Vitamin (MULTI-VITAMIN PO) Take by mouth daily        Multiple Vitamins-Minerals (ZINC PO) Take by mouth daily       NIACIN CR PO Take 500 mg by mouth       order for DME Compression sleeve to bilateral UE. 2 Units 6     Probiotic Product (PROBIOTIC DAILY PO) Take 1 capsule by mouth 2 times daily       sertraline (ZOLOFT) 50 MG tablet Take 1 tablet (50 mg) by mouth daily 90 tablet 3     SYNTHROID 75 MCG tablet Take 1 tablet (75 mcg) by mouth daily 90 tablet 1     ASPIRIN PO Take 81 mg by mouth daily       Family History  family history includes Breast Cancer in her sister; Cancer in her father; Cerebrovascular Disease in her maternal grandfather; Coronary Artery Disease in her brother; Depression in her sister; Diabetes in her mother and sister; Endometrial Cancer (age of onset: 51) in her sister; Glaucoma in her father and mother; Hypertension in her father and mother; Macular Degeneration in her mother; Thyroid Disease in her mother and sister.     Her mother, maternal grandmother and sister have hypothyroidism.     Social History  Social History   Substance Use Topics     Smoking status: Never Smoker     Smokeless tobacco: Never Used     Alcohol use Yes      Comment: occ   lives with family  She is special .    ROS  Constitutional: no weight change, good energy  Eyes: no vision change, diplopia or red eyes   Neck: no difficulty swallowing, no choking, no neck pain, no neck  "swelling  Cardiovascular: no chest pain, palpitations  Respiratory: no dyspnea, cough, shortness of breath or wheezing   GI: no nausea, vomiting, diarrhea or constipation, no abdominal pain   : no change in urine, no dysuria or hematuria  Musculoskeletal: no joint or muscle pain or swelling   Integumentary: no concerning lesions   Neuro: no loss of strength or sensation, no numbness or tingling, no tremor, no dizziness, no headache   Endo: no polyuria or polydipsia, +cold intolerance   Heme/Lymph: no concerning bumps, no bleeding problems   Allergy: no environmental allergies   Psych: no depression or anxiety, wake up often to use the bathroom at night    Physical Exam  /78 (BP Location: Left arm, Patient Position: Chair, Cuff Size: Adult Regular)  Pulse 102  Ht 1.576 m (5' 2.05\")  Wt 66.1 kg (145 lb 11.6 oz)  SpO2 96%  BMI 26.61 kg/m2  Body mass index is 26.61 kg/(m^2).  Constitutional: no distress, comfortable, pleasant   Eyes: anicteric, normal extra-ocular movements, no lid lag or retraction  Neck: firm thyroid but no thyromegaly, no discrete nodule  Cardiovascular: regular rate and rhythm, normal S1 and S2, no murmurs  Respiratory: clear to auscultation, no wheezes or crackles, normal breath sounds   Gastrointestinal:  nontender, no hepatomegaly, no masses   Musculoskeletal: no edema   Skin: no jaundice   Neurological: cranial nerves intact, 2+ reflexes at patella, normal gait, no tremor on outstretched hands bilaterally  Psychological: appropriate mood     RESULTS  Lab from [facility] [place]  10/20/2016; TSH 0.41, FT4 1.38, FT3 2.8        ASSESSMENT:    Chanel Israel is a 53 years old female with hypothyroidism, hyperlipidemia, previous hx of breast cancer diagnosed 2007 s/p left mastectomy and prophylactic right mastectomy, chemotherapy who is here for hypothyroidism.    1) Hashimoto's hypothyroidism: diagnosed in 2015 when presented with hair loss, low energy and some weight gain. " She has been on Synthroid since. Her current dose is 75 mcg daily. She is clinically euthyroid.   I will check lab today and adjust dose accordingly.    PLAN:   - lab today for TSH, FT4, vitamin D  - will contact pt with result  - she will follow up with PCP annually    ENDO THYROID LABS-[facility] Latest Ref Rng & Units 11/19/2018   TSH 0.40 - 4.00 mU/L 1.76   T4 FREE 0.76 - 1.46 ng/dL 1.16     Continue current dose of Synthroid 75 mcg daily.    Mariella Tripathi MD     Division of Diabetes and Endocrinology  Department of Medicine  [phone]

## 2018-11-19 NOTE — PROGRESS NOTES
Endocrinology Note         Chanel is a 53 year old female presents today for Hashimoto's hypothyroidism.    HPI  Chanel Israel is a 53 years old female with hypothyroidism, hyperlipidemia, previous hx of breast cancer diagnosed 2007 s/p left mastectomy and prophylactic right mastectomy, chemotherapy who is here for f/u hypothyroidism.    She was diagnosed with Hashimoto's hypothyroidism in August2015 when she presented with hair loss, fatigue, sluggish and weight gain. At the diagnosis, TSH was 16.49, FT4 0.71.  She was then started on Synthroid since.     She has been on brand name Synthroid 75 mcg daily. She is feeling well. She has some chronic fatigue but no major changes. She denied chest pain, SOB, altered bowel movement. She tended to feels cold. She sleeps ok. She is not taking biotin. She is compliant with taking thyroid medication. She is now back on niacin for hyperlipidemia. She could not tolerate statin due to lymphedema.    She has strong family hx of hypothyroidism in her mother, maternal grandmother and sister.     Past Medical History  Past Medical History:   Diagnosis Date     Asthma, mild intermittent      Breast cancer (H)      Depression      Endometriosis      Heart murmur     Patient reported.     PONV (postoperative nausea and vomiting)        Allergies  Allergies   Allergen Reactions     Erythromycin Nausea     Medications  Current Outpatient Prescriptions   Medication Sig Dispense Refill     Calcium Carbonate-Vitamin D (CALCIUM + D PO) Take by mouth 2 times daily       Coenzyme Q10 (CO Q 10) 100 MG CAPS Take 1 capsule by mouth daily        Cyanocobalamin (VITAMIN B-12 PO) Take by mouth daily       Flaxseed, Linseed, (FLAX SEED OIL) 1000 MG capsule Take 2 capsules (2,000 mg) by mouth 2 times daily       fluocinolone (SYNALAR) 0.025 % cream Apply topically 2 times daily Apply to affected areas of mouth as needed. 60 g 5     fluticasone (FLONASE) 50 MCG/ACT nasal spray Reported on  5/1/2017  0     Glucosamine-Chondroitin-MSM (TRIPLE FLEX PO) Take by mouth 2 times daily       hydrocortisone 2.5 % cream Apply topically as needed       L-GLUTAMINE 1 capsule 2 times daily        MAGNESIUM PO Take 1 capsule by mouth daily        Multiple Vitamin (MULTI-VITAMIN PO) Take by mouth daily        Multiple Vitamins-Minerals (ZINC PO) Take by mouth daily       NIACIN CR PO Take 500 mg by mouth       order for DME Compression sleeve to bilateral UE. 2 Units 6     Probiotic Product (PROBIOTIC DAILY PO) Take 1 capsule by mouth 2 times daily       sertraline (ZOLOFT) 50 MG tablet Take 1 tablet (50 mg) by mouth daily 90 tablet 3     SYNTHROID 75 MCG tablet Take 1 tablet (75 mcg) by mouth daily 90 tablet 1     ASPIRIN PO Take 81 mg by mouth daily       Family History  family history includes Breast Cancer in her sister; Cancer in her father; Cerebrovascular Disease in her maternal grandfather; Coronary Artery Disease in her brother; Depression in her sister; Diabetes in her mother and sister; Endometrial Cancer (age of onset: 51) in her sister; Glaucoma in her father and mother; Hypertension in her father and mother; Macular Degeneration in her mother; Thyroid Disease in her mother and sister.     Her mother, maternal grandmother and sister have hypothyroidism.     Social History  Social History   Substance Use Topics     Smoking status: Never Smoker     Smokeless tobacco: Never Used     Alcohol use Yes      Comment: occ   lives with family  She is special .    ROS  Constitutional: no weight change, good energy  Eyes: no vision change, diplopia or red eyes   Neck: no difficulty swallowing, no choking, no neck pain, no neck swelling  Cardiovascular: no chest pain, palpitations  Respiratory: no dyspnea, cough, shortness of breath or wheezing   GI: no nausea, vomiting, diarrhea or constipation, no abdominal pain   : no change in urine, no dysuria or hematuria  Musculoskeletal: no joint or muscle pain or  "swelling   Integumentary: no concerning lesions   Neuro: no loss of strength or sensation, no numbness or tingling, no tremor, no dizziness, no headache   Endo: no polyuria or polydipsia, +cold intolerance   Heme/Lymph: no concerning bumps, no bleeding problems   Allergy: no environmental allergies   Psych: no depression or anxiety, wake up often to use the bathroom at night    Physical Exam  /78 (BP Location: Left arm, Patient Position: Chair, Cuff Size: Adult Regular)  Pulse 102  Ht 1.576 m (5' 2.05\")  Wt 66.1 kg (145 lb 11.6 oz)  SpO2 96%  BMI 26.61 kg/m2  Body mass index is 26.61 kg/(m^2).  Constitutional: no distress, comfortable, pleasant   Eyes: anicteric, normal extra-ocular movements, no lid lag or retraction  Neck: firm thyroid but no thyromegaly, no discrete nodule  Cardiovascular: regular rate and rhythm, normal S1 and S2, no murmurs  Respiratory: clear to auscultation, no wheezes or crackles, normal breath sounds   Gastrointestinal:  nontender, no hepatomegaly, no masses   Musculoskeletal: no edema   Skin: no jaundice   Neurological: cranial nerves intact, 2+ reflexes at patella, normal gait, no tremor on outstretched hands bilaterally  Psychological: appropriate mood     RESULTS  Lab from [facility] [place]  10/20/2016; TSH 0.41, FT4 1.38, FT3 2.8        ASSESSMENT:    Chanel Israel is a 53 years old female with hypothyroidism, hyperlipidemia, previous hx of breast cancer diagnosed 2007 s/p left mastectomy and prophylactic right mastectomy, chemotherapy who is here for hypothyroidism.    1) Hashimoto's hypothyroidism: diagnosed in 2015 when presented with hair loss, low energy and some weight gain. She has been on Synthroid since. Her current dose is 75 mcg daily. She is clinically euthyroid.   I will check lab today and adjust dose accordingly.    PLAN:   - lab today for TSH, FT4, vitamin D  - will contact pt with result  - she will follow up with PCP annually    ENDO THYROID " LABS-[facility] Latest Ref Rng & Units 11/19/2018   TSH 0.40 - 4.00 mU/L 1.76   T4 FREE 0.76 - 1.46 ng/dL 1.16     Continue current dose of Synthroid 75 mcg daily.    Mariella Tripathi MD     Division of Diabetes and Endocrinology  Department of Medicine  [phone]

## 2018-11-19 NOTE — MR AVS SNAPSHOT
After Visit Summary   11/19/2018    Chanel Israel    MRN: [id number]           Patient Information     Date Of Birth          1965        Visit Information        Provider Department      11/19/2018 8:30 AM Mariella Tripathi MD [facility]        Today's Diagnoses     Fatigue, unspecified type    -  1    Hypothyroidism, unspecified type           Follow-ups after your visit        Your next 10 appointments already scheduled     Jun 21, 2019 11:45 AM CDT   LAB with LAB ON[facility] ([facility])    [address] [place] [id number] [phone]           Please do not eat 10-12 hours before your appointment if you are coming in fasting for labs on lipids, cholesterol, or glucose (sugar). This does not apply to pregnant women. Water, hot tea and black coffee (with nothing added) are okay. Do not drink other fluids, diet soda or chew gum.            Jun 21, 2019 12:30 PM CDT   Return Visit with Ghislaine Lopez MD   [facility] ([facility])    [address] [place] [id number] [phone]              Who to contact     If you have questions or need follow up information about today's clinic visit or your schedule please contact [facility] directly at [phone].  Normal or non-critical lab and imaging results will be communicated to you by MyChart, letter or phone within 4 business days after the clinic has received the results. If you do not hear from us within 7 days, please contact the clinic through MyChart or phone. If you have a critical or abnormal lab result, we will notify you by phone as soon as possible.  Submit refill requests through [employer] or call your pharmacy and they will forward the refill request to us. Please allow 3 business days for your refill to be completed.          Additional Information About Your Visit       "  MyChart Information     [employer] gives you secure access to your electronic health record. If you see a primary care provider, you can also send messages to your care team and make appointments. If you have questions, please call your primary care clinic.  If you do not have a primary care provider, please call [phone] and they will assist you.      [employer] is an electronic gateway that provides easy, online access to your medical records. With [employer], you can request a clinic appointment, read your test results, renew a prescription or communicate with your care team.     To access your existing account, please contact your [facility] or call [phone] for assistance.        Care EveryWhere ID     This is your Care EveryWhere ID. This could be used by other organizations to access your [facility] medical records  [id number]        Your Vitals Were     Pulse Height Pulse Oximetry BMI (Body Mass Index)          102 1.576 m (5' 2.05\") 96% 26.61 kg/m2         Blood Pressure from Last 3 Encounters:   11/19/18 127/78   10/22/18 109/73   06/28/18 118/72    Weight from Last 3 Encounters:   11/19/18 66.1 kg (145 lb 11.6 oz)   10/22/18 65 kg (143 lb 4.8 oz)   06/28/18 66.7 kg (147 lb)              We Performed the Following     T4 free     TSH     Vitamin D Deficiency          Where to get your medicines      These medications were sent to [facility] [address] [place] [address] [address] [place] AT [facility] [address]  [address] [place] [facility] [address]     Phone:  [phone]     SYNTHROID 75 MCG tablet          Primary Care Provider Office Phone # Fax #    Gabbie Hicks [phone]       [address]  [facility] [address]        Equal Access to Services     FLORENTINO KOHLER : Hadii valentina islaso Somelissa, waaxda luqadaha, qaybta kaalmada adeegyada, waxay roland velasquez. So [facility] [phone].    ATENCIÓN: Si habla " español, tiene a ortiz disposición servicios gratuitos de asistencia lingüística. Dora valdez [phone].    We comply with applicable federal civil rights laws and [place] laws. We do not discriminate on the basis of race, color, national origin, age, disability, sex, sexual orientation, or gender identity.            Thank you!     Thank you for choosing [facility]  for your care. Our goal is always to provide you with excellent care. Hearing back from our patients is one way we can continue to improve our services. Please take a few minutes to complete the written survey that you may receive in the mail after your visit with us. Thank you!             Your Updated Medication List - Protect others around you: Learn how to safely use, store and throw away your medicines at www.disposemymeds.org.          This list is accurate as of 11/19/18 10:42 AM.  Always use your most recent med list.                   Brand Name Dispense Instructions for use Diagnosis    ASPIRIN PO      Take 81 mg by mouth daily        CALCIUM + D PO      Take by mouth 2 times daily        Co Q 10 100 MG Caps      Take 1 capsule by mouth daily        flax seed oil 1000 MG capsule      Take 2 capsules (2,000 mg) by mouth 2 times daily        fluocinolone 0.025 % cream    SYNALAR    60 g    Apply topically 2 times daily Apply to affected areas of mouth as needed.    Perleche       fluticasone 50 MCG/ACT spray    FLONASE     Reported on 5/1/2017        hydrocortisone 2.5 % cream      Apply topically as needed        L-GLUTAMINE      1 capsule 2 times daily        MAGNESIUM PO      Take 1 capsule by mouth daily        MULTI-VITAMIN PO      Take by mouth daily        NIACIN CR PO      Take 500 mg by mouth        order for DME     2 Units    Compression sleeve to bilateral UE.    S/P bilateral mastectomy, Lymphedema       PROBIOTIC DAILY PO      Take 1 capsule by mouth 2 times daily        sertraline 50 MG tablet    ZOLOFT    90  tablet    Take 1 tablet (50 mg) by mouth daily    Mild recurrent major depression (H)       SYNTHROID 75 MCG tablet   Generic drug:  levothyroxine     90 tablet    Take 1 tablet (75 mcg) by mouth daily    Hypothyroidism, unspecified type       TRIPLE FLEX PO      Take by mouth 2 times daily        VITAMIN B-12 PO      Take by mouth daily    Personal history of breast cancer, FHx: breast cancer       ZINC PO      Take by mouth daily    Personal history of breast cancer, FHx: breast cancer

## 2018-11-20 LAB — DEPRECATED CALCIDIOL+CALCIFEROL SERPL-MC: 51 UG/L (ref 20–75)

## 2019-02-21 ENCOUNTER — MYC MEDICAL ADVICE (OUTPATIENT)
Dept: PEDIATRICS | Facility: CLINIC | Age: 54
End: 2019-02-21

## 2019-02-21 DIAGNOSIS — Z90.13 S/P BILATERAL MASTECTOMY: Primary | ICD-10-CM

## 2019-02-21 DIAGNOSIS — I89.0 LYMPHEDEMA: ICD-10-CM

## 2019-02-21 NOTE — TELEPHONE ENCOUNTER
Routing to Dr. Hicks to sign external referral for PT. Once signed please route back to pool. TC has [facility] Provider Recommendation Form ready on desk to attach and fax to insurance.  Miah ALMAGUER, CMA

## 2019-02-22 ENCOUNTER — MEDICAL CORRESPONDENCE (OUTPATIENT)
Dept: HEALTH INFORMATION MANAGEMENT | Facility: CLINIC | Age: 54
End: 2019-02-22

## 2019-02-22 NOTE — TELEPHONE ENCOUNTER
Printed referral. Faxed referral and [facility] Provider Recommendation Form to   [employer] Claims Department   ATTN: Referral Entry fax# [phone]    Received confirmation from [employer] that fax was sent successfully.    Sent mychart to inform patient.  Miah ALMAGUER, CMA

## 2019-03-12 ENCOUNTER — OFFICE VISIT (OUTPATIENT)
Dept: PEDIATRICS | Facility: CLINIC | Age: 54
End: 2019-03-12
Payer: COMMERCIAL

## 2019-03-12 VITALS
HEIGHT: 62 IN | BODY MASS INDEX: 26.65 KG/M2 | OXYGEN SATURATION: 95 % | TEMPERATURE: 98.2 F | HEART RATE: 88 BPM | DIASTOLIC BLOOD PRESSURE: 75 MMHG | WEIGHT: 144.8 LBS | SYSTOLIC BLOOD PRESSURE: 129 MMHG

## 2019-03-12 DIAGNOSIS — E03.9 ACQUIRED HYPOTHYROIDISM: ICD-10-CM

## 2019-03-12 DIAGNOSIS — Z85.3 PERSONAL HISTORY OF BREAST CANCER: ICD-10-CM

## 2019-03-12 DIAGNOSIS — R47.02 DYSPHASIA: Primary | ICD-10-CM

## 2019-03-12 DIAGNOSIS — E78.2 MIXED HYPERLIPIDEMIA: ICD-10-CM

## 2019-03-12 LAB
ALBUMIN SERPL-MCNC: 3.9 G/DL (ref 3.4–5)
ALP SERPL-CCNC: 83 U/L (ref 40–150)
ALT SERPL W P-5'-P-CCNC: 41 U/L (ref 0–50)
ANION GAP SERPL CALCULATED.3IONS-SCNC: 7 MMOL/L (ref 3–14)
AST SERPL W P-5'-P-CCNC: 23 U/L (ref 0–45)
BASOPHILS # BLD AUTO: 0 10E9/L (ref 0–0.2)
BASOPHILS NFR BLD AUTO: 0.4 %
BILIRUB SERPL-MCNC: 0.2 MG/DL (ref 0.2–1.3)
BUN SERPL-MCNC: 20 MG/DL (ref 7–30)
CALCIUM SERPL-MCNC: 9.5 MG/DL (ref 8.5–10.1)
CHLORIDE SERPL-SCNC: 107 MMOL/L (ref 94–109)
CO2 SERPL-SCNC: 28 MMOL/L (ref 20–32)
CREAT SERPL-MCNC: 0.81 MG/DL (ref 0.52–1.04)
DIFFERENTIAL METHOD BLD: NORMAL
EOSINOPHIL # BLD AUTO: 0.1 10E9/L (ref 0–0.7)
EOSINOPHIL NFR BLD AUTO: 1.3 %
ERYTHROCYTE [DISTWIDTH] IN BLOOD BY AUTOMATED COUNT: 12.2 % (ref 10–15)
FOLATE SERPL-MCNC: 57.4 NG/ML
GFR SERPL CREATININE-BSD FRML MDRD: 82 ML/MIN/{1.73_M2}
GLUCOSE SERPL-MCNC: 84 MG/DL (ref 70–99)
HBA1C MFR BLD: 5.2 % (ref 0–5.6)
HCT VFR BLD AUTO: 40.6 % (ref 35–47)
HGB BLD-MCNC: 13.4 G/DL (ref 11.7–15.7)
IMM GRANULOCYTES # BLD: 0 10E9/L (ref 0–0.4)
IMM GRANULOCYTES NFR BLD: 0.1 %
LYMPHOCYTES # BLD AUTO: 2.1 10E9/L (ref 0.8–5.3)
LYMPHOCYTES NFR BLD AUTO: 31.2 %
MCH RBC QN AUTO: 30.2 PG (ref 26.5–33)
MCHC RBC AUTO-ENTMCNC: 33 G/DL (ref 31.5–36.5)
MCV RBC AUTO: 92 FL (ref 78–100)
MONOCYTES # BLD AUTO: 0.5 10E9/L (ref 0–1.3)
MONOCYTES NFR BLD AUTO: 6.9 %
NEUTROPHILS # BLD AUTO: 4.1 10E9/L (ref 1.6–8.3)
NEUTROPHILS NFR BLD AUTO: 60.1 %
PLATELET # BLD AUTO: 221 10E9/L (ref 150–450)
POTASSIUM SERPL-SCNC: 3.7 MMOL/L (ref 3.4–5.3)
PROT SERPL-MCNC: 7.4 G/DL (ref 6.8–8.8)
RBC # BLD AUTO: 4.43 10E12/L (ref 3.8–5.2)
SODIUM SERPL-SCNC: 142 MMOL/L (ref 133–144)
T4 FREE SERPL-MCNC: 1.19 NG/DL (ref 0.76–1.46)
TSH SERPL DL<=0.005 MIU/L-ACNC: 0.94 MU/L (ref 0.4–4)
WBC # BLD AUTO: 6.8 10E9/L (ref 4–11)

## 2019-03-12 PROCEDURE — 99000 SPECIMEN HANDLING OFFICE-LAB: CPT | Performed by: FAMILY MEDICINE

## 2019-03-12 PROCEDURE — 84443 ASSAY THYROID STIM HORMONE: CPT | Performed by: FAMILY MEDICINE

## 2019-03-12 PROCEDURE — 84439 ASSAY OF FREE THYROXINE: CPT | Performed by: FAMILY MEDICINE

## 2019-03-12 PROCEDURE — 80053 COMPREHEN METABOLIC PANEL: CPT | Performed by: FAMILY MEDICINE

## 2019-03-12 PROCEDURE — 83921 ORGANIC ACID SINGLE QUANT: CPT | Mod: 90 | Performed by: FAMILY MEDICINE

## 2019-03-12 PROCEDURE — 82607 VITAMIN B-12: CPT | Performed by: FAMILY MEDICINE

## 2019-03-12 PROCEDURE — 36415 COLL VENOUS BLD VENIPUNCTURE: CPT | Performed by: FAMILY MEDICINE

## 2019-03-12 PROCEDURE — 83036 HEMOGLOBIN GLYCOSYLATED A1C: CPT | Performed by: FAMILY MEDICINE

## 2019-03-12 PROCEDURE — 82746 ASSAY OF FOLIC ACID SERUM: CPT | Performed by: FAMILY MEDICINE

## 2019-03-12 PROCEDURE — 85025 COMPLETE CBC W/AUTO DIFF WBC: CPT | Performed by: FAMILY MEDICINE

## 2019-03-12 PROCEDURE — 99214 OFFICE O/P EST MOD 30 MIN: CPT | Performed by: FAMILY MEDICINE

## 2019-03-12 ASSESSMENT — MIFFLIN-ST. JEOR: SCORE: 1206.09

## 2019-03-12 NOTE — PROGRESS NOTES
SUBJECTIVE:   Chanel Israel is a 53 year old female who presents to clinic today for the following health issues:    Concern - Patient was at PT. Patient was talking therapist but her words were not coming out the way she was thinking. Patient was having hard time with word retrival. This lasted from 5-7 minutes. Patient states no chest pain and no palpitations.         Patient with past medical history significant for personal history of breast cancer, status post bilateral mastectomy, hyperlipidemia, hypothyroidism, anxiety,, depression, lymphedema is here with concerns of having a brief episode of dysphagia the patient had difficulty finding words to express herself that lasted for 5-7 minutes during 1 of her lymphedema physician 2 days ago last Sunday.  Patient felt complete resolution of symptoms a few minutes but felt very tired and brain fog for the next 1 day.  Patient denies concerns for headache, dizziness, syncope, tremors, numbness or weakness of extremities, new onset of bladder or bowel incontinence during these episodes are afterwards.  Denies history of seizures.  Had no history of TIA or strokes in the past.  Has family history of heart disease or strokes.  Patient does not smoke, rarely drinks alcohol.  Patient is worried about having a stroke due to having high lipids which she does not want to be on a statin but on nIACIN.  Patient has history of recurrent falls, concerns for vision.        -------------------------------------    Problem list and histories reviewed & adjusted, as indicated.  Additional history: as documented    Patient Active Problem List   Diagnosis     Personal history of breast cancer     S/P bilateral mastectomy     Urge incontinence of urine     Overweight (BMI 25.0-29.9)     Mild recurrent major depression (H)     Family history of diabetes mellitus (DM)     Family history of thyroid disease     Plantar fasciitis, bilateral     Seasonal allergies     Mixed  hyperlipidemia     Chronic rhinitis     Breast implant asymmetry     ACP (advance care planning)     Lymphedema     Family history of ischemic heart disease     Seasonal allergic rhinitis     Acquired hypothyroidism     Glaucoma suspect, bilateral     Family history of glaucoma     Age-related nuclear cataract of both eyes     Seasonal allergic rhinitis due to other allergic trigger, unspecified chronicity     Family history of breast cancer in sister     Family history of skin cancer     Actinic keratoses     Hyperlipidemia LDL goal <160     Past Surgical History:   Procedure Laterality Date     ADENOIDECTOMY       COLONOSCOPY N/A 2014    Procedure: COLONOSCOPY;  Surgeon: Duane, William Charles, MD;  Location: [facility] OR     COSMETIC SURGERY       CYSTOSCOPY       HC REMOVAL OF ANAL FISSURE  2007     MASTECTOMY, SIMPLE RT/LT/MABLE       RECONSTRUCT BREAST BILATERAL  2008     TONSILLECTOMY         Social History     Tobacco Use     Smoking status: Never Smoker     Smokeless tobacco: Never Used   Substance Use Topics     Alcohol use: Yes     Comment: occ     Family History   Problem Relation Age of Onset     Diabetes Mother      Thyroid Disease Mother      Glaucoma Mother      Macular Degeneration Mother      Hypertension Mother      Diabetes Sister      Thyroid Disease Sister      Depression Sister      Endometrial Cancer Sister 51     Breast Cancer Sister      Cancer Father         skin cancer     Glaucoma Father      Hypertension Father      Coronary Artery Disease Brother          at 43     Cerebrovascular Disease Maternal Grandfather          Current Outpatient Medications   Medication Sig Dispense Refill     Calcium Carbonate-Vitamin D (CALCIUM + D PO) Take by mouth 2 times daily       Coenzyme Q10 (CO Q 10) 100 MG CAPS Take 1 capsule by mouth daily        Cyanocobalamin (VITAMIN B-12 PO) Take by mouth daily       Flaxseed, Linseed, (FLAX SEED OIL) 1000 MG capsule Take 2 capsules (2,000 mg) by mouth 2 times  daily       fluocinolone (SYNALAR) 0.025 % cream Apply topically 2 times daily Apply to affected areas of mouth as needed. 60 g 5     fluticasone (FLONASE) 50 MCG/ACT nasal spray Reported on 5/1/2017  0     Glucosamine-Chondroitin-MSM (TRIPLE FLEX PO) Take by mouth 2 times daily       hydrocortisone 2.5 % cream Apply topically as needed       L-GLUTAMINE 1 capsule 2 times daily        MAGNESIUM PO Take 1 capsule by mouth daily        Multiple Vitamin (MULTI-VITAMIN PO) Take by mouth daily        Multiple Vitamins-Minerals (ZINC PO) Take by mouth daily       NIACIN CR PO Take 500 mg by mouth       order for DME Compression sleeve to bilateral UE. 2 Units 6     Probiotic Product (PROBIOTIC DAILY PO) Take 1 capsule by mouth 2 times daily       sertraline (ZOLOFT) 50 MG tablet Take 1 tablet (50 mg) by mouth daily 90 tablet 3     SYNTHROID 75 MCG tablet Take 1 tablet (75 mcg) by mouth daily 90 tablet 3     ASPIRIN PO Take 81 mg by mouth daily       Allergies   Allergen Reactions     Erythromycin Nausea     Recent Labs   Lab Test 03/12/19  1654 11/19/18  0852 10/22/18  0939 06/28/18  1403  10/31/17  0731  08/24/16  0913   A1C 5.2  --   --   --   --   --   --   --    LDL  --   --  175*  --   --  104*  --  156*   HDL  --   --  65  --   --  60  --  65   TRIG  --   --  139  --   --  141  --  114   ALT 41  --  39 44   < >  --    < > 49   CR 0.81  --  0.87 0.94   < >  --    < > 0.88   GFRESTIMATED 82  --  68 62   < >  --    < > 68   GFRESTBLACK >90  --  82 75   < >  --    < > 82   POTASSIUM 3.7  --   --  4.1  --   --   --   --    TSH 0.94 1.76  --  0.73  --  1.44   < > 0.56    < > = values in this interval not displayed.      BP Readings from Last 3 Encounters:   03/12/19 129/75   11/19/18 127/78   10/22/18 109/73    Wt Readings from Last 3 Encounters:   03/12/19 65.7 kg (144 lb 12.8 oz)   11/19/18 66.1 kg (145 lb 11.6 oz)   10/22/18 65 kg (143 lb 4.8 oz)                  Labs reviewed in EPIC    Reviewed and updated as needed  "this visit by clinical staff       Reviewed and updated as needed this visit by Provider         ROS:  CONSTITUTIONAL: NEGATIVE for fever, chills, change in weight  INTEGUMENTARY/SKIN: NEGATIVE for worrisome rashes, moles or lesions  EYES: NEGATIVE for vision changes or irritation  ENT/MOUTH: NEGATIVE for ear, mouth and throat problems  ENT/MOUTH: History of seasonal allergies  RESP: NEGATIVE for significant cough or SOB  BREAST: History of breast cancer, status post bilateral mastectomy  CV: NEGATIVE for chest pain, palpitations or peripheral edema  GI: NEGATIVE for nausea, abdominal pain, heartburn, or change in bowel habits  : Postmenopausal  MUSCULOSKELETAL: NEGATIVE for significant arthralgias or myalgia  NEURO: NEGATIVE for weakness, dizziness or paresthesias and as above  ENDOCRINE: NEGATIVE for temperature intolerance, skin/hair changes and Hx thyroid disease  HEME/ALLERGY/IMMUNE: NEGATIVE for bleeding problems  PSYCHIATRIC: NEGATIVE for changes in mood or affect, HX anxiety and HX depression    OBJECTIVE:     /75 (BP Location: Right arm, Patient Position: Sitting, Cuff Size: Adult Regular)   Pulse 88   Temp 98.2  F (36.8  C) (Oral)   Ht 1.568 m (5' 1.75\")   Wt 65.7 kg (144 lb 12.8 oz)   SpO2 95%   BMI 26.70 kg/m    Body mass index is 26.7 kg/m .  GENERAL: healthy, alert and no distress  EYES: Eyes grossly normal to inspection, PERRL and conjunctivae and sclerae normal  HENT: ear canals and TM's normal, nose and mouth without ulcers or lesions  NECK: no adenopathy, no asymmetry, masses, or scars and thyroid normal to palpation  RESP: lungs clear to auscultation - no rales, rhonchi or wheezes  CV: regular rate and rhythm, normal S1 S2, no S3 or S4, no murmur, click or rub, no peripheral edema and peripheral pulses strong  MS: no gross musculoskeletal defects noted, no edema  SKIN: no suspicious lesions or rashes  NEURO: Normal strength and tone, mentation intact and speech normal  NEURO: Normal " strength and tone, sensory exam grossly normal, mentation intact, speech normal, cranial nerves 2-12 intact, DTR's normal and symmetric , gait normal including heel/toe/tandem walking, Romberg normal and rapid alternating movements normal  PSYCH: mentation appears normal, affect normal/bright  Diabetic foot exam: normal DP and PT pulses, no trophic changes or ulcerative lesions and normal sensory exam    Diagnostic Test Results:  none     ASSESSMENT/PLAN:         1. Dysphasia  Given her personal, history of breast cancer and hyperlipidemia and description of symptoms, recommended labs and brain MRI for further evaluation  Will f/u on results and call with recommendations.  Patient verbalised understanding and is agreeable to the plan.    - CBC with platelets and differential  - Comprehensive metabolic panel  - TSH  - T4 FREE  - Hemoglobin A1c  - MR Brain w/o Contrast; Future  - Vitamin B12  - Methylmalonic acid  - Folate    2. Personal history of breast cancer  as above    - MR Brain w/o Contrast; Future    3. Acquired hypothyroidism  Check thyroid levels today  TSH and free T4    4. Mixed hyperlipidemia  as above  Patient has tried Crestor in the past with significant aggravation of lymphedema  She is interested in trying statin at this time  Patient is currently on niacin, is not fasting today for lipid recheck  if MRI showed no concerns for stroke but evidence of small vessel disease, will consider starting on a statin.  Patient verbalised understanding and is agreeable to the plan.    - MR Brain w/o Contrast; Future    Chart documentation done in part with Dragon Voice recognition Software. Although reviewed after completion, some word and grammatical error may remain.    See Patient Instructions    Gabbie Hicks MD  [facility]

## 2019-03-12 NOTE — RESULT ENCOUNTER NOTE
Dear Tangela,   your lab test showed normal test results for thyroid functions, hemoglobin and blood counts, diabetes screening lab-A1c, liver and kidney functions and electrolytes.  These are good and reassuring.   Let me know if you have any questions. Take care.  Gabbie Hicks MD

## 2019-03-13 LAB — VIT B12 SERPL-MCNC: 2568 PG/ML (ref 193–986)

## 2019-03-13 NOTE — RESULT ENCOUNTER NOTE
Tangela,  Your B12 levels are significantly elevated, you may stop taking the supplements at this time.  Let me know if you have any questions. Take care.  Gabbie Hicks MD

## 2019-03-14 ENCOUNTER — ANCILLARY PROCEDURE (OUTPATIENT)
Dept: MRI IMAGING | Facility: CLINIC | Age: 54
End: 2019-03-14
Attending: FAMILY MEDICINE
Payer: COMMERCIAL

## 2019-03-14 DIAGNOSIS — R47.02 DYSPHASIA: ICD-10-CM

## 2019-03-14 DIAGNOSIS — Z85.3 PERSONAL HISTORY OF BREAST CANCER: ICD-10-CM

## 2019-03-14 DIAGNOSIS — E78.2 MIXED HYPERLIPIDEMIA: ICD-10-CM

## 2019-03-14 PROCEDURE — 70551 MRI BRAIN STEM W/O DYE: CPT | Performed by: RADIOLOGY

## 2019-03-15 LAB — METHYLMALONATE SERPL-SCNC: 0.19 UMOL/L (ref 0–0.4)

## 2019-03-15 NOTE — RESULT ENCOUNTER NOTE
Dear Tangela,  Your brain MRI is normal and negative with no concerns for stroke, this is good and reassuring.  Let me know if you have any questions. Take care.  Gabbie Hicks MD

## 2019-06-17 PROBLEM — J30.89 SEASONAL ALLERGIC RHINITIS DUE TO OTHER ALLERGIC TRIGGER: Status: ACTIVE | Noted: 2017-10-31

## 2019-06-19 ENCOUNTER — OFFICE VISIT (OUTPATIENT)
Dept: OPTOMETRY | Facility: CLINIC | Age: 54
End: 2019-06-19
Payer: COMMERCIAL

## 2019-06-19 DIAGNOSIS — H52.11 MYOPIA OF RIGHT EYE: ICD-10-CM

## 2019-06-19 DIAGNOSIS — Z01.00 EXAMINATION OF EYES AND VISION: Primary | ICD-10-CM

## 2019-06-19 DIAGNOSIS — H52.221 REGULAR ASTIGMATISM OF RIGHT EYE: ICD-10-CM

## 2019-06-19 DIAGNOSIS — H52.4 PRESBYOPIA: ICD-10-CM

## 2019-06-19 DIAGNOSIS — H25.13 AGE-RELATED NUCLEAR CATARACT OF BOTH EYES: ICD-10-CM

## 2019-06-19 DIAGNOSIS — H02.889 MEIBOMIAN GLAND DYSFUNCTION: ICD-10-CM

## 2019-06-19 DIAGNOSIS — H40.003 GLAUCOMA SUSPECT, BILATERAL: ICD-10-CM

## 2019-06-19 PROCEDURE — 92014 COMPRE OPH EXAM EST PT 1/>: CPT | Performed by: OPTOMETRIST

## 2019-06-19 PROCEDURE — 92133 CPTRZD OPH DX IMG PST SGM ON: CPT | Performed by: OPTOMETRIST

## 2019-06-19 PROCEDURE — 92083 EXTENDED VISUAL FIELD XM: CPT | Performed by: OPTOMETRIST

## 2019-06-19 PROCEDURE — 92015 DETERMINE REFRACTIVE STATE: CPT | Performed by: OPTOMETRIST

## 2019-06-19 ASSESSMENT — CONF VISUAL FIELD
OD_NORMAL: 1
OS_NORMAL: 1
METHOD: COUNTING FINGERS

## 2019-06-19 ASSESSMENT — REFRACTION_WEARINGRX
OD_ADD: +2.25
OD_AXIS: 172
OS_SPHERE: -0.50
OD_CYLINDER: +0.50
OS_ADD: +2.25
OS_CYLINDER: +0.25
OS_AXIS: 010
OD_SPHERE: -1.00

## 2019-06-19 ASSESSMENT — REFRACTION_MANIFEST
OD_AXIS: 172
OS_ADD: +2.25
OD_CYLINDER: +0.50
OD_SPHERE: -1.00
OD_ADD: +2.25
OS_SPHERE: PLANO
OS_CYLINDER: SPHERE

## 2019-06-19 ASSESSMENT — TONOMETRY
OS_IOP_MMHG: 17
OD_IOP_MMHG: 18
IOP_METHOD: TONOPEN

## 2019-06-19 ASSESSMENT — VISUAL ACUITY
OD_SC+: +2
OD_SC: 20/25
OD_SC: 20/60
OS_SC: 20/20
METHOD: SNELLEN - LINEAR
OS_SC: 20/70

## 2019-06-19 ASSESSMENT — SLIT LAMP EXAM - LIDS
COMMENTS: MEIBOMIAN GLAND DYSFUNCTION
COMMENTS: MEIBOMIAN GLAND DYSFUNCTION

## 2019-06-19 ASSESSMENT — EXTERNAL EXAM - RIGHT EYE: OD_EXAM: NORMAL

## 2019-06-19 ASSESSMENT — CUP TO DISC RATIO
OD_RATIO: .5/.4
OS_RATIO: 0.65

## 2019-06-19 ASSESSMENT — EXTERNAL EXAM - LEFT EYE: OS_EXAM: NORMAL

## 2019-06-19 NOTE — PROGRESS NOTES
Chief Complaint   Patient presents with     Annual Eye Exam         Last Eye Exam: 9/1/2017  Dilated Previously: Yes    What are you currently using to see?  Glasses - Not with at this visit.       Distance Vision Acuity: Satisfied with vision    Near Vision Acuity: Not satisfied     Eye Comfort: good  Do you use eye drops? : No  Occupation or Hobbies: Special     Anjali Raphael  Optometric Tech            Medical, surgical and family histories reviewed and updated 6/19/2019.       OBJECTIVE: See Ophthalmology exam    ASSESSMENT:    ICD-10-CM    1. Examination of eyes and vision Z01.00 EYE EXAM (SIMPLE-NONBILLABLE)   2. Presbyopia H52.4 REFRACTION   3. Myopia of right eye H52.11 REFRACTION   4. Regular astigmatism of right eye H52.221 REFRACTION   5. Glaucoma suspect, bilateral H40.003 EYE EXAM (SIMPLE-NONBILLABLE)     OCT Optic Nerve RNFL Optovue OU (both eyes)     HVF 24-2 OU   6. Age-related nuclear cataract of both eyes H25.13 EYE EXAM (SIMPLE-NONBILLABLE)   7. Meibomian gland dysfunction H02.889       PLAN:     Patient Instructions   Eyeglass prescription given.    Blink tears- 1 drop both eyes 2-4 x daily.    Monitor cataracts with yearly eye exams.    You are a glaucoma suspect.  We will continue to monitor your intraocular pressures and optic nerves with yearly eye exams.    Return in 1 year for a complete eye exam or sooner if needed.    Jeet Mccabe, ABY

## 2019-06-19 NOTE — PATIENT INSTRUCTIONS
Eyeglass prescription given.    Blink tears- 1 drop both eyes 2-4 x daily.    Monitor cataracts with yearly eye exams.    You are a glaucoma suspect.  We will continue to monitor your intraocular pressures and optic nerves with yearly eye exams.    Return in 1 year for a complete eye exam or sooner if needed.    Jeet Mccabe, OD    The affects of the dilating drops last for 4- 6 hours.  You will be more sensitive to light and vision will be blurry up close.  Mydriatic sunglasses were given if needed.      Optometry Providers       Clinic Locations                                 Telephone Number   Dr. Tangela Godinez and [place]   [place] [phone]     [facility] Hours:                [facility] Hours:       [facility] Hours:   [address]   [address] [address]      [address] [facility]  [place] [address]   [place] [address]    [place] [address]  Phone: [phone]                    Phone: [phone]     Phone: [phone]                      Monday 8:00-7:00                          Monday 8:00-7:00                          Monday 8:00-7:00              Tuesday 8:00-6:00                          Tuesday 8:00-7:00                          Tuesday 8:00-7:00              Wednesday 8:00-6:00                  Wednesday 8:00-7:00                   Wednesday 8:00-7:00      Thursday 8:00-6:00                        Thursday 8:00-7:00                         Thursday 8:00-7:00            Friday 8:00-5:00                              Friday 8:00-5:00                              Friday 8:00-5:00    [facility] Hours:   [address] [facility] [place] [id number] [phone]    Monday 8:00-7:00  Tuesday 8:00-7:00  Wednesday 8:00-7:00  Thursday 8:00-7:00  Friday 8:00-5:00  Please log on to [email or web address] to order your contact lenses.  The link is found on the [facility] Care [facility] Vision Services page.  As  always, Thank you for trusting us with your health care needs!

## 2019-06-27 ENCOUNTER — DOCUMENTATION ONLY (OUTPATIENT)
Dept: LAB | Facility: CLINIC | Age: 54
End: 2019-06-27

## 2019-06-27 NOTE — PROGRESS NOTES
Oncology Follow-up visit:  Date on this visit: Jun 28, 2019      Primary Care Physician: Gabbie Pate   Dx:   1. Breast cancer  She has a history of stage IIB breast cancer. She underwent L MRM and right prophylactic mastectomy on 5/14/2007. Pathology from the surgery showed  Grade 3 invasive ductal carcinoma, measuring 3.5x3.3 cm. One left axillary SLN was positive for malignancy and additional 13 resected left axillary LNs were negative for malignancy. 3 right  SLNs were negative for malignancy. The tumor was ER negative [place] negative HER2 Lisa negative by FISH.  She was given adjuvant chemotherapy per clinical trial  with weekly Adriamycin IV x15 weeks and also took oral Cytoxan daily x15 weeks. She was seen by the genetic counselor and the testing was negative for BRCA 1 and 2.  She was under the care of Dr. Ramirez at [facility] and preferred to continue to f/up with oncologist and has transferred her care to us in 05/2014.    She underwent bilateral removal and replacement with alloderm and larger silicone implants by  Dr. Sarah Todd on 6/21/2016      2. Personal and Fhx of breast cancer-  her sister was diagnosed with breast cancer at the age of 56. Her sister had endometrial cancer 5 years ago. Her sister's cancer was ER+NV+ and her sister was on oral HRT for years prior. They are two youngest sisters of 7 siblings. Pt tested negative (July 2018) for 28 genes -[facility] panel  - (APC, LENNY, BARD1, BRCA1, BRCA2, BRIP1, BMPR1A, CDH1, CDK4, CDKN2A (p14ARF and h34CSA0b), CHEK2, EPCAM, GREM1, MLH1, MSH2, MSH6, MUTYH, NBN, PALB2, PMS2, POLD1, POLE, PTEN, RAD51C, RAD51D, SMAD4, STK11, and TP53).      History Of Present Illness:  Ms. Israel is a 54 year old postmenopausal female who presents for f/up of Hx of triple negative breast cancer. (op note reviewed from [facility]).  Chanel is feeling well.  She remains on Zoloft for depression.   In addition, a complete 12 point  review of systems is negative.      Past  Medical/Surgical History:  Past Medical History:   Diagnosis Date     Asthma, mild intermittent      Breast cancer (H)      Depression      Endometriosis      Heart murmur     Patient reported.     PONV (postoperative nausea and vomiting)      Past Surgical History:   Procedure Laterality Date     ADENOIDECTOMY       COLONOSCOPY N/A 8/22/2014    Procedure: COLONOSCOPY;  Surgeon: Duane, William Charles, MD;  Location: [facility] OR     COSMETIC SURGERY       CYSTOSCOPY       HC REMOVAL OF ANAL FISSURE  2007     MASTECTOMY, SIMPLE RT/LT/MABLE       RECONSTRUCT BREAST BILATERAL  2008     TONSILLECTOMY       FHx and social Hx reviewed.  Employer: [employer]. Never smoker.    Allergies:  Allergies as of 06/28/2019 - Reviewed 06/19/2019   Allergen Reaction Noted     Erythromycin Nausea 07/08/2013     Current Medications:  Current Outpatient Medications   Medication     ASPIRIN PO     Calcium Carbonate-Vitamin D (CALCIUM + D PO)     Coenzyme Q10 (CO Q 10) 100 MG CAPS     Cyanocobalamin (VITAMIN B-12 PO)     Flaxseed, Linseed, (FLAX SEED OIL) 1000 MG capsule     fluocinolone (SYNALAR) 0.025 % cream     fluticasone (FLONASE) 50 MCG/ACT nasal spray     Glucosamine-Chondroitin-MSM (TRIPLE FLEX PO)     hydrocortisone 2.5 % cream     L-GLUTAMINE     MAGNESIUM PO     Multiple Vitamin (MULTI-VITAMIN PO)     Multiple Vitamins-Minerals (ZINC PO)     NIACIN CR PO     order for DME     Probiotic Product (PROBIOTIC DAILY PO)     sertraline (ZOLOFT) 50 MG tablet     SYNTHROID 75 MCG tablet     No current facility-administered medications for this visit.        Physical Exam:    /72   Pulse 84   Temp 98.2  F (36.8  C) (Oral)   Resp 16   Wt 65.7 kg (144 lb 12.8 oz)   SpO2 98%   BMI 26.70 kg/m          GENERAL APPEARANCE: healthy, alert and no distress     NECK: no adenopathy, no asymmetry or masses     LYMPHATICS: No cervical, supraclavicular, axillary  lymphadenopathy     RESP: lungs clear to auscultation - no rales,  rhonchi or wheezes     CARDIOVASCULAR: regular rates and rhythm, normal S1 S2, no S3 or S4 and no murmur.        MUSCULOSKELETAL: extremities normal- no gross deformities noted, no evidence of inflammation in joints, FROM in all extremities. No edema b/l LE.     SKIN: no suspicious lesions or rashes     PSYCHIATRIC: mentation appears normal and affect normal  Chest wall: s/p bilateral mastectomy and reconstruction.  No chest wall masses. No axillary lymphadenopathy b/l    Laboratory/Imaging Studies  CBCd, CMP within normal limits in March 2019.    ASSESSMENT/PLAN:  Chanel is a very pleasant 54 year old woman with Hx of stage IIB invasive ductal breast cancer, grade 3, triple negative, s/p L MRM in 05/2007, s/p adjuvant Adriamycin and Cytoxan, with no evidence of disease recurrence since.   1. Personal Hx of triple negative breast cancer - She prefers to continue to follow-up with us annually. She plans to have annual labs done during her visits  with Dr. Hicks  -cbcd, creat, LFTs given prior exposure to chemotherapy. She is 12 years out from her treatment and could transfer all her f/up care to Dr. Hicks with annual labs as above when she is ready. For now she prefers to continue seeing us annually with chest wall exam.   2.  She will f/up with Dr. Hicks for all her other medical needs.  At the end of our visit patient verbalized understanding and concurred with the plan.

## 2019-06-28 ENCOUNTER — ONCOLOGY VISIT (OUTPATIENT)
Dept: ONCOLOGY | Facility: CLINIC | Age: 54
End: 2019-06-28
Payer: COMMERCIAL

## 2019-06-28 VITALS
WEIGHT: 144.8 LBS | RESPIRATION RATE: 16 BRPM | BODY MASS INDEX: 26.7 KG/M2 | OXYGEN SATURATION: 98 % | DIASTOLIC BLOOD PRESSURE: 72 MMHG | SYSTOLIC BLOOD PRESSURE: 119 MMHG | HEART RATE: 84 BPM | TEMPERATURE: 98.2 F

## 2019-06-28 DIAGNOSIS — Z85.3 PERSONAL HISTORY OF BREAST CANCER: Primary | ICD-10-CM

## 2019-06-28 PROCEDURE — 99213 OFFICE O/P EST LOW 20 MIN: CPT | Performed by: INTERNAL MEDICINE

## 2019-06-28 ASSESSMENT — PAIN SCALES - GENERAL: PAINLEVEL: NO PAIN (0)

## 2019-06-28 NOTE — NURSING NOTE
"Oncology Rooming Note    June 28, 2019 2:37 PM   Chanel Israel is a 54 year old female who presents for:    Chief Complaint   Patient presents with     Oncology Clinic Visit     1 yr follow up     Initial Vitals: /72   Pulse 84   Temp 98.2  F (36.8  C) (Oral)   Resp 16   Wt 65.7 kg (144 lb 12.8 oz)   SpO2 98%   BMI 26.70 kg/m   Estimated body mass index is 26.7 kg/m  as calculated from the following:    Height as of 3/12/19: 1.568 m (5' 1.75\").    Weight as of this encounter: 65.7 kg (144 lb 12.8 oz). Body surface area is 1.69 meters squared.  No Pain (0) Comment: Data Unavailable   No LMP recorded. Patient is postmenopausal.  Allergies reviewed: Yes  Medications reviewed: Yes    Medications: Medication refills not needed today.  Pharmacy [facility] entered into [employer]: [facility] [address] [place] [address] [address] [facility]  [address]           GRANT MCGREGOR RN              "

## 2019-06-28 NOTE — LETTER
6/28/2019         RE: Chanel Israel  [address]  [facility] [address]        Dear Colleague,    Thank you for referring your patient, Chanel Israel, to the [facility]. Please see a copy of my visit note below.    Oncology Follow-up visit:  Date on this visit: Jun 28, 2019      Primary Care Physician: Gabbie Pate   Dx:   1. Breast cancer  She has a history of stage IIB breast cancer. She underwent L MRM and right prophylactic mastectomy on 5/14/2007. Pathology from the surgery showed  Grade 3 invasive ductal carcinoma, measuring 3.5x3.3 cm. One left axillary SLN was positive for malignancy and additional 13 resected left axillary LNs were negative for malignancy. 3 right  SLNs were negative for malignancy. The tumor was ER negative [place] negative HER2 Lisa negative by FISH.  She was given adjuvant chemotherapy per clinical trial  with weekly Adriamycin IV x15 weeks and also took oral Cytoxan daily x15 weeks. She was seen by the genetic counselor and the testing was negative for BRCA 1 and 2.  She was under the care of Dr. Ramirez at [facility] and preferred to continue to f/up with oncologist and has transferred her care to us in 05/2014.    She underwent bilateral removal and replacement with alloderm and larger silicone implants by  Dr. Sarah Todd on 6/21/2016      2. Personal and Fhx of breast cancer-  her sister was diagnosed with breast cancer at the age of 56. Her sister had endometrial cancer 5 years ago. Her sister's cancer was ER+IL+ and her sister was on oral HRT for years prior. They are two youngest sisters of 7 siblings. Pt tested negative (July 2018) for 28 genes -[facility] panel  - (APC, LENNY, BARD1, BRCA1, BRCA2, BRIP1, BMPR1A, CDH1, CDK4, CDKN2A (p14ARF and n22RJZ2q), CHEK2, EPCAM, GREM1, MLH1, MSH2, MSH6, MUTYH, NBN, PALB2, PMS2, POLD1, POLE, PTEN, RAD51C, RAD51D, SMAD4, STK11, and TP53).      History Of Present Illness:  Ms. Israel is a 54  year old postmenopausal female who presents for f/up of Hx of triple negative breast cancer. ([facility]  Chanel is feeling well.  She remains on Zoloft for depression.   In addition, a complete 12 point  review of systems is negative.      Past Medical/Surgical History:  Past Medical History:   Diagnosis Date     Asthma, mild intermittent      Breast cancer (H)      Depression      Endometriosis      Heart murmur     Patient reported.     PONV (postoperative nausea and vomiting)      Past Surgical History:   Procedure Laterality Date     ADENOIDECTOMY       COLONOSCOPY N/A 8/22/2014    Procedure: COLONOSCOPY;  Surgeon: Duane, William Charles, MD;  Location: [facility] OR     COSMETIC SURGERY       CYSTOSCOPY       HC REMOVAL OF ANAL FISSURE  2007     MASTECTOMY, SIMPLE RT/LT/MABLE       RECONSTRUCT BREAST BILATERAL  2008     TONSILLECTOMY       FHx and social Hx reviewed.  Employer: [employer]. Never smoker.    Allergies:  Allergies as of 06/28/2019 - Reviewed 06/19/2019   Allergen Reaction Noted     Erythromycin Nausea 07/08/2013     Current Medications:  Current Outpatient Medications   Medication     ASPIRIN PO     Calcium Carbonate-Vitamin D (CALCIUM + D PO)     Coenzyme Q10 (CO Q 10) 100 MG CAPS     Cyanocobalamin (VITAMIN B-12 PO)     Flaxseed, Linseed, (FLAX SEED OIL) 1000 MG capsule     fluocinolone (SYNALAR) 0.025 % cream     fluticasone (FLONASE) 50 MCG/ACT nasal spray     Glucosamine-Chondroitin-MSM (TRIPLE FLEX PO)     hydrocortisone 2.5 % cream     L-GLUTAMINE     MAGNESIUM PO     Multiple Vitamin (MULTI-VITAMIN PO)     Multiple Vitamins-Minerals (ZINC PO)     NIACIN CR PO     order for DME     Probiotic Product (PROBIOTIC DAILY PO)     sertraline (ZOLOFT) 50 MG tablet     SYNTHROID 75 MCG tablet     No current facility-administered medications for this visit.        Physical Exam:    /72   Pulse 84   Temp 98.2  F (36.8  C) (Oral)   Resp 16   Wt 65.7 kg (144 lb  12.8 oz)   SpO2 98%   BMI 26.70 kg/m           GENERAL APPEARANCE: healthy, alert and no distress     NECK: no adenopathy, no asymmetry or masses     LYMPHATICS: No cervical, supraclavicular, axillary  lymphadenopathy     RESP: lungs clear to auscultation - no rales, rhonchi or wheezes     CARDIOVASCULAR: regular rates and rhythm, normal S1 S2, no S3 or S4 and no murmur.        MUSCULOSKELETAL: extremities normal- no gross deformities noted, no evidence of inflammation in joints, FROM in all extremities. No edema b/l LE.     SKIN: no suspicious lesions or rashes     PSYCHIATRIC: mentation appears normal and affect normal  Chest wall: s/p bilateral mastectomy and reconstruction.  No chest wall masses. No axillary lymphadenopathy b/l    Laboratory/Imaging Studies  CBCd, CMP within normal limits in March 2019.    ASSESSMENT/PLAN:  Chanel is a very pleasant 54 year old woman with Hx of stage IIB invasive ductal breast cancer, grade 3, triple negative, s/p L MRM in 05/2007, s/p adjuvant Adriamycin and Cytoxan, with no evidence of disease recurrence since.   1. Personal Hx of triple negative breast cancer - She prefers to continue to follow-up with us annually. She plans to have annual labs done during her visits  with Dr. Hicks  -cbcd, creat, LFTs given prior exposure to chemotherapy. She is 12 years out from her treatment and could transfer all her f/up care to Dr. Hicks with annual labs as above when she is ready. For now she prefers to continue seeing us annually with chest wall exam.   2.  She will f/up with Dr. Hicks for all her other medical needs.  At the end of our visit patient verbalized understanding and concurred with the plan.        Again, thank you for allowing me to participate in the care of your patient.        Sincerely,        Ghislaine Lopez MD, MD

## 2019-10-03 ENCOUNTER — HEALTH MAINTENANCE LETTER (OUTPATIENT)
Age: 54
End: 2019-10-03

## 2019-10-12 DIAGNOSIS — Z00.00 ROUTINE GENERAL MEDICAL EXAMINATION AT A HEALTH CARE FACILITY: ICD-10-CM

## 2019-10-12 DIAGNOSIS — E78.5 HYPERLIPIDEMIA LDL GOAL <160: Primary | ICD-10-CM

## 2019-10-22 ENCOUNTER — OFFICE VISIT (OUTPATIENT)
Dept: PEDIATRICS | Facility: CLINIC | Age: 54
End: 2019-10-22
Payer: COMMERCIAL

## 2019-10-22 VITALS
SYSTOLIC BLOOD PRESSURE: 115 MMHG | WEIGHT: 143 LBS | HEART RATE: 88 BPM | BODY MASS INDEX: 26.31 KG/M2 | HEIGHT: 62 IN | OXYGEN SATURATION: 96 % | DIASTOLIC BLOOD PRESSURE: 73 MMHG | TEMPERATURE: 99.2 F

## 2019-10-22 DIAGNOSIS — E78.2 MIXED HYPERLIPIDEMIA: ICD-10-CM

## 2019-10-22 DIAGNOSIS — F33.0 MILD RECURRENT MAJOR DEPRESSION (H): ICD-10-CM

## 2019-10-22 DIAGNOSIS — F45.8 BRUXISM (TEETH GRINDING): ICD-10-CM

## 2019-10-22 DIAGNOSIS — Z12.4 CERVICAL CANCER SCREENING: ICD-10-CM

## 2019-10-22 DIAGNOSIS — Z90.13 S/P BILATERAL MASTECTOMY: ICD-10-CM

## 2019-10-22 DIAGNOSIS — Z23 NEED FOR SHINGLES VACCINE: ICD-10-CM

## 2019-10-22 DIAGNOSIS — E78.5 HYPERLIPIDEMIA LDL GOAL <160: ICD-10-CM

## 2019-10-22 DIAGNOSIS — E06.3 HYPOTHYROIDISM DUE TO HASHIMOTO'S THYROIDITIS: ICD-10-CM

## 2019-10-22 DIAGNOSIS — M25.673 STIFFNESS OF ANKLE JOINT, UNSPECIFIED LATERALITY: ICD-10-CM

## 2019-10-22 DIAGNOSIS — Z12.11 COLON CANCER SCREENING: ICD-10-CM

## 2019-10-22 DIAGNOSIS — Z00.00 ROUTINE GENERAL MEDICAL EXAMINATION AT A HEALTH CARE FACILITY: ICD-10-CM

## 2019-10-22 DIAGNOSIS — Z00.00 ROUTINE GENERAL MEDICAL EXAMINATION AT A HEALTH CARE FACILITY: Primary | ICD-10-CM

## 2019-10-22 LAB
CHOLEST SERPL-MCNC: 270 MG/DL
HDLC SERPL-MCNC: 59 MG/DL
LDLC SERPL CALC-MCNC: 177 MG/DL
NONHDLC SERPL-MCNC: 211 MG/DL
TRIGL SERPL-MCNC: 172 MG/DL

## 2019-10-22 PROCEDURE — 90682 RIV4 VACC RECOMBINANT DNA IM: CPT | Performed by: FAMILY MEDICINE

## 2019-10-22 PROCEDURE — 36415 COLL VENOUS BLD VENIPUNCTURE: CPT | Performed by: FAMILY MEDICINE

## 2019-10-22 PROCEDURE — 80061 LIPID PANEL: CPT | Performed by: FAMILY MEDICINE

## 2019-10-22 PROCEDURE — 90471 IMMUNIZATION ADMIN: CPT | Performed by: FAMILY MEDICINE

## 2019-10-22 PROCEDURE — 99396 PREV VISIT EST AGE 40-64: CPT | Mod: 25 | Performed by: FAMILY MEDICINE

## 2019-10-22 RX ORDER — LEVOTHYROXINE SODIUM 75 MCG
75 TABLET ORAL DAILY
Qty: 90 TABLET | Refills: 3 | Status: SHIPPED | OUTPATIENT
Start: 2019-10-22 | End: 2020-10-07

## 2019-10-22 ASSESSMENT — ANXIETY QUESTIONNAIRES
5. BEING SO RESTLESS THAT IT IS HARD TO SIT STILL: NOT AT ALL
1. FEELING NERVOUS, ANXIOUS, OR ON EDGE: NOT AT ALL
7. FEELING AFRAID AS IF SOMETHING AWFUL MIGHT HAPPEN: NOT AT ALL
2. NOT BEING ABLE TO STOP OR CONTROL WORRYING: NOT AT ALL
3. WORRYING TOO MUCH ABOUT DIFFERENT THINGS: NOT AT ALL
GAD7 TOTAL SCORE: 0
6. BECOMING EASILY ANNOYED OR IRRITABLE: NOT AT ALL

## 2019-10-22 ASSESSMENT — PAIN SCALES - GENERAL: PAINLEVEL: NO PAIN (0)

## 2019-10-22 ASSESSMENT — PATIENT HEALTH QUESTIONNAIRE - PHQ9
5. POOR APPETITE OR OVEREATING: NOT AT ALL
SUM OF ALL RESPONSES TO PHQ QUESTIONS 1-9: 3

## 2019-10-22 ASSESSMENT — MIFFLIN-ST. JEOR: SCORE: 1192.92
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
## 2019-10-22 NOTE — PATIENT INSTRUCTIONS
Get the flu shot today  Schedule for recheck and  fasting labs in 6 months    The 10-year ASCVD risk score (Candy DE JESUS Jr., et al., 2013) is: 2.1%    Values used to calculate the score:      Age: 54 years      Sex: Female      Is Non- : No      Diabetic: No      Tobacco smoker: No      Systolic Blood Pressure: 115 mmHg      Is BP treated: No      HDL Cholesterol: 59 mg/dL      Total Cholesterol: 270 mg/dL      Preventive Health Recommendations  Female Ages 50 - 64    Yearly exam: See your health care provider every year in order to  o Review health changes.   o Discuss preventive care.    o Review your medicines if your doctor has prescribed any.      Get a Pap test every three years (unless you have an abnormal result and your provider advises testing more often).    If you get Pap tests with HPV test, you only need to test every 5 years, unless you have an abnormal result.     You do not need a Pap test if your uterus was removed (hysterectomy) and you have not had cancer.    You should be tested each year for STDs (sexually transmitted diseases) if you're at risk.     Have a mammogram every 1 to 2 years.    Have a colonoscopy at age 50, or have a yearly FIT test (stool test). These exams screen for colon cancer.      Have a cholesterol test every 5 years, or more often if advised.    Have a diabetes test (fasting glucose) every three years. If you are at risk for diabetes, you should have this test more often.     If you are at risk for osteoporosis (brittle bone disease), think about having a bone density scan (DEXA).    Shots: Get a flu shot each year. Get a tetanus shot every 10 years.    Nutrition:     Eat at least 5 servings of fruits and vegetables each day.    Eat whole-grain bread, whole-wheat pasta and brown rice instead of white grains and rice.    Get adequate Calcium and Vitamin D.     Lifestyle    Exercise at least 150 minutes a week (30 minutes a day, 5 days a week). This will  help you control your weight and prevent disease.    Limit alcohol to one drink per day.    No smoking.     Wear sunscreen to prevent skin cancer.     See your dentist every six months for an exam and cleaning.    See your eye doctor every 1 to 2 years.

## 2019-10-23 ASSESSMENT — ANXIETY QUESTIONNAIRES: GAD7 TOTAL SCORE: 0

## 2019-10-30 ENCOUNTER — TELEPHONE (OUTPATIENT)
Dept: PEDIATRICS | Facility: CLINIC | Age: 54
End: 2019-10-30

## 2019-10-30 NOTE — TELEPHONE ENCOUNTER
Please inform patient on the availability of shingles vaccine at the clinic  I do not think we will be able to give the shingles vaccine in the clinic, that she brings from the pharmacy

## 2019-10-30 NOTE — TELEPHONE ENCOUNTER
Message taken from [email or web address] request message on Fluency.   Thanks for the info. I can push the injection out a few weeks since I am still recovering from a cold, post flu shot.   I spoke with dr camara about getting shingles vaccine at pharmacy, but rejected this option since I need to have it administered in the [place] due to lymphedema in arms .    How will I know when you have vaccine available at the clinic?    Can the vaccine be given at clinic if I get it from [facility]?   Thank you.

## 2019-11-01 ENCOUNTER — OFFICE VISIT (OUTPATIENT)
Dept: DERMATOLOGY | Facility: CLINIC | Age: 54
End: 2019-11-01
Payer: COMMERCIAL

## 2019-11-01 DIAGNOSIS — Z12.83 SCREENING FOR SKIN CANCER: ICD-10-CM

## 2019-11-01 DIAGNOSIS — D22.9 MULTIPLE BENIGN NEVI: Primary | ICD-10-CM

## 2019-11-01 PROCEDURE — 17000 DESTRUCT PREMALG LESION: CPT | Performed by: DERMATOLOGY

## 2019-11-01 PROCEDURE — 99213 OFFICE O/P EST LOW 20 MIN: CPT | Mod: 25 | Performed by: DERMATOLOGY

## 2019-11-01 ASSESSMENT — PAIN SCALES - GENERAL: PAINLEVEL: NO PAIN (0)

## 2019-11-01 NOTE — LETTER
2019         RE: Radha Dominguez  [address] [facility] [address]        Dear Colleague,    Thank you for referring your patient, Radha Dominguez, to the [facility]. Please see a copy of my visit note below.    Visit Date:   2019      SUBJECTIVE:  This is a return visit for Ana who has a history of breast cancer with bilateral mastectomy, telogen effluvium hair loss, and seborrheic keratoses.  She would like a skin exam..  She has no particular concerns.      OBJECTIVE:  A healthy lady in no distress.  We checked her face, back, chest, abdomen, arms and legs.  We found 1 probable actinic keratosis on the right forehead.  Otherwise, she had some nonspecific lesions of nevi and seborrheic keratoses scattered about.  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      ASSESSMENT:   1.  No skin cancers found.   2.  History of breast cancer.   3.  One actinic keratosis found today.      PLAN:  Cryotherapy to the actinic keratosis..  Reassured.  Suggested return in 1 year to see Dr. Santiago.      MEDICATIONS AND ALLERGIES:  Reviewed.         MAURI TAPIA MD             D: 2019   T: 2019   MT: DAVID      Name:     RADHA DOMINGUEZ   MRN:      [id number]        Account:      [id number]   :      1965           Visit Date:   2019      Document: [id number]        Again, thank you for allowing me to participate in the care of your patient.        Sincerely,        MAURI Tapia MD

## 2019-11-01 NOTE — PROGRESS NOTES
Visit Date:   2019      SUBJECTIVE:  This is a return visit for Ana who has a history of breast cancer with bilateral mastectomy, telogen effluvium hair loss, and seborrheic keratoses.  She would like a skin exam..  She has no particular concerns.      OBJECTIVE:  A healthy lady in no distress.  We checked her face, back, chest, abdomen, arms and legs.  We found 1 probable actinic keratosis on the right forehead.  Otherwise, she had some nonspecific lesions of nevi and seborrheic keratoses scattered about.  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      ASSESSMENT:   1.  No skin cancers found.   2.  History of breast cancer.   3.  One actinic keratosis found today.      PLAN:  Cryotherapy to the actinic keratosis..  Reassured.  Suggested return in 1 year to see Dr. Santiago.      MEDICATIONS AND ALLERGIES:  Reviewed.         MAURI DELGADO MD             D: 2019   T: 2019   MT: DAVID      Name:     RADHA DOMINGUEZ   MRN:      [id number]        Account:      [id number]   :      1965           Visit Date:   2019      Document: [id number]

## 2019-11-01 NOTE — NURSING NOTE
Chanel Israel's goals for this visit include:   Chief Complaint   Patient presents with     Skin Check     left leg and clavicle, always back       She requests these members of her care team be copied on today's visit information: Yes    PCP: Gabbie Hicks    Referring Provider:  No referring provider defined for this encounter.    There were no vitals taken for this visit.    Do you need any medication refills at today's visit? No    Liliana Bauer LPN

## 2020-01-20 DIAGNOSIS — F33.0 MILD RECURRENT MAJOR DEPRESSION (H): ICD-10-CM

## 2020-02-03 NOTE — PATIENT INSTRUCTIONS
Schedule for fasting labs and physical in 10/2017  Before Your Surgery      Call your surgeon if there is any change in your health. This includes signs of a cold or flu (such as a sore throat, runny nose, cough, rash or fever).    Do not smoke, drink alcohol or take over the counter medicine (unless your surgeon or primary care doctor tells you to) for the 24 hours before and after surgery.    If you take prescribed drugs: Follow your doctor s orders about which medicines to take and which to stop until after surgery.    Eating and drinking prior to surgery: follow the instructions from your surgeon    Take a shower or bath the night before surgery. Use the soap your surgeon gave you to gently clean your skin. If you do not have soap from your surgeon, use your regular soap. Do not shave or scrub the surgery site.  Wear clean pajamas and have clean sheets on your bed.   
02-Mar-2020

## 2020-02-08 ENCOUNTER — HEALTH MAINTENANCE LETTER (OUTPATIENT)
Age: 55
End: 2020-02-08

## 2020-02-11 ENCOUNTER — OFFICE VISIT (OUTPATIENT)
Dept: PEDIATRICS | Facility: CLINIC | Age: 55
End: 2020-02-11
Payer: COMMERCIAL

## 2020-02-11 VITALS
DIASTOLIC BLOOD PRESSURE: 75 MMHG | HEART RATE: 88 BPM | SYSTOLIC BLOOD PRESSURE: 104 MMHG | OXYGEN SATURATION: 97 % | BODY MASS INDEX: 25.78 KG/M2 | TEMPERATURE: 98.3 F | HEIGHT: 62 IN | WEIGHT: 140.1 LBS

## 2020-02-11 DIAGNOSIS — F41.9 ANXIETY: ICD-10-CM

## 2020-02-11 DIAGNOSIS — E06.3 HYPOTHYROIDISM DUE TO HASHIMOTO'S THYROIDITIS: Primary | ICD-10-CM

## 2020-02-11 DIAGNOSIS — F33.0 MILD RECURRENT MAJOR DEPRESSION (H): ICD-10-CM

## 2020-02-11 LAB
T4 FREE SERPL-MCNC: 1.14 NG/DL (ref 0.76–1.46)
TSH SERPL DL<=0.005 MIU/L-ACNC: 2.28 MU/L (ref 0.4–4)

## 2020-02-11 PROCEDURE — 84439 ASSAY OF FREE THYROXINE: CPT | Performed by: FAMILY MEDICINE

## 2020-02-11 PROCEDURE — 84443 ASSAY THYROID STIM HORMONE: CPT | Performed by: FAMILY MEDICINE

## 2020-02-11 PROCEDURE — 36415 COLL VENOUS BLD VENIPUNCTURE: CPT | Performed by: FAMILY MEDICINE

## 2020-02-11 PROCEDURE — 99214 OFFICE O/P EST MOD 30 MIN: CPT | Performed by: FAMILY MEDICINE

## 2020-02-11 ASSESSMENT — PAIN SCALES - GENERAL: PAINLEVEL: NO PAIN (0)

## 2020-02-11 ASSESSMENT — MIFFLIN-ST. JEOR: SCORE: 1175.8

## 2020-02-11 NOTE — PATIENT INSTRUCTIONS
Assessment/Plan:    Problem List Items Addressed This Visit     None      Visit Diagnoses     Acute non-recurrent frontal sinusitis    -  Primary    Relevant Medications    amoxicillin (AMOXIL) 875 mg tablet          Patient Instructions   Drinking plenty of fluids, saline nasal rinses or nasal spray, and steam or cool air humidification are all effective ways of managing symptoms  May take Mucinex D for sinus congestion, or Coricidin HBP if you have a heart condition or high blood pressure  Mucinex or Robitussin DM are used to control cough symptoms and include an expectorant  May take Ibuprofen or Tylenol as needed for pain or fevers  Recommend recheck if symptoms do not resolve or improve within 1 week  No Follow-up on file  Subjective:      Patient ID: Kentrell Gates is a 31 y o  female  Chief Complaint   Patient presents with    Headache     Started 3 days ago  Upset stomach as well  JMoyleLPN    Earache       She wasn't feeling well 3 days ago  Her head felt like a balloon and she had an upset stomach  Last night she started feeling worse  Stomach pains resolved  Her head feels weird, bilateral ear pain, worse in the left  Has had migraine headaches for over a week  Denies sinus congestion, cough, fevers  SOB, or wheezing  Has seasonal allergies  Took aleve which isn't helping  The following portions of the patient's history were reviewed and updated as appropriate: allergies, current medications, past family history, past medical history, past social history, past surgical history and problem list     Review of Systems   Constitutional: Negative for chills, fatigue and fever  HENT: Positive for ear pain  Negative for congestion, postnasal drip, rhinorrhea, sinus pressure and sore throat  Respiratory: Negative for cough, shortness of breath and wheezing  Cardiovascular: Negative for chest pain     Gastrointestinal: Negative for abdominal pain, diarrhea, nausea Get the lab today  Schedule to see Jaimie Valentine   and vomiting  Musculoskeletal: Negative for arthralgias  Skin: Negative for rash  Neurological: Positive for headaches  Current Outpatient Prescriptions   Medication Sig Dispense Refill    clonazePAM (KlonoPIN) 0 5 mg tablet Take 0 5 mg by mouth daily   Levothyroxine Sodium (TIROSINT) 75 MCG CAPS Take by mouth daily        norethindrone-ethinyl estradiol (MICROGESTIN FE 1/20) 1-20 MG-MCG per tablet Take 1 tablet by mouth daily      amoxicillin (AMOXIL) 875 mg tablet Take 1 tablet (875 mg total) by mouth 2 (two) times a day for 10 days 20 tablet 0     No current facility-administered medications for this visit  Objective:    /60   Pulse 80   Temp (!) 97 °F (36 1 °C)   Resp 16   Ht 5' 6" (1 676 m)   Wt 64 9 kg (143 lb)   LMP 04/15/2018   BMI 23 08 kg/m²        Physical Exam   Constitutional: She appears well-developed and well-nourished  HENT:   Right Ear: External ear and ear canal normal  Tympanic membrane is bulging  Left Ear: External ear and ear canal normal  Tympanic membrane is bulging  Nose: Mucosal edema present  Mouth/Throat: Oropharynx is clear and moist and mucous membranes are normal    Eyes: Conjunctivae are normal    Cardiovascular: Normal rate, regular rhythm and normal heart sounds  Pulmonary/Chest: Effort normal and breath sounds normal    Abdominal: Bowel sounds are normal  She exhibits no distension  There is no splenomegaly or hepatomegaly  There is no tenderness  Lymphadenopathy:        Right cervical: No superficial cervical adenopathy present  Left cervical: No superficial cervical adenopathy present  Skin: No rash noted  Psychiatric: She has a normal mood and affect  Nursing note and vitals reviewed               Rufino Lomas

## 2020-02-11 NOTE — PROGRESS NOTES
Subjective     Chanel Israel is a 54 year old female who presents to clinic today for the following health issues:    HPI   Hypothyroidism Follow-up  Patient with past medical history significant for Hashimoto's thyroiditis and anxiety, depression is here with concerns of having possible hyperthyroid symptoms as mentioned below  Had a significant panic attack at work with a colleague last week  Since then, patient has stopped taking medications for the last 5 days  Currently taking Zoloft 50 mg daily for her chronic anxiety and depression    Since last visit, patient describes the following symptoms: weight loss of 3 lbs since Saturday 2/8/20.    Patient reports concerns of cycling from hypo to hyperthyroid. Patient has had trouble sleeping, lack of appetite, fast heart rate, feeling anxious and irritable. Patient reports she had a panic attack last Thursday. Patient stopped taking levothyroxine on Saturday 2/8/20.       How many servings of fruits and vegetables do you eat daily?  2-3    On average, how many sweetened beverages do you drink each day (Examples: soda, juice, sweet tea, etc.  Do NOT count diet or artificially sweetened beverages)?   0    How many days per week do you exercise enough to make your heart beat faster? 3 or less    How many minutes a day do you exercise enough to make your heart beat faster? 9 or less    How many days per week do you miss taking your medication? 0    Depression and Anxiety Follow-Up    How are you doing with your depression since your last visit? Worsened as above    How are you doing with your anxiety since your last visit?  Worsened , had a big panic attack last week with no known triggers    Are you having other symptoms that might be associated with depression or anxiety? Yes:  Irritability    Have you had a significant life event? No     Do you have any concerns with your use of alcohol or other drugs? No    Social History     Tobacco Use     Smoking status:  Never Smoker     Smokeless tobacco: Never Used   Substance Use Topics     Alcohol use: Yes     Comment: occ     Drug use: No     PHQ 4/11/2018 10/22/2018 10/22/2019   PHQ-9 Total Score 3 2 3   Q9: Thoughts of better off dead/self-harm past 2 weeks Not at all Not at all Not at all     MERISSA-7 SCORE 4/11/2018 10/22/2018 10/22/2019   Total Score - - -   Total Score 0 0 0     Last PHQ-9 10/22/2019   1.  Little interest or pleasure in doing things 0   2.  Feeling down, depressed, or hopeless 0   3.  Trouble falling or staying asleep, or sleeping too much 1   4.  Feeling tired or having little energy 2   5.  Poor appetite or overeating 0   6.  Feeling bad about yourself 0   7.  Trouble concentrating 0   8.  Moving slowly or restless 0   Q9: Thoughts of better off dead/self-harm past 2 weeks 0   PHQ-9 Total Score 3   Difficulty at work, home, or with people -     MERISSA-7  10/22/2019   1. Feeling nervous, anxious, or on edge 0   2. Not being able to stop or control worrying 0   3. Worrying too much about different things 0   4. Trouble relaxing 0   5. Being so restless that it is hard to sit still 0   6. Becoming easily annoyed or irritable 0   7. Feeling afraid, as if something awful might happen 0   MERISSA-7 Total Score 0   If you checked any problems, how difficult have they made it for you to do your work, take care of things at home, or get along with other people? -     In the past two weeks have you had thoughts of suicide or self-harm?  No.    Do you have concerns about your personal safety or the safety of others?   No    Suicide Assessment Five-step Evaluation and Treatment (SAFE-T)      Patient Active Problem List   Diagnosis     Personal history of breast cancer     S/P bilateral mastectomy     Urge incontinence of urine     Overweight (BMI 25.0-29.9)     Mild recurrent major depression (H)     Family history of diabetes mellitus (DM)     Family history of thyroid disease     Plantar fasciitis, bilateral     Seasonal  allergies     Mixed hyperlipidemia     Chronic rhinitis     Breast implant asymmetry     ACP (advance care planning)     Lymphedema     Family history of ischemic heart disease     Seasonal allergic rhinitis     Acquired hypothyroidism     Glaucoma suspect, bilateral     Family history of glaucoma     Age-related nuclear cataract of both eyes     Seasonal allergic rhinitis due to other allergic trigger, unspecified chronicity     Family history of breast cancer in sister     Family history of skin cancer     Actinic keratoses     Hyperlipidemia LDL goal <160     Hypothyroidism due to Hashimoto's thyroiditis     Bruxism (teeth grinding)     Anxiety     Past Surgical History:   Procedure Laterality Date     ADENOIDECTOMY       COLONOSCOPY N/A 2014    Procedure: COLONOSCOPY;  Surgeon: Duane, William Charles, MD;  Location: [facility]     COSMETIC SURGERY       CYSTOSCOPY       HC REMOVAL OF ANAL FISSURE  2007     MASTECTOMY, SIMPLE RT/LT/MABLE       RECONSTRUCT BREAST BILATERAL  2008     TONSILLECTOMY         Social History     Tobacco Use     Smoking status: Never Smoker     Smokeless tobacco: Never Used   Substance Use Topics     Alcohol use: Yes     Comment: occ     Family History   Problem Relation Age of Onset     Diabetes Mother      Thyroid Disease Mother      Glaucoma Mother      Macular Degeneration Mother      Hypertension Mother      Diabetes Sister      Thyroid Disease Sister      Depression Sister      Endometrial Cancer Sister 51     Breast Cancer Sister      Cancer Father         skin cancer     Glaucoma Father      Hypertension Father      Coronary Artery Disease Brother          at 43     Cerebrovascular Disease Maternal Grandfather          Current Outpatient Medications   Medication Sig Dispense Refill     ASPIRIN PO Take 81 mg by mouth daily       Calcium Carbonate-Vitamin D (CALCIUM + D PO) Take by mouth 2 times daily       Coenzyme Q10 (CO Q 10) 100 MG CAPS Take 1 capsule by mouth daily         fluocinolone (SYNALAR) 0.025 % cream Apply topically 2 times daily Apply to affected areas of mouth as needed. 60 g 5     fluticasone (FLONASE) 50 MCG/ACT nasal spray as needed   0     Glucosamine-Chondroitin-MSM (TRIPLE FLEX PO) Take by mouth 2 times daily       hydrocortisone 2.5 % cream Apply topically as needed       L-GLUTAMINE 1 capsule 2 times daily        MAGNESIUM PO Take 1 capsule by mouth daily        Multiple Vitamins-Minerals (ZINC PO) Take by mouth daily       NIACIN CR PO Take 500 mg by mouth       order for DME Equipment being ordered: mouthguard 1 Units 0     order for DME Compression sleeve to bilateral UE. 2 Units 6     Probiotic Product (PROBIOTIC DAILY PO) Take 1 capsule by mouth 2 times daily       sertraline (ZOLOFT) 50 MG tablet TAKE 1 TABLET(50 MG) BY MOUTH DAILY 90 tablet 0     UNABLE TO FIND Take 1 tablet by mouth daily MEDICATION NAME: Sambrocal Elderberry Supplement       Flaxseed, Linseed, (FLAX SEED OIL) 1000 MG capsule Take 2 capsules (2,000 mg) by mouth 2 times daily       Multiple Vitamin (MULTI-VITAMIN PO) Take by mouth daily        SYNTHROID 75 MCG tablet Take 1 tablet (75 mcg) by mouth daily (Patient not taking: Reported on 2/11/2020) 90 tablet 3     Allergies   Allergen Reactions     Erythromycin Nausea     Recent Labs   Lab Test 10/22/19  0924 03/12/19  1654 11/19/18  0852 10/22/18  0939 06/28/18  1403  10/31/17  0731   A1C  --  5.2  --   --   --   --   --    *  --   --  175*  --   --  104*   HDL 59  --   --  65  --   --  60   TRIG 172*  --   --  139  --   --  141   ALT  --  41  --  39 44   < >  --    CR  --  0.81  --  0.87 0.94   < >  --    GFRESTIMATED  --  82  --  68 62   < >  --    GFRESTBLACK  --  >90  --  82 75   < >  --    POTASSIUM  --  3.7  --   --  4.1  --   --    TSH  --  0.94 1.76  --  0.73  --  1.44    < > = values in this interval not displayed.      BP Readings from Last 3 Encounters:   02/11/20 104/75   10/22/19 115/73   06/28/19 119/72    Wt Readings  "from Last 3 Encounters:   02/11/20 63.5 kg (140 lb 1.6 oz)   10/22/19 64.9 kg (143 lb)   06/28/19 65.7 kg (144 lb 12.8 oz)                    Reviewed and updated as needed this visit by Provider         Review of Systems   ROS COMP: CONSTITUTIONAL: NEGATIVE for fever, chills, change in weight  RESP: NEGATIVE for significant cough or SOB  CV: NEGATIVE for chest pain, palpitations or peripheral edema  GI: NEGATIVE for nausea, abdominal pain, heartburn, or change in bowel habits  MUSCULOSKELETAL: NEGATIVE for significant arthralgias or myalgia  NEURO: NEGATIVE for weakness, dizziness or paresthesias  ENDOCRINE: Hx thyroid disease  HEME/ALLERGY/IMMUNE: NEGATIVE for bleeding problems  PSYCHIATRIC: as above      Objective    /75 (BP Location: Right arm, Patient Position: Sitting, Cuff Size: Adult Regular)   Pulse 88   Temp 98.3  F (36.8  C) (Oral)   Ht 1.562 m (5' 1.5\")   Wt 63.5 kg (140 lb 1.6 oz)   SpO2 97%   BMI 26.04 kg/m    Body mass index is 26.04 kg/m .  Physical Exam   GENERAL: healthy, alert and no distress  RESP: lungs clear to auscultation - no rales, rhonchi or wheezes  CV: regular rate and rhythm, normal S1 S2, no S3 or S4, no murmur, click or rub, no peripheral edema and peripheral pulses strong  MS: no gross musculoskeletal defects noted, no edema  SKIN: no suspicious lesions or rashes  NEURO: Normal strength and tone, mentation intact and speech normal  PSYCH: mentation appears normal, tearful and anxious    Diagnostic Test Results:  Labs reviewed in Epic        Assessment & Plan     1. Hypothyroidism due to Hashimoto's thyroiditis  TSH   Date Value Ref Range Status   02/11/2020 2.28 0.40 - 4.00 mU/L Final   03/12/2019 0.94 0.40 - 4.00 mU/L Final   11/19/2018 1.76 0.40 - 4.00 mU/L Final   06/28/2018 0.73 0.40 - 4.00 mU/L Final   10/31/2017 1.44 0.40 - 4.00 mU/L Final     T4 Free   Date Value Ref Range Status   02/11/2020 1.14 0.76 - 1.46 ng/dL Final   03/12/2019 1.19 0.76 - 1.46 ng/dL Final "   11/19/2018 1.16 0.76 - 1.46 ng/dL Final   10/31/2017 1.36 0.76 - 1.46 ng/dL Final   05/23/2017 1.31 0.76 - 1.46 ng/dL Final       Reviewed normal thyroid labs, will continue to monitor, continue with Synthroid 75 MCG DD  - **TSH with free T4 reflex FUTURE 6mo    2. Mild recurrent major depression (H)  Slightly worsened  Recommended to increase the dose of sertraline from 50 mg to 75 mg daily, recheck in 6 months or sooner if needed  - sertraline (ZOLOFT) 50 MG tablet; Take 1.5 tablets (75 mg) by mouth daily  Dispense: 135 tablet; Refill: 1    3. Anxiety  as above  Reviewed relaxation techniques  - sertraline (ZOLOFT) 50 MG tablet; Take 1.5 tablets (75 mg) by mouth daily  Dispense: 135 tablet; Refill: 1       Chart documentation done in part with Dragon Voice recognition Software. Although reviewed after completion, some word and grammatical error may remain.    See Patient Instructions    No follow-ups on file.    Gabbie Hicks MD  [facility]

## 2020-02-12 NOTE — RESULT ENCOUNTER NOTE
Dear Chanel,  Your thyroid labs are in normal range, this is good. As we discussed today, let us increase zoloft to 75mg.daily, we will recheck at your next visit in a couple of months.   Let me know if you have any questions. Take care.  Gabbie Hicks MD

## 2020-03-11 ENCOUNTER — TELEPHONE (OUTPATIENT)
Dept: ORTHOPEDICS | Facility: CLINIC | Age: 55
End: 2020-03-11

## 2020-03-11 NOTE — TELEPHONE ENCOUNTER
[facility] Call Center    Phone Message    May a detailed message be left on voicemail: yes     Reason for Call: Other: pt having capsular contracture, 3 rd time she has had it, pt needs Singulair to help reduce inflamation, her Dr Hicks is on vaction, pt is in some pain, please advise with patient     Action Taken: Message routed to:  Adult Clinics: Surgery, Plastic p [phone]    Travel Screening: Not Applicable

## 2020-03-12 NOTE — TELEPHONE ENCOUNTER
Pt called, No answer.  does not identify pt. Left general message for pt to call the [facility] back at [phone].    Patient should be seen with our plastic surgeon Dr. Russell due to capsular contracture. Writer also confirmed with Dr. Nichole that Dr. Russell would be appropriate.    Maya Rodriguez LPN

## 2020-03-12 NOTE — TELEPHONE ENCOUNTER
Pt called. Left message with a male to have patient call the [facility] back at [phone].    Maya Rodriguez LPN

## 2020-03-19 NOTE — TELEPHONE ENCOUNTER
Left another message with a male at the home number to call to see if she still wants to consult with a breast surgeon for capsular contractor. If so, please schedule with Dr. Russell.     Maya Rodriguez LPN

## 2020-03-20 NOTE — TELEPHONE ENCOUNTER
Multiple attempts to reach patient and messages left.     Closing encounter.    Maya Rodriguez LPN

## 2020-03-28 ENCOUNTER — MYC MEDICAL ADVICE (OUTPATIENT)
Dept: PEDIATRICS | Facility: CLINIC | Age: 55
End: 2020-03-28

## 2020-03-28 DIAGNOSIS — F41.9 ANXIETY: ICD-10-CM

## 2020-03-28 DIAGNOSIS — F33.0 MILD RECURRENT MAJOR DEPRESSION (H): ICD-10-CM

## 2020-03-30 NOTE — TELEPHONE ENCOUNTER
Refill is sent as requested   Please help schedule for video/TC visit for her email concerns and med check

## 2020-03-30 NOTE — TELEPHONE ENCOUNTER
Routing to provider to review and advise.    Camille Whittington, RN, BSN, PHN  [facility]

## 2020-03-31 ENCOUNTER — VIRTUAL VISIT (OUTPATIENT)
Dept: PEDIATRICS | Facility: CLINIC | Age: 55
End: 2020-03-31
Payer: COMMERCIAL

## 2020-03-31 DIAGNOSIS — Z85.3 PERSONAL HISTORY OF BREAST CANCER: ICD-10-CM

## 2020-03-31 DIAGNOSIS — I89.0 LYMPHEDEMA: ICD-10-CM

## 2020-03-31 DIAGNOSIS — F33.0 MILD RECURRENT MAJOR DEPRESSION (H): Primary | ICD-10-CM

## 2020-03-31 DIAGNOSIS — E03.9 ACQUIRED HYPOTHYROIDISM: ICD-10-CM

## 2020-03-31 DIAGNOSIS — Z90.13 S/P BILATERAL MASTECTOMY: ICD-10-CM

## 2020-03-31 DIAGNOSIS — N64.89 BREAST ASYMMETRY: ICD-10-CM

## 2020-03-31 DIAGNOSIS — F41.9 ANXIETY: ICD-10-CM

## 2020-03-31 PROCEDURE — 99213 OFFICE O/P EST LOW 20 MIN: CPT | Mod: TEL | Performed by: FAMILY MEDICINE

## 2020-03-31 ASSESSMENT — PATIENT HEALTH QUESTIONNAIRE - PHQ9
SUM OF ALL RESPONSES TO PHQ QUESTIONS 1-9: 0
5. POOR APPETITE OR OVEREATING: NOT AT ALL

## 2020-03-31 ASSESSMENT — ANXIETY QUESTIONNAIRES
5. BEING SO RESTLESS THAT IT IS HARD TO SIT STILL: NOT AT ALL
1. FEELING NERVOUS, ANXIOUS, OR ON EDGE: NOT AT ALL
2. NOT BEING ABLE TO STOP OR CONTROL WORRYING: NOT AT ALL
7. FEELING AFRAID AS IF SOMETHING AWFUL MIGHT HAPPEN: NOT AT ALL
3. WORRYING TOO MUCH ABOUT DIFFERENT THINGS: NOT AT ALL
GAD7 TOTAL SCORE: 0
6. BECOMING EASILY ANNOYED OR IRRITABLE: NOT AT ALL

## 2020-03-31 NOTE — PATIENT INSTRUCTIONS
Please fax the physical therapy referral to [facility]  Please fax the plastic surgery referral to [facility] as patient requested

## 2020-03-31 NOTE — PROGRESS NOTES
"Subjective     Chanel Israel is a 54 year old female who is being evaluated via a billable telephone visit.      The patient has been notified of following:     \"This telephone visit will be conducted via a call between you and your physician/provider. We have found that certain health care needs can be provided without the need for a physical exam.  This service lets us provide the care you need with a short phone conversation.  If a prescription is necessary we can send it directly to your pharmacy.  If lab work is needed we can place an order for that and you can then stop by our lab to have the test done at a later time.    If during the course of the call the physician/provider feels a telephone visit is not appropriate, you will not be charged for this service.\"     Patient has given verbal consent for Telephone visit?  Yes    Chanel Israel complains of   Chief Complaint   Patient presents with     Recheck Medication       ALLERGIES  Erythromycin    Depression and Anxiety Follow-Up  Patient with past medical history significant for personal history of breast malignancy, status post bilateral mastectomy, lymphedema, depression, anxiety, Hashimoto's thyroiditis is here for a telephone visit for medication recheck after adjusting her dose of Zoloft due to slightly worsened her depression and anxiety.  Patient feels improvement in symptoms however wants to continue with Zoloft 25 mg daily  She is also requesting referral for her lymphedema  Patient goes to [facility] for her edema from status post bilateral mastectomy  Due to recurrent pandemic viral outbreak, patient is not able to go for therapy  As advised by her plastic surgeon, she has been taking over-the-counter ephedrine guaifenesin medications to improve lymphatic drainage.  Patient is requesting new referral for her lymphedema therapy for this year per her insurance.  Patient is also planning to see her plastic surgeon Dr. Todd " at the [facility] for follow-up on her arm swelling from lymphedema, needing a specific referral to them today    How are you doing with your depression since your last visit? Improved     How are you doing with your anxiety since your last visit?  Improved     Are you having other symptoms that might be associated with depression or anxiety? No    Have you had a significant life event? No     Do you have any concerns with your use of alcohol or other drugs? No    Social History     Tobacco Use     Smoking status: Never Smoker     Smokeless tobacco: Never Used   Substance Use Topics     Alcohol use: Yes     Comment: occ     Drug use: No     PHQ 4/11/2018 10/22/2018 10/22/2019   PHQ-9 Total Score 3 2 3   Q9: Thoughts of better off dead/self-harm past 2 weeks Not at all Not at all Not at all     MERISSA-7 SCORE 4/11/2018 10/22/2018 10/22/2019   Total Score - - -   Total Score 0 0 0     Last PHQ-9 3/31/2020   1.  Little interest or pleasure in doing things 0   2.  Feeling down, depressed, or hopeless 0   3.  Trouble falling or staying asleep, or sleeping too much 0   4.  Feeling tired or having little energy 0   5.  Poor appetite or overeating 0   6.  Feeling bad about yourself 0   7.  Trouble concentrating 0   8.  Moving slowly or restless 0   Q9: Thoughts of better off dead/self-harm past 2 weeks 0   PHQ-9 Total Score 0   Difficulty at work, home, or with people -     MERISSA-7  3/31/2020   1. Feeling nervous, anxious, or on edge 0   2. Not being able to stop or control worrying 0   3. Worrying too much about different things 0   4. Trouble relaxing 0   5. Being so restless that it is hard to sit still 0   6. Becoming easily annoyed or irritable 0   7. Feeling afraid, as if something awful might happen 0   MERISSA-7 Total Score 0   If you checked any problems, how difficult have they made it for you to do your work, take care of things at home, or get along with other people? -     In the past two weeks have you had  thoughts of suicide or self-harm?  No.    Do you have concerns about your personal safety or the safety of others?   No    Suicide Assessment Five-step Evaluation and Treatment (SAFE-T)      How many servings of fruits and vegetables do you eat daily?  4 or more    On average, how many sweetened beverages do you drink each day (Examples: soda, juice, sweet tea, etc.  Do NOT count diet or artificially sweetened beverages)?   0    How many days per week do you exercise enough to make your heart beat faster? 7    How many minutes a day do you exercise enough to make your heart beat faster? 30 - 60    How many days per week do you miss taking your medication? 0           Patient Active Problem List   Diagnosis     Personal history of breast cancer     S/P bilateral mastectomy     Urge incontinence of urine     Overweight (BMI 25.0-29.9)     Mild recurrent major depression (H)     Family history of diabetes mellitus (DM)     Family history of thyroid disease     Plantar fasciitis, bilateral     Seasonal allergies     Mixed hyperlipidemia     Chronic rhinitis     Breast implant asymmetry     ACP (advance care planning)     Lymphedema     Family history of ischemic heart disease     Seasonal allergic rhinitis     Acquired hypothyroidism     Glaucoma suspect, bilateral     Family history of glaucoma     Age-related nuclear cataract of both eyes     Seasonal allergic rhinitis due to other allergic trigger, unspecified chronicity     Family history of breast cancer in sister     Family history of skin cancer     Actinic keratoses     Hyperlipidemia LDL goal <160     Hypothyroidism due to Hashimoto's thyroiditis     Bruxism (teeth grinding)     Anxiety     Past Surgical History:   Procedure Laterality Date     ADENOIDECTOMY       COLONOSCOPY N/A 8/22/2014    Procedure: COLONOSCOPY;  Surgeon: Duane, William Charles, MD;  Location: [facility] OR     COSMETIC SURGERY       CYSTOSCOPY       HC REMOVAL OF ANAL FISSURE  2007     MASTECTOMY,  SIMPLE RT/LT/MABLE       RECONSTRUCT BREAST BILATERAL  2008     TONSILLECTOMY         Social History     Tobacco Use     Smoking status: Never Smoker     Smokeless tobacco: Never Used   Substance Use Topics     Alcohol use: Yes     Comment: occ     Family History   Problem Relation Age of Onset     Diabetes Mother      Thyroid Disease Mother      Glaucoma Mother      Macular Degeneration Mother      Hypertension Mother      Diabetes Sister      Thyroid Disease Sister      Depression Sister      Endometrial Cancer Sister 51     Breast Cancer Sister      Cancer Father         skin cancer     Glaucoma Father      Hypertension Father      Coronary Artery Disease Brother          at 43     Cerebrovascular Disease Maternal Grandfather          Current Outpatient Medications   Medication Sig Dispense Refill     Calcium Carbonate-Vitamin D (CALCIUM + D PO) Take by mouth 2 times daily       Coenzyme Q10 (CO Q 10) 100 MG CAPS Take 1 capsule by mouth daily        fluocinolone (SYNALAR) 0.025 % cream Apply topically 2 times daily Apply to affected areas of mouth as needed. 60 g 5     fluticasone (FLONASE) 50 MCG/ACT nasal spray as needed   0     Glucosamine-Chondroitin-MSM (TRIPLE FLEX PO) Take by mouth 2 times daily       hydrocortisone 2.5 % cream Apply topically as needed       L-GLUTAMINE 1 capsule 2 times daily        MAGNESIUM PO Take 1 capsule by mouth daily        Multiple Vitamin (MULTI-VITAMIN PO) Take by mouth daily        Multiple Vitamins-Minerals (ZINC PO) Take by mouth daily       NIACIN CR PO Take 500 mg by mouth       order for DME Equipment being ordered: mouthguard 1 Units 0     order for DME Compression sleeve to bilateral UE. 2 Units 6     Probiotic Product (PROBIOTIC DAILY PO) Take 1 capsule by mouth 2 times daily       sertraline (ZOLOFT) 50 MG tablet Take 1.5 tablets (75 mg) by mouth daily (Patient taking differently: Take 50 mg by mouth daily ) 135 tablet 1     UNABLE TO FIND Take 1 tablet by mouth  daily MEDICATION NAME: Marisela Elderberry Supplement       ASPIRIN PO Take 81 mg by mouth daily       Flaxseed, Linseed, (FLAX SEED OIL) 1000 MG capsule Take 2 capsules (2,000 mg) by mouth 2 times daily       sertraline (ZOLOFT) 25 MG tablet Take 1 tablet of 25 mg along with Zoloft 50 mg daily (Patient not taking: Reported on 3/31/2020) 90 tablet 0     SYNTHROID 75 MCG tablet Take 1 tablet (75 mcg) by mouth daily (Patient not taking: Reported on 2/11/2020) 90 tablet 3     Allergies   Allergen Reactions     Erythromycin Nausea     Recent Labs   Lab Test 02/11/20  1213 10/22/19  0924 03/12/19  1654  10/22/18  0939 06/28/18  1403  10/31/17  0731   A1C  --   --  5.2  --   --   --   --   --    LDL  --  177*  --   --  175*  --   --  104*   HDL  --  59  --   --  65  --   --  60   TRIG  --  172*  --   --  139  --   --  141   ALT  --   --  41  --  39 44   < >  --    CR  --   --  0.81  --  0.87 0.94   < >  --    GFRESTIMATED  --   --  82  --  68 62   < >  --    GFRESTBLACK  --   --  >90  --  82 75   < >  --    POTASSIUM  --   --  3.7  --   --  4.1  --   --    TSH 2.28  --  0.94   < >  --  0.73  --  1.44    < > = values in this interval not displayed.      BP Readings from Last 3 Encounters:   02/11/20 104/75   10/22/19 115/73   06/28/19 119/72    Wt Readings from Last 3 Encounters:   02/11/20 63.5 kg (140 lb 1.6 oz)   10/22/19 64.9 kg (143 lb)   06/28/19 65.7 kg (144 lb 12.8 oz)                    Reviewed and updated as needed this visit by Provider         Review of Systems   ROS COMP: CONSTITUTIONAL: NEGATIVE for fever, chills, change in weight  RESP: NEGATIVE for significant cough or SOB  BREAST: Status post bilateral mastectomy for history of breast cancer  CV: NEGATIVE for chest pain, palpitations or peripheral edema  MUSCULOSKELETAL: Lymphedema upper extremity  ENDOCRINE: NEGATIVE for temperature intolerance, skin/hair changes and history of hypothyroidism  PSYCHIATRIC: NEGATIVE for changes in mood or affect  History  of depression and anxiety         Diagnostic Test Results:  Labs reviewed in Epic        Assessment/Plan:  1. Mild recurrent major depression (H)  Stable and improved, continue with Zoloft 25 mg daily, follow-up for recheck in 3 months or sooner if needed    2. Acquired hypothyroidism  TSH   Date Value Ref Range Status   02/11/2020 2.28 0.40 - 4.00 mU/L Final   03/12/2019 0.94 0.40 - 4.00 mU/L Final   11/19/2018 1.76 0.40 - 4.00 mU/L Final   06/28/2018 0.73 0.40 - 4.00 mU/L Final   10/31/2017 1.44 0.40 - 4.00 mU/L Final         3. Anxiety  As above in problem #1    4. Lymphedema  Reviewed normal thyroid labs from 2/23/2020    Referral made for plastic surgery and lymphedema therapy [facility] as patient requested  - PLASTIC SURGERY REFERRAL  - LYMPHEDEMA THERAPY REFERRAL; Future    5. Personal history of breast cancer  as above    - PLASTIC SURGERY REFERRAL  - LYMPHEDEMA THERAPY REFERRAL; Future    6. S/P bilateral mastectomy  as above    - PLASTIC SURGERY REFERRAL  - LYMPHEDEMA THERAPY REFERRAL; Future    7. Breast implant asymmetry  as above    - PLASTIC SURGERY REFERRAL  - LYMPHEDEMA THERAPY REFERRAL; Future    No follow-ups on file.      Phone call duration:  13 minutes  Chart documentation done in part with Dragon Voice recognition Software. Although reviewed after completion, some word and grammatical error may remain.    Gabbie Hicks MD

## 2020-04-01 ASSESSMENT — ANXIETY QUESTIONNAIRES: GAD7 TOTAL SCORE: 0

## 2020-05-12 ENCOUNTER — HOSPITAL ENCOUNTER (OUTPATIENT)
Dept: OCCUPATIONAL THERAPY | Facility: CLINIC | Age: 55
Setting detail: THERAPIES SERIES
End: 2020-05-12
Payer: COMMERCIAL

## 2020-05-12 PROCEDURE — 97140 MANUAL THERAPY 1/> REGIONS: CPT | Mod: GO

## 2020-05-12 PROCEDURE — 97165 OT EVAL LOW COMPLEX 30 MIN: CPT | Mod: GO

## 2020-05-12 NOTE — PROGRESS NOTES
05/12/20 1400   Rehab Discipline   Discipline OT   Type of Visit   Type of visit Initial Edema Evaluation   General Information   Start of care 05/12/20   Referring physician Gabbie Hicks   Orders Evaluate and treat as indicated   Order date 03/31/20   Medical diagnosis lymphedema   Onset of illness / date of surgery 03/31/20  (chronic lymphedema 2/2 CA care)   Edema onset 03/31/20  (chronic lymphedema since care 2007)   Affected body parts LUE;Trunk   Edema etiology Cancer with lymph node dissection;Chemo;Surgery   Location - Cancer with lymph node dissection L axilla nearly total LN removal reported 2007   Chemotherapy comments Pt reports heavier levels lymphedema when receiving chemo in past   Edema etiology comments Pt went through breast cancer care in 2007 with B mastectomy and chemotherapy reported. She reports mild LUE lymphedema and some mild L sided trunkal lymphedema since that care in 2007. Pt has a private lymphedema therapist (PT in private practice) she cannot access 2/2 covid and has seen and felt increases in LUE/trunkal lymphedema.   Pertinent history of current problem (PT: include personal factors and/or comorbidities that impact the POC; OT: include additional occupational profile info) PMH significant for breast cancer 2007 resulting i B mastectomy and chemo. 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   Surgical / medical history reviewed Yes   Prior level of functional mobility I   Prior treatment Compression garments;Exercise;MLD   Community support Family / friend caregiver;Therapy services   Patient role / employment history Employed   Living environment [facility]   Fall Risk Screen   Fall screen completed by OT   Have you fallen 2 or more times in the past year? No   Have you fallen and had an injury in the past year? No   Is patient a fall risk? No   Abuse Screen (yes response referral indicated)   Feels  Unsafe at Home or Work/School no   Feels Threatened by Someone no   Does Anyone Try to Keep You From Having Contact with Others or Doing Things Outside Your Home? no   Physical Signs of Abuse Present no   System Outcome Measures   Lymphedema Life Impact Scale (score range 0-72). A higher score indicates greater impairment. 27   Subjective Report   Patient report of symptoms heaviness in LUE   Patient / Family Goals   Patient / family goals statement to reduce LUE/trunkal lymphedema 2/2 unale to access provate CDT therapist and is flaring   Pain   Patient currently in pain No   Cognitive Status   Orientation Orientation to person, place and time   Level of consciousness Alert   Follows commands and answers questions 100% of the time   Personal safety and judgement Intact   Edema Exam / Assessment   Skin condition Non-pitting;Intact   Skin condition comments mild non pitting lymphedema LUE palpated forearm-axilla L side. Visibly undetectable   Scar   (B mastectomy scars-no issues noted)   Capillary refill Symmetrical   Radial pulse Symmetrical   Stemmer sign Negative   Girth Measurements   Girth Measurements   (will obatin upon return for services)   Range of Motion   ROM comments WNL   Strength   Strength comments NT'd   Activities of Daily Living   Activities of Daily Living I   Bed Mobility   Bed mobility I   Transfers   Transfers I   Gait / Locomotion   Gait / Locomotion I   Sensory   Sensory perception comments BUE neuropathy reported   Coordination   Coordination Gross motor coordination appropriate   Muscle Tone   Muscle tone No deficits were identified   Planned Edema Interventions   Planned edema interventions Manual lymph drainage;Gradient compression bandaging;Fit for compression garment;Exercises;Precautions to prevent infection / exacerbation;Education;Skin care / precautions;Home management program development   Clinical Impression   Criteria for skilled therapeutic intervention met Yes   Therapy  diagnosis lymphedema   Influenced by the following impairments / conditions Stage 1   Assessment of Occupational Performance 1-3 Performance Deficits   Identified Performance Deficits infection risk; heaviness in limb compromising self cares and IADL tasks   Clinical Decision Making (Complexity) Low complexity   Treatment Frequency 1x/week;2x/week   Treatment duration 1x/wk x 3 weeks then 2x/wk x 1 week, then 0x/wk x 3 weeks, then 1x/wk x 1 week   Patient / family and/or staff in agreement with plan of care Yes   Risks and benefits of therapy have been explained Yes   Clinical impression comments Pt will beenfit from skilled lymphedema services to reduce LUE/LUQ lymphedema for reduciton in heaviness of limb for imporved ADL and IADL engagement and promote infection risk reduciton.   Goals   Edema Eval Goals 1;2;3;4   Goal 1   Goal identifier Education   Goal description Pt will report understandign s/s lymphedema, s/s skin infections, and treatment options for education needed for optimal home management chronic LUE/LUQ lymphedema   Target date 05/12/20   Date met 05/12/20   Goal 2   Goal identifier MLD/HEP   Goal description Demonstrate independence in performing prescribed exercises to facilate the lymph system and muscle pumping systems for max reducitons in LUE/LUQ lymphedema needed for infection risk reduciton and promote more ease with ADL/IADL tasks   Target date 08/07/20   Goal 3   Goal identifier GCB Donning   Goal description Demonstrate independence in applying gradient compression bandages to build I with home management of LUE/LUQ lymphedema needed for infection risk reduciton and establish more efficiency and control over lymphedema home management   Target date 08/07/20   Goal 4   Goal identifier Limb Heaviness   Goal description Pt will report a reduction in LUE heaviness for imporved ADL/IADL engagement and reduce risk skin infections   Target date 08/07/20   Total Evaluation Time   OT Eval, Low  Complexity Minutes (10818) 11

## 2020-05-20 ENCOUNTER — HOSPITAL ENCOUNTER (OUTPATIENT)
Dept: OCCUPATIONAL THERAPY | Facility: CLINIC | Age: 55
Setting detail: THERAPIES SERIES
End: 2020-05-20
Attending: FAMILY MEDICINE
Payer: COMMERCIAL

## 2020-05-20 DIAGNOSIS — I89.0 LYMPHEDEMA: Primary | ICD-10-CM

## 2020-05-20 PROCEDURE — 97140 MANUAL THERAPY 1/> REGIONS: CPT | Mod: GO

## 2020-05-26 ENCOUNTER — HOSPITAL ENCOUNTER (OUTPATIENT)
Dept: OCCUPATIONAL THERAPY | Facility: CLINIC | Age: 55
Setting detail: THERAPIES SERIES
End: 2020-05-26
Attending: FAMILY MEDICINE
Payer: COMMERCIAL

## 2020-05-26 PROCEDURE — 97140 MANUAL THERAPY 1/> REGIONS: CPT | Mod: GO

## 2020-06-08 NOTE — PROGRESS NOTES
06/08/20 0900   Signing Clinician's Name / Credentials   Signing clinician's name / credentials Jeison Henderson, OTR/L, CLT   Session Number   Session Number DISCHARGE NOTE   Comments   Comments Patient canceled upcoming appointments via MyChart; she is planning to resume tx with former therapist.  Unable to determine progress toward goals, discharge from edema treatment.

## 2020-06-09 DIAGNOSIS — R53.83 OTHER FATIGUE: Primary | ICD-10-CM

## 2020-06-09 DIAGNOSIS — E03.9 ACQUIRED HYPOTHYROIDISM: ICD-10-CM

## 2020-06-09 LAB
T4 FREE SERPL-MCNC: 1.02 NG/DL (ref 0.76–1.46)
TSH SERPL DL<=0.005 MIU/L-ACNC: 3.35 MU/L (ref 0.4–4)

## 2020-06-09 PROCEDURE — 82306 VITAMIN D 25 HYDROXY: CPT | Performed by: FAMILY MEDICINE

## 2020-06-09 PROCEDURE — 84443 ASSAY THYROID STIM HORMONE: CPT | Performed by: FAMILY MEDICINE

## 2020-06-09 PROCEDURE — 84439 ASSAY OF FREE THYROXINE: CPT | Performed by: FAMILY MEDICINE

## 2020-06-09 PROCEDURE — 36415 COLL VENOUS BLD VENIPUNCTURE: CPT | Performed by: FAMILY MEDICINE

## 2020-06-10 LAB — DEPRECATED CALCIDIOL+CALCIFEROL SERPL-MC: 65 UG/L (ref 20–75)

## 2020-06-11 NOTE — RESULT ENCOUNTER NOTE
Dear Ana,  Your vitamin D is in normal range, this is good.  Let me know if you have any questions. Take care.  Gabbie Hicks MD

## 2020-06-26 ENCOUNTER — TELEPHONE (OUTPATIENT)
Dept: ONCOLOGY | Facility: CLINIC | Age: 55
End: 2020-06-26

## 2020-06-26 NOTE — TELEPHONE ENCOUNTER
I called this patient to reschedule the appointment with Dr Lopez. This patient is going to follow up with her Primary Care physician and does not want to reschedule the appointment. -cap-06/26/2020

## 2020-08-05 DIAGNOSIS — E03.9 ACQUIRED HYPOTHYROIDISM: ICD-10-CM

## 2020-08-05 DIAGNOSIS — E78.5 HYPERLIPIDEMIA LDL GOAL <160: ICD-10-CM

## 2020-08-05 LAB
CHOLEST SERPL-MCNC: 232 MG/DL
HDLC SERPL-MCNC: 57 MG/DL
LDLC SERPL CALC-MCNC: 152 MG/DL
NONHDLC SERPL-MCNC: 175 MG/DL
T4 FREE SERPL-MCNC: 1.31 NG/DL (ref 0.76–1.46)
TRIGL SERPL-MCNC: 117 MG/DL
TSH SERPL DL<=0.005 MIU/L-ACNC: 0.64 MU/L (ref 0.4–4)

## 2020-08-05 PROCEDURE — 84443 ASSAY THYROID STIM HORMONE: CPT | Performed by: FAMILY MEDICINE

## 2020-08-05 PROCEDURE — 83540 ASSAY OF IRON: CPT | Performed by: FAMILY MEDICINE

## 2020-08-05 PROCEDURE — 83550 IRON BINDING TEST: CPT | Performed by: FAMILY MEDICINE

## 2020-08-05 PROCEDURE — 80061 LIPID PANEL: CPT | Performed by: FAMILY MEDICINE

## 2020-08-05 PROCEDURE — 36415 COLL VENOUS BLD VENIPUNCTURE: CPT | Performed by: FAMILY MEDICINE

## 2020-08-05 PROCEDURE — 84439 ASSAY OF FREE THYROXINE: CPT | Performed by: FAMILY MEDICINE

## 2020-08-05 PROCEDURE — 82728 ASSAY OF FERRITIN: CPT | Performed by: FAMILY MEDICINE

## 2020-08-05 NOTE — RESULT ENCOUNTER NOTE
Con Buchanan,  Your lab test showed normal thyroid results.  Your fasting cholesterol numbers continue to be abnormal but much improved from last year, this is reassuring.  Please continue with your efforts on healthy eating and regular exercises.   Let me know if you have any questions. Take care.  Gabbie Hicks MD

## 2020-08-06 ENCOUNTER — MYC MEDICAL ADVICE (OUTPATIENT)
Dept: ENDOCRINOLOGY | Facility: CLINIC | Age: 55
End: 2020-08-06

## 2020-08-06 ENCOUNTER — MYC MEDICAL ADVICE (OUTPATIENT)
Dept: PEDIATRICS | Facility: CLINIC | Age: 55
End: 2020-08-06
Payer: COMMERCIAL

## 2020-08-06 DIAGNOSIS — L65.9 HAIR LOSS: Primary | ICD-10-CM

## 2020-08-06 NOTE — TELEPHONE ENCOUNTER
My chart message routed to provider to further review for labs orders and referral?.Mariajose LAWTON,CMA

## 2020-08-07 ENCOUNTER — TELEPHONE (OUTPATIENT)
Dept: PEDIATRICS | Facility: CLINIC | Age: 55
End: 2020-08-07

## 2020-08-07 DIAGNOSIS — L65.9 HAIR LOSS: Primary | ICD-10-CM

## 2020-08-07 DIAGNOSIS — L21.9 SEBORRHEIC DERMATITIS: ICD-10-CM

## 2020-08-07 LAB
FERRITIN SERPL-MCNC: 118 NG/ML (ref 8–252)
IRON SATN MFR SERPL: 32 % (ref 15–46)
IRON SERPL-MCNC: 92 UG/DL (ref 35–180)
TIBC SERPL-MCNC: 289 UG/DL (ref 240–430)

## 2020-08-07 NOTE — TELEPHONE ENCOUNTER
----- Message -----    From: Chanel Israel    Sent: 8/6/2020  11:28 AM CDT    To: [facility] Patient Access    Subject: Referral Request                                   Chanel Prince Israel would like to request a referral.    Reason: Excessive hair loss, seborrheic dermatitis    Requested provider: Dr. Patrice Faulkner, dermatologist at [facility] [place]    Comment:    Hi Dr. Hicks,    I am requesting referral to a Dermatologist who is out of network since [facility] could not see me until September 2nd to rule out any scalp issues, as I have a history of seborrheic Dermatitis.  I have an appointment scheduled with Dr. Patrice Faulkner At [facility]. Here is their contact information:    [facility], [place], [place]    Phone (general inquiries): [phone]    Address: [address] [place] [address], [address], [place] [address]       My appointment is scheduled for Monday, August 10 @ 12:40 p.m.       Thank you,    Ana israel (Kathleen)

## 2020-08-07 NOTE — RESULT ENCOUNTER NOTE
Dear Chanel,  Your test results for iron and iron stores are normal range, this is reassuring.   Let me know if you have any questions. Take care.  Gabbie Hicks MD

## 2020-08-07 NOTE — TELEPHONE ENCOUNTER
Dermatology referral made as requested.  Please fax to where it needs to go and update patient when done.

## 2020-08-08 NOTE — PROGRESS NOTES
"Chanel Israel is a 55 year old female who is being evaluated via a billable video visit.      The patient has been notified of following:     \"This video visit will be conducted via a call between you and your physician/provider. We have found that certain health care needs can be provided without the need for an in-person physical exam.  This service lets us provide the care you need with a video conversation.  If a prescription is necessary we can send it directly to your pharmacy.  If lab work is needed we can place an order for that and you can then stop by our lab to have the test done at a later time.    Video visits are billed at different rates depending on your insurance coverage.  Please reach out to your insurance provider with any questions.    If during the course of the call the physician/provider feels a video visit is not appropriate, you will not be charged for this service.\"    Patient has given verbal consent for Video visit?yes    Video-Visit Details    Type of service:  Video Visit    Video visit duration: 16 min  Originating Location (pt. Location): home  Distant Location (provider location):  [facility]     Platform used for Video Visit:DORI Lopez MD, MD    Oncology Follow-up visit:  Date on this visit: Aug 11, 2020      Primary Care Physician: Gabbie Pate   Dx:   1. Breast cancer  She has a history of stage IIB breast cancer. She underwent L MRM and right prophylactic mastectomy on 5/14/2007. Pathology from the surgery showed  Grade 3 invasive ductal carcinoma, measuring 3.5x3.3 cm. One left axillary SLN was positive for malignancy and additional 13 resected left axillary LNs were negative for malignancy. 3 right  SLNs were negative for malignancy. The tumor was ER negative MI negative HER2 Lisa negative by FISH.  She was given adjuvant chemotherapy per clinical trial  with weekly Adriamycin IV x15 weeks and also took oral Cytoxan " daily x15 weeks. She was seen by the genetic counselor and the testing was negative for BRCA 1 and 2.  She was under the care of Dr. Ramirez at [facility] and preferred to continue to f/up with oncologist and has transferred her care to us in 05/2014.    She underwent bilateral removal and replacement with alloderm and larger silicone implants by  Dr. Sarah Todd on 6/21/2016      2. Personal and Fhx of breast cancer-  her sister was diagnosed with breast cancer at the age of 56. Her sister had endometrial cancer 5 years ago. Her sister's cancer was ER+MD+ and her sister was on oral HRT for years prior. They are two youngest sisters of 7 siblings. Pt tested negative (July 2018) for 28 genes -[facility] panel  - (APC, LENNY, BARD1, BRCA1, BRCA2, BRIP1, BMPR1A, CDH1, CDK4, CDKN2A (p14ARF and b40UZI5w), CHEK2, EPCAM, GREM1, MLH1, MSH2, MSH6, MUTYH, NBN, PALB2, PMS2, POLD1, POLE, PTEN, RAD51C, RAD51D, SMAD4, STK11, and TP53).      History Of Present Illness:  Ms. Israel is  postmenopausal female who presents for f/up of Hx of triple negative breast cancer. She has had hair loss for two months and had a normal TSH. She was seen by dermatology and was felt to likely have androgenic hair loss. She has also been feeling fatigued although in the last couple of days this has been improving.  She remains on Zoloft for depression. She has no chest wall related complaints and no lumps or bumps.   In addition, a complete 12 point  review of systems is negative.      Past Medical/Surgical History:  Past Medical History:   Diagnosis Date     Asthma, mild intermittent      Breast cancer (H)      Depression      Endometriosis      Heart murmur     Patient reported.     PONV (postoperative nausea and vomiting)      Past Surgical History:   Procedure Laterality Date     ADENOIDECTOMY       COLONOSCOPY N/A 8/22/2014    Procedure: COLONOSCOPY;  Surgeon: Duane, William Charles, MD;  Location: [facility] OR     COSMETIC SURGERY       CYSTOSCOPY       HC  REMOVAL OF ANAL FISSURE  2007     MASTECTOMY, SIMPLE RT/LT/MABLE       RECONSTRUCT BREAST BILATERAL  2008     TONSILLECTOMY       FHx and social Hx reviewed.  Employer: [employer]. Never smoker.    Allergies:  Allergies as of 08/11/2020 - Reviewed 03/31/2020   Allergen Reaction Noted     Erythromycin Nausea 07/08/2013     Current Medications:  Current Outpatient Medications   Medication     Calcium Carbonate-Vitamin D (CALCIUM + D PO)     Coenzyme Q10 (CO Q 10) 100 MG CAPS     fluocinolone (SYNALAR) 0.025 % cream     fluticasone (FLONASE) 50 MCG/ACT nasal spray     Glucosamine-Chondroitin-MSM (TRIPLE FLEX PO)     hydrocortisone 2.5 % cream     L-GLUTAMINE     MAGNESIUM PO     Multiple Vitamin (MULTI-VITAMIN PO)     Multiple Vitamins-Minerals (ZINC PO)     NIACIN CR PO     order for DME     order for DME     order for DME     Probiotic Product (PROBIOTIC DAILY PO)     sertraline (ZOLOFT) 50 MG tablet     SYNTHROID 75 MCG tablet     UNABLE TO FIND     No current facility-administered medications for this visit.        Physical Exam:  Wt 63 kg (139 lb)   BMI 25.84 kg/m        Constitutional: alert and in no distress  Eyes: No redness or discharge  Respiratory: No cough or labored breathing.  Musculoskeletal: Full range of motion in extremities.  Skin: no visible skin lesions or discoloration  Neurological: No tremors and denies headache.  Psychiatric: Mentation appears normal and affect is normal as well.  Alert and oriented x3.  The rest the comprehensive physical examination is deferred due to public health emergency video visit restrictions.    Laboratory/Imaging Studies    Component      Latest Ref Rng & Units 8/11/2020   WBC      4.0 - 11.0 10e9/L 5.2   RBC Count      3.8 - 5.2 10e12/L 4.63   Hemoglobin      11.7 - 15.7 g/dL 14.1   Hematocrit      35.0 - 47.0 % 42.6   MCV      78 - 100 fl 92   MCH      26.5 - 33.0 pg 30.5   MCHC      31.5 - 36.5 g/dL 33.1   RDW      10.0 - 15.0 % 12.2   Platelet  Count      150 - 450 10e9/L 222   Diff Method       Automated Method   % Neutrophils      % 63.7   % Lymphocytes      % 27.3   % Monocytes      % 7.1   % Eosinophils      % 1.3   % Basophils      % 0.4   % Immature Granulocytes      % 0.2   Absolute Neutrophil      1.6 - 8.3 10e9/L 3.3   Absolute Lymphocytes      0.8 - 5.3 10e9/L 1.4   Absolute Monocytes      0.0 - 1.3 10e9/L 0.4   Absolute Eosinophils      0.0 - 0.7 10e9/L 0.1   Absolute Basophils      0.0 - 0.2 10e9/L 0.0   Abs Immature Granulocytes      0 - 0.4 10e9/L 0.0   Sodium      133 - 144 mmol/L 142   Potassium      3.4 - 5.3 mmol/L 3.9   Chloride      94 - 109 mmol/L 107   Carbon Dioxide      20 - 32 mmol/L 29   Anion Gap      3 - 14 mmol/L 6   Glucose      70 - 99 mg/dL 86   Urea Nitrogen      7 - 30 mg/dL 17   Creatinine      0.52 - 1.04 mg/dL 0.80   GFR Estimate      >60 mL/min/1.73:m2 83   GFR Estimate If Black      >60 mL/min/1.73:m2 >90   Calcium      8.5 - 10.1 mg/dL 9.1   Bilirubin Total      0.2 - 1.3 mg/dL 0.3   Albumin      3.4 - 5.0 g/dL 3.7   Protein Total      6.8 - 8.8 g/dL 7.2   Alkaline Phosphatase      40 - 150 U/L 88   ALT      0 - 50 U/L 29   AST      0 - 45 U/L 18   CRP Inflammation      0.0 - 8.0 mg/L <2.9   Sed Rate      0 - 30 mm/h 10       ASSESSMENT/PLAN:  Chanel is a very pleasant 55 year old woman with Hx of stage IIB invasive ductal breast cancer, grade 3, triple negative, s/p L MRM in 05/2007, s/p adjuvant Adriamycin and Cytoxan, with no evidence of disease recurrence since. She is also s/p right prophylactic mastectomy.   1. Personal Hx of triple negative breast cancer - She prefers to continue to follow-up with us annually for chest wall exam. She plans to have annual labs done during her visits  with Dr. Hicks  -cbcd, creat, LFTs given prior exposure to chemotherapy. She is 13 years out from her treatment and could transfer all her f/up care to Dr. Hicks with annual labs as above when she is ready.   2.  She will f/up  with Dr. Hicks or Dr. Swenson  for all her other medical needs.  3. Hair loss, fatigue- CHITO added to today's labs and negative. She will report back if her fatigue does not improve. She will be seeing endocrinology in f/up of hypothyroidism.  At the end of our visit patient verbalized understanding and concurred with the plan.

## 2020-08-10 NOTE — TELEPHONE ENCOUNTER
Patient informed 8/10 appt at 2:15 is no longer available. Scheduled for 8/17/2020 at 0945. Patient will call [facility] to see if sooner appt there.    Danna Shah LPN  Diabetes Clinic Coordinator   Adult Endocrinology and Pediatric Specialty Clinics  [facility]

## 2020-08-11 ENCOUNTER — OFFICE VISIT (OUTPATIENT)
Dept: PEDIATRICS | Facility: CLINIC | Age: 55
End: 2020-08-11
Payer: COMMERCIAL

## 2020-08-11 ENCOUNTER — VIRTUAL VISIT (OUTPATIENT)
Dept: ONCOLOGY | Facility: CLINIC | Age: 55
End: 2020-08-11
Payer: COMMERCIAL

## 2020-08-11 VITALS
TEMPERATURE: 98.8 F | DIASTOLIC BLOOD PRESSURE: 68 MMHG | SYSTOLIC BLOOD PRESSURE: 102 MMHG | BODY MASS INDEX: 25.62 KG/M2 | HEIGHT: 62 IN | OXYGEN SATURATION: 96 % | WEIGHT: 139.2 LBS | HEART RATE: 94 BPM

## 2020-08-11 VITALS — BODY MASS INDEX: 25.84 KG/M2 | WEIGHT: 139 LBS

## 2020-08-11 DIAGNOSIS — R53.83 FATIGUE, UNSPECIFIED TYPE: Primary | ICD-10-CM

## 2020-08-11 DIAGNOSIS — R21 RASH: ICD-10-CM

## 2020-08-11 DIAGNOSIS — I89.0 LYMPHEDEMA: ICD-10-CM

## 2020-08-11 DIAGNOSIS — L65.9 LOSS OF HAIR: ICD-10-CM

## 2020-08-11 DIAGNOSIS — L65.9 HAIR LOSS: Primary | ICD-10-CM

## 2020-08-11 DIAGNOSIS — Z85.3 PERSONAL HISTORY OF BREAST CANCER: ICD-10-CM

## 2020-08-11 DIAGNOSIS — L65.9 HAIR LOSS: ICD-10-CM

## 2020-08-11 DIAGNOSIS — E06.3 HYPOTHYROIDISM DUE TO HASHIMOTO'S THYROIDITIS: ICD-10-CM

## 2020-08-11 LAB
ALBUMIN SERPL-MCNC: 3.7 G/DL (ref 3.4–5)
ALP SERPL-CCNC: 88 U/L (ref 40–150)
ALT SERPL W P-5'-P-CCNC: 29 U/L (ref 0–50)
ANION GAP SERPL CALCULATED.3IONS-SCNC: 6 MMOL/L (ref 3–14)
AST SERPL W P-5'-P-CCNC: 18 U/L (ref 0–45)
BASOPHILS # BLD AUTO: 0 10E9/L (ref 0–0.2)
BASOPHILS NFR BLD AUTO: 0.4 %
BILIRUB SERPL-MCNC: 0.3 MG/DL (ref 0.2–1.3)
BUN SERPL-MCNC: 17 MG/DL (ref 7–30)
CALCIUM SERPL-MCNC: 9.1 MG/DL (ref 8.5–10.1)
CHLORIDE SERPL-SCNC: 107 MMOL/L (ref 94–109)
CO2 SERPL-SCNC: 29 MMOL/L (ref 20–32)
CREAT SERPL-MCNC: 0.8 MG/DL (ref 0.52–1.04)
CRP SERPL-MCNC: <2.9 MG/L (ref 0–8)
DIFFERENTIAL METHOD BLD: NORMAL
EOSINOPHIL # BLD AUTO: 0.1 10E9/L (ref 0–0.7)
EOSINOPHIL NFR BLD AUTO: 1.3 %
ERYTHROCYTE [DISTWIDTH] IN BLOOD BY AUTOMATED COUNT: 12.2 % (ref 10–15)
ERYTHROCYTE [SEDIMENTATION RATE] IN BLOOD BY WESTERGREN METHOD: 10 MM/H (ref 0–30)
GFR SERPL CREATININE-BSD FRML MDRD: 83 ML/MIN/{1.73_M2}
GLUCOSE SERPL-MCNC: 86 MG/DL (ref 70–99)
HCT VFR BLD AUTO: 42.6 % (ref 35–47)
HGB BLD-MCNC: 14.1 G/DL (ref 11.7–15.7)
IMM GRANULOCYTES # BLD: 0 10E9/L (ref 0–0.4)
IMM GRANULOCYTES NFR BLD: 0.2 %
LYMPHOCYTES # BLD AUTO: 1.4 10E9/L (ref 0.8–5.3)
LYMPHOCYTES NFR BLD AUTO: 27.3 %
MCH RBC QN AUTO: 30.5 PG (ref 26.5–33)
MCHC RBC AUTO-ENTMCNC: 33.1 G/DL (ref 31.5–36.5)
MCV RBC AUTO: 92 FL (ref 78–100)
MONOCYTES # BLD AUTO: 0.4 10E9/L (ref 0–1.3)
MONOCYTES NFR BLD AUTO: 7.1 %
NEUTROPHILS # BLD AUTO: 3.3 10E9/L (ref 1.6–8.3)
NEUTROPHILS NFR BLD AUTO: 63.7 %
PLATELET # BLD AUTO: 222 10E9/L (ref 150–450)
POTASSIUM SERPL-SCNC: 3.9 MMOL/L (ref 3.4–5.3)
PROT SERPL-MCNC: 7.2 G/DL (ref 6.8–8.8)
RBC # BLD AUTO: 4.63 10E12/L (ref 3.8–5.2)
SODIUM SERPL-SCNC: 142 MMOL/L (ref 133–144)
VIT B12 SERPL-MCNC: 863 PG/ML (ref 193–986)
WBC # BLD AUTO: 5.2 10E9/L (ref 4–11)

## 2020-08-11 PROCEDURE — 36415 COLL VENOUS BLD VENIPUNCTURE: CPT | Performed by: INTERNAL MEDICINE

## 2020-08-11 PROCEDURE — 82607 VITAMIN B-12: CPT | Performed by: INTERNAL MEDICINE

## 2020-08-11 PROCEDURE — 80053 COMPREHEN METABOLIC PANEL: CPT | Performed by: INTERNAL MEDICINE

## 2020-08-11 PROCEDURE — 99213 OFFICE O/P EST LOW 20 MIN: CPT | Mod: GT | Performed by: INTERNAL MEDICINE

## 2020-08-11 PROCEDURE — 85652 RBC SED RATE AUTOMATED: CPT | Performed by: INTERNAL MEDICINE

## 2020-08-11 PROCEDURE — 86038 ANTINUCLEAR ANTIBODIES: CPT | Performed by: INTERNAL MEDICINE

## 2020-08-11 PROCEDURE — 86140 C-REACTIVE PROTEIN: CPT | Performed by: INTERNAL MEDICINE

## 2020-08-11 PROCEDURE — 85025 COMPLETE CBC W/AUTO DIFF WBC: CPT | Performed by: INTERNAL MEDICINE

## 2020-08-11 PROCEDURE — 99214 OFFICE O/P EST MOD 30 MIN: CPT | Performed by: INTERNAL MEDICINE

## 2020-08-11 ASSESSMENT — MIFFLIN-ST. JEOR: SCORE: 1166.72

## 2020-08-11 ASSESSMENT — PAIN SCALES - GENERAL: PAINLEVEL: NO PAIN (0)

## 2020-08-11 NOTE — PROGRESS NOTES
Subjective     Chanel Israel is a 55 year old female who presents to clinic today for the following health issues:    HPI     Swollen Glands    Patient here concerned about swollen glands in her neck/under her jaw and armpit areas.  Patient has Hashimoto's thyroiditis.  Patient has a history of breast cancer, had a double mastectomy, 50% hair loss.  Seen by Dermatologist yesterday.  Patient is really concerned and would like a WBC done today.    The patient has been feeling fatigued and tired lately as well.  She has a small patch of rash on the left upper chest that she recently noticed.  She denies fever chills, chest pain or shortness of breath.  No gastrointestinal or urogenital symptoms.  She is a 15-year survivor of left breast cancer.  There is a family history of breast cancer.  She had double mastectomy followed by implants.  She deals with lymphedema both upper extremity.  She is a special .      Patient Active Problem List   Diagnosis     Personal history of breast cancer     S/P bilateral mastectomy     Urge incontinence of urine     Overweight (BMI 25.0-29.9)     Mild recurrent major depression (H)     Family history of diabetes mellitus (DM)     Family history of thyroid disease     Plantar fasciitis, bilateral     Seasonal allergies     Mixed hyperlipidemia     Chronic rhinitis     Breast implant asymmetry     ACP (advance care planning)     Lymphedema     Family history of ischemic heart disease     Seasonal allergic rhinitis     Glaucoma suspect, bilateral     Family history of glaucoma     Age-related nuclear cataract of both eyes     Seasonal allergic rhinitis due to other allergic trigger, unspecified chronicity     Family history of breast cancer in sister     Family history of skin cancer     Actinic keratoses     Hyperlipidemia LDL goal <160     Hypothyroidism due to Hashimoto's thyroiditis     Bruxism (teeth grinding)     Anxiety     Loss of hair     Past Surgical History:    Procedure Laterality Date     ADENOIDECTOMY       COLONOSCOPY N/A 2014    Procedure: COLONOSCOPY;  Surgeon: Duane, William Charles, MD;  Location: [facility] OR     COSMETIC SURGERY       CYSTOSCOPY       HC REMOVAL OF ANAL FISSURE  2007     MASTECTOMY, SIMPLE RT/LT/MABLE       RECONSTRUCT BREAST BILATERAL  2008     TONSILLECTOMY         Social History     Tobacco Use     Smoking status: Never Smoker     Smokeless tobacco: Never Used   Substance Use Topics     Alcohol use: Yes     Comment: occ     Family History   Problem Relation Age of Onset     Diabetes Mother      Thyroid Disease Mother      Glaucoma Mother      Macular Degeneration Mother      Hypertension Mother      Diabetes Sister      Thyroid Disease Sister      Depression Sister      Endometrial Cancer Sister 51     Breast Cancer Sister      Cancer Father         skin cancer     Glaucoma Father      Hypertension Father      Coronary Artery Disease Brother          at 43     Cerebrovascular Disease Maternal Grandfather          Current Outpatient Medications   Medication Sig Dispense Refill     BIOTIN 5000 PO        Calcium Carbonate-Vitamin D (CALCIUM + D PO) Take by mouth 2 times daily       Coenzyme Q10 (CO Q 10) 100 MG CAPS Take 1 capsule by mouth daily        fluocinolone (SYNALAR) 0.025 % cream Apply topically 2 times daily Apply to affected areas of mouth as needed. 60 g 5     fluticasone (FLONASE) 50 MCG/ACT nasal spray as needed   0     Glucosamine-Chondroitin-MSM (TRIPLE FLEX PO) Take by mouth 2 times daily       hydrocortisone 2.5 % cream Apply topically as needed       L-GLUTAMINE 1 capsule 2 times daily        MAGNESIUM PO Take 1 capsule by mouth daily        Multiple Vitamin (MULTI-VITAMIN PO) Take by mouth daily        Multiple Vitamins-Minerals (ZINC PO) Take by mouth daily       NIACIN CR PO Take 500 mg by mouth       order for DME 1: Gradient Compression Wraps; 2: LUE compression sleeve with handpiece or guantlet; 20-30 mm Hg; 3: LUE  velcro compression sleeve; 4: Compression camisole/bra/tank top 1 each 0     order for DME Equipment being ordered: mouthguard 1 Units 0     order for DME Compression sleeve to bilateral UE. 2 Units 6     Probiotic Product (PROBIOTIC DAILY PO) Take 1 capsule by mouth 2 times daily       sertraline (ZOLOFT) 50 MG tablet Take 1.5 tablets (75 mg) by mouth daily (Patient taking differently: Take 50 mg by mouth daily ) 135 tablet 1     SYNTHROID 75 MCG tablet Take 1 tablet (75 mcg) by mouth daily 90 tablet 3     UNABLE TO FIND Take 1 tablet by mouth daily MEDICATION NAME: Marisela Elderberry Supplement       Allergies   Allergen Reactions     Erythromycin Nausea     Recent Labs   Lab Test 08/05/20  0930 06/09/20  0726  10/22/19  0924 03/12/19  1654  10/22/18  0939 06/28/18  1403   A1C  --   --   --   --  5.2  --   --   --    *  --   --  177*  --   --  175*  --    HDL 57  --   --  59  --   --  65  --    TRIG 117  --   --  172*  --   --  139  --    ALT  --   --   --   --  41  --  39 44   CR  --   --   --   --  0.81  --  0.87 0.94   GFRESTIMATED  --   --   --   --  82  --  68 62   GFRESTBLACK  --   --   --   --  >90  --  82 75   POTASSIUM  --   --   --   --  3.7  --   --  4.1   TSH 0.64 3.35   < >  --  0.94   < >  --  0.73    < > = values in this interval not displayed.      BP Readings from Last 3 Encounters:   08/11/20 102/68   02/11/20 104/75   10/22/19 115/73    Wt Readings from Last 3 Encounters:   08/11/20 63.1 kg (139 lb 3.2 oz)   02/11/20 63.5 kg (140 lb 1.6 oz)   10/22/19 64.9 kg (143 lb)                    Reviewed and updated as needed this visit by Provider         Review of Systems   Constitutional, HEENT, cardiovascular, pulmonary, GI, , musculoskeletal, neuro, skin, endocrine and psych systems are negative, except as otherwise noted.      Objective    There were no vitals taken for this visit.  There is no height or weight on file to calculate BMI.  Physical Exam   GENERAL: healthy, alert and no  "distress  HENT: ear canals and TM's normal, nose and mouth without ulcers or lesions  NECK: no adenopathy, no asymmetry, masses, or scars and thyroid normal to palpation  RESP: lungs clear to auscultation - no rales, rhonchi or wheezes  CV: regular rate and rhythm, normal S1 S2, no S3 or S4, no murmur, click or rub, no peripheral edema and peripheral pulses strong  ABDOMEN: soft, nontender, no hepatosplenomegaly, no masses and bowel sounds normal  MS: no gross musculoskeletal defects noted, no edema  NEURO: Normal strength and tone, mentation intact and speech normal  No evidence of cervical, axillary or inguinal lymphadenopathy.  Skin examination reveals a dime sized patch of erythematous rash with small clear papules.  Scalp examination reveals healthy scalp.    Diagnostic Test Results:  Labs reviewed in Epic  No results found for this or any previous visit (from the past 24 hour(s)).    On 8/5/2020 patient had normal TSH of 0.64.  Normal iron studies and lipid profile.    Assessment & Plan     1.  Fatigue unspecified.  Will investigate further with CMP, CBC with differential, CRP, ESR and a B12 level.  2.  Hypothyroidism Hashimoto's type adequately controlled recent TSH therapeutic range as mentioned above.  3.  Personal history of breast cancer involving left breast, triple negative treated with bilateral mastectomy and chemotherapy.  No evidence of recurrence.  4.  Lymphedema upper extremities patient gets treatment periodically.  5.  Loss of hair with healthy scalp on exam.  She did see dermatology yesterday.  Cause unclear could be autoimmune.  6.  Rash left upper chest.  Etiology unknown advised to use cortisone cream twice a day and see if it helps.  Could be a contact dermatitis.      I will get back to the patient with a lab report.     BMI:   Estimated body mass index is 25.88 kg/m  as calculated from the following:    Height as of this encounter: 1.562 m (5' 1.5\").    Weight as of this encounter: 63.1 " kg (139 lb 3.2 oz).         Kishan Swenson MD  [facility]

## 2020-08-11 NOTE — NURSING NOTE
SYMPTOM QUESTIONNAIRE    Pain: no    Nausea/Vomiting: no    Mouth Sores: no    Shortness of Breath: no    Smoking: no    Fever or Chills: no    Hard Stools: no    Soft Stools: no    Weight Loss: no    Weakness: no    Burning, numbness or tingling in hands or feet: occasional    Problems with skin or swelling: swelling of glands in neck and axillae    Memory Loss: no    Anxiety or Depression: yes    Trouble Sleeping: yes    Patient saw Dr. Swenson in office this morning.    Lissa Banuelos LPN on 8/11/2020 at 2:44 PM

## 2020-08-12 LAB — ANA SER QL IF: NEGATIVE

## 2020-08-17 ENCOUNTER — VIRTUAL VISIT (OUTPATIENT)
Dept: ENDOCRINOLOGY | Facility: CLINIC | Age: 55
End: 2020-08-17
Payer: COMMERCIAL

## 2020-08-17 DIAGNOSIS — E06.3 HYPOTHYROIDISM DUE TO HASHIMOTO'S THYROIDITIS: Primary | ICD-10-CM

## 2020-08-17 PROCEDURE — 99213 OFFICE O/P EST LOW 20 MIN: CPT | Mod: GT | Performed by: INTERNAL MEDICINE

## 2020-08-17 RX ORDER — GINSENG 100 MG
54 CAPSULE ORAL DAILY
COMMUNITY

## 2020-08-17 NOTE — PATIENT INSTRUCTIONS
- continue Synthroid 75 mcg daily  - continue good diet and exercise regularly  - try practicing meditation or relaxation music    If you have any questions, please do not hesitate to call [facility] at [phone] and ask for Endocrinology clinic.    Sincerely,    Mariella Tripathi MD  Endocrinology

## 2020-08-17 NOTE — PROGRESS NOTES
"Chanel Israel is a 55 year old female who is being evaluated via a billable video visit.      The patient has been notified of following:     \"This video visit will be conducted via a call between you and your physician/provider. We have found that certain health care needs can be provided without the need for an in-person physical exam.  This service lets us provide the care you need with a video conversation.  If a prescription is necessary we can send it directly to your pharmacy.  If lab work is needed we can place an order for that and you can then stop by our lab to have the test done at a later time.    Video visits are billed at different rates depending on your insurance coverage.  Please reach out to your insurance provider with any questions.    If during the course of the call the physician/provider feels a video visit is not appropriate, you will not be charged for this service.\"    Patient has given verbal consent for Video visit? Yes  How would you like to obtain your AVS? MyChart  If you are dropped from the video visit, the video invite should be resent to: [employer]  Will anyone else be joining your video visit? No    Anjali Nguyễn [facility]  Adult Endocrinology  [facility]          "

## 2020-08-17 NOTE — PROGRESS NOTES
Endocrinology Note         Chanel is a 55 year old female who has VDO visit today for follow up Hashimoto's hypothyroidism.    HPI  Chanel Israel is a 55 years old female with hypothyroidism, hyperlipidemia, previous hx of breast cancer diagnosed 2007 s/p left mastectomy and prophylactic right mastectomy, chemotherapy who has VDO visit for f/u hypothyroidism.    She was diagnosed with Hashimoto's hypothyroidism in August 2015 when she presented with hair loss, fatigue, sluggish and weight gain. At the diagnosis, TSH was 16.49, FT4 0.71.  She has been on Synthroid since.     Interim history  Last seen was 2018.  She has been on brand name Synthroid 75 mcg daily. She was feeling well until Feb 2020 when she started having anxiety and panic attack after stress at work. She did have TFT done on 2/11/2020 that revealed TSH of 2.28, FT4 1.14. She thought that she could have high thyroid level so she stopped taking thyroid medication. Since stopping, she noticed fatigue, sluggish, body ache and hair loss. She subsequently had TFT done on 6/9/2020 that revealed TSH 3.35, FT4 1.02. She restarted taking Synthroid again at 75 mcg daily with improvement of symptoms. The most recent TFT on 8/5/20 revealed TSH 0.64, FT4 1.31.    She denied chest pain, SOB, altered bowel movement, temperature intolerance. She sleeps ok. She started taking biotin for hair loss. She is on niacin for hyperlipidemia. She could not tolerate statin due to lymphedema.    Past Medical History  Past Medical History:   Diagnosis Date     Asthma, mild intermittent      Breast cancer (H)      Depression      Endometriosis      Heart murmur     Patient reported.     Loss of hair 8/11/2020     PONV (postoperative nausea and vomiting)        Allergies  Allergies   Allergen Reactions     Erythromycin Nausea     Medications  Current Outpatient Medications   Medication Sig Dispense Refill     BIOTIN 5000 PO        Calcium Carbonate-Vitamin D (CALCIUM + D  PO) Take by mouth 2 times daily       Coenzyme Q10 (CO Q 10) 100 MG CAPS Take 1 capsule by mouth daily        fluocinolone (SYNALAR) 0.025 % cream Apply topically 2 times daily Apply to affected areas of mouth as needed. 60 g 5     fluticasone (FLONASE) 50 MCG/ACT nasal spray as needed   0     Glucosamine-Chondroitin-MSM (TRIPLE FLEX PO) Take by mouth 2 times daily       hydrocortisone 2.5 % cream Apply topically as needed       L-GLUTAMINE 1 capsule 2 times daily        MAGNESIUM PO Take 1 capsule by mouth daily        Multiple Vitamin (MULTI-VITAMIN PO) Take by mouth daily        Multiple Vitamins-Minerals (ZINC PO) Take by mouth daily       NIACIN CR PO Take 500 mg by mouth       order for DME 1: Gradient Compression Wraps; 2: LUE compression sleeve with handpiece or guantlet; 20-30 mm Hg; 3: LUE velcro compression sleeve; 4: Compression camisole/bra/tank top 1 each 0     order for DME Equipment being ordered: mouthguard 1 Units 0     order for DME Compression sleeve to bilateral UE. 2 Units 6     Probiotic Product (PROBIOTIC DAILY PO) Take 1 capsule by mouth 2 times daily       sertraline (ZOLOFT) 50 MG tablet Take 1.5 tablets (75 mg) by mouth daily (Patient taking differently: Take 50 mg by mouth daily ) 135 tablet 1     SYNTHROID 75 MCG tablet Take 1 tablet (75 mcg) by mouth daily 90 tablet 3     UNABLE TO FIND Take 1 tablet by mouth daily MEDICATION NAME: Marisela Elderberry Supplement       Family History  family history includes Breast Cancer in her sister; Cancer in her father; Cerebrovascular Disease in her maternal grandfather; Coronary Artery Disease in her brother; Depression in her sister; Diabetes in her mother and sister; Endometrial Cancer (age of onset: 51) in her sister; Glaucoma in her father and mother; Hypertension in her father and mother; Macular Degeneration in her mother; Thyroid Disease in her mother and sister.     Her mother, maternal grandmother and sister have hypothyroidism.      Social History  Social History     Tobacco Use     Smoking status: Never Smoker     Smokeless tobacco: Never Used   Substance Use Topics     Alcohol use: Yes     Comment: occ     Drug use: No   lives with family  She is special .    ROS  Constitutional: no weight change, low energy but now improving  Eyes: no vision change, diplopia or red eyes   Neck: no difficulty swallowing, no choking, no neck pain, no neck swelling  Cardiovascular: no chest pain, palpitations  Respiratory: no dyspnea, cough, shortness of breath or wheezing   GI: no nausea, vomiting, diarrhea or constipation, no abdominal pain   : no change in urine, no dysuria or hematuria  Musculoskeletal: no joint or muscle pain or swelling   Integumentary: no concerning lesions   Neuro: no loss of strength or sensation, no numbness or tingling, no tremor, no dizziness, no headache   Endo: no polyuria or polydipsia, no temperature intolerance   Heme/Lymph: no concerning bumps, no bleeding problems   Allergy: no environmental allergies   Psych: +anxiety    Physical Exam  no distress, comfortable, pleasant   appropriate mood     RESULTS  Lab from [facility]  10/20/2016; TSH 0.41, FT4 1.38, FT3 2.8        ASSESSMENT:    Chanel Israel is a 55 years old female with hypothyroidism, hyperlipidemia, previous hx of breast cancer diagnosed 2007 s/p left mastectomy and prophylactic right mastectomy, chemotherapy who has VDO visit for hypothyroidism.    1) Hashimoto's hypothyroidism: diagnosed in 2015 when presented with hair loss, low energy and some weight gain. She has been on Synthroid since. She stopped taking medication in Feb 2020 when she had panic/anxiety attack likely due to stress from work. She restarted Synthroid again in June when she did have fatigue, body ache and hair loss.    Her current dose is 75 mcg daily. She is clinically euthyroid. Will continue current dose.     PLAN:   - Continue Synthroid 75 mcg daily  -  she will follow up with PCP annually    VDO start time: 0933 AM  VDO end time: 0956 AM    Mariella Tripathi MD  Division of Diabetes and Endocrinology  Department of Medicine  [phone]

## 2020-08-17 NOTE — LETTER
8/17/2020         RE: Chanel Israel  [address]  [facility] [address]        Dear Colleague,    Thank you for referring your patient, Chanel Israel, to the [facility]. Please see a copy of my visit note below.         Endocrinology Note         Chanel is a 55 year old female who has VDO visit today for follow up Hashimoto's hypothyroidism.    HPI  Chanel Israel is a 55 years old female with hypothyroidism, hyperlipidemia, previous hx of breast cancer diagnosed 2007 s/p left mastectomy and prophylactic right mastectomy, chemotherapy who has VDO visit for f/u hypothyroidism.    She was diagnosed with Hashimoto's hypothyroidism in August 2015 when she presented with hair loss, fatigue, sluggish and weight gain. At the diagnosis, TSH was 16.49, FT4 0.71.  She has been on Synthroid since.     Interim history  Last seen was 2018.  She has been on brand name Synthroid 75 mcg daily. She was feeling well until Feb 2020 when she started having anxiety and panic attack after stress at work. She did have TFT done on 2/11/2020 that revealed TSH of 2.28, FT4 1.14. She thought that she could have high thyroid level so she stopped taking thyroid medication. Since stopping, she noticed fatigue, sluggish, body ache and hair loss. She subsequently had TFT done on 6/9/2020 that revealed TSH 3.35, FT4 1.02. She restarted taking Synthroid again at 75 mcg daily with improvement of symptoms. The most recent TFT on 8/5/20 revealed TSH 0.64, FT4 1.31.    She denied chest pain, SOB, altered bowel movement, temperature intolerance. She sleeps ok. She started taking biotin for hair loss. She is on niacin for hyperlipidemia. She could not tolerate statin due to lymphedema.    Past Medical History  Past Medical History:   Diagnosis Date     Asthma, mild intermittent      Breast cancer (H)      Depression      Endometriosis      Heart murmur     Patient reported.     Loss of hair 8/11/2020      PONV (postoperative nausea and vomiting)        Allergies  Allergies   Allergen Reactions     Erythromycin Nausea     Medications  Current Outpatient Medications   Medication Sig Dispense Refill     BIOTIN 5000 PO        Calcium Carbonate-Vitamin D (CALCIUM + D PO) Take by mouth 2 times daily       Coenzyme Q10 (CO Q 10) 100 MG CAPS Take 1 capsule by mouth daily        fluocinolone (SYNALAR) 0.025 % cream Apply topically 2 times daily Apply to affected areas of mouth as needed. 60 g 5     fluticasone (FLONASE) 50 MCG/ACT nasal spray as needed   0     Glucosamine-Chondroitin-MSM (TRIPLE FLEX PO) Take by mouth 2 times daily       hydrocortisone 2.5 % cream Apply topically as needed       L-GLUTAMINE 1 capsule 2 times daily        MAGNESIUM PO Take 1 capsule by mouth daily        Multiple Vitamin (MULTI-VITAMIN PO) Take by mouth daily        Multiple Vitamins-Minerals (ZINC PO) Take by mouth daily       NIACIN CR PO Take 500 mg by mouth       order for DME 1: Gradient Compression Wraps; 2: LUE compression sleeve with handpiece or guantlet; 20-30 mm Hg; 3: LUE velcro compression sleeve; 4: Compression camisole/bra/tank top 1 each 0     order for DME Equipment being ordered: mouthguard 1 Units 0     order for DME Compression sleeve to bilateral UE. 2 Units 6     Probiotic Product (PROBIOTIC DAILY PO) Take 1 capsule by mouth 2 times daily       sertraline (ZOLOFT) 50 MG tablet Take 1.5 tablets (75 mg) by mouth daily (Patient taking differently: Take 50 mg by mouth daily ) 135 tablet 1     SYNTHROID 75 MCG tablet Take 1 tablet (75 mcg) by mouth daily 90 tablet 3     UNABLE TO FIND Take 1 tablet by mouth daily MEDICATION NAME: Marisela Elderberry Supplement       Family History  family history includes Breast Cancer in her sister; Cancer in her father; Cerebrovascular Disease in her maternal grandfather; Coronary Artery Disease in her brother; Depression in her sister; Diabetes in her mother and sister; Endometrial  Cancer (age of onset: 51) in her sister; Glaucoma in her father and mother; Hypertension in her father and mother; Macular Degeneration in her mother; Thyroid Disease in her mother and sister.     Her mother, maternal grandmother and sister have hypothyroidism.     Social History  Social History     Tobacco Use     Smoking status: Never Smoker     Smokeless tobacco: Never Used   Substance Use Topics     Alcohol use: Yes     Comment: occ     Drug use: No   lives with family  She is special .    ROS  Constitutional: no weight change, low energy but now improving  Eyes: no vision change, diplopia or red eyes   Neck: no difficulty swallowing, no choking, no neck pain, no neck swelling  Cardiovascular: no chest pain, palpitations  Respiratory: no dyspnea, cough, shortness of breath or wheezing   GI: no nausea, vomiting, diarrhea or constipation, no abdominal pain   : no change in urine, no dysuria or hematuria  Musculoskeletal: no joint or muscle pain or swelling   Integumentary: no concerning lesions   Neuro: no loss of strength or sensation, no numbness or tingling, no tremor, no dizziness, no headache   Endo: no polyuria or polydipsia, no temperature intolerance   Heme/Lymph: no concerning bumps, no bleeding problems   Allergy: no environmental allergies   Psych: +anxiety    Physical Exam  no distress, comfortable, pleasant   appropriate mood     RESULTS  Lab from [facility]  10/20/2016; TSH 0.41, FT4 1.38, FT3 2.8        ASSESSMENT:    Chanel Israel is a 55 years old female with hypothyroidism, hyperlipidemia, previous hx of breast cancer diagnosed 2007 s/p left mastectomy and prophylactic right mastectomy, chemotherapy who has VDO visit for hypothyroidism.    1) Hashimoto's hypothyroidism: diagnosed in 2015 when presented with hair loss, low energy and some weight gain. She has been on Synthroid since. She stopped taking medication in Feb 2020 when she had panic/anxiety attack  likely due to stress from work. She restarted Synthroid again in June when she did have fatigue, body ache and hair loss.    Her current dose is 75 mcg daily. She is clinically euthyroid. Will continue current dose.     PLAN:   - Continue Synthroid 75 mcg daily  - she will follow up with PCP annually    VDO start time: 0933 AM  VDO end time: 0956 AM    Mariella Tripathi MD  Division of Diabetes and Endocrinology  Department of Medicine  [phone]

## 2020-08-31 ENCOUNTER — ALLIED HEALTH/NURSE VISIT (OUTPATIENT)
Dept: PEDIATRICS | Facility: CLINIC | Age: 55
End: 2020-08-31
Payer: COMMERCIAL

## 2020-08-31 DIAGNOSIS — Z23 NEED FOR SHINGLES VACCINE: Primary | ICD-10-CM

## 2020-08-31 PROCEDURE — 90471 IMMUNIZATION ADMIN: CPT

## 2020-08-31 PROCEDURE — 99207 ZZC NO CHARGE NURSE ONLY: CPT

## 2020-08-31 PROCEDURE — 90750 HZV VACC RECOMBINANT IM: CPT

## 2020-08-31 NOTE — NURSING NOTE
Prior to immunization administration, verified patients identity using patient s name and date of birth. Please see Immunization Activity for additional information.     Screening Questionnaire for Adult Immunization    Are you sick today?   No   Do you have allergies to medications, food, a vaccine component or latex?   Yes   Have you ever had a serious reaction after receiving a vaccination?   No   Do you have a long-term health problem with heart, lung, kidney, or metabolic disease (e.g., diabetes), asthma, a blood disorder, no spleen, complement component deficiency, a cochlear implant, or a spinal fluid leak?  Are you on long-term aspirin therapy?   No   Do you have cancer, leukemia, HIV/AIDS, or any other immune system problem?   No   Do you have a parent, brother, or sister with an immune system problem?   No   In the past 3 months, have you taken medications that affect  your immune system, such as prednisone, other steroids, or anticancer drugs; drugs for the treatment of rheumatoid arthritis, Crohn s disease, or psoriasis; or have you had radiation treatments?   No   Have you had a seizure, or a brain or other nervous system problem?   No   During the past year, have you received a transfusion of blood or blood    products, or been given immune (gamma) globulin or antiviral drug?   No   For women: Are you pregnant or is there a chance you could become       pregnant during the next month?   No   Have you received any vaccinations in the past 4 weeks?   No     Immunization questionnaire was positive for at least one answer.  Notified of allergies.        Per orders of MAX Arias CNP, injection of shingrix given by Cheryl Mtz CMA. Patient instructed to remain in clinic for 15 minutes afterwards, and to report any adverse reaction to me immediately.       Screening performed by Cheryl Mtz CMA on 8/31/2020 at 4:58 PM.

## 2020-09-10 ENCOUNTER — OFFICE VISIT (OUTPATIENT)
Dept: OPTOMETRY | Facility: CLINIC | Age: 55
End: 2020-09-10
Payer: COMMERCIAL

## 2020-09-10 DIAGNOSIS — H52.4 PRESBYOPIA: ICD-10-CM

## 2020-09-10 DIAGNOSIS — H02.889 MEIBOMIAN GLAND DYSFUNCTION: ICD-10-CM

## 2020-09-10 DIAGNOSIS — H25.13 AGE-RELATED NUCLEAR CATARACT OF BOTH EYES: ICD-10-CM

## 2020-09-10 DIAGNOSIS — H52.221 REGULAR ASTIGMATISM OF RIGHT EYE: ICD-10-CM

## 2020-09-10 DIAGNOSIS — Z01.00 EXAMINATION OF EYES AND VISION: Primary | ICD-10-CM

## 2020-09-10 DIAGNOSIS — H40.003 GLAUCOMA SUSPECT, BILATERAL: ICD-10-CM

## 2020-09-10 PROCEDURE — 92014 COMPRE OPH EXAM EST PT 1/>: CPT | Performed by: OPTOMETRIST

## 2020-09-10 PROCEDURE — 92015 DETERMINE REFRACTIVE STATE: CPT | Performed by: OPTOMETRIST

## 2020-09-10 ASSESSMENT — CONF VISUAL FIELD
METHOD: COUNTING FINGERS
OD_NORMAL: 1
OS_NORMAL: 1

## 2020-09-10 ASSESSMENT — REFRACTION_WEARINGRX
OS_SPHERE: PLANO
OS_CYLINDER: SPHERE
OD_ADD: +2.25
OD_SPHERE: -1.00
OS_ADD: +2.25
OD_AXIS: 172
OD_CYLINDER: +0.50

## 2020-09-10 ASSESSMENT — REFRACTION_MANIFEST
OS_CYLINDER: +0.25
OD_SPHERE: -0.75
OD_CYLINDER: +0.50
OS_AXIS: 180
OS_ADD: +2.25
OD_ADD: +2.25
OS_SPHERE: -0.25
OD_AXIS: 172

## 2020-09-10 ASSESSMENT — VISUAL ACUITY
CORRECTION_TYPE: GLASSES
OS_CC: 20/20
OS_SC: 20/20
OD_CC: 20/20
OD_SC: 20/20
OD_CC: 20/20
METHOD: SNELLEN - LINEAR
OS_CC: 20/20

## 2020-09-10 ASSESSMENT — TONOMETRY
IOP_METHOD: TONOPEN
OS_IOP_MMHG: 14
OD_IOP_MMHG: 18

## 2020-09-10 ASSESSMENT — CUP TO DISC RATIO
OS_RATIO: 0.65
OD_RATIO: .5/.4

## 2020-09-10 ASSESSMENT — SLIT LAMP EXAM - LIDS
COMMENTS: MEIBOMIAN GLAND DYSFUNCTION
COMMENTS: MEIBOMIAN GLAND DYSFUNCTION

## 2020-09-10 ASSESSMENT — EXTERNAL EXAM - RIGHT EYE: OD_EXAM: NORMAL

## 2020-09-10 ASSESSMENT — EXTERNAL EXAM - LEFT EYE: OS_EXAM: NORMAL

## 2020-09-10 NOTE — PROGRESS NOTES
Chief Complaint   Patient presents with     Annual Eye Exam         Last Eye Exam: 6/2019  Dilated Previously: Yes    What are you currently using to see?  glasses       Distance Vision Acuity: Satisfied with vision    Near Vision Acuity: Not satisfied  - harder to read with out cheaters    Eye Comfort: dry and gritty - not constant  Do you use eye drops? : Yes: OTC Systane use PRN. Lately not even 1x a day  Occupation or Hobbies: . Dog., fixing cabin    Patient has Hypothyroidism due to Hashimoto's thyroiditis.  She is wondering how it may be affecting her eyes.    Anjali Ijeoma  [facility] Tech            Medical, surgical and family histories reviewed and updated 9/10/2020.       OBJECTIVE: See Ophthalmology exam    ASSESSMENT:    ICD-10-CM    1. Examination of eyes and vision  Z01.00 REFRACTION   2. Presbyopia  H52.4 EYE EXAM (SIMPLE-NONBILLABLE)   3. Regular astigmatism of right eye  H52.221 EYE EXAM (SIMPLE-NONBILLABLE)   4. Glaucoma suspect, bilateral  H40.003 REFRACTION   5. Age-related nuclear cataract of both eyes  H25.13 REFRACTION   6. Meibomian gland dysfunction  H02.889 REFRACTION      PLAN:     Patient Instructions   Eyeglass prescription given.     Continue to monitor glaucoma status with yearly eye exams.    You have the start of mild cataracts.  You may notice some blurred vision or glare with night driving.  It is important that you wear good sunglasses to protect your eyes from the ultraviolet light from the sun.  I recommend that you return in 1 year for an eye exam unless there are any sudden changes in your vision.       Heat to the eyes daily for 10-15 minutes nightly with warm washcloth or reusable gel masks from the pharmacy or  Lively heat masks can be purchased at Amazon.    Artificial tears- 1 drop both eyes 4 x day.    We Love Eyes- tea tree oil eyelid  foaming cleanser to cleanse eyelids 2 x day or eye make up oil remover at night.  [email or web address]  for amazon .com.    Return in 1 year for a complete eye exam or sooner if needed.    Jeet Mccabe, OD

## 2020-09-10 NOTE — PATIENT INSTRUCTIONS
Eyeglass prescription given.  Work space glasses are an option.     Continue to monitor glaucoma status with yearly eye exams.    You have the start of mild cataracts.  You may notice some blurred vision or glare with night driving.  It is important that you wear good sunglasses to protect your eyes from the ultraviolet light from the sun.  I recommend that you return in 1 year for an eye exam unless there are any sudden changes in your vision.       Heat to the eyes daily for 10-15 minutes nightly with warm washcloth or reusable gel masks from the pharmacy or  Bellco heat masks can be purchased at Amazon.    Artificial tears- 1 drop both eyes 4 x day.    We Love Eyes- tea tree oil eyelid  foaming cleanser to cleanse eyelids 2 x day or eye make up oil remover at night.  [email or web address] for amazon .com.    Return in 1 year for a complete eye exam or sooner if needed.    Jeet Mccabe, OD    The affects of the dilating drops last for 4- 6 hours.  You will be more sensitive to light and vision will be blurry up close.  Do not drive if you do not feel comfortable.  Mydriatic sunglasses were given if needed.    There is a combination of three treatments which can greatly improve symptoms of dry eyes.    1.  Artificial tears  2. Heat (eyes closed)  3. Eyelid and eyelash cleansing (eyes closed)     Use one drop of artificial tears both eyes 4 x daily.  Once in the morning, lunch, dinner and bedtime. Continue to use the drops regardless if your eyes are comfortable or not.  Artificial tears work best as a preventative and not as well after your eyes are starting to bother you.  It may take 4- 6 weeks of using the drops before you notice improvement.  If after that time you are still having problems schedule an appointment for an evaluation and discussion of different treatments such as Restasis or Xiidra.  Dry eyes are a chronic condition and you may have more symptoms at certain times of the year.    Excess tearing  can be due to the right tears not working properly or a blockage in the tear drainage system.  You can try using artificial tears 1 drop right eye 4 x day.  If the excess tearing is bothersome after 4-6 weeks of treatment then we can send you for further testing.  This would entail a referral to our oculoplastic specialist Dr. Niya Nichols at the [facility]-[phone].    Recommended brands are:    Systane Complete  Systane Ultra  Systane Balance  Refresh Advanced Optive  Refresh Relieva  Blink    Recommended brands for contact lens wearers are:    Systane contacts  Refresh contacts  Blink contacts    If you are using drops more than 4 x day or have sensitivities to preservatives I recommend non preserved artificial tears.  These come in 1 use vials.  They can be used every 1-2 hours.  Do not reuse the vials.    Recommended brands are:    Refresh Optive Tl-3  Systane- preservative free  Refresh-  preservative free  Blink- preservative free    Gels or ointment can be used at night.    Recommended brands are:    Systane Gel  Refresh Gel  Blink Gel  Genteal Gel    Systane night time (ointment)  Refresh Celluvisc  Refresh PM (ointment)      Visine, Clear Eyes or Murine (drops that get the red out) can irritate the eyes and cause a rebound effect where the eyes become more red and you end up using more drops.  Avoid drops containing tetrahydrozoline, naphazoline, phenylephrine, oxymetazoline.      OTC Lumify is a newer product that gives immediate redness relief without the rebound effect.  Use as needed to take the redness out.    Artificial tears may be used with other drops (such as allergy, glaucoma, antibiotics) around the same time.  Be sure to wait 5 minutes in between drops.    Heat to the eyelids can also improve your symptoms of dry eyes.  Reza heat masks can be purchased at Amazon to be used nightly for 10-15 minutes.  Other options are gel masks that can be put in the microwave and purchased at  most pharmacies.      Tea Tree Oil eyelid cleansers recommended are Ocusoft Oust foam cleanser to cleanse eyelids/lashes at night and in the am. Other options are Blephadex or Cliradex eyelid wipes.  KEEP EYES CLOSED when using these products.  These can be purchased on amazon.com   A good product for make up remover with tea tree oil is WeLoveEyes.  This can be found at www.[employer] or [employer].    Other good eyelid cleansers have hypochlorous acid which removes excess bacteria and is safe around the eyes. Products are Avenova, Ocusoft Hypochlor or Heyedrate. Spray solution onto cotton pad, close eyes and gently apply to eyelids and eyelashes using side to side motion.  You can also KEEP EYES CLOSED spray and rub into eyelashes.  You do not need to rinse it off. Use morning and evening. These products can be found on Amazon.  You can check with your local pharmacy and see if they can order if for you if they don't have it.    Other brands of eyelid cleansing wipes are:    Ocusoft wipes  Systane wipes

## 2020-09-10 NOTE — LETTER
9/10/2020         RE: Chanel Israel  [address]  [place] [address]        Dear Colleague,    Thank you for referring your patient, Chanel Israel, to the [facility]. Please see a copy of my visit note below.    Chief Complaint   Patient presents with     Annual Eye Exam         Last Eye Exam: 6/2019  Dilated Previously: Yes    What are you currently using to see?  glasses       Distance Vision Acuity: Satisfied with vision    Near Vision Acuity: Not satisfied  - harder to read with out cheaters    Eye Comfort: dry and gritty - not constant  Do you use eye drops? : Yes: OTC Systane use PRN. Lately not even 1x a day  Occupation or Hobbies: . Dog., fixing cabin    Patient has Hypothyroidism due to Hashimoto's thyroiditis.  She is wondering how it may be affecting her eyes.    Anjali Raphael  Optometric Tech            Medical, surgical and family histories reviewed and updated 9/10/2020.       OBJECTIVE: See Ophthalmology exam    ASSESSMENT:    ICD-10-CM    1. Examination of eyes and vision  Z01.00 REFRACTION   2. Presbyopia  H52.4 EYE EXAM (SIMPLE-NONBILLABLE)   3. Regular astigmatism of right eye  H52.221 EYE EXAM (SIMPLE-NONBILLABLE)   4. Glaucoma suspect, bilateral  H40.003 REFRACTION   5. Age-related nuclear cataract of both eyes  H25.13 REFRACTION   6. Meibomian gland dysfunction  H02.889 REFRACTION      PLAN:     Patient Instructions   Eyeglass prescription given.     Continue to monitor glaucoma status with yearly eye exams.    You have the start of mild cataracts.  You may notice some blurred vision or glare with night driving.  It is important that you wear good sunglasses to protect your eyes from the ultraviolet light from the sun.  I recommend that you return in 1 year for an eye exam unless there are any sudden changes in your vision.       Heat to the eyes daily for 10-15 minutes nightly with warm washcloth or reusable gel  masks from the pharmacy or  Reza heat masks can be purchased at Amazon.    Artificial tears- 1 drop both eyes 4 x day.    We Love Eyes- tea tree oil eyelid  foaming cleanser to cleanse eyelids 2 x day or eye make up oil remover at night.  www.[email or web address] for amazon .com.    Return in 1 year for a complete eye exam or sooner if needed.    Jeet Mccabe, OD             Again, thank you for allowing me to participate in the care of your patient.        Sincerely,        Jeet Mccabe, OD

## 2020-10-04 DIAGNOSIS — E06.3 HYPOTHYROIDISM DUE TO HASHIMOTO'S THYROIDITIS: ICD-10-CM

## 2020-10-04 RX ORDER — LEVOTHYROXINE SODIUM 75 MCG
TABLET ORAL
Qty: 90 TABLET | Refills: 3 | Status: CANCELLED | OUTPATIENT
Start: 2020-10-04

## 2020-10-06 ENCOUNTER — MYC MEDICAL ADVICE (OUTPATIENT)
Dept: PEDIATRICS | Facility: CLINIC | Age: 55
End: 2020-10-06

## 2020-10-06 DIAGNOSIS — E06.3 HYPOTHYROIDISM DUE TO HASHIMOTO'S THYROIDITIS: ICD-10-CM

## 2020-10-07 RX ORDER — LEVOTHYROXINE SODIUM 75 MCG
75 TABLET ORAL DAILY
Qty: 90 TABLET | Refills: 2 | Status: SHIPPED | OUTPATIENT
Start: 2020-10-07 | End: 2021-06-07

## 2020-10-08 RX ORDER — LEVOTHYROXINE SODIUM 75 MCG
TABLET ORAL
Qty: 90 TABLET | Refills: 3 | OUTPATIENT
Start: 2020-10-08

## 2020-10-08 NOTE — TELEPHONE ENCOUNTER
SYNTHROID 0.075MG (75MCG)TABLETS  SYNTHROID 75 MCG tablet 90 tablet 2 10/7/2020  Yes   Sig - Route: Take 1 tablet (75 mcg) by mouth daily - Oral   Sent to pharmacy as: Synthroid 75 MCG Oral Tablet   Class: E-Prescribe   Order: [id number]   E-Prescribing Status: Receipt confirmed by pharmacy (10/7/2020  6:06 PM CDT)   Printout Tracking    External Result Report   Pharmacy    [id number] - [place], [place] - [address] [place] AT [facility]  & [id number]

## 2020-10-22 ENCOUNTER — ALLIED HEALTH/NURSE VISIT (OUTPATIENT)
Dept: PEDIATRICS | Facility: CLINIC | Age: 55
End: 2020-10-22
Payer: COMMERCIAL

## 2020-10-22 DIAGNOSIS — Z23 NEED FOR PROPHYLACTIC VACCINATION AND INOCULATION AGAINST INFLUENZA: Primary | ICD-10-CM

## 2020-10-22 PROCEDURE — 90682 RIV4 VACC RECOMBINANT DNA IM: CPT

## 2020-10-22 PROCEDURE — 99207 PR NO CHARGE NURSE ONLY: CPT

## 2020-10-22 PROCEDURE — 90471 IMMUNIZATION ADMIN: CPT

## 2020-10-22 NOTE — PROGRESS NOTES
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     Jaimie Merlos RN

## 2020-12-16 ENCOUNTER — TELEPHONE (OUTPATIENT)
Dept: PEDIATRICS | Facility: CLINIC | Age: 55
End: 2020-12-16

## 2020-12-16 DIAGNOSIS — I89.0 LYMPHEDEMA: Primary | ICD-10-CM

## 2020-12-16 DIAGNOSIS — Z85.3 PERSONAL HISTORY OF BREAST CANCER: ICD-10-CM

## 2020-12-16 NOTE — TELEPHONE ENCOUNTER
Routing to provider to please advise on requested orders    Amrita Arana RN  [facility] [place]

## 2020-12-16 NOTE — TELEPHONE ENCOUNTER
[facility] Call Center    Phone Message    May a detailed message be left on voicemail: yes     Reason for Call: Wendi from [facility] calling on behalf of the patient stated the patient received PT services this year and 2019.  They have the referral for the services received this year but are in need of a referral for services in 2019. Service Dates- 1/6/2019-12/15/2019 for a total of 23 visits at [facility]    Fax: [phone]    Action Taken: Message routed to:  Primary Care p [id number]    Travel Screening: Not Applicable

## 2020-12-17 NOTE — TELEPHONE ENCOUNTER
Referral printed and faxed to [phone]. Per rightfax, referral went through. Patient informed that referral was faxed.    GINA Morris

## 2020-12-24 ENCOUNTER — MYC MEDICAL ADVICE (OUTPATIENT)
Dept: FAMILY MEDICINE | Facility: CLINIC | Age: 55
End: 2020-12-24

## 2020-12-24 DIAGNOSIS — K13.0 PERLECHE: ICD-10-CM

## 2020-12-28 NOTE — TELEPHONE ENCOUNTER
Requested medication and pharmacy pended. Unable to refill as not currently on medication list.     Jaimie Merlos RN

## 2020-12-29 RX ORDER — KETOCONAZOLE 20 MG/G
CREAM TOPICAL DAILY
Qty: 100 G | Refills: 1 | Status: SHIPPED | OUTPATIENT
Start: 2020-12-29

## 2020-12-29 NOTE — TELEPHONE ENCOUNTER
Pharmacy verified, will route to covering providers to review. Patient would like to be notified when script has been sent.    GINA Morris

## 2021-01-02 ENCOUNTER — MYC MEDICAL ADVICE (OUTPATIENT)
Dept: FAMILY MEDICINE | Facility: CLINIC | Age: 56
End: 2021-01-02

## 2021-01-02 DIAGNOSIS — I89.0 LYMPHEDEMA: Primary | ICD-10-CM

## 2021-01-15 ENCOUNTER — HEALTH MAINTENANCE LETTER (OUTPATIENT)
Age: 56
End: 2021-01-15

## 2021-01-27 ENCOUNTER — TELEPHONE (OUTPATIENT)
Dept: FAMILY MEDICINE | Facility: CLINIC | Age: 56
End: 2021-01-27

## 2021-01-27 NOTE — TELEPHONE ENCOUNTER
[facility] provider recommendation form placed in Dr. Gabbie Hicks's bin for completion.      Julia ALCOCER (R))

## 2021-02-01 ENCOUNTER — OFFICE VISIT (OUTPATIENT)
Dept: FAMILY MEDICINE | Facility: CLINIC | Age: 56
End: 2021-02-01
Payer: COMMERCIAL

## 2021-02-01 ENCOUNTER — ANCILLARY PROCEDURE (OUTPATIENT)
Dept: GENERAL RADIOLOGY | Facility: CLINIC | Age: 56
End: 2021-02-01
Attending: FAMILY MEDICINE
Payer: COMMERCIAL

## 2021-02-01 VITALS
SYSTOLIC BLOOD PRESSURE: 135 MMHG | TEMPERATURE: 98.1 F | DIASTOLIC BLOOD PRESSURE: 64 MMHG | WEIGHT: 144 LBS | HEART RATE: 81 BPM | RESPIRATION RATE: 16 BRPM | BODY MASS INDEX: 26.77 KG/M2 | OXYGEN SATURATION: 98 %

## 2021-02-01 DIAGNOSIS — M25.462 SWELLING OF LEFT KNEE JOINT: ICD-10-CM

## 2021-02-01 DIAGNOSIS — G89.29 CHRONIC PAIN OF LEFT KNEE: ICD-10-CM

## 2021-02-01 DIAGNOSIS — G89.29 CHRONIC PAIN OF LEFT KNEE: Primary | ICD-10-CM

## 2021-02-01 DIAGNOSIS — F33.0 MILD RECURRENT MAJOR DEPRESSION (H): ICD-10-CM

## 2021-02-01 DIAGNOSIS — M25.562 CHRONIC PAIN OF LEFT KNEE: Primary | ICD-10-CM

## 2021-02-01 DIAGNOSIS — F41.9 ANXIETY: ICD-10-CM

## 2021-02-01 DIAGNOSIS — M25.562 CHRONIC PAIN OF LEFT KNEE: ICD-10-CM

## 2021-02-01 PROCEDURE — 73560 X-RAY EXAM OF KNEE 1 OR 2: CPT | Mod: 26 | Performed by: RADIOLOGY

## 2021-02-01 PROCEDURE — 99214 OFFICE O/P EST MOD 30 MIN: CPT | Performed by: FAMILY MEDICINE

## 2021-02-01 ASSESSMENT — ANXIETY QUESTIONNAIRES
2. NOT BEING ABLE TO STOP OR CONTROL WORRYING: NOT AT ALL
IF YOU CHECKED OFF ANY PROBLEMS ON THIS QUESTIONNAIRE, HOW DIFFICULT HAVE THESE PROBLEMS MADE IT FOR YOU TO DO YOUR WORK, TAKE CARE OF THINGS AT HOME, OR GET ALONG WITH OTHER PEOPLE: NOT DIFFICULT AT ALL
1. FEELING NERVOUS, ANXIOUS, OR ON EDGE: SEVERAL DAYS
5. BEING SO RESTLESS THAT IT IS HARD TO SIT STILL: NOT AT ALL
GAD7 TOTAL SCORE: 1
3. WORRYING TOO MUCH ABOUT DIFFERENT THINGS: NOT AT ALL
6. BECOMING EASILY ANNOYED OR IRRITABLE: NOT AT ALL
7. FEELING AFRAID AS IF SOMETHING AWFUL MIGHT HAPPEN: NOT AT ALL

## 2021-02-01 ASSESSMENT — PATIENT HEALTH QUESTIONNAIRE - PHQ9
5. POOR APPETITE OR OVEREATING: NOT AT ALL
SUM OF ALL RESPONSES TO PHQ QUESTIONS 1-9: 2

## 2021-02-01 NOTE — PROGRESS NOTES
Assessment & Plan     Chronic pain of left knee  Recent Results (from the past 24 hour(s))   XR Knee Standing Left 2 Views    Narrative    KNEE TWO VIEWS STANDING LEFT  2/1/2021 5:21 PM     HISTORY: Chronic pain of left knee; Swelling of left knee joint    COMPARISON: None.      Impression    IMPRESSION: No acute fracture or malalignment. There is normal joint  spacing. No significant knee joint effusion.    MONE ANGULO MD     X-rays showed no concerns for fracture, dislocation  Due to chronicity of symptoms and swelling recommended to get left knee x-rays for further evaluation  Recommended to try local ice and heat, avoid triggering activities, topical diclofenac gel 4 times daily as needed for pain, start on physical therapy  Patient's insurance requires her to get physical therapy at a certain location -[facility].  If no improvement noted in 4 to 5 weeks of PT, patient understands to call to schedule for sports medicine consult for further evaluation  - XR Knee Standing Left 2 Views; Future  - diclofenac (VOLTAREN) 1 % topical gel; Apply 4 g topically 4 times daily as needed for moderate pain  - PHYSICAL THERAPY REFERRAL; Future  - Orthopedic & Spine  Referral; Future    Swelling of left knee joint  as above    - XR Knee Standing Left 2 Views; Future  - diclofenac (VOLTAREN) 1 % topical gel; Apply 4 g topically 4 times daily as needed for moderate pain  - PHYSICAL THERAPY REFERRAL; Future  - Orthopedic & Spine  Referral; Future    Mild recurrent major depression (H)  Stable, continue with current dose of Zoloft 2 mg daily, follow for recheck in 6 months with remote physical or sooner if needed  - sertraline (ZOLOFT) 50 MG tablet; Take 1 tablet (50 mg) by mouth daily    Anxiety  as above    - sertraline (ZOLOFT) 50 MG tablet; Take 1 tablet (50 mg) by mouth daily    Review of the result(s) of each unique test - Knee x-ray on the left side                   BMI:   Estimated body mass  "index is 26.77 kg/m  as calculated from the following:    Height as of 8/11/20: 1.562 m (5' 1.5\").    Weight as of this encounter: 65.3 kg (144 lb).         Regular exercise  Chart documentation done in part with Dragon Voice recognition Software. Although reviewed after completion, some word and grammatical error may remain.    See Patient Instructions    No follow-ups on file.    Gabbie Hicks MD  [facility] DONNA Buchanan is a 55 year old who presents to clinic today for the following health issues     Complaining of swelling and pain of the left knee for the past 6+ months with no specific major injury but thinks it could be from kneeling and bumping against things.  Denies right-sided symptoms or previous similar symptoms, limping or walking  Past medical history significant for hypothyroidism, anxiety, depression, mild intermittent asthma, hyperlipidemia, seasonal allergic rhinitis, status post bilateral mastectomy, lymphedema    HPI       Musculoskeletal problem/pain  Onset/Duration: summer 2020  Description  Location: knee - left  Joint Swelling: YES  Redness: no  Pain: YES  Warmth: no  Intensity:  moderate  Progression of Symptoms:  worsening  Accompanying signs and symptoms:   Fevers: no  Numbness/tingling/weakness: no  History  Trauma to the area: no  Recent illness:  no  Previous similar problem: no  Previous evaluation:  no  Precipitating or alleviating factors:  Aggravating factors include: overuse and kneeling and bumping against things  Therapies tried and outcome: ice and Ibuprofen        Review of Systems   CONSTITUTIONAL: NEGATIVE for fever, chills, change in weight  RESP: NEGATIVE for significant cough or SOB  CV: NEGATIVE for chest pain, palpitations or peripheral edema  GI: NEGATIVE for nausea, abdominal pain, heartburn, or change in bowel habits  MUSCULOSKELETAL: As above  NEURO: NEGATIVE for weakness, dizziness or paresthesias  ENDOCRINE: History of " hypothyroidism  HEME/ALLERGY/IMMUNE: NEGATIVE for bleeding problems  PSYCHIATRIC: History of anxiety and depression      Objective    /64   Pulse 81   Temp 98.1  F (36.7  C) (Oral)   Resp 16   Wt 65.3 kg (144 lb)   SpO2 98%   BMI 26.77 kg/m    Body mass index is 26.77 kg/m .  Physical Exam   GENERAL: healthy, alert and no distress  RESP: lungs clear to auscultation - no rales, rhonchi or wheezes  CV: regular rate and rhythm, normal S1 S2, no S3 or S4, no murmur, click or rub, no peripheral edema and peripheral pulses strong  MS: Normal gait  Right knee-normal on inspection  Left knee-soft tissue swelling on the inferolateral aspect of the left knee  No joint line tenderness  Minimal patellar effusion, no patellar apprehension, full range of motion  Negative special testing  SKIN: no suspicious lesions or rashes  PSYCH: mentation appears normal, affect normal/bright    Recent Results (from the past 24 hour(s))   XR Knee Standing Left 2 Views    Narrative    KNEE TWO VIEWS STANDING LEFT  2/1/2021 5:21 PM     HISTORY: Chronic pain of left knee; Swelling of left knee joint    COMPARISON: None.      Impression    IMPRESSION: No acute fracture or malalignment. There is normal joint  spacing. No significant knee joint effusion.    MONE ANGULO MD

## 2021-02-01 NOTE — PATIENT INSTRUCTIONS
Schedule for fasting labs, physical in 6/2021  Get the knee xrays today  Start on diclofenac gel as needed for knee pain

## 2021-02-02 ASSESSMENT — ANXIETY QUESTIONNAIRES: GAD7 TOTAL SCORE: 1

## 2021-02-02 NOTE — RESULT ENCOUNTER NOTE
Dear Ana,  Your knee x-ray is normal with no concern for fracture, dislocation, bone chips or degenerative arthritis, this is reassuring  As discussed, to start on physical therapy, I placed the order.  If your pain is swelling do not improve in 4 to 6 weeks after starting on physical therapy, please call to schedule for a sports medicine consult at the [facility] for further evaluation.   Let me know if you have any questions. Take care.  Gabbie Hicks MD

## 2021-02-11 ENCOUNTER — OFFICE VISIT (OUTPATIENT)
Dept: ORTHOPEDICS | Facility: CLINIC | Age: 56
End: 2021-02-11
Payer: COMMERCIAL

## 2021-02-11 VITALS — BODY MASS INDEX: 26.77 KG/M2 | WEIGHT: 144 LBS

## 2021-02-11 DIAGNOSIS — M25.562 CHRONIC PAIN OF LEFT KNEE: Primary | ICD-10-CM

## 2021-02-11 DIAGNOSIS — G89.29 CHRONIC PAIN OF LEFT KNEE: Primary | ICD-10-CM

## 2021-02-11 PROCEDURE — 99203 OFFICE O/P NEW LOW 30 MIN: CPT | Performed by: FAMILY MEDICINE

## 2021-02-11 NOTE — LETTER
2/11/2021         RE: Chanel Israel  [address]  [place] [address]        Dear Colleague,    Thank you for referring your patient, Chanel Israel, to the [facility] [place]. Please see a copy of my visit note below.    CHIEF COMPLAINT:  Chief Complaint   Patient presents with     Consult     left knee pain, fell this summer, swellig started a few months ago in the whole left leg, hx of lymphedema        HISTORY OF PRESENT ILLNESS  Ms. Israel is a pleasant 55 year old female who presents to clinic today with left knee pain.  Ana has been experiencing pain in her left knee since a fall a few months ago.  She fell directly onto her lateral left knee, onto a dog toy in her kitchen.  She has been having waxing and waning pain since this incident, she has also experienced swelling in this leg.  Sometimes the swelling has been throughout the entire leg.  She does have lymphedema on her left side after breast cancer surgery, she is unsure if this is related.  Currently her knee feels good, she does not have much pain unless she palpates the area.  She does not have pain with walking, stairs, or any other activity.        Additional history: as documented    MEDICAL HISTORY  Patient Active Problem List   Diagnosis     Personal history of breast cancer     S/P bilateral mastectomy     Urge incontinence of urine     Overweight (BMI 25.0-29.9)     Mild recurrent major depression (H)     Family history of diabetes mellitus (DM)     Family history of thyroid disease     Plantar fasciitis, bilateral     Seasonal allergies     Mixed hyperlipidemia     Chronic rhinitis     Breast implant asymmetry     ACP (advance care planning)     Lymphedema     Family history of ischemic heart disease     Seasonal allergic rhinitis     Glaucoma suspect, bilateral     Family history of glaucoma     Age-related nuclear cataract of both eyes     Seasonal allergic rhinitis due to  other allergic trigger, unspecified chronicity     Family history of breast cancer in sister     Family history of skin cancer     Actinic keratoses     Hyperlipidemia LDL goal <160     Hypothyroidism due to Hashimoto's thyroiditis     Bruxism (teeth grinding)     Anxiety     Loss of hair       Current Outpatient Medications   Medication Sig Dispense Refill     BIOTIN 5000 PO        Calcium Carbonate-Vitamin D (CALCIUM + D PO) Take by mouth 2 times daily       Coenzyme Q10 (CO Q 10) 100 MG CAPS Take 1 capsule by mouth daily        diclofenac (VOLTAREN) 1 % topical gel Apply 4 g topically 4 times daily as needed for moderate pain 100 g 0     fluocinolone (SYNALAR) 0.025 % cream Apply topically 2 times daily Apply to affected areas of mouth as needed. 60 g 5     fluticasone (FLONASE) 50 MCG/ACT nasal spray as needed   0     Glucosamine-Chondroitin-MSM (TRIPLE FLEX PO) Take by mouth 2 times daily       hydrocortisone 2.5 % cream Apply topically as needed       ketoconazole (NIZORAL) 2 % external cream Apply topically daily 100 g 1     L-GLUTAMINE 1 capsule 2 times daily        MAGNESIUM PO Take 1 capsule by mouth daily        melatonin 5 MG tablet Take 5 mg by mouth nightly as needed for sleep       Multiple Vitamin (MULTI-VITAMIN PO) Take by mouth daily        NIACIN CR PO Take 500 mg by mouth       order for DME 1: Gradient Compression Wraps; 2: LUE compression sleeve with handpiece or guantlet; 20-30 mm Hg; 3: LUE velcro compression sleeve; 4: Compression camisole/bra/tank top 1 each 0     order for DME Compression sleeve to bilateral UE. 2 Units 6     Probiotic Product (PROBIOTIC DAILY PO) Take 1 capsule by mouth 2 times daily       sertraline (ZOLOFT) 50 MG tablet Take 1 tablet (50 mg) by mouth daily 90 tablet 3     SYNTHROID 75 MCG tablet Take 1 tablet (75 mcg) by mouth daily 90 tablet 2     UNABLE TO FIND Take 1 tablet by mouth daily MEDICATION NAME: Sambrocal Elderberry Supplement       zinc 50 MG TABS Take 54  mg by mouth daily         Allergies   Allergen Reactions     Erythromycin Nausea       Family History   Problem Relation Age of Onset     Diabetes Mother         type 2     Thyroid Disease Mother      Glaucoma Mother      Macular Degeneration Mother      Hypertension Mother      Hyperlipidemia Mother      Obesity Mother      Diabetes Sister      Thyroid Disease Sister      Breast Cancer Sister         Estrogen+progesterone +     Depression Sister      Endometrial Cancer Sister 51     Breast Cancer Sister      Cancer Father         skin cancer     Glaucoma Father      Hypertension Father      Hyperlipidemia Father      Other Cancer Father         skin     Obesity Father      Coronary Artery Disease Brother          at 43     Cerebrovascular Disease Maternal Grandfather      Diabetes Sister         type 2     Depression Sister         clinical depression     Mental Illness Sister         Bipolar Disorder     Obesity Sister         fatty liver, hepatitis     Prostate Cancer Paternal Grandfather      Other Cancer Sister         endometrial     Other Cancer Brother         skin     Obesity Brother      Other Cancer Paternal Grandmother         Lung     Osteoporosis Paternal Grandmother      Other Cancer Other         Bladder     Depression Sister      Anxiety Disorder Sister      Thyroid Disease Sister         Hashimotos     Anxiety Disorder Sister         unmedicated     Mental Illness Nephew         Bipolar Disorder     Substance Abuse Nephew         recreational drugs     Mental Illness Brother         unspecified     Substance Abuse Brother         recreational drugs     Substance Abuse Nephew         recreational drugs     Substance Abuse Nephew         recreational drug, alcohol     Asthma Sister         childhood asthma       Additional medical/Social/Surgical histories reviewed in EPIC and updated as appropriate.        PHYSICAL EXAM  General  - normal appearance, in no obvious distress  HEENT  - conjunctivae  not injected, moist mucous membranes  CV  - normal popliteal pulse  Pulm  - normal respiratory pattern, non-labored  Musculoskeletal - left knee  - stance: normal gait without limp, normal single leg squat, no obvious leg length discrepancy  - inspection: no swelling or effusion, normal bone and joint alignment, no obvious deformity  - palpation: mildly tender lateral joint line, patella and patellar tendon non-tender  - ROM: 135 degrees flexion, -5 degrees extension, not painful, normal actively and passively compared to contralateral  - strength: 5/5 in flexion, 5/5 in extension  - special tests:  (-) Lachman  (-) Rolando  (-) varus at 0 and 30 degrees flexion  (-) valgus at 0 and 30 degrees flexion    Neuro  - no sensory or motor deficit, grossly normal coordination, normal muscle tone  Skin  - no ecchymosis, erythema, warmth, or induration, no obvious rash  Psych  - interactive, appropriate, normal mood and affect         ASSESSMENT & PLAN  Ms. Israel is a 55 year old female who presents to clinic today with left knee pain.    I reviewed her x-ray in the room with her, she does have mild osteoarthritis of the knee, but no other obvious findings to explain her pain.    Her pain may be secondary to a bone contusion, meniscus injury, or a separate musculoligamentous issue.    We did discuss continuing conservative measures, she is getting some relief from Voltaren gel.  She should continue this, as helpful.  We also gave her a Tubigrip sleeve today to wear for compression, and for comfort.  She was also referred to physical therapy, I do think this is a good idea.    If her symptoms are continuing to get worse despite continued conservative measures we could consider an MRI.    It was a pleasure seeing Chanel today.    Armani Storey DO, [facility]  Primary Care Sports Medicine      This note was constructed using Dragon dictation software, please excuse any minor errors in spelling, grammar, or syntax.          [facility] FOLLOW-UP VISIT 2/11/2021    Chanel Israel's chief complaint for this visit includes:  Chief Complaint   Patient presents with     Consult     left knee pain, fell this summer, swellig started a few months ago in the whole left leg, hx of lymphedema      PCP: Gabbie Hicks    Referring Provider:  Gabbie Hicks MD  [address] [place] [address]    Wt 65.3 kg (144 lb)   BMI 26.77 kg/m    Data Unavailable       Interval History:     Follow up reason: left knee     Following Therapeutic Plan: Yes     Pain: Worsening    Function: Worsening    Medical History:    Any recent changes to your medical history? No    Any new medication prescribed since last visit? No    Review of Systems:    Do you have fever, chills, weight loss? No    Do you have any vision problems? No    Do you have any chest pain or edema? No    Do you have any shortness of breath or wheezing?  No    Do you have stomach problems? No    Do you have any urinary track issues? No    Do you have any numbness or focal weakness? No    Do you have diabetes? No    Do you have problems with bleeding or clotting? No    Do you have an rashes or other skin lesions? No        Again, thank you for allowing me to participate in the care of your patient.        Sincerely,        Armani Storey, DO

## 2021-02-11 NOTE — PROGRESS NOTES
CHIEF COMPLAINT:  Chief Complaint   Patient presents with     Consult     left knee pain, fell this summer, swellig started a few months ago in the whole left leg, hx of lymphedema        HISTORY OF PRESENT ILLNESS  Ms. Israel is a pleasant 55 year old female who presents to clinic today with left knee pain.  Ana has been experiencing pain in her left knee since a fall a few months ago.  She fell directly onto her lateral left knee, onto a dog toy in her kitchen.  She has been having waxing and waning pain since this incident, she has also experienced swelling in this leg.  Sometimes the swelling has been throughout the entire leg.  She does have lymphedema on her left side after breast cancer surgery, she is unsure if this is related.  Currently her knee feels good, she does not have much pain unless she palpates the area.  She does not have pain with walking, stairs, or any other activity.        Additional history: as documented    MEDICAL HISTORY  Patient Active Problem List   Diagnosis     Personal history of breast cancer     S/P bilateral mastectomy     Urge incontinence of urine     Overweight (BMI 25.0-29.9)     Mild recurrent major depression (H)     Family history of diabetes mellitus (DM)     Family history of thyroid disease     Plantar fasciitis, bilateral     Seasonal allergies     Mixed hyperlipidemia     Chronic rhinitis     Breast implant asymmetry     ACP (advance care planning)     Lymphedema     Family history of ischemic heart disease     Seasonal allergic rhinitis     Glaucoma suspect, bilateral     Family history of glaucoma     Age-related nuclear cataract of both eyes     Seasonal allergic rhinitis due to other allergic trigger, unspecified chronicity     Family history of breast cancer in sister     Family history of skin cancer     Actinic keratoses     Hyperlipidemia LDL goal <160     Hypothyroidism due to Hashimoto's thyroiditis     Bruxism (teeth grinding)     Anxiety     Loss of hair        Current Outpatient Medications   Medication Sig Dispense Refill     BIOTIN 5000 PO        Calcium Carbonate-Vitamin D (CALCIUM + D PO) Take by mouth 2 times daily       Coenzyme Q10 (CO Q 10) 100 MG CAPS Take 1 capsule by mouth daily        diclofenac (VOLTAREN) 1 % topical gel Apply 4 g topically 4 times daily as needed for moderate pain 100 g 0     fluocinolone (SYNALAR) 0.025 % cream Apply topically 2 times daily Apply to affected areas of mouth as needed. 60 g 5     fluticasone (FLONASE) 50 MCG/ACT nasal spray as needed   0     Glucosamine-Chondroitin-MSM (TRIPLE FLEX PO) Take by mouth 2 times daily       hydrocortisone 2.5 % cream Apply topically as needed       ketoconazole (NIZORAL) 2 % external cream Apply topically daily 100 g 1     L-GLUTAMINE 1 capsule 2 times daily        MAGNESIUM PO Take 1 capsule by mouth daily        melatonin 5 MG tablet Take 5 mg by mouth nightly as needed for sleep       Multiple Vitamin (MULTI-VITAMIN PO) Take by mouth daily        NIACIN CR PO Take 500 mg by mouth       order for DME 1: Gradient Compression Wraps; 2: LUE compression sleeve with handpiece or guantlet; 20-30 mm Hg; 3: LUE velcro compression sleeve; 4: Compression camisole/bra/tank top 1 each 0     order for DME Compression sleeve to bilateral UE. 2 Units 6     Probiotic Product (PROBIOTIC DAILY PO) Take 1 capsule by mouth 2 times daily       sertraline (ZOLOFT) 50 MG tablet Take 1 tablet (50 mg) by mouth daily 90 tablet 3     SYNTHROID 75 MCG tablet Take 1 tablet (75 mcg) by mouth daily 90 tablet 2     UNABLE TO FIND Take 1 tablet by mouth daily MEDICATION NAME: Sambrocal Elderberry Supplement       zinc 50 MG TABS Take 54 mg by mouth daily         Allergies   Allergen Reactions     Erythromycin Nausea       Family History   Problem Relation Age of Onset     Diabetes Mother         type 2     Thyroid Disease Mother      Glaucoma Mother      Macular Degeneration Mother      Hypertension Mother       Hyperlipidemia Mother      Obesity Mother      Diabetes Sister      Thyroid Disease Sister      Breast Cancer Sister         Estrogen+progesterone +     Depression Sister      Endometrial Cancer Sister 51     Breast Cancer Sister      Cancer Father         skin cancer     Glaucoma Father      Hypertension Father      Hyperlipidemia Father      Other Cancer Father         skin     Obesity Father      Coronary Artery Disease Brother          at 43     Cerebrovascular Disease Maternal Grandfather      Diabetes Sister         type 2     Depression Sister         clinical depression     Mental Illness Sister         Bipolar Disorder     Obesity Sister         fatty liver, hepatitis     Prostate Cancer Paternal Grandfather      Other Cancer Sister         endometrial     Other Cancer Brother         skin     Obesity Brother      Other Cancer Paternal Grandmother         Lung     Osteoporosis Paternal Grandmother      Other Cancer Other         Bladder     Depression Sister      Anxiety Disorder Sister      Thyroid Disease Sister         Hashimotos     Anxiety Disorder Sister         unmedicated     Mental Illness Nephew         Bipolar Disorder     Substance Abuse Nephew         recreational drugs     Mental Illness Brother         unspecified     Substance Abuse Brother         recreational drugs     Substance Abuse Nephew         recreational drugs     Substance Abuse Nephew         recreational drug, alcohol     Asthma Sister         childhood asthma       Additional medical/Social/Surgical histories reviewed in EPIC and updated as appropriate.        PHYSICAL EXAM  General  - normal appearance, in no obvious distress  HEENT  - conjunctivae not injected, moist mucous membranes  CV  - normal popliteal pulse  Pulm  - normal respiratory pattern, non-labored  Musculoskeletal - left knee  - stance: normal gait without limp, normal single leg squat, no obvious leg length discrepancy  - inspection: no swelling or  effusion, normal bone and joint alignment, no obvious deformity  - palpation: mildly tender lateral joint line, patella and patellar tendon non-tender  - ROM: 135 degrees flexion, -5 degrees extension, not painful, normal actively and passively compared to contralateral  - strength: 5/5 in flexion, 5/5 in extension  - special tests:  (-) Lachman  (-) Rolando  (-) varus at 0 and 30 degrees flexion  (-) valgus at 0 and 30 degrees flexion    Neuro  - no sensory or motor deficit, grossly normal coordination, normal muscle tone  Skin  - no ecchymosis, erythema, warmth, or induration, no obvious rash  Psych  - interactive, appropriate, normal mood and affect         ASSESSMENT & PLAN  Ms. Israel is a 55 year old female who presents to clinic today with left knee pain.    I reviewed her x-ray in the room with her, she does have mild osteoarthritis of the knee, but no other obvious findings to explain her pain.    Her pain may be secondary to a bone contusion, meniscus injury, or a separate musculoligamentous issue.    We did discuss continuing conservative measures, she is getting some relief from Voltaren gel.  She should continue this, as helpful.  We also gave her a Tubigrip sleeve today to wear for compression, and for comfort.  She was also referred to physical therapy, I do think this is a good idea.    If her symptoms are continuing to get worse despite continued conservative measures we could consider an MRI.    It was a pleasure seeing Chanel today.    Armani Storey DO, [facility]  Primary Care Sports Medicine      This note was constructed using Dragon dictation software, please excuse any minor errors in spelling, grammar, or syntax.          [facility] FOLLOW-UP VISIT 2/11/2021    Chanel Israel's chief complaint for this visit includes:  Chief Complaint   Patient presents with     Consult     left knee pain, fell this summer, swellig started a few months ago in the whole left leg, hx of  lymphedema      PCP: Gabbie Hicks    Referring Provider:  Gabbie Hicks MD  [address] [place] [address]    Wt 65.3 kg (144 lb)   BMI 26.77 kg/m    Data Unavailable       Interval History:     Follow up reason: left knee     Following Therapeutic Plan: Yes     Pain: Worsening    Function: Worsening    Medical History:    Any recent changes to your medical history? No    Any new medication prescribed since last visit? No    Review of Systems:    Do you have fever, chills, weight loss? No    Do you have any vision problems? No    Do you have any chest pain or edema? No    Do you have any shortness of breath or wheezing?  No    Do you have stomach problems? No    Do you have any urinary track issues? No    Do you have any numbness or focal weakness? No    Do you have diabetes? No    Do you have problems with bleeding or clotting? No    Do you have an rashes or other skin lesions? No

## 2021-05-12 ENCOUNTER — NURSE TRIAGE (OUTPATIENT)
Dept: NURSING | Facility: CLINIC | Age: 56
End: 2021-05-12

## 2021-05-12 NOTE — TELEPHONE ENCOUNTER
Triage Call:    -Patient had her 2nd Moderna shot for COVID-19 for March 6th.   -Patient is wondering if she is okay to have her shingles vaccine now.   -RN advised that patient can have any other vaccine as long as it is 14 days (2 weeks apart).   -Patient verbalized understanding and agrees with plan.   -RN advised if patient has any other questions or concerns to call back.    Gracy Valdez RN, BSN Nurse Triage Advisor 5:20 PM 5/12/2021       Reason for Disposition    COVID-19 vaccine, Frequently Asked Questions (FAQs)    Protocols used: CORONAVIRUS (COVID-19) VACCINE QUESTIONS AND [id number] 3.25    COVID 19 Nurse Triage Plan/Patient Instructions    Please be aware that novel coronavirus (COVID-19) may be circulating in the community. If you develop symptoms such as fever, cough, or SOB or if you have concerns about the presence of another infection including coronavirus (COVID-19), please contact your health care provider or visit [email or web address].     Disposition/Instructions    Home care recommended. Follow home care protocol based instructions.    Thank you for taking steps to prevent the spread of this virus.  o Limit your contact with others.  o Wear a simple mask to cover your cough.  o Wash your hands well and often.    Resources    [facility]: About COVID-19: [email or web address]    CDC: What to Do If You're Sick: www.cdc.gov/coronavirus/2019-ncov/about/steps-when-sick.html    CDC: Ending Home Isolation: www.cdc.gov/coronavirus/2019-ncov/hcp/disposition-in-home-patients.html     CDC: Caring for Someone: www.cdc.gov/coronavirus/2019-ncov/if-you-are-sick/care-for-someone.html     [facility]: Interim Guidance for Hospital Discharge to Home: www.[email or web address]/coronavirus/hcp/hospdischarge.pdf    [facility] clinical trials (COVID-19 research studies): [email or web address]     Below are the COVID-19 hotlines at the [place]  Department of [facility] ([facility]). Interpreters are available.   o For health questions: Call 194-122-6616 or 1-391.456.4572 (7 a.m. to 7 p.m.)  o For questions about schools and childcare: Call 926-995-4390 or 1-479.389.3414 (7 a.m. to 7 p.m.)

## 2021-05-27 ENCOUNTER — RECORDS - HEALTHEAST (OUTPATIENT)
Dept: ADMINISTRATIVE | Facility: CLINIC | Age: 56
End: 2021-05-27

## 2021-06-06 DIAGNOSIS — E06.3 HYPOTHYROIDISM DUE TO HASHIMOTO'S THYROIDITIS: ICD-10-CM

## 2021-06-07 RX ORDER — LEVOTHYROXINE SODIUM 75 MCG
TABLET ORAL
Qty: 90 TABLET | Refills: 0 | Status: SHIPPED | OUTPATIENT
Start: 2021-06-07 | End: 2021-06-23

## 2021-06-20 ASSESSMENT — ENCOUNTER SYMPTOMS
DYSURIA: 0
FEVER: 0
CONSTIPATION: 0
HEMATURIA: 0
SHORTNESS OF BREATH: 0
DIARRHEA: 0
COUGH: 0
PARESTHESIAS: 0
SORE THROAT: 0
JOINT SWELLING: 0
BREAST MASS: 0
HEADACHES: 0
ARTHRALGIAS: 1
ABDOMINAL PAIN: 0
CHILLS: 0
NERVOUS/ANXIOUS: 0
MYALGIAS: 0
DIZZINESS: 0
HEMATOCHEZIA: 0
EYE PAIN: 0
NAUSEA: 0
WEAKNESS: 0
HEARTBURN: 0
PALPITATIONS: 0
FREQUENCY: 0

## 2021-06-22 DIAGNOSIS — Z13.6 CARDIOVASCULAR SCREENING; LDL GOAL LESS THAN 160: ICD-10-CM

## 2021-06-22 DIAGNOSIS — R53.82 CHRONIC FATIGUE: ICD-10-CM

## 2021-06-22 DIAGNOSIS — Z13.0 SCREENING FOR DEFICIENCY ANEMIA: ICD-10-CM

## 2021-06-22 DIAGNOSIS — E06.3 HYPOTHYROIDISM DUE TO HASHIMOTO'S THYROIDITIS: ICD-10-CM

## 2021-06-22 DIAGNOSIS — Z13.1 SCREENING FOR DIABETES MELLITUS (DM): ICD-10-CM

## 2021-06-22 LAB
ALBUMIN SERPL-MCNC: 3.6 G/DL (ref 3.4–5)
ALP SERPL-CCNC: 78 U/L (ref 40–150)
ALT SERPL W P-5'-P-CCNC: 36 U/L (ref 0–50)
ANION GAP SERPL CALCULATED.3IONS-SCNC: 4 MMOL/L (ref 3–14)
AST SERPL W P-5'-P-CCNC: 20 U/L (ref 0–45)
BILIRUB SERPL-MCNC: 0.4 MG/DL (ref 0.2–1.3)
BUN SERPL-MCNC: 17 MG/DL (ref 7–30)
CALCIUM SERPL-MCNC: 8.7 MG/DL (ref 8.5–10.1)
CHLORIDE SERPL-SCNC: 108 MMOL/L (ref 94–109)
CHOLEST SERPL-MCNC: 218 MG/DL
CO2 SERPL-SCNC: 29 MMOL/L (ref 20–32)
CORTIS SERPL-MCNC: 12.1 UG/DL (ref 4–22)
CREAT SERPL-MCNC: 0.8 MG/DL (ref 0.52–1.04)
ERYTHROCYTE [DISTWIDTH] IN BLOOD BY AUTOMATED COUNT: 12.5 % (ref 10–15)
GFR SERPL CREATININE-BSD FRML MDRD: 81 ML/MIN/{1.73_M2}
GLUCOSE SERPL-MCNC: 95 MG/DL (ref 70–99)
HCT VFR BLD AUTO: 42.2 % (ref 35–47)
HDLC SERPL-MCNC: 58 MG/DL
HGB BLD-MCNC: 14.1 G/DL (ref 11.7–15.7)
LDLC SERPL CALC-MCNC: 129 MG/DL
MCH RBC QN AUTO: 31.1 PG (ref 26.5–33)
MCHC RBC AUTO-ENTMCNC: 33.4 G/DL (ref 31.5–36.5)
MCV RBC AUTO: 93 FL (ref 78–100)
NONHDLC SERPL-MCNC: 160 MG/DL
PLATELET # BLD AUTO: 205 10E9/L (ref 150–450)
POTASSIUM SERPL-SCNC: 4 MMOL/L (ref 3.4–5.3)
PROT SERPL-MCNC: 7.2 G/DL (ref 6.8–8.8)
RBC # BLD AUTO: 4.54 10E12/L (ref 3.8–5.2)
SODIUM SERPL-SCNC: 141 MMOL/L (ref 133–144)
TRIGL SERPL-MCNC: 154 MG/DL
TSH SERPL DL<=0.005 MIU/L-ACNC: 0.88 MU/L (ref 0.4–4)
WBC # BLD AUTO: 5.3 10E9/L (ref 4–11)

## 2021-06-22 PROCEDURE — 82533 TOTAL CORTISOL: CPT | Performed by: FAMILY MEDICINE

## 2021-06-22 PROCEDURE — 80053 COMPREHEN METABOLIC PANEL: CPT | Performed by: FAMILY MEDICINE

## 2021-06-22 PROCEDURE — 36415 COLL VENOUS BLD VENIPUNCTURE: CPT | Performed by: FAMILY MEDICINE

## 2021-06-22 PROCEDURE — 85027 COMPLETE CBC AUTOMATED: CPT | Performed by: FAMILY MEDICINE

## 2021-06-22 PROCEDURE — 84443 ASSAY THYROID STIM HORMONE: CPT | Performed by: FAMILY MEDICINE

## 2021-06-22 PROCEDURE — 80061 LIPID PANEL: CPT | Performed by: FAMILY MEDICINE

## 2021-06-22 ASSESSMENT — ENCOUNTER SYMPTOMS
PARESTHESIAS: 0
SORE THROAT: 0
MYALGIAS: 0
HEMATURIA: 0
FEVER: 0
DIZZINESS: 0
ABDOMINAL PAIN: 0
CHILLS: 0
HEMATOCHEZIA: 0
FREQUENCY: 0
ARTHRALGIAS: 1
HEARTBURN: 0
HEADACHES: 0
CONSTIPATION: 0
WEAKNESS: 0
EYE PAIN: 0
COUGH: 0
DIARRHEA: 0
SHORTNESS OF BREATH: 0
DYSURIA: 0
NERVOUS/ANXIOUS: 0
JOINT SWELLING: 0
BREAST MASS: 0
PALPITATIONS: 0
NAUSEA: 0
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
## 2021-06-23 ENCOUNTER — OFFICE VISIT (OUTPATIENT)
Dept: FAMILY MEDICINE | Facility: CLINIC | Age: 56
End: 2021-06-23
Payer: COMMERCIAL

## 2021-06-23 VITALS
DIASTOLIC BLOOD PRESSURE: 60 MMHG | WEIGHT: 142 LBS | RESPIRATION RATE: 16 BRPM | HEIGHT: 63 IN | SYSTOLIC BLOOD PRESSURE: 118 MMHG | BODY MASS INDEX: 25.16 KG/M2

## 2021-06-23 DIAGNOSIS — Z90.13 S/P BILATERAL MASTECTOMY: ICD-10-CM

## 2021-06-23 DIAGNOSIS — I89.0 LYMPHEDEMA: ICD-10-CM

## 2021-06-23 DIAGNOSIS — Z23 NEED FOR TDAP VACCINATION: ICD-10-CM

## 2021-06-23 DIAGNOSIS — F41.9 ANXIETY: ICD-10-CM

## 2021-06-23 DIAGNOSIS — Z12.11 COLON CANCER SCREENING: ICD-10-CM

## 2021-06-23 DIAGNOSIS — Z12.4 CERVICAL CANCER SCREENING: ICD-10-CM

## 2021-06-23 DIAGNOSIS — E06.3 HYPOTHYROIDISM DUE TO HASHIMOTO'S THYROIDITIS: ICD-10-CM

## 2021-06-23 DIAGNOSIS — F33.0 MILD RECURRENT MAJOR DEPRESSION (H): ICD-10-CM

## 2021-06-23 DIAGNOSIS — Z00.00 ROUTINE GENERAL MEDICAL EXAMINATION AT A HEALTH CARE FACILITY: Primary | ICD-10-CM

## 2021-06-23 DIAGNOSIS — R53.82 CHRONIC FATIGUE: ICD-10-CM

## 2021-06-23 PROCEDURE — 99214 OFFICE O/P EST MOD 30 MIN: CPT | Mod: 25 | Performed by: FAMILY MEDICINE

## 2021-06-23 PROCEDURE — 90471 IMMUNIZATION ADMIN: CPT | Performed by: FAMILY MEDICINE

## 2021-06-23 PROCEDURE — 90715 TDAP VACCINE 7 YRS/> IM: CPT | Performed by: FAMILY MEDICINE

## 2021-06-23 PROCEDURE — 99396 PREV VISIT EST AGE 40-64: CPT | Mod: 25 | Performed by: FAMILY MEDICINE

## 2021-06-23 PROCEDURE — G0123 SCREEN CERV/VAG THIN LAYER: HCPCS | Performed by: FAMILY MEDICINE

## 2021-06-23 PROCEDURE — 87624 HPV HI-RISK TYP POOLED RSLT: CPT | Performed by: FAMILY MEDICINE

## 2021-06-23 RX ORDER — BUPROPION HYDROCHLORIDE 150 MG/1
150 TABLET ORAL EVERY MORNING
Qty: 30 TABLET | Refills: 1 | Status: SHIPPED | OUTPATIENT
Start: 2021-06-23 | End: 2021-07-12 | Stop reason: SINTOL

## 2021-06-23 RX ORDER — LEVOTHYROXINE SODIUM 75 MCG
TABLET ORAL
Qty: 90 TABLET | Refills: 3 | Status: SHIPPED | OUTPATIENT
Start: 2021-06-23 | End: 2022-07-19

## 2021-06-23 ASSESSMENT — MIFFLIN-ST. JEOR: SCORE: 1198.24

## 2021-06-28 LAB
COPATH REPORT: NORMAL
PAP: NORMAL

## 2021-06-29 LAB
FINAL DIAGNOSIS: NORMAL
HPV HR 12 DNA CVX QL NAA+PROBE: NEGATIVE
HPV16 DNA SPEC QL NAA+PROBE: NEGATIVE
HPV18 DNA SPEC QL NAA+PROBE: NEGATIVE
SPECIMEN DESCRIPTION: NORMAL
SPECIMEN SOURCE CVX/VAG CYTO: NORMAL

## 2021-07-10 ENCOUNTER — MYC MEDICAL ADVICE (OUTPATIENT)
Dept: FAMILY MEDICINE | Facility: CLINIC | Age: 56
End: 2021-07-10

## 2021-07-10 DIAGNOSIS — F33.0 MILD RECURRENT MAJOR DEPRESSION (H): ICD-10-CM

## 2021-07-10 DIAGNOSIS — R53.82 CHRONIC FATIGUE: ICD-10-CM

## 2021-07-10 DIAGNOSIS — F41.9 ANXIETY: ICD-10-CM

## 2021-07-10 DIAGNOSIS — Z23 NEED FOR SHINGLES VACCINE: Primary | ICD-10-CM

## 2021-07-12 ENCOUNTER — OFFICE VISIT (OUTPATIENT)
Dept: ORTHOPEDICS | Facility: CLINIC | Age: 56
End: 2021-07-12
Payer: COMMERCIAL

## 2021-07-12 ENCOUNTER — ANCILLARY PROCEDURE (OUTPATIENT)
Dept: GENERAL RADIOLOGY | Facility: CLINIC | Age: 56
End: 2021-07-12
Attending: FAMILY MEDICINE
Payer: COMMERCIAL

## 2021-07-12 VITALS — WEIGHT: 142 LBS | BODY MASS INDEX: 25.15 KG/M2

## 2021-07-12 DIAGNOSIS — M25.562 CHRONIC PAIN OF LEFT KNEE: ICD-10-CM

## 2021-07-12 DIAGNOSIS — G89.29 CHRONIC PAIN OF LEFT KNEE: ICD-10-CM

## 2021-07-12 DIAGNOSIS — G89.29 CHRONIC LEFT SHOULDER PAIN: Primary | ICD-10-CM

## 2021-07-12 DIAGNOSIS — M25.512 CHRONIC LEFT SHOULDER PAIN: Primary | ICD-10-CM

## 2021-07-12 DIAGNOSIS — M25.512 LEFT SHOULDER PAIN: ICD-10-CM

## 2021-07-12 PROCEDURE — 73030 X-RAY EXAM OF SHOULDER: CPT | Mod: LT | Performed by: RADIOLOGY

## 2021-07-12 PROCEDURE — 99213 OFFICE O/P EST LOW 20 MIN: CPT | Mod: 25 | Performed by: FAMILY MEDICINE

## 2021-07-12 PROCEDURE — 20610 DRAIN/INJ JOINT/BURSA W/O US: CPT | Mod: LT | Performed by: FAMILY MEDICINE

## 2021-07-12 RX ORDER — BUPROPION HYDROCHLORIDE 75 MG/1
75 TABLET ORAL 2 TIMES DAILY
Qty: 60 TABLET | Refills: 3 | Status: SHIPPED | OUTPATIENT
Start: 2021-07-12 | End: 2021-10-08

## 2021-07-12 RX ORDER — TRIAMCINOLONE ACETONIDE 40 MG/ML
40 INJECTION, SUSPENSION INTRA-ARTICULAR; INTRAMUSCULAR
Status: SHIPPED | OUTPATIENT
Start: 2021-07-12

## 2021-07-12 RX ADMIN — TRIAMCINOLONE ACETONIDE 40 MG: 40 INJECTION, SUSPENSION INTRA-ARTICULAR; INTRAMUSCULAR at 14:26

## 2021-07-12 NOTE — TELEPHONE ENCOUNTER
Routing to provider to review and advise, see MyC message below.    Fernanda Dee RN  [facility]

## 2021-07-12 NOTE — PROGRESS NOTES
HISTORY OF PRESENT ILLNESS  Ms. Israel is a pleasant 56 year old female following up with left knee and leg pain and swelling  Ana has been doing relatively well lately, although her swelling has been an ongoing problem.  She does feel swelling and fullness from her thigh down to her foot.  This is mostly in the medial aspect of her left leg, although this is diffuse.  Her knee is feeling mildly better.     PHYSICAL EXAM  Vitals:    07/12/21 1111   Weight: 64.4 kg (142 lb)   General  - normal appearance, in no obvious distress  HEENT  - conjunctivae not injected, moist mucous membranes  CV  - normal radial pulse  Pulm  - normal respiratory pattern, non-labored  Musculoskeletal - left shoulder  - inspection: normal bone and joint alignment, no obvious deformity, no scapular winging, no AC step-off  - palpation: normal clavicle, non-tender AC  - ROM: painful and limited flexion and ER at end range, limited IR  - strength: 5/5  strength, 5/5 in all shoulder planes  - special tests:  (-) Speed's  (-) Neer  (+) Hawkin's  (+) Masha's  Neuro  - no sensory or motor deficit, grossly normal coordination, normal muscle tone  Skin  - no ecchymosis, erythema, warmth, or induration, no obvious rash  Psych  - interactive, appropriate, normal mood and affect          ASSESSMENT & PLAN  Ms. Israel is a 56 year old female following up with left knee pain and left leg swelling.    We revisited her swelling from a global standpoint, and this very well may be secondary to her lymphatic issues.  I also again reviewed her x-ray of her left knee, this does show mild to moderate osteoarthritis.    Through shared decision making we did decide to inject her left knee today on a diagnostic and therapeutic basis (see procedure note).  If this injection helps her that we can be certain that her knee is pathologic to a certain degree.  If this does not help her at all this would suggest that lymphatic issues are predominantly contributing to  her symptoms.    Lastly, I did order and review an x-ray of her shoulder, this shows no obvious acute or chronic issues.  It is difficult to tell ultimately if the bulk of the pain is arising from a primary shoulder issue or a lymphatic issue.    I do think she will do well with focused physical therapy, hopefully this can be diagnostic as well.  She get started at her earliest convenience.    It was a pleasure seeing Ana.        Armani Storey DO, CAQSM      This note was constructed using Dragon dictation software, please excuse any minor errors in spelling, grammar, or syntax.        [facility] FOLLOW-UP VISIT 7/12/2021    Chanel Israel's chief complaint for this visit includes:  Chief Complaint   Patient presents with     RECHECK     left leg follow up,      PCP: Gabbie Hicks    Referring Provider:  No referring provider defined for this encounter.    Wt 64.4 kg (142 lb)   BMI 25.15 kg/m    Data Unavailable       Interval History:     Follow up reason: left leg     Pain: Worsening    Function: Worsening    Medical History:    Any recent changes to your medical history? No    Any new medication prescribed since last visit? No    Review of Systems:    Do you have fever, chills, weight loss? No    Do you have any vision problems? No    Do you have any chest pain or edema? No    Do you have any shortness of breath or wheezing?  No    Do you have stomach problems? No    Do you have any urinary track issues? No    Do you have any numbness or focal weakness? No    Do you have diabetes? No    Do you have problems with bleeding or clotting? No    Do you have an rashes or other skin lesions? No    Large Joint Injection/Arthocentesis: L knee joint    Date/Time: 7/12/2021 2:26 PM  Performed by: Armani Storey DO  Authorized by: Armani Storey DO     Indications:  Pain  Needle Size:  22 G  Guidance: landmark guided    Approach:  Lateral  Location:  Knee      Medications:  40 mg  triamcinolone 40 MG/ML  Outcome:  Tolerated well, no immediate complications  Procedure discussed: discussed risks, benefits, and alternatives    Consent Given by:  Patient  Timeout: timeout called immediately prior to procedure    Prep: patient was prepped and draped in usual sterile fashion       PROCEDURE    Knee Injection - Intraarticular  The patient was informed of the risks and the benefits of the procedure and a written consent was signed.  The patient s left knee was prepped with chlorhexidine in sterile fashion.   40 mg of triamcinolone suspension was drawn up into a 5 mL syringe with 4 mL of 1% lidocaine.  Injection was performed using substerile technique.  A 1.5-inch 22-gauge needle was used to enter the lateral aspect of the knee.  Injection performed successfully without difficulty.  There were no complications. The patient tolerated the procedure well. There was negligible bleeding.   The patient was instructed to ice the knee upon leaving clinic and refrain from overuse over the next 3 days.   The patient was instructed to call or go to the emergency room with any unusual pain, swelling, redness, or if otherwise concerned.

## 2021-07-12 NOTE — LETTER
7/12/2021         RE: Chanel Israel  [address]  [facility] [address]        Dear Colleague,    Thank you for referring your patient, Chanel Israel, to the [facility] [place]. Please see a copy of my visit note below.    HISTORY OF PRESENT ILLNESS  Ms. Israel is a pleasant 56 year old female following up with left knee and leg pain and swelling  Ana has been doing relatively well lately, although her swelling has been an ongoing problem.  She does feel swelling and fullness from her thigh down to her foot.  This is mostly in the medial aspect of her left leg, although this is diffuse.  Her knee is feeling mildly better.     PHYSICAL EXAM  Vitals:    07/12/21 1111   Weight: 64.4 kg (142 lb)   General  - normal appearance, in no obvious distress  HEENT  - conjunctivae not injected, moist mucous membranes  CV  - normal radial pulse  Pulm  - normal respiratory pattern, non-labored  Musculoskeletal - left shoulder  - inspection: normal bone and joint alignment, no obvious deformity, no scapular winging, no AC step-off  - palpation: normal clavicle, non-tender AC  - ROM: painful and limited flexion and ER at end range, limited IR  - strength: 5/5  strength, 5/5 in all shoulder planes  - special tests:  (-) Speed's  (-) Neer  (+) Hawkin's  (+) Masha's  Neuro  - no sensory or motor deficit, grossly normal coordination, normal muscle tone  Skin  - no ecchymosis, erythema, warmth, or induration, no obvious rash  Psych  - interactive, appropriate, normal mood and affect          ASSESSMENT & PLAN  Ms. Israel is a 56 year old female following up with left knee pain and left leg swelling.    We revisited her swelling from a global standpoint, and this very well may be secondary to her lymphatic issues.  I also again reviewed her x-ray of her left knee, this does show mild to moderate osteoarthritis.    Through shared decision making we did decide to inject  her left knee today on a diagnostic and therapeutic basis (see procedure note).  If this injection helps her that we can be certain that her knee is pathologic to a certain degree.  If this does not help her at all this would suggest that lymphatic issues are predominantly contributing to her symptoms.    Lastly, I did order and review an x-ray of her shoulder, this shows no obvious acute or chronic issues.  It is difficult to tell ultimately if the bulk of the pain is arising from a primary shoulder issue or a lymphatic issue.    I do think she will do well with focused physical therapy, hopefully this can be diagnostic as well.  She get started at her earliest convenience.    It was a pleasure seeing Ana.        Armani Storey DO, CAQSM      This note was constructed using Dragon dictation software, please excuse any minor errors in spelling, grammar, or syntax.        [facility] FOLLOW-UP VISIT 7/12/2021    Chanel Israel's chief complaint for this visit includes:  Chief Complaint   Patient presents with     RECHECK     left leg follow up,      PCP: Gabbie Hicks    Referring Provider:  No referring provider defined for this encounter.    Wt 64.4 kg (142 lb)   BMI 25.15 kg/m    Data Unavailable       Interval History:     Follow up reason: left leg     Pain: Worsening    Function: Worsening    Medical History:    Any recent changes to your medical history? No    Any new medication prescribed since last visit? No    Review of Systems:    Do you have fever, chills, weight loss? No    Do you have any vision problems? No    Do you have any chest pain or edema? No    Do you have any shortness of breath or wheezing?  No    Do you have stomach problems? No    Do you have any urinary track issues? No    Do you have any numbness or focal weakness? No    Do you have diabetes? No    Do you have problems with bleeding or clotting? No    Do you have an rashes or other skin lesions? No    Large  Joint Injection/Arthocentesis: L knee joint    Date/Time: 7/12/2021 2:26 PM  Performed by: Armani Storey DO  Authorized by: Armani Storey DO     Indications:  Pain  Needle Size:  22 G  Guidance: landmark guided    Approach:  Lateral  Location:  Knee      Medications:  40 mg triamcinolone 40 MG/ML  Outcome:  Tolerated well, no immediate complications  Procedure discussed: discussed risks, benefits, and alternatives    Consent Given by:  Patient  Timeout: timeout called immediately prior to procedure    Prep: patient was prepped and draped in usual sterile fashion       PROCEDURE    Knee Injection - Intraarticular  The patient was informed of the risks and the benefits of the procedure and a written consent was signed.  The patient s left knee was prepped with chlorhexidine in sterile fashion.   40 mg of triamcinolone suspension was drawn up into a 5 mL syringe with 4 mL of 1% lidocaine.  Injection was performed using substerile technique.  A 1.5-inch 22-gauge needle was used to enter the lateral aspect of the knee.  Injection performed successfully without difficulty.  There were no complications. The patient tolerated the procedure well. There was negligible bleeding.   The patient was instructed to ice the knee upon leaving clinic and refrain from overuse over the next 3 days.   The patient was instructed to call or go to the emergency room with any unusual pain, swelling, redness, or if otherwise concerned.                Again, thank you for allowing me to participate in the care of your patient.        Sincerely,        Armani Storey DO

## 2021-07-13 ENCOUNTER — ANCILLARY PROCEDURE (OUTPATIENT)
Dept: GENERAL RADIOLOGY | Facility: CLINIC | Age: 56
End: 2021-07-13
Attending: FAMILY MEDICINE
Payer: COMMERCIAL

## 2021-07-13 DIAGNOSIS — M54.2 NECK PAIN: ICD-10-CM

## 2021-07-13 PROCEDURE — 72040 X-RAY EXAM NECK SPINE 2-3 VW: CPT | Mod: GC | Performed by: RADIOLOGY

## 2021-07-14 ENCOUNTER — TRANSFERRED RECORDS (OUTPATIENT)
Dept: HEALTH INFORMATION MANAGEMENT | Facility: CLINIC | Age: 56
End: 2021-07-14

## 2021-08-02 ENCOUNTER — ALLIED HEALTH/NURSE VISIT (OUTPATIENT)
Dept: NURSING | Facility: CLINIC | Age: 56
End: 2021-08-02
Payer: COMMERCIAL

## 2021-08-02 DIAGNOSIS — Z23 NEED FOR VACCINATION: Primary | ICD-10-CM

## 2021-08-02 PROCEDURE — 90750 HZV VACC RECOMBINANT IM: CPT

## 2021-08-02 PROCEDURE — 90471 IMMUNIZATION ADMIN: CPT

## 2021-08-02 PROCEDURE — 99207 PR NO CHARGE NURSE ONLY: CPT

## 2021-08-02 NOTE — NURSING NOTE
Prior to immunization administration, verified patients identity using patient s name and date of birth. Please see Immunization Activity for additional information.     Screening Questionnaire for Adult Immunization    Are you sick today?   No   Do you have allergies to medications, food, a vaccine component or latex?   Yes   Have you ever had a serious reaction after receiving a vaccination?   No   Do you have a long-term health problem with heart, lung, kidney, or metabolic disease (e.g., diabetes), asthma, a blood disorder, no spleen, complement component deficiency, a cochlear implant, or a spinal fluid leak?  Are you on long-term aspirin therapy?   No   Do you have cancer, leukemia, HIV/AIDS, or any other immune system problem?   No   Do you have a parent, brother, or sister with an immune system problem?   No   In the past 3 months, have you taken medications that affect  your immune system, such as prednisone, other steroids, or anticancer drugs; drugs for the treatment of rheumatoid arthritis, Crohn s disease, or psoriasis; or have you had radiation treatments?   No   Have you had a seizure, or a brain or other nervous system problem?   No   During the past year, have you received a transfusion of blood or blood    products, or been given immune (gamma) globulin or antiviral drug?   No   For women: Are you pregnant or is there a chance you could become       pregnant during the next month?   No   Have you received any vaccinations in the past 4 weeks?   No     Immunization questionnaire answers were all negative.        Per orders of Dr. Hicks, injection of Shingrix given by Della Fajardo. Patient instructed to remain in clinic for 15 minutes afterwards, and to report any adverse reaction to me immediately.       Screening performed by Della Fajardo on 8/2/2021 at 1:11 PM.

## 2021-08-05 ENCOUNTER — OFFICE VISIT (OUTPATIENT)
Dept: URGENT CARE | Facility: URGENT CARE | Age: 56
End: 2021-08-05
Payer: COMMERCIAL

## 2021-08-05 VITALS
RESPIRATION RATE: 14 BRPM | BODY MASS INDEX: 24.8 KG/M2 | TEMPERATURE: 98.1 F | SYSTOLIC BLOOD PRESSURE: 129 MMHG | WEIGHT: 140 LBS | HEART RATE: 91 BPM | DIASTOLIC BLOOD PRESSURE: 60 MMHG | OXYGEN SATURATION: 97 %

## 2021-08-05 DIAGNOSIS — L08.9 LOCAL INFECTION OF SKIN AND SUBCUTANEOUS TISSUE: Primary | ICD-10-CM

## 2021-08-05 DIAGNOSIS — R09.A2 GLOBUS SENSATION: ICD-10-CM

## 2021-08-05 PROCEDURE — 99213 OFFICE O/P EST LOW 20 MIN: CPT | Performed by: FAMILY MEDICINE

## 2021-08-05 RX ORDER — CEPHALEXIN 500 MG/1
500 CAPSULE ORAL 2 TIMES DAILY
Qty: 14 CAPSULE | Refills: 0 | Status: SHIPPED | OUTPATIENT
Start: 2021-08-05 | End: 2021-08-12

## 2021-08-05 NOTE — PROGRESS NOTES
Assessment & Plan     Local infection of skin and subcutaneous tissue  Probably infection over large local reactio nto the shingles vaccine.follow up discussed.   - cephALEXin (KEFLEX) 500 MG capsule  Dispense: 14 capsule; Refill: 0    Globus sensation  Discussed follow up if persisting. Discussed usual causes.    [id number]}    Return in about 3 days (around 8/8/2021), or if symptoms worsen or fail to improve.    Alejandro Cha MD  [facility]    ------------------------------------------------------------------------  Subjective     Chanel Prince Israel presents to clinic today for the following health issues:  chief complaint  HPI  Got shingles vaccine 2-3 days ago. Yesterday began to notice redness and pain worsening in the area of hte shot. No fever.     Separately noted sense of lump deep in her throat present fo r< 1 weeks. Thought had a cold a bout a week ago. No bovious post nasal drip nor gerd symptoms recently. The patient has a history of hashimotos.       Review of Systems        Objective    /60 (BP Location: Left arm, Patient Position: Sitting, Cuff Size: Adult Regular)   Pulse 91   Temp 98.1  F (36.7  C) (Tympanic)   Resp 14   Wt 63.5 kg (140 lb)   SpO2 97%   BMI 24.80 kg/m    Physical Exam   GENERAL: healthy, alert and no distress  Throat is normal in appearance  NECK: no adenopathy, no asymmetry, masses, or scars and thyroid normal to palpation  SKIN: 7x15 cm area of well demarcated area of warmth, tenderness and redness about the injection site.

## 2021-09-05 ENCOUNTER — HEALTH MAINTENANCE LETTER (OUTPATIENT)
Age: 56
End: 2021-09-05

## 2021-10-15 ENCOUNTER — TRANSFERRED RECORDS (OUTPATIENT)
Dept: HEALTH INFORMATION MANAGEMENT | Facility: CLINIC | Age: 56
End: 2021-10-15
Payer: COMMERCIAL

## 2021-10-15 LAB — RETINOPATHY: NORMAL

## 2021-10-16 ENCOUNTER — MYC MEDICAL ADVICE (OUTPATIENT)
Dept: FAMILY MEDICINE | Facility: CLINIC | Age: 56
End: 2021-10-16
Payer: COMMERCIAL

## 2021-10-16 DIAGNOSIS — Z82.62 FAMILY HISTORY OF OSTEOPOROSIS: Primary | ICD-10-CM

## 2021-10-16 DIAGNOSIS — Z13.820 SCREENING FOR OSTEOPOROSIS: ICD-10-CM

## 2021-10-16 DIAGNOSIS — E06.3 HYPOTHYROIDISM DUE TO HASHIMOTO'S THYROIDITIS: ICD-10-CM

## 2021-10-16 DIAGNOSIS — Z78.0 POST-MENOPAUSAL: ICD-10-CM

## 2021-10-20 DIAGNOSIS — E06.3 HYPOTHYROIDISM DUE TO HASHIMOTO'S THYROIDITIS: Primary | ICD-10-CM

## 2021-10-21 ENCOUNTER — LAB (OUTPATIENT)
Dept: LAB | Facility: CLINIC | Age: 56
End: 2021-10-21
Payer: COMMERCIAL

## 2021-10-21 ENCOUNTER — IMMUNIZATION (OUTPATIENT)
Dept: FAMILY MEDICINE | Facility: CLINIC | Age: 56
End: 2021-10-21
Payer: COMMERCIAL

## 2021-10-21 DIAGNOSIS — Z23 NEED FOR PROPHYLACTIC VACCINATION AND INOCULATION AGAINST INFLUENZA: Primary | ICD-10-CM

## 2021-10-21 DIAGNOSIS — E06.3 HYPOTHYROIDISM DUE TO HASHIMOTO'S THYROIDITIS: ICD-10-CM

## 2021-10-21 LAB
T3 SERPL-MCNC: 88 NG/DL (ref 60–181)
T4 FREE SERPL-MCNC: 1.18 NG/DL (ref 0.76–1.46)
TSH SERPL DL<=0.005 MIU/L-ACNC: 0.85 MU/L (ref 0.4–4)

## 2021-10-21 PROCEDURE — 84445 ASSAY OF TSI GLOBULIN: CPT | Mod: 90

## 2021-10-21 PROCEDURE — 84443 ASSAY THYROID STIM HORMONE: CPT

## 2021-10-21 PROCEDURE — 84439 ASSAY OF FREE THYROXINE: CPT

## 2021-10-21 PROCEDURE — 90682 RIV4 VACC RECOMBINANT DNA IM: CPT

## 2021-10-21 PROCEDURE — 90471 IMMUNIZATION ADMIN: CPT

## 2021-10-21 PROCEDURE — 99207 PR NO CHARGE NURSE ONLY: CPT

## 2021-10-21 PROCEDURE — 36415 COLL VENOUS BLD VENIPUNCTURE: CPT

## 2021-10-21 PROCEDURE — 84480 ASSAY TRIIODOTHYRONINE (T3): CPT

## 2021-10-28 LAB — TSI SER-ACNC: <1 TSI INDEX

## 2021-10-29 ENCOUNTER — OFFICE VISIT (OUTPATIENT)
Dept: ENDOCRINOLOGY | Facility: CLINIC | Age: 56
End: 2021-10-29
Payer: COMMERCIAL

## 2021-10-29 VITALS
HEIGHT: 62 IN | DIASTOLIC BLOOD PRESSURE: 73 MMHG | SYSTOLIC BLOOD PRESSURE: 131 MMHG | BODY MASS INDEX: 25.89 KG/M2 | WEIGHT: 140.7 LBS

## 2021-10-29 DIAGNOSIS — E06.3 HYPOTHYROIDISM DUE TO HASHIMOTO'S THYROIDITIS: Primary | ICD-10-CM

## 2021-10-29 PROCEDURE — 99215 OFFICE O/P EST HI 40 MIN: CPT | Performed by: INTERNAL MEDICINE

## 2021-10-29 ASSESSMENT — MIFFLIN-ST. JEOR: SCORE: 1173.46

## 2021-10-29 NOTE — LETTER
10/29/2021       RE: Chanel Israel  [address]  [facility] [address]     Dear Colleague,    Thank you for referring your patient, Chanel Israel, to the [facility] [place] at [facility]. Please see a copy of my visit note below.         Endocrinology Note         Chanel is a 56 year old female who has a visit today for follow up Hashimoto's hypothyroidism.    HPI  Chanel Israel is a 56 years old female with hypothyroidism, hyperlipidemia, previous hx of breast cancer diagnosed 2007 s/p left mastectomy and prophylactic right mastectomy, chemotherapy who has a visit for f/u hypothyroidism.    She was diagnosed with Hashimoto's hypothyroidism in August 2015 when she presented with hair loss, fatigue, sluggish and weight gain. At the diagnosis, TSH was 16.49, FT4 0.71.  She has been on Synthroid since.     Interim history  Last seen 8/2020.  She reports being quite well until this summer 2021 when she noticed that she has had dry eye, redness,eye pressure and light sensitivity. She saw ophthalmologist who suspected mild Graves's eye disease. She would like to get it checked whether she has Graves' disease or not.     She reports feeling well except stress in her job recently. She is a .  She has been on brand name Synthroid 75 mcg daily. Lab on 10/21/21 showed TSH 0.85, FT4 1.18, TT3 88 and negative TSI Ab. She is taking biotin 5000 mcg daily for hair. She denied chest pain, SOB, altered bowel movement, temperature intolerance. She sleeps ok. She started taking biotin for hair loss. She is on niacin for hyperlipidemia. She could not tolerate statin due to lymphedema.    Past Medical History  Past Medical History:   Diagnosis Date     Asthma, mild intermittent      Breast cancer (H)      Depression      Depressive disorder 8/2000    postpartum     Endometriosis      Heart murmur      Patient reported.     Loss of hair 8/11/2020     PONV (postoperative nausea and vomiting)      Thyroid disease 8/2015    Hashimotos       Allergies  Allergies   Allergen Reactions     Erythromycin Nausea     Medications  Current Outpatient Medications   Medication Sig Dispense Refill     BIOTIN 5000 PO        buPROPion (WELLBUTRIN XL) 150 MG 24 hr tablet Take 1 tablet (150 mg) by mouth every morning 90 tablet 0     Calcium Carbonate-Vitamin D (CALCIUM + D PO) Take by mouth 2 times daily       Coenzyme Q10 (CO Q 10) 100 MG CAPS Take 1 capsule by mouth daily        diclofenac (VOLTAREN) 1 % topical gel Apply 4 g topically 4 times daily as needed for moderate pain 100 g 2     fluocinolone (SYNALAR) 0.025 % cream Apply topically 2 times daily Apply to affected areas of mouth as needed. 60 g 5     fluticasone (FLONASE) 50 MCG/ACT nasal spray as needed   0     Glucosamine-Chondroitin-MSM (TRIPLE FLEX PO) Take by mouth 2 times daily       hydrocortisone 2.5 % cream Apply topically as needed       ketoconazole (NIZORAL) 2 % external cream Apply topically daily 100 g 1     L-GLUTAMINE 1 capsule 2 times daily        MAGNESIUM PO Take 1 capsule by mouth daily        melatonin 5 MG tablet Take 5 mg by mouth nightly as needed for sleep       Multiple Vitamin (MULTI-VITAMIN PO) Take by mouth daily        NIACIN CR PO Take 500 mg by mouth       order for DME 1: Gradient Compression Wraps; 2: LUE compression sleeve with handpiece or guantlet; 20-30 mm Hg; 3: LUE velcro compression sleeve; 4: Compression camisole/bra/tank top 1 each 0     order for DME Compression sleeve to bilateral UE. 2 Units 6     Probiotic Product (PROBIOTIC DAILY PO) Take 1 capsule by mouth 2 times daily       sertraline (ZOLOFT) 50 MG tablet Take 1.5 tablets (75 mg) by mouth every evening Dose change 135 tablet 1     SYNTHROID 75 MCG tablet TAKE 1 TABLET(75 MCG) BY MOUTH DAILY 90 tablet 3     UNABLE TO FIND Take 1 tablet by mouth daily MEDICATION NAME:  Marisela Elderberry Supplement       zinc 50 MG TABS Take 54 mg by mouth daily       Family History  family history includes Anxiety Disorder in her sister and sister; Asthma in her sister; Breast Cancer in her sister and sister; Cancer in her father; Cerebrovascular Disease in her maternal grandfather; Coronary Artery Disease in her brother; Depression in her sister, sister, and sister; Diabetes in her mother, sister, and sister; Endometrial Cancer (age of onset: 51) in her sister; Glaucoma in her father and mother; Hyperlipidemia in her father and mother; Hypertension in her father and mother; Macular Degeneration in her mother; Mental Illness in her brother, nephew, and sister; Obesity in her brother, father, mother, and sister; Osteoporosis in her paternal grandmother; Other Cancer in her brother, father, paternal grandmother, sister, and another family member; Prostate Cancer in her paternal grandfather; Substance Abuse in her brother, nephew, nephew, and nephew; Thyroid Disease in her mother, sister, and sister.     Her mother, maternal grandmother and sister have hypothyroidism.     Social History  Social History     Tobacco Use     Smoking status: Never Smoker     Smokeless tobacco: Never Used   Substance Use Topics     Alcohol use: Yes     Comment: 1-2 drinks per week     Drug use: No   lives with family  She is special .    ROS  Constitutional: no weight change, good energy  Eyes: no vision change, diplopia or red eyes   Neck: no difficulty swallowing, no choking, no neck pain, no neck swelling  Cardiovascular: no chest pain, palpitations  Respiratory: no dyspnea, cough, shortness of breath or wheezing   GI: no nausea, vomiting, diarrhea or constipation, no abdominal pain   : no change in urine, no dysuria or hematuria  Musculoskeletal: no joint or muscle pain or swelling   Integumentary: no concerning lesions   Neuro: no loss of strength or sensation, no numbness or tingling, no tremor, no  "dizziness, no headache   Endo: no polyuria or polydipsia, no temperature intolerance   Heme/Lymph: no concerning bumps, no bleeding problems   Allergy: no environmental allergies   Psych: +anxiety    Physical Exam  Vital signs:   /73   Ht 1.57 m (5' 1.81\")   Wt 63.8 kg (140 lb 11.2 oz)   BMI 25.89 kg/m    Estimated body mass index is 25.89 kg/m  as calculated from the following:    Height as of this encounter: 1.57 m (5' 1.81\").    Weight as of this encounter: 63.8 kg (140 lb 11.2 oz).  MERISSA: no distress, comfortable, pleasant   HEENT: no pallor, EOMI, no eye lid retraction or exophthalmos  Thyroid: no enlarged, no discrete nodule  CVS: RRR, normal S1,S2, no murmur  Lungs: CTA B/L  Abd: soft, ND, NT, no hepatomegaly  NS: appropriate mood, CN intact, no tremor  Ext: no lower legs swelling    RESULTS  Lab from [facility]  10/20/2016; TSH 0.41, FT4 1.38, FT3 2.8        ASSESSMENT:    Chanel Israel is a 56 years old female with hypothyroidism, hyperlipidemia, previous hx of breast cancer diagnosed 2007 s/p left mastectomy and prophylactic right mastectomy, chemotherapy who has VDO visit for hypothyroidism.    1) Hashimoto's hypothyroidism: diagnosed in 2015 when presented with hair loss, low energy and some weight gain. Recent lab is normal.   Her current dose is 75 mcg daily. She is clinically and biochemically euthyroid. Will continue current dose.     2) concern of thyroid eye disease: I don't see eyelid retraction or exophthalmos. TSI is negative. Follow up eye exam with ophthalmologist    PLAN:   - Continue Synthroid 75 mcg daily  - she will follow up with PCP annually    External notes/medical records independently reviewed, labs and imaging independently reviewed, medical management and tests to be discussed/communicated to patient.    Time: I spent 40 minutes spent on the date of the encounter preparing to see patient (including chart review and preparation), obtaining and or " reviewing additional medical history, performing a physical exam and evaluation, documenting clinical information in the electronic health record, independently interpreting results, communicating results to the patient and coordinating care.      Mariella Tripathi MD  Division of Diabetes and Endocrinology  Department of Medicine  [phone]

## 2021-10-29 NOTE — PROGRESS NOTES
Endocrinology Note         Chanel is a 56 year old female who has a visit today for follow up Hashimoto's hypothyroidism.    HPI  Chanel Israel is a 56 years old female with hypothyroidism, hyperlipidemia, previous hx of breast cancer diagnosed 2007 s/p left mastectomy and prophylactic right mastectomy, chemotherapy who has a visit for f/u hypothyroidism.    She was diagnosed with Hashimoto's hypothyroidism in August 2015 when she presented with hair loss, fatigue, sluggish and weight gain. At the diagnosis, TSH was 16.49, FT4 0.71.  She has been on Synthroid since.     Interim history  Last seen 8/2020.  She reports being quite well until this summer 2021 when she noticed that she has had dry eye, redness,eye pressure and light sensitivity. She saw ophthalmologist who suspected mild Graves's eye disease. She would like to get it checked whether she has Graves' disease or not.     She reports feeling well except stress in her job recently. She is a .  She has been on brand name Synthroid 75 mcg daily. Lab on 10/21/21 showed TSH 0.85, FT4 1.18, TT3 88 and negative TSI Ab. She is taking biotin 5000 mcg daily for hair. She denied chest pain, SOB, altered bowel movement, temperature intolerance. She sleeps ok. She started taking biotin for hair loss. She is on niacin for hyperlipidemia. She could not tolerate statin due to lymphedema.    Past Medical History  Past Medical History:   Diagnosis Date     Asthma, mild intermittent      Breast cancer (H)      Depression      Depressive disorder 8/2000    postpartum     Endometriosis      Heart murmur     Patient reported.     Loss of hair 8/11/2020     PONV (postoperative nausea and vomiting)      Thyroid disease 8/2015    Hashimotos       Allergies  Allergies   Allergen Reactions     Erythromycin Nausea     Medications  Current Outpatient Medications   Medication Sig Dispense Refill     BIOTIN 5000 PO        buPROPion (WELLBUTRIN XL) 150 MG  24 hr tablet Take 1 tablet (150 mg) by mouth every morning 90 tablet 0     Calcium Carbonate-Vitamin D (CALCIUM + D PO) Take by mouth 2 times daily       Coenzyme Q10 (CO Q 10) 100 MG CAPS Take 1 capsule by mouth daily        diclofenac (VOLTAREN) 1 % topical gel Apply 4 g topically 4 times daily as needed for moderate pain 100 g 2     fluocinolone (SYNALAR) 0.025 % cream Apply topically 2 times daily Apply to affected areas of mouth as needed. 60 g 5     fluticasone (FLONASE) 50 MCG/ACT nasal spray as needed   0     Glucosamine-Chondroitin-MSM (TRIPLE FLEX PO) Take by mouth 2 times daily       hydrocortisone 2.5 % cream Apply topically as needed       ketoconazole (NIZORAL) 2 % external cream Apply topically daily 100 g 1     L-GLUTAMINE 1 capsule 2 times daily        MAGNESIUM PO Take 1 capsule by mouth daily        melatonin 5 MG tablet Take 5 mg by mouth nightly as needed for sleep       Multiple Vitamin (MULTI-VITAMIN PO) Take by mouth daily        NIACIN CR PO Take 500 mg by mouth       order for DME 1: Gradient Compression Wraps; 2: LUE compression sleeve with handpiece or guantlet; 20-30 mm Hg; 3: LUE velcro compression sleeve; 4: Compression camisole/bra/tank top 1 each 0     order for DME Compression sleeve to bilateral UE. 2 Units 6     Probiotic Product (PROBIOTIC DAILY PO) Take 1 capsule by mouth 2 times daily       sertraline (ZOLOFT) 50 MG tablet Take 1.5 tablets (75 mg) by mouth every evening Dose change 135 tablet 1     SYNTHROID 75 MCG tablet TAKE 1 TABLET(75 MCG) BY MOUTH DAILY 90 tablet 3     UNABLE TO FIND Take 1 tablet by mouth daily MEDICATION NAME: Delivery Hero Elderberry Supplement       zinc 50 MG TABS Take 54 mg by mouth daily       Family History  family history includes Anxiety Disorder in her sister and sister; Asthma in her sister; Breast Cancer in her sister and sister; Cancer in her father; Cerebrovascular Disease in her maternal grandfather; Coronary Artery Disease in her brother;  "Depression in her sister, sister, and sister; Diabetes in her mother, sister, and sister; Endometrial Cancer (age of onset: 51) in her sister; Glaucoma in her father and mother; Hyperlipidemia in her father and mother; Hypertension in her father and mother; Macular Degeneration in her mother; Mental Illness in her brother, nephew, and sister; Obesity in her brother, father, mother, and sister; Osteoporosis in her paternal grandmother; Other Cancer in her brother, father, paternal grandmother, sister, and another family member; Prostate Cancer in her paternal grandfather; Substance Abuse in her brother, nephew, nephew, and nephew; Thyroid Disease in her mother, sister, and sister.     Her mother, maternal grandmother and sister have hypothyroidism.     Social History  Social History     Tobacco Use     Smoking status: Never Smoker     Smokeless tobacco: Never Used   Substance Use Topics     Alcohol use: Yes     Comment: 1-2 drinks per week     Drug use: No   lives with family  She is special .    ROS  Constitutional: no weight change, good energy  Eyes: no vision change, diplopia or red eyes   Neck: no difficulty swallowing, no choking, no neck pain, no neck swelling  Cardiovascular: no chest pain, palpitations  Respiratory: no dyspnea, cough, shortness of breath or wheezing   GI: no nausea, vomiting, diarrhea or constipation, no abdominal pain   : no change in urine, no dysuria or hematuria  Musculoskeletal: no joint or muscle pain or swelling   Integumentary: no concerning lesions   Neuro: no loss of strength or sensation, no numbness or tingling, no tremor, no dizziness, no headache   Endo: no polyuria or polydipsia, no temperature intolerance   Heme/Lymph: no concerning bumps, no bleeding problems   Allergy: no environmental allergies   Psych: +anxiety    Physical Exam  Vital signs:   /73   Ht 1.57 m (5' 1.81\")   Wt 63.8 kg (140 lb 11.2 oz)   BMI 25.89 kg/m    Estimated body mass index is " "25.89 kg/m  as calculated from the following:    Height as of this encounter: 1.57 m (5' 1.81\").    Weight as of this encounter: 63.8 kg (140 lb 11.2 oz).  MERISSA: no distress, comfortable, pleasant   HEENT: no pallor, EOMI, no eye lid retraction or exophthalmos  Thyroid: no enlarged, no discrete nodule  CVS: RRR, normal S1,S2, no murmur  Lungs: CTA B/L  Abd: soft, ND, NT, no hepatomegaly  NS: appropriate mood, CN intact, no tremor  Ext: no lower legs swelling    RESULTS  Lab from [facility]  10/20/2016; TSH 0.41, FT4 1.38, FT3 2.8        ASSESSMENT:    Chanel Israel is a 56 years old female with hypothyroidism, hyperlipidemia, previous hx of breast cancer diagnosed 2007 s/p left mastectomy and prophylactic right mastectomy, chemotherapy who has VDO visit for hypothyroidism.    1) Hashimoto's hypothyroidism: diagnosed in 2015 when presented with hair loss, low energy and some weight gain. Recent lab is normal.   Her current dose is 75 mcg daily. She is clinically and biochemically euthyroid. Will continue current dose.     2) concern of thyroid eye disease: I don't see eyelid retraction or exophthalmos. TSI is negative. Follow up eye exam with ophthalmologist    PLAN:   - Continue Synthroid 75 mcg daily  - she will follow up with PCP annually    External notes/medical records independently reviewed, labs and imaging independently reviewed, medical management and tests to be discussed/communicated to patient.    Time: I spent 40 minutes spent on the date of the encounter preparing to see patient (including chart review and preparation), obtaining and or reviewing additional medical history, performing a physical exam and evaluation, documenting clinical information in the electronic health record, independently interpreting results, communicating results to the patient and coordinating care.      Mariella Tripathi MD  Division of Diabetes and Endocrinology  Department of " Medicine  [phone]

## 2021-11-16 ENCOUNTER — ANCILLARY PROCEDURE (OUTPATIENT)
Dept: BONE DENSITY | Facility: CLINIC | Age: 56
End: 2021-11-16
Attending: FAMILY MEDICINE
Payer: COMMERCIAL

## 2021-11-16 DIAGNOSIS — Z13.820 SCREENING FOR OSTEOPOROSIS: ICD-10-CM

## 2021-11-16 DIAGNOSIS — E06.3 HYPOTHYROIDISM DUE TO HASHIMOTO'S THYROIDITIS: ICD-10-CM

## 2021-11-16 DIAGNOSIS — Z78.0 POST-MENOPAUSAL: ICD-10-CM

## 2021-11-16 DIAGNOSIS — Z82.62 FAMILY HISTORY OF OSTEOPOROSIS: ICD-10-CM

## 2021-11-16 PROCEDURE — 77080 DXA BONE DENSITY AXIAL: CPT | Performed by: RADIOLOGY

## 2021-11-16 NOTE — RESULT ENCOUNTER NOTE
Dear Ana,  Your DEXA showed reduced bone density- osteopenia, that is in between normal bones and osteoporosis. Please make sure to take CALTRATE-D twice daily along with weight bearing exercises including walking. We will repeat the DEXA in 2 years for recheck.   Let me know if you have any questions. Take care.  Gabbie Hicks MD

## 2021-11-26 ENCOUNTER — IMMUNIZATION (OUTPATIENT)
Dept: NURSING | Facility: CLINIC | Age: 56
End: 2021-11-26
Payer: COMMERCIAL

## 2021-11-26 DIAGNOSIS — Z23 HIGH PRIORITY FOR 2019-NCOV VACCINE: Primary | ICD-10-CM

## 2021-11-26 PROCEDURE — 91306 COVID-19,PF,MODERNA (18+ YRS BOOSTER .25ML): CPT

## 2021-11-26 PROCEDURE — 99207 PR NO CHARGE NURSE ONLY: CPT

## 2021-11-26 PROCEDURE — 0064A COVID-19,PF,MODERNA (18+ YRS BOOSTER .25ML): CPT

## 2021-12-20 ENCOUNTER — OFFICE VISIT (OUTPATIENT)
Dept: URGENT CARE | Facility: URGENT CARE | Age: 56
End: 2021-12-20
Payer: COMMERCIAL

## 2021-12-20 VITALS
BODY MASS INDEX: 25.51 KG/M2 | HEART RATE: 96 BPM | OXYGEN SATURATION: 97 % | RESPIRATION RATE: 20 BRPM | SYSTOLIC BLOOD PRESSURE: 131 MMHG | TEMPERATURE: 98.7 F | DIASTOLIC BLOOD PRESSURE: 71 MMHG | WEIGHT: 138.6 LBS

## 2021-12-20 DIAGNOSIS — H60.391 INFECTIVE OTITIS EXTERNA, RIGHT: Primary | ICD-10-CM

## 2021-12-20 PROCEDURE — 99213 OFFICE O/P EST LOW 20 MIN: CPT | Performed by: PHYSICIAN ASSISTANT

## 2021-12-20 RX ORDER — NEOMYCIN SULFATE, POLYMYXIN B SULFATE AND HYDROCORTISONE 10; 3.5; 1 MG/ML; MG/ML; [USP'U]/ML
4 SUSPENSION/ DROPS AURICULAR (OTIC) 4 TIMES DAILY
Qty: 10 ML | Refills: 0 | Status: SHIPPED | OUTPATIENT
Start: 2021-12-20 | End: 2021-12-27

## 2021-12-20 ASSESSMENT — ENCOUNTER SYMPTOMS
FEVER: 0
SHORTNESS OF BREATH: 0
WHEEZING: 0
CONSTITUTIONAL NEGATIVE: 1
FATIGUE: 0
GASTROINTESTINAL NEGATIVE: 1
RESPIRATORY NEGATIVE: 1
SORE THROAT: 0
RHINORRHEA: 0
SINUS PRESSURE: 0
CARDIOVASCULAR NEGATIVE: 1
CHILLS: 0
PALPITATIONS: 0
COUGH: 0
SINUS PAIN: 0

## 2021-12-20 ASSESSMENT — PAIN SCALES - GENERAL: PAINLEVEL: MODERATE PAIN (4)

## 2021-12-21 ENCOUNTER — TELEPHONE (OUTPATIENT)
Dept: FAMILY MEDICINE | Facility: CLINIC | Age: 56
End: 2021-12-21
Payer: COMMERCIAL

## 2021-12-21 ENCOUNTER — MYC MEDICAL ADVICE (OUTPATIENT)
Dept: FAMILY MEDICINE | Facility: CLINIC | Age: 56
End: 2021-12-21
Payer: COMMERCIAL

## 2022-01-05 ENCOUNTER — OFFICE VISIT (OUTPATIENT)
Dept: URGENT CARE | Facility: URGENT CARE | Age: 57
End: 2022-01-05
Payer: COMMERCIAL

## 2022-01-05 VITALS
HEART RATE: 103 BPM | OXYGEN SATURATION: 100 % | TEMPERATURE: 97.8 F | DIASTOLIC BLOOD PRESSURE: 85 MMHG | BODY MASS INDEX: 26.28 KG/M2 | WEIGHT: 142.8 LBS | SYSTOLIC BLOOD PRESSURE: 136 MMHG

## 2022-01-05 DIAGNOSIS — H66.001 NON-RECURRENT ACUTE SUPPURATIVE OTITIS MEDIA OF RIGHT EAR WITHOUT SPONTANEOUS RUPTURE OF TYMPANIC MEMBRANE: ICD-10-CM

## 2022-01-05 DIAGNOSIS — H60.391 INFECTIVE OTITIS EXTERNA, RIGHT: Primary | ICD-10-CM

## 2022-01-05 PROCEDURE — 99213 OFFICE O/P EST LOW 20 MIN: CPT | Performed by: INTERNAL MEDICINE

## 2022-01-05 RX ORDER — NEOMYCIN SULFATE, POLYMYXIN B SULFATE, HYDROCORTISONE 3.5; 10000; 1 MG/ML; [USP'U]/ML; MG/ML
3 SOLUTION/ DROPS AURICULAR (OTIC) 4 TIMES DAILY
Qty: 5 ML | Refills: 0 | Status: SHIPPED | OUTPATIENT
Start: 2022-01-05 | End: 2022-01-12

## 2022-01-05 RX ORDER — AMOXICILLIN 875 MG
875 TABLET ORAL 2 TIMES DAILY
Qty: 20 TABLET | Refills: 0 | Status: SHIPPED | OUTPATIENT
Start: 2022-01-05 | End: 2022-01-15

## 2022-01-05 ASSESSMENT — PAIN SCALES - GENERAL: PAINLEVEL: SEVERE PAIN (6)

## 2022-01-05 NOTE — PROGRESS NOTES
SUBJECTIVE:  Chanel Israel is an 56 year old female who presents for ear pain in right ear.  Yesterday pain started and has had shooting pain in ear and feels some pain radiating out to face.  Has had similar sxs in past and had ear drops for it.  No fevers, chills, sweats.  No cough or runny nose.  No ear drainage.  Did put some vicks vapo rub on a cotton ball and put it in her ear.  Taking decongestants and advil but not helping.  No hx of ear surgery.  Did have a lot of ear infections as a child.      PMH:   has a past medical history of Asthma, mild intermittent, Breast cancer (H), Depression, Depressive disorder (8/2000), Endometriosis, Heart murmur, Loss of hair (8/11/2020), PONV (postoperative nausea and vomiting), and Thyroid disease (8/2015).  Patient Active Problem List   Diagnosis     Personal history of breast cancer     S/P bilateral mastectomy     Urge incontinence of urine     Overweight (BMI 25.0-29.9)     Mild recurrent major depression (H)     Family history of diabetes mellitus (DM)     Family history of thyroid disease     Plantar fasciitis, bilateral     Seasonal allergies     Mixed hyperlipidemia     Chronic rhinitis     Breast implant asymmetry     ACP (advance care planning)     Lymphedema     Family history of ischemic heart disease     Seasonal allergic rhinitis     Glaucoma suspect, bilateral     Family history of glaucoma     Age-related nuclear cataract of both eyes     Seasonal allergic rhinitis due to other allergic trigger, unspecified chronicity     Family history of breast cancer in sister     Family history of skin cancer     Actinic keratoses     Hyperlipidemia LDL goal <160     Hypothyroidism due to Hashimoto's thyroiditis     Bruxism (teeth grinding)     Anxiety     Loss of hair     Social History     Socioeconomic History     Marital status:      Spouse name: None     Number of children: None     Years of education: None     Highest education level: None    Occupational History     None   Tobacco Use     Smoking status: Never Smoker     Smokeless tobacco: Never Used   Substance and Sexual Activity     Alcohol use: Yes     Comment: 1-2 drinks per week     Drug use: No     Sexual activity: Not Currently     Partners: Male   Other Topics Concern     Parent/sibling w/ CABG, MI or angioplasty before 65F 55M? Yes     Comment: brother (43), mom (5 total, she's 95)   Social History Narrative     None     Social Determinants of Health     Financial Resource Strain: Not on file   Food Insecurity: Not on file   Transportation Needs: Not on file   Physical Activity: Not on file   Stress: Not on file   Social Connections: Not on file   Intimate Partner Violence: Not on file   Housing Stability: Not on file     Family History   Problem Relation Age of Onset     Diabetes Mother         type 2     Thyroid Disease Mother      Glaucoma Mother      Macular Degeneration Mother      Hypertension Mother      Hyperlipidemia Mother      Obesity Mother      Diabetes Sister      Thyroid Disease Sister      Breast Cancer Sister         Estrogen+progesterone +     Depression Sister      Endometrial Cancer Sister 51     Breast Cancer Sister      Cancer Father         skin cancer     Glaucoma Father      Hypertension Father      Hyperlipidemia Father      Other Cancer Father         skin     Obesity Father      Coronary Artery Disease Brother          at 43     Cerebrovascular Disease Maternal Grandfather      Diabetes Sister         type 2     Depression Sister         clinical depression     Mental Illness Sister         Bipolar Disorder     Obesity Sister         fatty liver, hepatitis     Prostate Cancer Paternal Grandfather      Other Cancer Sister         endometrial     Other Cancer Brother         skin     Obesity Brother      Other Cancer Paternal Grandmother         Lung     Osteoporosis Paternal Grandmother      Other Cancer Other         Bladder     Depression Sister      Anxiety  Disorder Sister      Thyroid Disease Sister         Hashimotos     Anxiety Disorder Sister         unmedicated     Mental Illness Nephew         Bipolar Disorder     Substance Abuse Nephew         recreational drugs     Mental Illness Brother         unspecified     Substance Abuse Brother         recreational drugs     Substance Abuse Nephew         recreational drugs     Substance Abuse Nephew         recreational drug, alcohol     Asthma Sister         childhood asthma       ALLERGIES:  Erythromycin    Current Outpatient Medications   Medication     BIOTIN 5000 PO     buPROPion (WELLBUTRIN XL) 150 MG 24 hr tablet     Calcium Carbonate-Vitamin D (CALCIUM + D PO)     Coenzyme Q10 (CO Q 10) 100 MG CAPS     diclofenac (VOLTAREN) 1 % topical gel     fluocinolone (SYNALAR) 0.025 % cream     fluticasone (FLONASE) 50 MCG/ACT nasal spray     Glucosamine-Chondroitin-MSM (TRIPLE FLEX PO)     hydrocortisone 2.5 % cream     ketoconazole (NIZORAL) 2 % external cream     L-GLUTAMINE     MAGNESIUM PO     melatonin 5 MG tablet     Multiple Vitamin (MULTI-VITAMIN PO)     NIACIN CR PO     order for DME     order for DME     Probiotic Product (PROBIOTIC DAILY PO)     sertraline (ZOLOFT) 50 MG tablet     SYNTHROID 75 MCG tablet     UNABLE TO FIND     zinc 50 MG TABS     Current Facility-Administered Medications   Medication     triamcinolone (KENALOG-40) injection 40 mg         ROS:  ROS is done and is negative for general/constitutional, eye, ENT, Respiratory, cardiovascular, GI, , Skin, musculoskeletal except as noted elsewhere.  All other review of systems negative except as noted elsewhere.      OBJECTIVE:  /85   Pulse 103   Temp 97.8  F (36.6  C) (Tympanic)   Wt 64.8 kg (142 lb 12.8 oz)   SpO2 100%   BMI 26.28 kg/m    GENERAL APPEARANCE: Alert, in no acute distress  EYES: normal  EARS: External ears normal. Left canal and TM normal.  Right canal with moderate erythema.  Right TM with mild bulging with cloudy fluid  and moderate erythema  NOSE:normal  OROPHARYNX:normal  NECK:No adenopathy,masses or thyromegaly  RESP: normal and clear to auscultation  CV:regular rate and rhythm and no murmurs, clicks, or gallops  ABDOMEN: Abdomen soft, non-tender. BS normal. No masses, organomegaly  SKIN: no ulcers, lesions or rash  MUSCULOSKELETAL:Musculoskeletal normal      RESULTS  No results found for any visits on 01/05/22..  No results found for this or any previous visit (from the past 48 hour(s)).    ASSESSMENT/PLAN:    ASSESSMENT / PLAN:  (H60.391) Infective otitis externa, right  (primary encounter diagnosis)  Comment: will repeat course of cortisporin to help give quicker relief of pain in addition to oral abx for OM  Plan: neomycin-polymyxin-hydrocortisone (CORTISPORIN)        [id number] otic solution        Reviewed medication instructions and side effects. Follow up if experiences side effects.. I reviewed supportive care, otc meds to use if needed, expected course, and signs of concern.  Follow up as needed or if she does not improve within 1 week(s) or if worsens in any way.  Reviewed red flag symptoms and is to go to the ER if experiences any of these.    (H66.001) Non-recurrent acute suppurative otitis media of right ear without spontaneous rupture of tympanic membrane  Comment: was recently on abx ear drops, so will tx with oral abx.  Plan: amoxicillin (AMOXIL) 875 MG tablet        Reviewed medication instructions and side effects. Follow up if experiences side effects.. I reviewed supportive care, otc meds to use if needed, expected course, and signs of concern.  Follow up as needed or if she does not improve within 1 week(s) or if worsens in any way.  Reviewed red flag symptoms and is to go to the ER if experiences any of these.      PPE worn: mask and shield.    See [facility] for orders, medications, letters, patient instructions    Nickie Ramires M.D.

## 2022-01-06 DIAGNOSIS — F41.9 ANXIETY: ICD-10-CM

## 2022-01-06 DIAGNOSIS — F33.0 MILD RECURRENT MAJOR DEPRESSION (H): ICD-10-CM

## 2022-01-06 DIAGNOSIS — R53.82 CHRONIC FATIGUE: ICD-10-CM

## 2022-01-09 ENCOUNTER — MYC MEDICAL ADVICE (OUTPATIENT)
Dept: FAMILY MEDICINE | Facility: CLINIC | Age: 57
End: 2022-01-09
Payer: COMMERCIAL

## 2022-01-09 DIAGNOSIS — Z86.69 HISTORY OF RECURRENT EAR INFECTION: Primary | ICD-10-CM

## 2022-01-09 DIAGNOSIS — H60.90 RECURRENT OTITIS EXTERNA, UNSPECIFIED LATERALITY: ICD-10-CM

## 2022-01-10 ENCOUNTER — MYC MEDICAL ADVICE (OUTPATIENT)
Dept: FAMILY MEDICINE | Facility: CLINIC | Age: 57
End: 2022-01-10
Payer: COMMERCIAL

## 2022-01-10 DIAGNOSIS — F33.0 MILD RECURRENT MAJOR DEPRESSION (H): ICD-10-CM

## 2022-01-10 DIAGNOSIS — F41.9 ANXIETY: ICD-10-CM

## 2022-01-10 DIAGNOSIS — R53.82 CHRONIC FATIGUE: ICD-10-CM

## 2022-01-10 RX ORDER — BUPROPION HYDROCHLORIDE 150 MG/1
150 TABLET ORAL EVERY MORNING
Qty: 90 TABLET | Refills: 0 | Status: SHIPPED | OUTPATIENT
Start: 2022-01-10 | End: 2022-01-17

## 2022-01-10 RX ORDER — BUPROPION HYDROCHLORIDE 150 MG/1
TABLET ORAL
Qty: 90 TABLET | Refills: 0 | OUTPATIENT
Start: 2022-01-10

## 2022-01-10 NOTE — TELEPHONE ENCOUNTER
Routing refill request to provider for review/approval because:  Labs not current:  PHQ9  Senait Valera RN

## 2022-01-11 NOTE — TELEPHONE ENCOUNTER
Refill completed.   Please update PHQ 9 and MERISSA  Thanks    PHQ-9 SCORE 10/22/2019 3/31/2020 2/1/2021   PHQ-9 Total Score - - -   PHQ-9 Total Score MyChart - - -   PHQ-9 Total Score - - -   PHQ-9 Total Score 3 0 2       MERISSA-7 SCORE 10/22/2019 3/31/2020 2/1/2021   Total Score - - -   Total Score 0 0 1       [facility] Follow-up to PHQ 10/22/2019 3/31/2020 2/1/2021   PHQ-9 9. Suicide Ideation past 2 weeks Not at all Not at all Not at all

## 2022-01-13 ENCOUNTER — OFFICE VISIT (OUTPATIENT)
Dept: ORTHOPEDICS | Facility: CLINIC | Age: 57
End: 2022-01-13
Payer: COMMERCIAL

## 2022-01-13 DIAGNOSIS — G89.29 CHRONIC PAIN OF LEFT KNEE: Primary | ICD-10-CM

## 2022-01-13 DIAGNOSIS — M25.562 CHRONIC PAIN OF LEFT KNEE: Primary | ICD-10-CM

## 2022-01-13 PROCEDURE — 20610 DRAIN/INJ JOINT/BURSA W/O US: CPT | Mod: LT | Performed by: FAMILY MEDICINE

## 2022-01-13 PROCEDURE — 99214 OFFICE O/P EST MOD 30 MIN: CPT | Mod: 25 | Performed by: FAMILY MEDICINE

## 2022-01-13 RX ORDER — TRIAMCINOLONE ACETONIDE 40 MG/ML
40 INJECTION, SUSPENSION INTRA-ARTICULAR; INTRAMUSCULAR
Status: SHIPPED | OUTPATIENT
Start: 2022-01-13

## 2022-01-13 RX ADMIN — TRIAMCINOLONE ACETONIDE 40 MG: 40 INJECTION, SUSPENSION INTRA-ARTICULAR; INTRAMUSCULAR at 16:31

## 2022-01-13 NOTE — LETTER
1/13/2022         RE: Chanel Israel  [address]  [facility] [address]        Dear Colleague,    Thank you for referring your patient, Chanel Israel, to the [facility] [place]. Please see a copy of my visit note below.    HISTORY OF PRESENT ILLNESS  Ms. Israel is a pleasant 56 year old female following up with left knee pain.  Ana noticed an intense pain in her knee about a month ago.  She was stretching when she felt a very large pop inside her knee.  She points to the anterior, central knee.  This was quite painful.  After this the pain slowly resolved to a good degree, although has largely been present ever since the incident.  Pain is now worse, causing her to limp.  She points to the general area of her knee, pain feels deep inside.  There is some swelling.     PHYSICAL EXAM  General  - normal appearance, in no obvious distress  HEENT  - conjunctivae not injected, moist mucous membranes  CV  - normal popliteal pulse  Pulm  - normal respiratory pattern, non-labored  Musculoskeletal - left knee  - stance: antalgic gait  - inspection: generalized swelling, trace effusion  - palpation: medial joint line tenderness  - ROM: 100 degrees flexion, 0 degrees extension, painful active ROM  - strength: 5/5 in flexion, 5/5 in extension  - special tests:  (-) Lachman  (-) Rolando  (-) varus at 0 and 30 degrees flexion  (-) valgus at 0 and 30 degrees flexion  Neuro  - no sensory or motor deficit, grossly normal coordination, normal muscle tone  Skin  - no ecchymosis, erythema, warmth, or induration, no obvious rash  Psych  - interactive, appropriate, normal mood and affect            ASSESSMENT & PLAN  Ms. Israel is a 56 year old female following up with left knee pain.    I reviewed her previous x-ray in the room with her, this shows mild osteoarthritis throughout.    Her pain is definitely out of proportion compared to her x-ray findings, she is walking  with a significant limp.  Through shared decision making we did decide to order an MR.  I do suspect that she may have a bony contusion, she may have a soft tissue issue as well.    We also decided to inject her knee with corticosteroid today (see procedure note).  She is going to undergo her MRI in about a week from now.    It was a pleasure seeing Nata Storey DO, CAQSM      This note was constructed using Dragon dictation software, please excuse any minor errors in spelling, grammar, or syntax.            [facility] FOLLOW-UP VISIT 1/13/2022    Chanel Israel's chief complaint for this visit includes:  Chief Complaint   Patient presents with     RECHECK     left knee pain      PCP: Gabbie Hicks    Interval History:     Follow up reason: left knee pain     Medical History:    Any recent changes to your medical history? No    Any new medication prescribed since last visit? No    Review of Systems:    Do you have fever, chills, weight loss? No    Do you have any vision problems? No    Do you have any chest pain or edema? No    Do you have any shortness of breath or wheezing?  No    Do you have stomach problems? No    Do you have any urinary track issues? No    Do you have any numbness or focal weakness? No    Do you have diabetes? No    Do you have problems with bleeding or clotting? No    Do you have an rashes or other skin lesions? No    Large Joint Injection/Arthocentesis: L knee joint    Date/Time: 1/13/2022 4:31 PM  Performed by: Armani Storey DO  Authorized by: Armani Storey DO     Indications:  Pain  Needle Size:  22 G  Guidance: landmark guided    Approach:  Lateral  Location:  Knee      Medications:  40 mg triamcinolone 40 MG/ML  Outcome:  Tolerated well, no immediate complications  Procedure discussed: discussed risks, benefits, and alternatives    Consent Given by:  Patient  Timeout: timeout called immediately prior to procedure    Prep: patient was  prepped and draped in usual sterile fashion       PROCEDURE    Knee Injection - Intraarticular  The patient was informed of the risks and the benefits of the procedure and a written consent was signed.  The patient s left knee was prepped with chlorhexidine in sterile fashion.   40 mg of triamcinolone suspension was drawn up into a 5 mL syringe with 4 mL of 1% lidocaine.  Injection was performed using substerile technique.  A 1.5-inch 22-gauge needle was used to enter the lateral aspect of the knee.  Injection performed successfully without difficulty.  There were no complications. The patient tolerated the procedure well. There was negligible bleeding.                     Again, thank you for allowing me to participate in the care of your patient.        Sincerely,        Armani Storey DO

## 2022-01-13 NOTE — PATIENT INSTRUCTIONS
Thanks for coming today.  Ortho/[facility] Medicine Clinic  [address] [place] [address]    To schedule future appointments in Ortho Clinic, you may call [phone].    To schedule ordered imaging by your Provider: Call [facility] at [phone]    [employer] available online at:   [email or web address]    Please call if any further questions or concerns [phone] and ask for the Orthopedic Department. Clinic hours 8 am to 5 pm.    Return to clinic if symptoms worsen.

## 2022-01-13 NOTE — PROGRESS NOTES
HISTORY OF PRESENT ILLNESS  Ms. Israel is a pleasant 56 year old female following up with left knee pain.  Ana noticed an intense pain in her knee about a month ago.  She was stretching when she felt a very large pop inside her knee.  She points to the anterior, central knee.  This was quite painful.  After this the pain slowly resolved to a good degree, although has largely been present ever since the incident.  Pain is now worse, causing her to limp.  She points to the general area of her knee, pain feels deep inside.  There is some swelling.     PHYSICAL EXAM  General  - normal appearance, in no obvious distress  HEENT  - conjunctivae not injected, moist mucous membranes  CV  - normal popliteal pulse  Pulm  - normal respiratory pattern, non-labored  Musculoskeletal - left knee  - stance: antalgic gait  - inspection: generalized swelling, trace effusion  - palpation: medial joint line tenderness  - ROM: 100 degrees flexion, 0 degrees extension, painful active ROM  - strength: 5/5 in flexion, 5/5 in extension  - special tests:  (-) Lachman  (-) Rolando  (-) varus at 0 and 30 degrees flexion  (-) valgus at 0 and 30 degrees flexion  Neuro  - no sensory or motor deficit, grossly normal coordination, normal muscle tone  Skin  - no ecchymosis, erythema, warmth, or induration, no obvious rash  Psych  - interactive, appropriate, normal mood and affect            ASSESSMENT & PLAN  Ms. Israel is a 56 year old female following up with left knee pain.    I reviewed her previous x-ray in the room with her, this shows mild osteoarthritis throughout.    Her pain is definitely out of proportion compared to her x-ray findings, she is walking with a significant limp.  Through shared decision making we did decide to order an MR.  I do suspect that she may have a bony contusion, she may have a soft tissue issue as well.    We also decided to inject her knee with corticosteroid today (see procedure note).  She is going to undergo  her MRI in about a week from now.    It was a pleasure seeing Chanel.        Armani Storey DO, CAQSM      This note was constructed using Dragon dictation software, please excuse any minor errors in spelling, grammar, or syntax.            [facility] FOLLOW-UP VISIT 1/13/2022    Chanel Israel's chief complaint for this visit includes:  Chief Complaint   Patient presents with     RECHECK     left knee pain      PCP: Gabbie Hicks    Interval History:     Follow up reason: left knee pain     Medical History:    Any recent changes to your medical history? No    Any new medication prescribed since last visit? No    Review of Systems:    Do you have fever, chills, weight loss? No    Do you have any vision problems? No    Do you have any chest pain or edema? No    Do you have any shortness of breath or wheezing?  No    Do you have stomach problems? No    Do you have any urinary track issues? No    Do you have any numbness or focal weakness? No    Do you have diabetes? No    Do you have problems with bleeding or clotting? No    Do you have an rashes or other skin lesions? No    Large Joint Injection/Arthocentesis: L knee joint    Date/Time: 1/13/2022 4:31 PM  Performed by: Armani Storey DO  Authorized by: Armani Storey DO     Indications:  Pain  Needle Size:  22 G  Guidance: landmark guided    Approach:  Lateral  Location:  Knee      Medications:  40 mg triamcinolone 40 MG/ML  Outcome:  Tolerated well, no immediate complications  Procedure discussed: discussed risks, benefits, and alternatives    Consent Given by:  Patient  Timeout: timeout called immediately prior to procedure    Prep: patient was prepped and draped in usual sterile fashion       PROCEDURE    Knee Injection - Intraarticular  The patient was informed of the risks and the benefits of the procedure and a written consent was signed.  The patient s left knee was prepped with chlorhexidine in sterile fashion.   40 mg of  triamcinolone suspension was drawn up into a 5 mL syringe with 4 mL of 1% lidocaine.  Injection was performed using substerile technique.  A 1.5-inch 22-gauge needle was used to enter the lateral aspect of the knee.  Injection performed successfully without difficulty.  There were no complications. The patient tolerated the procedure well. There was negligible bleeding.

## 2022-01-17 ENCOUNTER — LAB (OUTPATIENT)
Dept: LAB | Facility: CLINIC | Age: 57
End: 2022-01-17
Payer: COMMERCIAL

## 2022-01-17 ENCOUNTER — OFFICE VISIT (OUTPATIENT)
Dept: FAMILY MEDICINE | Facility: CLINIC | Age: 57
End: 2022-01-17
Payer: COMMERCIAL

## 2022-01-17 VITALS
DIASTOLIC BLOOD PRESSURE: 76 MMHG | HEART RATE: 88 BPM | BODY MASS INDEX: 25.76 KG/M2 | TEMPERATURE: 98.8 F | OXYGEN SATURATION: 96 % | RESPIRATION RATE: 16 BRPM | SYSTOLIC BLOOD PRESSURE: 131 MMHG | WEIGHT: 140 LBS

## 2022-01-17 DIAGNOSIS — F33.0 MILD RECURRENT MAJOR DEPRESSION (H): Primary | ICD-10-CM

## 2022-01-17 DIAGNOSIS — E78.2 MIXED HYPERLIPIDEMIA: ICD-10-CM

## 2022-01-17 DIAGNOSIS — C50.912 MALIGNANT NEOPLASM OF LEFT FEMALE BREAST, UNSPECIFIED ESTROGEN RECEPTOR STATUS, UNSPECIFIED SITE OF BREAST (H): ICD-10-CM

## 2022-01-17 DIAGNOSIS — F41.9 ANXIETY: ICD-10-CM

## 2022-01-17 DIAGNOSIS — M25.562 CHRONIC PAIN OF LEFT KNEE: ICD-10-CM

## 2022-01-17 DIAGNOSIS — G89.29 CHRONIC PAIN OF LEFT KNEE: ICD-10-CM

## 2022-01-17 DIAGNOSIS — R53.82 CHRONIC FATIGUE: ICD-10-CM

## 2022-01-17 LAB
CHOLEST SERPL-MCNC: 266 MG/DL
FASTING STATUS PATIENT QL REPORTED: YES
HDLC SERPL-MCNC: 67 MG/DL
LDLC SERPL CALC-MCNC: 178 MG/DL
NONHDLC SERPL-MCNC: 199 MG/DL
TRIGL SERPL-MCNC: 106 MG/DL

## 2022-01-17 PROCEDURE — 96127 BRIEF EMOTIONAL/BEHAV ASSMT: CPT | Performed by: FAMILY MEDICINE

## 2022-01-17 PROCEDURE — 99214 OFFICE O/P EST MOD 30 MIN: CPT | Performed by: FAMILY MEDICINE

## 2022-01-17 PROCEDURE — 36415 COLL VENOUS BLD VENIPUNCTURE: CPT

## 2022-01-17 PROCEDURE — 80061 LIPID PANEL: CPT

## 2022-01-17 RX ORDER — BUPROPION HYDROCHLORIDE 150 MG/1
150 TABLET ORAL EVERY MORNING
Qty: 90 TABLET | Refills: 1 | Status: SHIPPED | OUTPATIENT
Start: 2022-01-17 | End: 2022-07-19

## 2022-01-17 RX ORDER — NAPROXEN 500 MG/1
500 TABLET ORAL 2 TIMES DAILY PRN
Qty: 30 TABLET | Refills: 0 | Status: SHIPPED | OUTPATIENT
Start: 2022-01-17

## 2022-01-17 ASSESSMENT — ANXIETY QUESTIONNAIRES
7. FEELING AFRAID AS IF SOMETHING AWFUL MIGHT HAPPEN: NOT AT ALL
IF YOU CHECKED OFF ANY PROBLEMS ON THIS QUESTIONNAIRE, HOW DIFFICULT HAVE THESE PROBLEMS MADE IT FOR YOU TO DO YOUR WORK, TAKE CARE OF THINGS AT HOME, OR GET ALONG WITH OTHER PEOPLE: NOT DIFFICULT AT ALL
2. NOT BEING ABLE TO STOP OR CONTROL WORRYING: NOT AT ALL
GAD7 TOTAL SCORE: 0
5. BEING SO RESTLESS THAT IT IS HARD TO SIT STILL: NOT AT ALL
1. FEELING NERVOUS, ANXIOUS, OR ON EDGE: NOT AT ALL
3. WORRYING TOO MUCH ABOUT DIFFERENT THINGS: NOT AT ALL
6. BECOMING EASILY ANNOYED OR IRRITABLE: NOT AT ALL

## 2022-01-17 ASSESSMENT — PATIENT HEALTH QUESTIONNAIRE - PHQ9
SUM OF ALL RESPONSES TO PHQ QUESTIONS 1-9: 4
5. POOR APPETITE OR OVEREATING: NOT AT ALL

## 2022-01-17 NOTE — PROGRESS NOTES
Assessment & Plan     Mild recurrent major depression (H)  PHQ 3/31/2020 2/1/2021 1/17/2022   PHQ-9 Total Score 0 2 4   Q9: Thoughts of better off dead/self-harm past 2 weeks Not at all Not at all Not at all     Slightly worsened with recent family loss  Recommended to start on counseling, continue with current dose of Wellbutrin and sertraline, follow-up for recheck in 6 months or sooner if needed  - buPROPion (WELLBUTRIN XL) 150 MG 24 hr tablet; Take 1 tablet (150 mg) by mouth every morning Profile Rx  - sertraline (ZOLOFT) 50 MG tablet; TAKE 1& 1/2 TABLETS BY MOUTH EVERY EVENING, profile Rx  - Adult Mental Health Referral; Future    Anxiety  MERISSA-7 SCORE 3/31/2020 2/1/2021 1/17/2022   Total Score - - -   Total Score 0 1 0       Improved, other plan-as mentioned above  - buPROPion (WELLBUTRIN XL) 150 MG 24 hr tablet; Take 1 tablet (150 mg) by mouth every morning Profile Rx  - sertraline (ZOLOFT) 50 MG tablet; TAKE 1& 1/2 TABLETS BY MOUTH EVERY EVENING, profile Rx  - Adult Mental Health Referral; Future  - EMOTIONAL / BEHAVIORAL ASSESSMENT    Chronic fatigue  Likely from underlying depression  Improved, continue current dose of Wellbutrin  - buPROPion (WELLBUTRIN XL) 150 MG 24 hr tablet; Take 1 tablet (150 mg) by mouth every morning Profile Rx    Malignant neoplasm of left female breast, unspecified estrogen receptor status, unspecified site of breast (H)  Status post bilateral mastectomy, monitor    Mixed hyperlipidemia  Cholesterol   Date Value Ref Range Status   01/17/2022 266 (H) <200 mg/dL Final   06/22/2021 218 (H) <200 mg/dL Final     Comment:     Desirable:       <200 mg/dl   08/05/2020 232 (H) <200 mg/dL Final     Comment:     Desirable:       <200 mg/dl     HDL Cholesterol   Date Value Ref Range Status   06/22/2021 58 >49 mg/dL Final   08/05/2020 57 >49 mg/dL Final     Direct Measure HDL   Date Value Ref Range Status   01/17/2022 67 >=50 mg/dL Final     LDL Cholesterol Calculated   Date Value Ref  Range Status   01/17/2022 178 (H) <=100 mg/dL Final   06/22/2021 129 (H) <100 mg/dL Final     Comment:     Above desirable:  100-129 mg/dl  Borderline High:  130-159 mg/dL  High:             160-189 mg/dL  Very high:       >189 mg/dl     08/05/2020 152 (H) <100 mg/dL Final     Comment:     Above desirable:  100-129 mg/dl  Borderline High:  130-159 mg/dL  High:             160-189 mg/dL  Very high:       >189 mg/dl       Triglycerides   Date Value Ref Range Status   01/17/2022 106 <150 mg/dL Final   06/22/2021 154 (H) <150 mg/dL Final     Comment:     Borderline high:  150-199 mg/dl  High:             200-499 mg/dl  Very high:       >499 mg/dl     08/05/2020 117 <150 mg/dL Final     Comment:     Fasting specimen     Cholesterol/HDL Ratio   Date Value Ref Range Status   08/24/2015 4.7 0.0 - 5.0 Final   03/04/2015 4.1 0.0 - 5.0 Final     The 10-year ASCVD risk score (Candyloi DE JESUS Jr., et al., 2013) is: 2.8%    Values used to calculate the score:      Age: 56 years      Sex: Female      Is Non- : No      Diabetic: No      Tobacco smoker: No      Systolic Blood Pressure: 131 mmHg      Is BP treated: No      HDL Cholesterol: 67 mg/dL      Total Cholesterol: 266 mg/dL  Reviewed moderately elevated fasting cholesterol numbers  Patient reported that she has been under stress with recent family loss, unable to watch her diet closely  Reviewed heart healthy diet, start back on regular exercises, will recheck lipids at the physical visit in 6 months or sooner if needed    Chronic pain of left knee  Patient is currently seeing Dr. Storey in sports medicine, scheduled for MRI left knee on 1/23/2022  Prescription given for naproxen to take twice daily as needed for severe pain, take Tylenol daily as needed for mild pain apply local ice and heat, avoid triggering activities    Dosing and potential medication side effects discussed.  Patient verbalised understanding and is agreeable to the plan.    - naproxen  "(NAPROSYN) 500 MG tablet; Take 1 tablet (500 mg) by mouth 2 times daily as needed for moderate pain    Review of the result(s) of each unique test - Fasting lipid numbers         BMI:   Estimated body mass index is 25.76 kg/m  as calculated from the following:    Height as of 10/29/21: 1.57 m (5' 1.81\").    Weight as of this encounter: 63.5 kg (140 lb).   Weight management plan: Discussed healthy diet and exercise guidelines    Regular exercise  Chart documentation done in part with Dragon Voice recognition Software. Although reviewed after completion, some word and grammatical error may remain.    See Patient Instructions    Return in about 6 months (around 7/17/2022), or if symptoms worsen or fail to improve, for Physical Exam, fasting labs.    Gabbie Hicks MD  [facility] DONNA Perez is a 56 year old who presents for the following health issues     HPI     Depression Followup    How are you doing with your depression since your last visit? Situational     Are you having other symptoms that might be associated with depression? Yes:  Feeling sad    Have you had a significant life event?  Grief or Loss     Are you feeling anxious or having panic attacks?   No    Do you have any concerns with your use of alcohol or other drugs? No    Social History     Tobacco Use     Smoking status: Never Smoker     Smokeless tobacco: Never Used   Substance Use Topics     Alcohol use: Yes     Comment: 1-2 drinks per week     Drug use: No     PHQ 3/31/2020 2/1/2021 1/17/2022   PHQ-9 Total Score 0 2 4   Q9: Thoughts of better off dead/self-harm past 2 weeks Not at all Not at all Not at all     MERISSA-7 SCORE 3/31/2020 2/1/2021 1/17/2022   Total Score - - -   Total Score 0 1 0     Last PHQ-9 1/17/2022   1.  Little interest or pleasure in doing things 0   2.  Feeling down, depressed, or hopeless 1   3.  Trouble falling or staying asleep, or sleeping too much 1   4.  Feeling tired or having little " energy 1   5.  Poor appetite or overeating 1   6.  Feeling bad about yourself 0   7.  Trouble concentrating 0   8.  Moving slowly or restless 0   Q9: Thoughts of better off dead/self-harm past 2 weeks 0   PHQ-9 Total Score 4   Difficulty at work, home, or with people Somewhat difficult     MERISSA-7  1/17/2022   1. Feeling nervous, anxious, or on edge 0   2. Not being able to stop or control worrying 0   3. Worrying too much about different things 0   4. Trouble relaxing 0   5. Being so restless that it is hard to sit still 0   6. Becoming easily annoyed or irritable 0   7. Feeling afraid, as if something awful might happen 0   MERISSA-7 Total Score 0   If you checked any problems, how difficult have they made it for you to do your work, take care of things at home, or get along with other people? Not difficult at all       Suicide Assessment Five-step Evaluation and Treatment (SAFE-T)      How many servings of fruits and vegetables do you eat daily?  2-3    On average, how many sweetened beverages do you drink each day (Examples: soda, juice, sweet tea, etc.  Do NOT count diet or artificially sweetened beverages)?   0    How many days per week do you exercise enough to make your heart beat faster? 3 or less    How many minutes a day do you exercise enough to make your heart beat faster? 9 or less    How many days per week do you miss taking your medication? 0    Hyperlipidemia Follow-Up      Are you regularly taking any medication or supplement to lower your cholesterol?   No    Are you having muscle aches or other side effects that you think could be caused by your cholesterol lowering medication?  N/A        Review of Systems   CONSTITUTIONAL: NEGATIVE for fever, chills, change in weight  RESP: NEGATIVE for significant cough or SOB  CV: NEGATIVE for chest pain, palpitations or peripheral edema  GI: NEGATIVE for nausea, abdominal pain, heartburn, or change in bowel habits  MUSCULOSKELETAL: NEGATIVE for significant  arthralgias or myalgia  NEURO: NEGATIVE for weakness, dizziness or paresthesias  ENDOCRINE: NEGATIVE for temperature intolerance, skin/hair changes and Hx thyroid disease  HEME/ALLERGY/IMMUNE: NEGATIVE for bleeding problems  PSYCHIATRIC: NEGATIVE for changes in mood or affect, HX anxiety and HX depression      Objective    /76 (BP Location: Right arm, Patient Position: Sitting, Cuff Size: Adult Regular)   Pulse 88   Temp 98.8  F (37.1  C) (Temporal)   Resp 16   Wt 63.5 kg (140 lb)   SpO2 96%   Breastfeeding No   BMI 25.76 kg/m    Body mass index is 25.76 kg/m .  Physical Exam   GENERAL: healthy, alert and no distress  RESP: lungs clear to auscultation - no rales, rhonchi or wheezes  CV: regular rate and rhythm, normal S1 S2, no S3 or S4, no murmur, click or rub, no peripheral edema and peripheral pulses strong  PSYCH: mentation appears normal, affect normal/bright    Cholesterol   Date Value Ref Range Status   01/17/2022 266 (H) <200 mg/dL Final   06/22/2021 218 (H) <200 mg/dL Final     Comment:     Desirable:       <200 mg/dl   08/05/2020 232 (H) <200 mg/dL Final     Comment:     Desirable:       <200 mg/dl     HDL Cholesterol   Date Value Ref Range Status   06/22/2021 58 >49 mg/dL Final   08/05/2020 57 >49 mg/dL Final     Direct Measure HDL   Date Value Ref Range Status   01/17/2022 67 >=50 mg/dL Final     LDL Cholesterol Calculated   Date Value Ref Range Status   01/17/2022 178 (H) <=100 mg/dL Final   06/22/2021 129 (H) <100 mg/dL Final     Comment:     Above desirable:  100-129 mg/dl  Borderline High:  130-159 mg/dL  High:             160-189 mg/dL  Very high:       >189 mg/dl     08/05/2020 152 (H) <100 mg/dL Final     Comment:     Above desirable:  100-129 mg/dl  Borderline High:  130-159 mg/dL  High:             160-189 mg/dL  Very high:       >189 mg/dl       Triglycerides   Date Value Ref Range Status   01/17/2022 106 <150 mg/dL Final   06/22/2021 154 (H) <150 mg/dL Final     Comment:      Borderline high:  150-199 mg/dl  High:             200-499 mg/dl  Very high:       >499 mg/dl     08/05/2020 117 <150 mg/dL Final     Comment:     Fasting specimen     Cholesterol/HDL Ratio   Date Value Ref Range Status   08/24/2015 4.7 0.0 - 5.0 Final   03/04/2015 4.1 0.0 - 5.0 Final

## 2022-01-18 PROBLEM — M25.562 CHRONIC PAIN OF LEFT KNEE: Status: ACTIVE | Noted: 2022-01-18

## 2022-01-18 PROBLEM — G89.29 CHRONIC PAIN OF LEFT KNEE: Status: ACTIVE | Noted: 2022-01-18

## 2022-01-18 ASSESSMENT — ANXIETY QUESTIONNAIRES: GAD7 TOTAL SCORE: 0

## 2022-01-24 NOTE — PROGRESS NOTES
I am seeing this patient in consultation for recurrent otitis externa at the request of the provider Gabbie Pate.    Chief Complaint - right ear and face pain    History of Present Illness - Chanel Israel is a 56 year old female who presents to me today with recurrent pain of the right ear. She has pressure sometimes, but sometimes sharp shooting pains. It has been present and noticeable for approximately 2 months. Not much problems with infections as an adult, but as a kid infections and in her 20s. The patient has noted no otorrhea. Hearing has been okay. No troubles in the left ear. The patient has tried antibiotics, drops. She feels this started with TMJ, and that was bad. She wears a splint. She grinds teeth and clenches. She had a recent death. She uses hot packs. She has a PT she sees.     Past Medical History -   Patient Active Problem List   Diagnosis     Personal history of breast cancer     S/P bilateral mastectomy     Urge incontinence of urine     Overweight (BMI 25.0-29.9)     Mild recurrent major depression (H)     Family history of diabetes mellitus (DM)     Family history of thyroid disease     Plantar fasciitis, bilateral     Seasonal allergies     Mixed hyperlipidemia     Chronic rhinitis     Breast implant asymmetry     ACP (advance care planning)     Lymphedema     Family history of ischemic heart disease     Seasonal allergic rhinitis     Glaucoma suspect, bilateral     Family history of glaucoma     Age-related nuclear cataract of both eyes     Seasonal allergic rhinitis due to other allergic trigger, unspecified chronicity     Family history of breast cancer in sister     Family history of skin cancer     Actinic keratoses     Hyperlipidemia LDL goal <160     Hypothyroidism due to Hashimoto's thyroiditis     Bruxism (teeth grinding)     Anxiety     Loss of hair     Malignant neoplasm of left female breast, unspecified estrogen receptor status, unspecified site of breast (H)      Chronic fatigue     Chronic pain of left knee       Current Medications -   Current Outpatient Medications:      BIOTIN 5000 PO, , Disp: , Rfl:      buPROPion (WELLBUTRIN XL) 150 MG 24 hr tablet, Take 1 tablet (150 mg) by mouth every morning Profile Rx, Disp: 90 tablet, Rfl: 1     Calcium Carbonate-Vitamin D (CALCIUM + D PO), Take by mouth 2 times daily, Disp: , Rfl:      Coenzyme Q10 (CO Q 10) 100 MG CAPS, Take 1 capsule by mouth daily , Disp: , Rfl:      diclofenac (VOLTAREN) 1 % topical gel, Apply 4 g topically 4 times daily as needed for moderate pain, Disp: 100 g, Rfl: 2     fluocinolone (SYNALAR) 0.025 % cream, Apply topically 2 times daily Apply to affected areas of mouth as needed., Disp: 60 g, Rfl: 5     fluticasone (FLONASE) 50 MCG/ACT nasal spray, as needed , Disp: , Rfl: 0     Glucosamine-Chondroitin-MSM (TRIPLE FLEX PO), Take by mouth 2 times daily, Disp: , Rfl:      hydrocortisone 2.5 % cream, Apply topically as needed, Disp: , Rfl:      ketoconazole (NIZORAL) 2 % external cream, Apply topically daily, Disp: 100 g, Rfl: 1     L-GLUTAMINE, 1 capsule 2 times daily , Disp: , Rfl:      MAGNESIUM PO, Take 1 capsule by mouth daily , Disp: , Rfl:      melatonin 5 MG tablet, Take 5 mg by mouth nightly as needed for sleep, Disp: , Rfl:      Multiple Vitamin (MULTI-VITAMIN PO), Take by mouth daily , Disp: , Rfl:      naproxen (NAPROSYN) 500 MG tablet, Take 1 tablet (500 mg) by mouth 2 times daily as needed for moderate pain, Disp: 30 tablet, Rfl: 0     NIACIN CR PO, Take 500 mg by mouth, Disp: , Rfl:      order for DME, 1: Gradient Compression Wraps; 2: LUE compression sleeve with handpiece or guantlet; 20-30 mm Hg; 3: LUE velcro compression sleeve; 4: Compression camisole/bra/tank top, Disp: 1 each, Rfl: 0     order for DME, Compression sleeve to bilateral UE., Disp: 2 Units, Rfl: 6     Probiotic Product (PROBIOTIC DAILY PO), Take 1 capsule by mouth 2 times daily, Disp: , Rfl:      sertraline (ZOLOFT) 50 MG  tablet, TAKE 1& 1/2 TABLETS BY MOUTH EVERY EVENING, profile Rx, Disp: 135 tablet, Rfl: 1     SYNTHROID 75 MCG tablet, TAKE 1 TABLET(75 MCG) BY MOUTH DAILY, Disp: 90 tablet, Rfl: 3     UNABLE TO FIND, Take 1 tablet by mouth daily MEDICATION NAME: Sambrocal Elderberry Supplement, Disp: , Rfl:      zinc 50 MG TABS, Take 54 mg by mouth daily, Disp: , Rfl:     Current Facility-Administered Medications:      triamcinolone (KENALOG-40) injection 40 mg, 40 mg, , , Armani Storey DO, 40 mg at 01/13/22 1631     triamcinolone (KENALOG-40) injection 40 mg, 40 mg, , , Armani Storey DO, 40 mg at 07/12/21 1426    Allergies -   Allergies   Allergen Reactions     Erythromycin Nausea       Social History -   Social History     Socioeconomic History     Marital status:      Spouse name: Not on file     Number of children: Not on file     Years of education: Not on file     Highest education level: Not on file   Occupational History     Not on file   Tobacco Use     Smoking status: Never Smoker     Smokeless tobacco: Never Used   Substance and Sexual Activity     Alcohol use: Yes     Comment: 1-2 drinks per week     Drug use: No     Sexual activity: Not Currently     Partners: Male   Other Topics Concern     Parent/sibling w/ CABG, MI or angioplasty before 65F 55M? Yes     Comment: brother (43), mom (5 total, she's 95)   Social History Narrative     Not on file     Social Determinants of Health     Financial Resource Strain: Not on file   Food Insecurity: Not on file   Transportation Needs: Not on file   Physical Activity: Not on file   Stress: Not on file   Social Connections: Not on file   Intimate Partner Violence: Not on file   Housing Stability: Not on file       Family History -   Family History   Problem Relation Age of Onset     Diabetes Mother         type 2     Thyroid Disease Mother      Glaucoma Mother      Macular Degeneration Mother      Hypertension Mother      Hyperlipidemia Mother      Obesity Mother       Diabetes Sister      Thyroid Disease Sister      Breast Cancer Sister         Estrogen+progesterone +     Depression Sister      Endometrial Cancer Sister 51     Breast Cancer Sister      Cancer Father         skin cancer     Glaucoma Father      Hypertension Father      Hyperlipidemia Father      Other Cancer Father         skin     Obesity Father      Coronary Artery Disease Brother          at 43     Cerebrovascular Disease Maternal Grandfather      Diabetes Sister         type 2     Depression Sister         clinical depression     Mental Illness Sister         Bipolar Disorder     Obesity Sister         fatty liver, hepatitis     Prostate Cancer Paternal Grandfather      Other Cancer Sister         endometrial     Other Cancer Brother         skin     Obesity Brother      Other Cancer Paternal Grandmother         Lung     Osteoporosis Paternal Grandmother      Other Cancer Other         Bladder     Depression Sister      Anxiety Disorder Sister      Thyroid Disease Sister         Hashimotos     Anxiety Disorder Sister         unmedicated     Mental Illness Nephew         Bipolar Disorder     Substance Abuse Nephew         recreational drugs     Mental Illness Brother         unspecified     Substance Abuse Brother         recreational drugs     Substance Abuse Nephew         recreational drugs     Substance Abuse Nephew         recreational drug, alcohol     Asthma Sister         childhood asthma       Review of Systems - As per HPI and PMHx, otherwise 7 system review of the head and neck negative.    Physical Exam  General - The patient is in no distress.  Alert and oriented to person and place, answers questions and cooperates with examination appropriately.   Voice and Breathing - The patient was breathing comfortably without the use of accessory muscles. There was no wheezing, stridor, or stertor.  The patients voice was clear and strong.  Ears - The right ear was examined. It showed cerumen impaction  and crusting along the medial canal and on the tympanic membrane, and I couldn't see the tympanic membrane. See below for cerumen and debri removal procedure.  Once clean the right tympanic membrane is intact.  No middle ear effusion and no infection.  The left ear was then examined. The canal was nonedematous and nonerythematous. No evidence of infection. The tympanic membrane was intact and the middle ear was well aerated.   Eyes - Extraocular movements intact.  Sclera were not icteric or injected.  Neck - Soft, non-tender. Palpation of the occipital, submental, submandibular, internal jugular chain, and supraclavicular nodes did not demonstrateany abnormal lymph nodes or masses. No parotid masses. Palpation of the thyroid was soft and smooth, with no nodules or goiter appreciated.  The trachea was mobile and midline.  Neurological - Cranial nerves 2 through 12 were grossly intact. House-Brackmann grade 1 out of 6 bilaterally.  Mouth -moist mucous membranes.  Tongue is midline.  No lesions or ulcerations or masses.    PROCEDURE - right ear cerumen and debri removal    I laid the patient supine in the exam chair. Using the binocular microscope I used a fine pick to elevate firm cerumen crust and impaction off the medial canal wall and tympanic membrane.  At this point I was able to grab this with an alligator remove the entire plug.  It was consistent with a sloughed keratin mixed with wax. I could then see the tympanic membrane. It appeared intact. No evidence of middle ear effusion.    Assessment and Plan - Chanel Israel is a 56 year old female has right otalgia.  She did have significant cerumen impaction and sloughing of the lateral tympanic membrane.  I was able to remove it.  No evidence of infection below this.  Tympanic membrane's intact and no effusion.  This certainly could have been the cause of the muffling in earache.  However she also has significant TMJ that is been worse lately.  I think  this may be playing a component.  We discussed TMJ precautions.  She also already uses a mouthguard.  She may want to use a soft diet, consider physical therapy, massage, and hot packs.    Miguel Angel Monge MD  Otolaryngology  [facility]

## 2022-01-25 ENCOUNTER — ANCILLARY PROCEDURE (OUTPATIENT)
Dept: MRI IMAGING | Facility: CLINIC | Age: 57
End: 2022-01-25
Attending: FAMILY MEDICINE
Payer: COMMERCIAL

## 2022-01-25 ENCOUNTER — OFFICE VISIT (OUTPATIENT)
Dept: OTOLARYNGOLOGY | Facility: CLINIC | Age: 57
End: 2022-01-25
Payer: COMMERCIAL

## 2022-01-25 ENCOUNTER — OFFICE VISIT (OUTPATIENT)
Dept: AUDIOLOGY | Facility: CLINIC | Age: 57
End: 2022-01-25
Payer: COMMERCIAL

## 2022-01-25 VITALS
RESPIRATION RATE: 16 BRPM | SYSTOLIC BLOOD PRESSURE: 132 MMHG | OXYGEN SATURATION: 97 % | DIASTOLIC BLOOD PRESSURE: 82 MMHG | HEART RATE: 88 BPM

## 2022-01-25 DIAGNOSIS — H60.90 RECURRENT OTITIS EXTERNA, UNSPECIFIED LATERALITY: ICD-10-CM

## 2022-01-25 DIAGNOSIS — H92.01 OTALGIA, RIGHT: Primary | ICD-10-CM

## 2022-01-25 DIAGNOSIS — Z53.9 ERRONEOUS ENCOUNTER--DISREGARD: Primary | ICD-10-CM

## 2022-01-25 DIAGNOSIS — H61.21 IMPACTED CERUMEN OF RIGHT EAR: ICD-10-CM

## 2022-01-25 DIAGNOSIS — G89.29 CHRONIC PAIN OF LEFT KNEE: ICD-10-CM

## 2022-01-25 DIAGNOSIS — M25.562 CHRONIC PAIN OF LEFT KNEE: ICD-10-CM

## 2022-01-25 PROCEDURE — 69210 REMOVE IMPACTED EAR WAX UNI: CPT | Performed by: OTOLARYNGOLOGY

## 2022-01-25 PROCEDURE — 99203 OFFICE O/P NEW LOW 30 MIN: CPT | Mod: 25 | Performed by: OTOLARYNGOLOGY

## 2022-01-25 PROCEDURE — 73721 MRI JNT OF LWR EXTRE W/O DYE: CPT | Mod: LT | Performed by: RADIOLOGY

## 2022-01-25 NOTE — LETTER
1/25/2022         RE: Chanel Israel  [address]  [place] [address]        Dear Colleague,    Thank you for referring your patient, Chanel Israel, to the [facility]. Please see a copy of my visit note below.    I am seeing this patient in consultation for recurrent otitis externa at the request of the provider Gabbie Pate.    Chief Complaint - right ear and face pain    History of Present Illness - Chanel Israel is a 56 year old female who presents to me today with recurrent pain of the right ear. She has pressure sometimes, but sometimes sharp shooting pains. It has been present and noticeable for approximately 2 months. Not much problems with infections as an adult, but as a kid infections and in her 20s. The patient has noted no otorrhea. Hearing has been okay. No troubles in the left ear. The patient has tried antibiotics, drops. She feels this started with TMJ, and that was bad. She wears a splint. She grinds teeth and clenches. She had a recent death. She uses hot packs. She has a PT she sees.     Past Medical History -   Patient Active Problem List   Diagnosis     Personal history of breast cancer     S/P bilateral mastectomy     Urge incontinence of urine     Overweight (BMI 25.0-29.9)     Mild recurrent major depression (H)     Family history of diabetes mellitus (DM)     Family history of thyroid disease     Plantar fasciitis, bilateral     Seasonal allergies     Mixed hyperlipidemia     Chronic rhinitis     Breast implant asymmetry     ACP (advance care planning)     Lymphedema     Family history of ischemic heart disease     Seasonal allergic rhinitis     Glaucoma suspect, bilateral     Family history of glaucoma     Age-related nuclear cataract of both eyes     Seasonal allergic rhinitis due to other allergic trigger, unspecified chronicity     Family history of breast cancer in sister     Family history of skin cancer      Actinic keratoses     Hyperlipidemia LDL goal <160     Hypothyroidism due to Hashimoto's thyroiditis     Bruxism (teeth grinding)     Anxiety     Loss of hair     Malignant neoplasm of left female breast, unspecified estrogen receptor status, unspecified site of breast (H)     Chronic fatigue     Chronic pain of left knee       Current Medications -   Current Outpatient Medications:      BIOTIN 5000 PO, , Disp: , Rfl:      buPROPion (WELLBUTRIN XL) 150 MG 24 hr tablet, Take 1 tablet (150 mg) by mouth every morning Profile Rx, Disp: 90 tablet, Rfl: 1     Calcium Carbonate-Vitamin D (CALCIUM + D PO), Take by mouth 2 times daily, Disp: , Rfl:      Coenzyme Q10 (CO Q 10) 100 MG CAPS, Take 1 capsule by mouth daily , Disp: , Rfl:      diclofenac (VOLTAREN) 1 % topical gel, Apply 4 g topically 4 times daily as needed for moderate pain, Disp: 100 g, Rfl: 2     fluocinolone (SYNALAR) 0.025 % cream, Apply topically 2 times daily Apply to affected areas of mouth as needed., Disp: 60 g, Rfl: 5     fluticasone (FLONASE) 50 MCG/ACT nasal spray, as needed , Disp: , Rfl: 0     Glucosamine-Chondroitin-MSM (TRIPLE FLEX PO), Take by mouth 2 times daily, Disp: , Rfl:      hydrocortisone 2.5 % cream, Apply topically as needed, Disp: , Rfl:      ketoconazole (NIZORAL) 2 % external cream, Apply topically daily, Disp: 100 g, Rfl: 1     L-GLUTAMINE, 1 capsule 2 times daily , Disp: , Rfl:      MAGNESIUM PO, Take 1 capsule by mouth daily , Disp: , Rfl:      melatonin 5 MG tablet, Take 5 mg by mouth nightly as needed for sleep, Disp: , Rfl:      Multiple Vitamin (MULTI-VITAMIN PO), Take by mouth daily , Disp: , Rfl:      naproxen (NAPROSYN) 500 MG tablet, Take 1 tablet (500 mg) by mouth 2 times daily as needed for moderate pain, Disp: 30 tablet, Rfl: 0     NIACIN CR PO, Take 500 mg by mouth, Disp: , Rfl:      order for DME, 1: Gradient Compression Wraps; 2: LUE compression sleeve with handpiece or guantlet; 20-30 mm Hg; 3: LUE velcro  compression sleeve; 4: Compression camisole/bra/tank top, Disp: 1 each, Rfl: 0     order for DME, Compression sleeve to bilateral UE., Disp: 2 Units, Rfl: 6     Probiotic Product (PROBIOTIC DAILY PO), Take 1 capsule by mouth 2 times daily, Disp: , Rfl:      sertraline (ZOLOFT) 50 MG tablet, TAKE 1& 1/2 TABLETS BY MOUTH EVERY EVENING, profile Rx, Disp: 135 tablet, Rfl: 1     SYNTHROID 75 MCG tablet, TAKE 1 TABLET(75 MCG) BY MOUTH DAILY, Disp: 90 tablet, Rfl: 3     UNABLE TO FIND, Take 1 tablet by mouth daily MEDICATION NAME: Sambrocal Elderberry Supplement, Disp: , Rfl:      zinc 50 MG TABS, Take 54 mg by mouth daily, Disp: , Rfl:     Current Facility-Administered Medications:      triamcinolone (KENALOG-40) injection 40 mg, 40 mg, , , Armani Storey DO, 40 mg at 01/13/22 1631     triamcinolone (KENALOG-40) injection 40 mg, 40 mg, , , Armani Storey DO, 40 mg at 07/12/21 1426    Allergies -   Allergies   Allergen Reactions     Erythromycin Nausea       Social History -   Social History     Socioeconomic History     Marital status:      Spouse name: Not on file     Number of children: Not on file     Years of education: Not on file     Highest education level: Not on file   Occupational History     Not on file   Tobacco Use     Smoking status: Never Smoker     Smokeless tobacco: Never Used   Substance and Sexual Activity     Alcohol use: Yes     Comment: 1-2 drinks per week     Drug use: No     Sexual activity: Not Currently     Partners: Male   Other Topics Concern     Parent/sibling w/ CABG, MI or angioplasty before 65F 55M? Yes     Comment: brother (43), mom (5 total, she's 95)   Social History Narrative     Not on file     Social Determinants of Health     Financial Resource Strain: Not on file   Food Insecurity: Not on file   Transportation Needs: Not on file   Physical Activity: Not on file   Stress: Not on file   Social Connections: Not on file   Intimate Partner Violence: Not on file   Housing  Stability: Not on file       Family History -   Family History   Problem Relation Age of Onset     Diabetes Mother         type 2     Thyroid Disease Mother      Glaucoma Mother      Macular Degeneration Mother      Hypertension Mother      Hyperlipidemia Mother      Obesity Mother      Diabetes Sister      Thyroid Disease Sister      Breast Cancer Sister         Estrogen+progesterone +     Depression Sister      Endometrial Cancer Sister 51     Breast Cancer Sister      Cancer Father         skin cancer     Glaucoma Father      Hypertension Father      Hyperlipidemia Father      Other Cancer Father         skin     Obesity Father      Coronary Artery Disease Brother          at 43     Cerebrovascular Disease Maternal Grandfather      Diabetes Sister         type 2     Depression Sister         clinical depression     Mental Illness Sister         Bipolar Disorder     Obesity Sister         fatty liver, hepatitis     Prostate Cancer Paternal Grandfather      Other Cancer Sister         endometrial     Other Cancer Brother         skin     Obesity Brother      Other Cancer Paternal Grandmother         Lung     Osteoporosis Paternal Grandmother      Other Cancer Other         Bladder     Depression Sister      Anxiety Disorder Sister      Thyroid Disease Sister         Hashimotos     Anxiety Disorder Sister         unmedicated     Mental Illness Nephew         Bipolar Disorder     Substance Abuse Nephew         recreational drugs     Mental Illness Brother         unspecified     Substance Abuse Brother         recreational drugs     Substance Abuse Nephew         recreational drugs     Substance Abuse Nephew         recreational drug, alcohol     Asthma Sister         childhood asthma       Review of Systems - As per HPI and PMHx, otherwise 7 system review of the head and neck negative.    Physical Exam  General - The patient is in no distress.  Alert and oriented to person and place, answers questions and  cooperates with examination appropriately.   Voice and Breathing - The patient was breathing comfortably without the use of accessory muscles. There was no wheezing, stridor, or stertor.  The patients voice was clear and strong.  Ears - The right ear was examined. It showed cerumen impaction and crusting along the medial canal and on the tympanic membrane, and I couldn't see the tympanic membrane. See below for cerumen and debri removal procedure.  Once clean the right tympanic membrane is intact.  No middle ear effusion and no infection.  The left ear was then examined. The canal was nonedematous and nonerythematous. No evidence of infection. The tympanic membrane was intact and the middle ear was well aerated.   Eyes - Extraocular movements intact.  Sclera were not icteric or injected.  Neck - Soft, non-tender. Palpation of the occipital, submental, submandibular, internal jugular chain, and supraclavicular nodes did not demonstrateany abnormal lymph nodes or masses. No parotid masses. Palpation of the thyroid was soft and smooth, with no nodules or goiter appreciated.  The trachea was mobile and midline.  Neurological - Cranial nerves 2 through 12 were grossly intact. House-Brackmann grade 1 out of 6 bilaterally.  Mouth -moist mucous membranes.  Tongue is midline.  No lesions or ulcerations or masses.    PROCEDURE - right ear cerumen and debri removal    I laid the patient supine in the exam chair. Using the binocular microscope I used a fine pick to elevate firm cerumen crust and impaction off the medial canal wall and tympanic membrane.  At this point I was able to grab this with an alligator remove the entire plug.  It was consistent with a sloughed keratin mixed with wax. I could then see the tympanic membrane. It appeared intact. No evidence of middle ear effusion.    Assessment and Plan - Chanel Israel is a 56 year old female has right otalgia.  She did have significant cerumen impaction and  sloughing of the lateral tympanic membrane.  I was able to remove it.  No evidence of infection below this.  Tympanic membrane's intact and no effusion.  This certainly could have been the cause of the muffling in earache.  However she also has significant TMJ that is been worse lately.  I think this may be playing a component.  We discussed TMJ precautions.  She also already uses a mouthguard.  She may want to use a soft diet, consider physical therapy, massage, and hot packs.    Miguel Angel Monge MD  Otolaryngology  [facility]        Again, thank you for allowing me to participate in the care of your patient.        Sincerely,        Miguel Angel Monge MD

## 2022-01-26 PROBLEM — Z82.61 FAMILY HISTORY OF RHEUMATOID ARTHRITIS: Status: ACTIVE | Noted: 2022-01-26

## 2022-01-27 ENCOUNTER — NURSE TRIAGE (OUTPATIENT)
Dept: NURSING | Facility: CLINIC | Age: 57
End: 2022-01-27
Payer: COMMERCIAL

## 2022-01-27 NOTE — TELEPHONE ENCOUNTER
"Triage Call    Pt calling to report ongoing pain in left knee, and some new \"numbness\" below her knee.    Recently had a steroid shot     Has a surgical procedure planned.    Triaged to Home Care, and Care Advice given per Knee Pain Guideline.    Crys Kebede RN        Reason for Disposition    Knee pain    Additional Information    Negative: Followed a knee injury    Negative: Leg pain is main symptom    Negative: Knee swelling is main symptom    Negative: [1] Swollen joint AND [2] fever    Negative: Sounds like a life-threatening emergency to the triager    Negative: [1] Red area or streak AND [2] fever    Negative: Patient sounds very sick or weak to the triager    Negative: [1] SEVERE pain (e.g., excruciating, unable to walk) AND [2] not improved after 2 hours of pain medicine    Negative: [1] Can't move swollen joint at all AND [2] no fever    Negative: [1] Thigh or calf pain AND [2] only 1 side AND [3] present > 1 hour    Negative: [1] Thigh, calf, or ankle swelling AND [2] only 1 side    Negative: [1] Looks infected (spreading redness, pus) AND [2] large red area (> 2 in. or 5 cm)    Negative: [1] Very swollen joint AND [2] no fever    Negative: Blistering rash in area of pain  (i.e., dermatomal distribution or \"band\" or \"stripe\")    Negative: Looks like a boil, infected sore, or deep ulcer    Negative: [1] Redness of the skin AND [2] no fever    Negative: [1] MODERATE pain (e.g., interferes with normal activities, limping) AND [2] present > 3 days    Negative: [1] Swollen joint AND [2] no fever or redness    Negative: [1] Fluid-filled sack just below knee cap  (i.e., inferior aspect of the anterior knee) AND [2] no fever or redness    Negative: [1] MILD pain (e.g., does not interfere with normal activities) AND [2] present > 7 days    Negative: Knee pain is a chronic symptom (recurrent or ongoing AND present > 4 weeks)    Negative: Knee locking (i.e., joint gets stuck, catching) is a chronic symptom " (recurrent or ongoing AND present > 4 weeks)    Negative: Knee giving way (or buckling) when walking is a chronic symptom (recurrent or ongoing AND present > 4 weeks)    Negative: Caused by overuse from recent vigorous activity (e.g.,  aerobics, jogging/running, physical work, prolonged walking, sports)    Protocols used: KNEE PAIN-A-AH

## 2022-01-29 NOTE — TELEPHONE ENCOUNTER
DIAGNOSIS: left knee   APPOINTMENT DATE: 1.31.22   NOTES STATUS DETAILS   OFFICE NOTE from referring provider N/A    OFFICE NOTE from other specialist Internal 1.13.22, 2.11.21 MIGUELINA [facility]  2.1.21 MIGUELINA [place]   DISCHARGE SUMMARY from hospital N/A    DISCHARGE REPORT from the ER N/A    OPERATIVE REPORT N/A    EMG report N/A    MEDICATION LIST Internal    MRI Internal 1.25.22 L knee   DEXA (osteoporosis/bone health) N/A    CT SCAN N/A    XRAYS (IMAGES & REPORTS) N/A

## 2022-01-31 ENCOUNTER — PRE VISIT (OUTPATIENT)
Dept: ORTHOPEDICS | Facility: CLINIC | Age: 57
End: 2022-01-31

## 2022-01-31 ENCOUNTER — OFFICE VISIT (OUTPATIENT)
Dept: ORTHOPEDICS | Facility: CLINIC | Age: 57
End: 2022-01-31
Payer: COMMERCIAL

## 2022-01-31 ENCOUNTER — ANCILLARY PROCEDURE (OUTPATIENT)
Dept: GENERAL RADIOLOGY | Facility: CLINIC | Age: 57
End: 2022-01-31
Attending: ORTHOPAEDIC SURGERY
Payer: COMMERCIAL

## 2022-01-31 VITALS — WEIGHT: 140 LBS | BODY MASS INDEX: 25.76 KG/M2 | HEIGHT: 62 IN

## 2022-01-31 DIAGNOSIS — M25.562 LEFT KNEE PAIN: ICD-10-CM

## 2022-01-31 DIAGNOSIS — M25.562 CHRONIC PAIN OF LEFT KNEE: Primary | ICD-10-CM

## 2022-01-31 DIAGNOSIS — G89.29 CHRONIC PAIN OF LEFT KNEE: Primary | ICD-10-CM

## 2022-01-31 PROCEDURE — 73562 X-RAY EXAM OF KNEE 3: CPT | Mod: LT | Performed by: RADIOLOGY

## 2022-01-31 PROCEDURE — 99204 OFFICE O/P NEW MOD 45 MIN: CPT | Performed by: ORTHOPAEDIC SURGERY

## 2022-01-31 ASSESSMENT — MIFFLIN-ST. JEOR: SCORE: 1173.29

## 2022-01-31 NOTE — LETTER
1/31/2022         RE: Chanel Israel  [address]  [facility] [address]        Dear Colleague,    Thank you for referring your patient, Chanel Israel, to the [facility] [place]. Please see a copy of my visit note below.    CHIEF CONCERN: Left knee pain    HISTORY:   Very pleasant 56-year-old female with a long history about her left knee.  She does feel like it initially started approximately a year ago check she got x-rays at that time was told she had a mild amount of arthritis that was tried to nonsurgical things.  She had a specific event when it worsened this past winter.  She was stepping down off of the stepstool putting away some [place] decorations and she felt a pop in the back of her knee.  She has had had excruciating pain since that time.  It keeps her from doing the things that she wants to do.  She is severely limited.    MRI demonstrated a meniscus tear I have some concerns that this may have extended into the meniscus root.  Referred to my clinic for definitive management.    PAST MEDICAL HISTORY: (Reviewed with the patient and in the [facility] medical record)  1. Hashimoto's thyroiditis    PAST SURGICAL HISTORY: (Reviewed with the patient and in the [facility] medical record)  1. No knee surgery though her mom and her sister have had knee replacements    MEDICATIONS: (Reviewed with the patient and in the [facility] medical record)    Notable medications include: No blood thinners or opioids    ALLERGIES: (Reviewed with the patient and in the [facility] medical record)  1. Erythromycin      SOCIAL HISTORY: (Reviewed with the patient and in the medical record)  --Tobacco: Non-smoker  --Occupation:   --Avocation/Sport: Walking and being active    FAMILY HISTORY: (Reviewed with the patient and in the medical record)  -- No family history of bleeding, clotting, or difficulty with anesthesia    REVIEW OF SYSTEMS: (Reviewed with the patient  and on the health intake form)  -- A comprehensive 10 point review of systems was conducted and is negative except as noted in the HPI    EXAM:     General: Awake, Alert and Oriented, No acute Distress. Articulate and Interactive    Body mass index is 25.61 kg/m .    Left lower extremity :    Skin is Warm and Well perfused, no suggestion of infection    Positive medial joint line tenderness    Less tenderness laterally    Range of motion 0-1 35    Stable to varus valgus stress testing.  Stable anterior posterior drawer testing.  No pivot shift.  1 quadrant medial lateral translation of the patella    EHL/FHL/TA/GS 5/5    Sensation intact L3-S1    2+ Dorsalis Pedis Pulse    IMAGING:    Plain Radiographs: New PA flexion weightbearing films today show overall good preservation of her medial compartment with just trace joint space narrowing.    MRI: MRI does show evidence of cartilage loss of the tibial plateau of the medial compartment though the femoral cartilage looks well-preserved.  There is what appears to be a horizontal tear of the medial meniscus.  I am suspicious that this might extend into the meniscus root    ASSESSMENT:  1. Medial meniscus tear, possible medial meniscus root tear left knee    PLAN:  1. I long discussion with the patient regarding her left knee.  Reviewed the diagnosis potential treatment options I discussed with her both meniscus root repair as well as meniscectomy.  She understands that we will make the decision in the operating room which we will proceed.  We discussed the divergent recovery pathways.  I discussed with her the pros cons risk benefits surgery.  She understands that we will not reverse any degenerative changes that have already occurred.  I am very suspicious for a root tear given her history I think this is a reasonable thing to consider.  I will look for her at the time of surgery.          Again, thank you for allowing me to participate in the care of your patient.         Sincerely,        Leonid Bailey MD

## 2022-01-31 NOTE — PROGRESS NOTES
CHIEF CONCERN: Left knee pain    HISTORY:   Very pleasant 56-year-old female with a long history about her left knee.  She does feel like it initially started approximately a year ago check she got x-rays at that time was told she had a mild amount of arthritis that was tried to nonsurgical things.  She had a specific event when it worsened this past winter.  She was stepping down off of the stepstool putting away some [place] decorations and she felt a pop in the back of her knee.  She has had had excruciating pain since that time.  It keeps her from doing the things that she wants to do.  She is severely limited.    MRI demonstrated a meniscus tear I have some concerns that this may have extended into the meniscus root.  Referred to my clinic for definitive management.    PAST MEDICAL HISTORY: (Reviewed with the patient and in the [facility] medical record)  1. Hashimoto's thyroiditis    PAST SURGICAL HISTORY: (Reviewed with the patient and in the [facility] medical record)  1. No knee surgery though her mom and her sister have had knee replacements    MEDICATIONS: (Reviewed with the patient and in the [facility] medical record)    Notable medications include: No blood thinners or opioids    ALLERGIES: (Reviewed with the patient and in the [facility] medical record)  1. Erythromycin      SOCIAL HISTORY: (Reviewed with the patient and in the medical record)  --Tobacco: Non-smoker  --Occupation:   --Avocation/Sport: Walking and being active    FAMILY HISTORY: (Reviewed with the patient and in the medical record)  -- No family history of bleeding, clotting, or difficulty with anesthesia    REVIEW OF SYSTEMS: (Reviewed with the patient and on the health intake form)  -- A comprehensive 10 point review of systems was conducted and is negative except as noted in the HPI    EXAM:     General: Awake, Alert and Oriented, No acute Distress. Articulate and Interactive    Body mass index is 25.61 kg/m .    Left lower  extremity :    Skin is Warm and Well perfused, no suggestion of infection    Positive medial joint line tenderness    Less tenderness laterally    Range of motion 0-1 35    Stable to varus valgus stress testing.  Stable anterior posterior drawer testing.  No pivot shift.  1 quadrant medial lateral translation of the patella    EHL/FHL/TA/GS 5/5    Sensation intact L3-S1    2+ Dorsalis Pedis Pulse    IMAGING:    Plain Radiographs: New PA flexion weightbearing films today show overall good preservation of her medial compartment with just trace joint space narrowing.    MRI: MRI does show evidence of cartilage loss of the tibial plateau of the medial compartment though the femoral cartilage looks well-preserved.  There is what appears to be a horizontal tear of the medial meniscus.  I am suspicious that this might extend into the meniscus root    ASSESSMENT:  1. Medial meniscus tear, possible medial meniscus root tear left knee    PLAN:  1. I long discussion with the patient regarding her left knee.  Reviewed the diagnosis potential treatment options I discussed with her both meniscus root repair as well as meniscectomy.  She understands that we will make the decision in the operating room which we will proceed.  We discussed the divergent recovery pathways.  I discussed with her the pros cons risk benefits surgery.  She understands that we will not reverse any degenerative changes that have already occurred.  I am very suspicious for a root tear given her history I think this is a reasonable thing to consider.  I will look for her at the time of surgery.

## 2022-02-01 ENCOUNTER — TELEPHONE (OUTPATIENT)
Dept: ORTHOPEDICS | Facility: CLINIC | Age: 57
End: 2022-02-01
Payer: COMMERCIAL

## 2022-02-01 NOTE — TELEPHONE ENCOUNTER
Patient is scheduled for surgery with Dr. Bailey    Spoke with: JULIA Lowe - scheduled with patient in clinic    Date of Surgery: 3/1/2022    Location: [facility]    Informed patient they will need an adult  yes    Pre op with Provider n/a    H&P: Scheduled with pcp    Pre-procedure COVID-19 Test: patient will schedule with covid scheduling team    Additional imaging/appointments: n/a    Surgery packet: Given in clinic     Additional comments: n/a

## 2022-02-07 DIAGNOSIS — G89.29 CHRONIC PAIN OF LEFT KNEE: Primary | ICD-10-CM

## 2022-02-07 DIAGNOSIS — M25.562 CHRONIC PAIN OF LEFT KNEE: Primary | ICD-10-CM

## 2022-02-08 DIAGNOSIS — Z11.59 ENCOUNTER FOR SCREENING FOR OTHER VIRAL DISEASES: Primary | ICD-10-CM

## 2022-02-10 NOTE — NURSING NOTE
Teaching Flowsheet   Relevant Diagnosis: Left knee arthroscopy, meniscus root repair vs. meniscectomy  Teaching Topic: Pre operative teaching     Person(s) involved in teaching:   Patient     Motivation Level:  Asks Questions: Yes  Eager to Learn: Yes  Cooperative: Yes  Receptive (willing/able to accept information): Yes  Any cultural factors/Religion beliefs that may influence understanding or compliance? No       Patient demonstrates understanding of the following:  Reason for the appointment, diagnosis and treatment plan: Yes  Knowledge of proper use of medications and conditions for which they are ordered (with special attention to potential side effects or drug interactions): Yes  Which situations necessitate calling provider and whom to contact: Yes       Teaching Concerns Addressed: Pre operative care       Proper use and care of brace and crutches (medical equip, care aids, etc.): No, explain: will be provided at surgery  Nutritional needs and diet plan: Yes  Pain management techniques: Yes  Wound Care: Yes  How and/when to access community resources: Yes     Instructional Materials Used/Given: Pre operative instructions and surgical soap     Time spent with patient: 15 minutes.

## 2022-02-22 ENCOUNTER — OFFICE VISIT (OUTPATIENT)
Dept: FAMILY MEDICINE | Facility: CLINIC | Age: 57
End: 2022-02-22
Payer: COMMERCIAL

## 2022-02-22 ENCOUNTER — TELEPHONE (OUTPATIENT)
Dept: FAMILY MEDICINE | Facility: CLINIC | Age: 57
End: 2022-02-22

## 2022-02-22 VITALS
DIASTOLIC BLOOD PRESSURE: 84 MMHG | WEIGHT: 139 LBS | OXYGEN SATURATION: 98 % | HEART RATE: 110 BPM | TEMPERATURE: 98.1 F | RESPIRATION RATE: 19 BRPM | SYSTOLIC BLOOD PRESSURE: 110 MMHG | BODY MASS INDEX: 25.42 KG/M2

## 2022-02-22 DIAGNOSIS — G89.29 CHRONIC PAIN OF LEFT KNEE: ICD-10-CM

## 2022-02-22 DIAGNOSIS — I89.0 LYMPHEDEMA: ICD-10-CM

## 2022-02-22 DIAGNOSIS — E06.3 HYPOTHYROIDISM DUE TO HASHIMOTO'S THYROIDITIS: ICD-10-CM

## 2022-02-22 DIAGNOSIS — F41.9 ANXIETY: ICD-10-CM

## 2022-02-22 DIAGNOSIS — F33.0 MILD RECURRENT MAJOR DEPRESSION (H): ICD-10-CM

## 2022-02-22 DIAGNOSIS — Z01.818 PREOP GENERAL PHYSICAL EXAM: Primary | ICD-10-CM

## 2022-02-22 DIAGNOSIS — M25.562 CHRONIC PAIN OF LEFT KNEE: ICD-10-CM

## 2022-02-22 DIAGNOSIS — C50.912 MALIGNANT NEOPLASM OF LEFT FEMALE BREAST, UNSPECIFIED ESTROGEN RECEPTOR STATUS, UNSPECIFIED SITE OF BREAST (H): ICD-10-CM

## 2022-02-22 LAB
ERYTHROCYTE [DISTWIDTH] IN BLOOD BY AUTOMATED COUNT: 13.2 % (ref 10–15)
HCT VFR BLD AUTO: 42 % (ref 35–47)
HGB BLD-MCNC: 13.4 G/DL (ref 11.7–15.7)
MCH RBC QN AUTO: 30.2 PG (ref 26.5–33)
MCHC RBC AUTO-ENTMCNC: 31.9 G/DL (ref 31.5–36.5)
MCV RBC AUTO: 95 FL (ref 78–100)
PLATELET # BLD AUTO: 219 10E3/UL (ref 150–450)
RBC # BLD AUTO: 4.44 10E6/UL (ref 3.8–5.2)
WBC # BLD AUTO: 5.6 10E3/UL (ref 4–11)

## 2022-02-22 PROCEDURE — 36415 COLL VENOUS BLD VENIPUNCTURE: CPT | Performed by: FAMILY MEDICINE

## 2022-02-22 PROCEDURE — 99214 OFFICE O/P EST MOD 30 MIN: CPT | Performed by: FAMILY MEDICINE

## 2022-02-22 PROCEDURE — 85027 COMPLETE CBC AUTOMATED: CPT | Performed by: FAMILY MEDICINE

## 2022-02-22 ASSESSMENT — PAIN SCALES - GENERAL: PAINLEVEL: MILD PAIN (2)

## 2022-02-22 NOTE — H&P (VIEW-ONLY)
[address] [address]  Phone: [phone]  Primary Provider: Aisha Barba  Pre-op Performing Provider: AISHA BARBA      PREOPERATIVE EVALUATION:  Today's date: 2/22/2022    Chanle Israel is a 56 year old female who presents for a preoperative evaluation.    Surgical Information:  Surgery/Procedure: Examination under anesthesia, knee arthroscopy, meniscus root repair vs. meniscectomy  Surgery Location: [facility]  Surgeon: Dr. Leonid Bailey  Surgery Date: 03/01/2022  Time of Surgery: 1:00 PM  Where patient plans to recover: At home with family  Fax number for surgical facility: Note does not need to be faxed, will be available electronically in Epic.    Type of Anesthesia Anticipated: Choice    Assessment & Plan     The proposed surgical procedure is considered INTERMEDIATE risk.    Preop general physical exam  Patient is cleared for procedure  Recommended to hold naproxen for at least 3 to 4 days before the procedure  Patient already held all her over-the-counter supplements  - CBC with platelets; Future  - CBC with platelets    Chronic pain of left knee  as above    - CBC with platelets; Future  - CBC with platelets    Malignant neoplasm of left female breast, unspecified estrogen receptor status, unspecified site of breast (H)  Per patient's request, physical therapy referral placed for [facility] for ongoing lymphedema therapy  - Physical Therapy Referral; Future    Lymphedema  as above    - Physical Therapy Referral; Future    Mild recurrent major depression (H)  Doing well on current dose of Wellbutrin and sertraline, monitor, follow for recheck in July 2022    Anxiety  as above      Hypothyroidism due to Hashimoto's thyroiditis  TSH   Date Value Ref Range Status   10/21/2021 0.85 0.40 - 4.00 mU/L Final   06/22/2021 0.88 0.40 - 4.00 mU/L Final   08/05/2020 0.64 0.40 - 4.00 mU/L Final   06/09/2020 3.35 0.40 - 4.00 mU/L  Final   02/11/2020 2.28 0.40 - 4.00 mU/L Final   03/12/2019 0.94 0.40 - 4.00 mU/L Final     Stable on current dose of Synthroid 75 MCG daily           Risks and Recommendations:  The patient has the following additional risks and recommendations for perioperative complications:   - No identified additional risk factors other than previously addressed    Medication Instructions:  Patient is to take all scheduled medications on the day of surgery EXCEPT for modifications listed below:   - naproxen (Aleve, Naprosyn): HOLD 4 days before surgery.     RECOMMENDATION:  APPROVAL GIVEN to proceed with proposed procedure, without further diagnostic evaluation.    Review of the result(s) of each unique test - CBC                  Subjective     HPI related to upcoming procedure: Patient with past medical history significant for chronic left knee pain, mild depression, anxiety, hypothyroidism, chronic lymphedema, status post to surgery for malignant neoplasm of left breast is here for preop clearance for upcoming Examination under anesthesia, knee arthroscopy, meniscus root repair vs. Meniscectomy  She is also requesting her physical therapy referral for rhythm weakness for ongoing lymphedema    Preop Questions 2/19/2022   1. Have you ever had a heart attack or stroke? No   2. Have you ever had surgery on your heart or blood vessels, such as a stent placement, a coronary artery bypass, or surgery on an artery in your head, neck, heart, or legs? No   3. Do you have chest pain with activity? No   4. Do you have a history of  heart failure? No   5. Do you currently have a cold, bronchitis or symptoms of other infection? No   6. Do you have a cough, shortness of breath, or wheezing? No   7. Do you or anyone in your family have previous history of blood clots? No   8. Do you or does anyone in your family have a serious bleeding problem such as prolonged bleeding following surgeries or cuts? No   9. Have you ever had problems with  anemia or been told to take iron pills? No   10. Have you had any abnormal blood loss such as black, tarry or bloody stools, or abnormal vaginal bleeding? No   11. Have you ever had a blood transfusion? No   12. Are you willing to have a blood transfusion if it is medically needed before, during, or after your surgery? Yes   13. Have you or any of your relatives ever had problems with anesthesia? YES - patient has severe N/V after    14. Do you have sleep apnea, excessive snoring or daytime drowsiness? No   15. Do you have any artifical heart valves or other implanted medical devices like a pacemaker, defibrillator, or continuous glucose monitor? No   16. Do you have artificial joints? No   17. Are you allergic to latex? No   18. Is there any chance that you may be pregnant? No       Health Care Directive:  Patient has a Health Care Directive on file      Preoperative Review of :   reviewed - no record of controlled substances prescribed.      Status of Chronic Conditions:  DEPRESSION - Patient has a long history of Depression of moderate severity requiring medication for control with recent symptoms being stable..Current symptoms of depression include see PHQ-9.     HYPOTHYROIDISM - Patient has a longstanding history of chronic Hypothyroidism. Patient has been doing well, noting no tremor, insomnia, hair loss or changes in skin texture. Continues to take medications as directed, without adverse reactions or side effects. Last TSH   Lab Results   Component Value Date    TSH 0.85 10/21/2021   .        Review of Systems  CONSTITUTIONAL: NEGATIVE for fever, chills, change in weight  INTEGUMENTARY/SKIN: NEGATIVE for worrisome rashes, moles or lesions  EYES: NEGATIVE for vision changes or irritation  ENT/MOUTH: NEGATIVE for ear, mouth and throat problems  RESP: NEGATIVE for significant cough or SOB  CV: NEGATIVE for chest pain, palpitations or peripheral edema  GI: NEGATIVE for nausea, abdominal pain, heartburn, or  change in bowel habits  : NEGATIVE for frequency, dysuria, or hematuria  MUSCULOSKELETAL:Chronic left knee pain  NEURO: NEGATIVE for weakness, dizziness or paresthesias  ENDOCRINE: NEGATIVE for temperature intolerance, skin/hair changes  ENDOCRINE: Hx thyroid disease  HEME: NEGATIVE for bleeding problems  PSYCHIATRIC: NEGATIVE for changes in mood or affect  PSYCHIATRIC: HX anxiety and HX depression    Patient Active Problem List    Diagnosis Date Noted     Family history of rheumatoid arthritis 01/26/2022     Priority: Medium     Chronic pain of left knee 01/18/2022     Priority: Medium     Malignant neoplasm of left female breast, unspecified estrogen receptor status, unspecified site of breast (H) 01/17/2022     Priority: Medium     Chronic fatigue 01/17/2022     Priority: Medium     Loss of hair 08/11/2020     Priority: Medium     Anxiety 02/11/2020     Priority: Medium     Hypothyroidism due to Hashimoto's thyroiditis 10/22/2019     Priority: Medium     Bruxism (teeth grinding) 10/22/2019     Priority: Medium     Family history of skin cancer 10/22/2018     Priority: Medium     Actinic keratoses 10/22/2018     Priority: Medium     Hyperlipidemia LDL goal <160 10/22/2018     Priority: Medium     Family history of breast cancer in sister 06/29/2018     Priority: Medium     Seasonal allergic rhinitis due to other allergic trigger, unspecified chronicity 10/31/2017     Priority: Medium     Age-related nuclear cataract of both eyes 09/01/2017     Priority: Medium     Glaucoma suspect, bilateral 09/14/2016     Priority: Medium     Family history of glaucoma 09/14/2016     Priority: Medium     Seasonal allergic rhinitis 05/06/2016     Priority: Medium     Family history of ischemic heart disease 03/25/2016     Priority: Medium     Lymphedema 03/01/2016     Priority: Medium     ACP (advance care planning) 08/28/2015     Priority: Medium     Advance Care Planning 8/29/2016: Receipt of ACP document:  Received: invalid  HCD document dated 6-17-16.  Document not previously scanned. Validation form completed indicating invalid document. Copy sent to client with information and facilitation resources. Validation form sent to be scanned as notation of invalid document received.  Confirmed/documented designated decision maker(s).  Added by Claudette Kay RN Advance Care Planning Liaison with Karolina Chairez  Advance Care Planning 8/28/2015: Receipt of ACP document:  Received: Health Care Directive which was witnessed or notarized on 3-5-13.  Document previously scanned on 8-25-15.  Validation form completed and sent to be scanned.  Code Status needs to be updated to reflect choices in most recent ACP document.  Confirmed/documented designated decision maker(s).  Added by Claudette Kay RN Advance Care Planning Liaison with Karolina Chairez           Breast implant asymmetry 08/25/2015     Priority: Medium     Mixed hyperlipidemia 08/24/2015     Priority: Medium     Chronic rhinitis 08/24/2015     Priority: Medium     Seasonal allergies 07/09/2014     Priority: Medium     Plantar fasciitis, bilateral 06/20/2014     Priority: Medium     Family history of diabetes mellitus (DM) 01/20/2014     Priority: Medium     Family history of thyroid disease 01/20/2014     Priority: Medium     Personal history of breast cancer 07/08/2013     Priority: Medium     S/P bilateral mastectomy 07/08/2013     Priority: Medium     Urge incontinence of urine 07/08/2013     Priority: Medium     Overweight (BMI 25.0-29.9) 07/08/2013     Priority: Medium     Mild recurrent major depression (H) 07/08/2013     Priority: Medium      Past Medical History:   Diagnosis Date     Asthma, mild intermittent      Breast cancer (H)      Depression      Depressive disorder 8/2000    postpartum     Endometriosis      Heart murmur     Patient reported.     Loss of hair 8/11/2020     PONV (postoperative nausea and vomiting)      Thyroid disease 8/2015    Hashimotos     Past  Surgical History:   Procedure Laterality Date     ADENOIDECTOMY       BIOPSY  4/2007    left breast     COLONOSCOPY N/A 8/22/2014    Procedure: COLONOSCOPY;  Surgeon: Duane, William Charles, MD;  Location: [facility] OR     COSMETIC SURGERY       CYSTOSCOPY       GENITOURINARY SURGERY  9480-1445    bladder: botox, durosphere injections     GYN SURGERY  10/1996    biopsy endometriois and cervical scraping     HC REMOVAL OF ANAL FISSURE  2007     MASTECTOMY, SIMPLE RT/LT/MABLE       RECONSTRUCT BREAST BILATERAL  2008     TONSILLECTOMY       Current Outpatient Medications   Medication Sig Dispense Refill     BIOTIN 5000 PO        buPROPion (WELLBUTRIN XL) 150 MG 24 hr tablet Take 1 tablet (150 mg) by mouth every morning Profile Rx 90 tablet 1     Calcium Carbonate-Vitamin D (CALCIUM + D PO) Take by mouth 2 times daily       Coenzyme Q10 (CO Q 10) 100 MG CAPS Take 1 capsule by mouth daily        diclofenac (VOLTAREN) 1 % topical gel Apply 4 g topically 4 times daily as needed for moderate pain 100 g 2     fluocinolone (SYNALAR) 0.025 % cream Apply topically 2 times daily Apply to affected areas of mouth as needed. 60 g 5     fluticasone (FLONASE) 50 MCG/ACT nasal spray as needed   0     Glucosamine-Chondroitin-MSM (TRIPLE FLEX PO) Take by mouth 2 times daily       hydrocortisone 2.5 % cream Apply topically as needed       ketoconazole (NIZORAL) 2 % external cream Apply topically daily 100 g 1     L-GLUTAMINE 1 capsule 2 times daily        MAGNESIUM PO Take 1 capsule by mouth daily        melatonin 5 MG tablet Take 5 mg by mouth nightly as needed for sleep       Multiple Vitamin (MULTI-VITAMIN PO) Take by mouth daily        naproxen (NAPROSYN) 500 MG tablet Take 1 tablet (500 mg) by mouth 2 times daily as needed for moderate pain 30 tablet 0     NIACIN CR PO Take 500 mg by mouth       order for DME 1: Gradient Compression Wraps; 2: LUE compression sleeve with handpiece or guantlet; 20-30 mm Hg; 3: LUE velcro compression sleeve; 4:  Compression camisole/bra/tank top 1 each 0     order for DME Compression sleeve to bilateral UE. 2 Units 6     Probiotic Product (PROBIOTIC DAILY PO) Take 1 capsule by mouth 2 times daily       sertraline (ZOLOFT) 50 MG tablet TAKE 1& 1/2 TABLETS BY MOUTH EVERY EVENING, profile Rx 135 tablet 1     SYNTHROID 75 MCG tablet TAKE 1 TABLET(75 MCG) BY MOUTH DAILY 90 tablet 3     UNABLE TO FIND Take 1 tablet by mouth daily MEDICATION NAME: Sambrocal Elderberry Supplement       zinc 50 MG TABS Take 54 mg by mouth daily         Allergies   Allergen Reactions     Erythromycin Nausea        Social History     Tobacco Use     Smoking status: Never Smoker     Smokeless tobacco: Never Used   Substance Use Topics     Alcohol use: Not Currently     Comment: 1-2 drinks per week     Family History   Problem Relation Age of Onset     Diabetes Mother         type 2     Thyroid Disease Mother      Glaucoma Mother      Macular Degeneration Mother      Hypertension Mother      Hyperlipidemia Mother      Obesity Mother      Diabetes Sister      Thyroid Disease Sister      Breast Cancer Sister         Estrogen+progesterone +     Depression Sister      Endometrial Cancer Sister 51     Breast Cancer Sister      Cancer Father         skin cancer     Glaucoma Father      Hypertension Father      Hyperlipidemia Father      Other Cancer Father         skin     Obesity Father      Coronary Artery Disease Brother          at 43     Cerebrovascular Disease Maternal Grandfather      Diabetes Sister         type 2     Depression Sister         clinical depression     Mental Illness Sister         Bipolar Disorder     Obesity Sister         fatty liver, hepatitis     Hyperlipidemia Sister      Prostate Cancer Paternal Grandfather      Other Cancer Sister         endometrial     Other Cancer Brother         skin     Obesity Brother      Other Cancer Paternal Grandmother         Lung     Osteoporosis Paternal Grandmother      Other Cancer Other          Bladder     Depression Sister         Personality disorder     Anxiety Disorder Sister      Thyroid Disease Sister         Hashimotos     Osteoporosis Sister      Anxiety Disorder Sister         unmedicated     Mental Illness Nephew         Bipolar Disorder     Substance Abuse Nephew         recreational drugs     Mental Illness Brother         unspecified     Substance Abuse Brother         recreational drugs     Substance Abuse Nephew         recreational drugs     Substance Abuse Nephew         recreational drug, alcohol     Asthma Sister         childhood asthma     History   Drug Use No         Objective     /84 (BP Location: Right arm, Patient Position: Sitting, Cuff Size: Adult Regular)   Pulse 110   Temp 98.1  F (36.7  C) (Temporal)   Resp 19   Wt 63 kg (139 lb)   SpO2 98%   BMI 25.42 kg/m      Physical Exam    GENERAL APPEARANCE: healthy, alert and no distress     EYES: EOMI, PERRL     HENT: ear canals and TM's normal and nose and mouth without ulcers or lesions     NECK: no adenopathy, no asymmetry, masses, or scars and thyroid normal to palpation     RESP: lungs clear to auscultation - no rales, rhonchi or wheezes     CV: regular rates and rhythm, normal S1 S2, no S3 or S4 and no murmur, click or rub     ABDOMEN:  soft, nontender, no HSM or masses and bowel sounds normal     MS: extremities normal- no gross deformities noted, no evidence of inflammation in joints, FROM in all extremities.     SKIN: no suspicious lesions or rashes     NEURO: Normal strength and tone, sensory exam grossly normal, mentation intact and speech normal     PSYCH: mentation appears normal. and affect normal/bright     LYMPHATICS: No cervical adenopathy    Recent Labs   Lab Test 06/22/21  0855 08/11/20  1052   HGB 14.1 14.1    222    142   POTASSIUM 4.0 3.9   CR 0.80 0.80        Diagnostics:  Results for orders placed or performed in visit on 02/22/22   CBC with platelets     Status: Normal   Result  Value Ref Range    WBC Count 5.6 4.0 - 11.0 10e3/uL    RBC Count 4.44 3.80 - 5.20 10e6/uL    Hemoglobin 13.4 11.7 - 15.7 g/dL    Hematocrit 42.0 35.0 - 47.0 %    MCV 95 78 - 100 fL    MCH 30.2 26.5 - 33.0 pg    MCHC 31.9 31.5 - 36.5 g/dL    RDW 13.2 10.0 - 15.0 %    Platelet Count 219 150 - 450 10e3/uL        No EKG required, no history of coronary heart disease, significant arrhythmia, peripheral arterial disease or other structural heart disease.    Revised Cardiac Risk Index (RCRI):  The patient has the following serious cardiovascular risks for perioperative complications:   - No serious cardiac risks = 0 points     RCRI Interpretation: 0 points: Class I (very low risk - 0.4% complication rate)           Signed Electronically by: Gabbie Hicks MD  Copy of this evaluation report is provided to requesting physician.    Chart documentation done in part with Dragon Voice recognition Software. Although reviewed after completion, some word and grammatical error may remain.

## 2022-02-22 NOTE — PATIENT INSTRUCTIONS
Preparing for Your Surgery  Getting started  A nurse will call you to review your health history and instructions. They will give you an arrival time based on your scheduled surgery time. Please be ready to share:    Your doctor's clinic name and phone number    Your medical, surgical and anesthesia history    A list of allergies and sensitivities    A list of medicines, including herbal treatments and over-the-counter drugs    Whether the patient has a legal guardian (ask how to send us the papers in advance)  Please tell us if you're pregnant--or if there's any chance you might be pregnant. Some surgeries may injure a fetus (unborn baby), so they require a pregnancy test. Surgeries that are safe for a fetus don't always need a test, and you can choose whether to have one.   If you have a child who's having surgery, please ask for a copy of Preparing for Your Child's Surgery.    Preparing for surgery    Within 30 days of surgery: Have a pre-op exam (sometimes called an H&P, or History and Physical). This can be done at a clinic or pre-operative center.  ? If you're having a , you may not need this exam. Talk to your care team.    At your pre-op exam, talk to your care team about all medicines you take. If you need to stop any medicines before surgery, ask when to start taking them again.  ? We do this for your safety. Many medicines can make you bleed too much during surgery. Some change how well surgery (anesthesia) drugs work.    Call your insurance company to let them know you're having surgery. (If you don't have insurance, call [phone].)    Call your clinic if there's any change in your health. This includes signs of a cold or flu (sore throat, runny nose, cough, rash, fever). It also includes a scrape or scratch near the surgery site.    If you have questions on the day of surgery, call your hospital or surgery center.  COVID testing  You may need to be tested for COVID-19 before having  surgery. If so, your surgical team will give you instructions for scheduling this test, separate from your preoperative history and physical.  Eating and drinking guidelines  For your safety: Unless your surgeon tells you otherwise, follow the guidelines below.    Eat and drink as usual until 8 hours before surgery. After that, no food or milk.    Drink clear liquids until 2 hours before surgery. These are liquids you can see through, like water, Gatorade and Propel Water. You may also have black coffee and tea (no cream or milk).    Nothing by mouth within 2 hours of surgery. This includes gum, candy and breath mints.    If you drink alcohol: Stop drinking it the night before surgery.    If your care team tells you to take medicine on the morning of surgery, it's okay to take it with a sip of water.  Preventing infection    Shower or bathe the night before and morning of your surgery. Follow the instructions your clinic gave you. (If no instructions, use regular soap.)    Don't shave or clip hair near your surgery site. We'll remove the hair if needed.    Don't smoke or vape the morning of surgery. You may chew nicotine gum up to 2 hours before surgery. A nicotine patch is okay.  ? Note: Some surgeries require you to completely quit smoking and nicotine. Check with your surgeon.    Your care team will make every effort to keep you safe from infection. We will:  ? Clean our hands often with soap and water (or an alcohol-based hand rub).  ? Clean the skin at your surgery site with a special soap that kills germs.  ? Give you a special gown to keep you warm. (Cold raises the risk of infection.)  ? Wear special hair covers, masks, gowns and gloves during surgery.  ? Give antibiotic medicine, if prescribed. Not all surgeries need antibiotics.  What to bring on the day of surgery    Photo ID and insurance card    Copy of your health care directive, if you have one    Glasses and hearing aides (bring cases)  ? You can't  wear contacts during surgery    Inhaler and eye drops, if you use them (tell us about these when you arrive)    CPAP machine or breathing device, if you use them    A few personal items, if spending the night    If you have . . .  ? A pacemaker, ICD (cardiac defibrillator) or other implant: Bring the ID card.  ? An implanted stimulator: Bring the remote control.  ? A legal guardian: Bring a copy of the certified (court-stamped) guardianship papers.  Please remove any jewelry, including body piercings. Leave jewelry and other valuables at home.  If you're going home the day of surgery    You must have a responsible adult drive you home. They should stay with you overnight as well.    If you don't have someone to stay with you, and you aren't safe to go home alone, we may keep you overnight. Insurance often won't pay for this.  After surgery  If it's hard to control your pain or you need more pain medicine, please call your surgeon's office.  Questions?   If you have any questions for your care team, list them here: _________________________________________________________________________________________________________________________________________________________________________ ____________________________________ ____________________________________ ____________________________________  For informational purposes only. Not to replace the advice of your health care provider. Copyright   2003, 2019 [facility]. All rights reserved. Clinically reviewed by Estela Pope MD. [employer] [id number] - REV 07/21.

## 2022-02-22 NOTE — TELEPHONE ENCOUNTER
Physical therapy referral done per patient's request during the visit today please fax it to [facility] [employer] as patient requested

## 2022-02-22 NOTE — PROGRESS NOTES
[address] [address]  Phone: [phone]  Primary Provider: Aisha Barba  Pre-op Performing Provider: AISHA BARBA      PREOPERATIVE EVALUATION:  Today's date: 2/22/2022    Chanel Israel is a 56 year old female who presents for a preoperative evaluation.    Surgical Information:  Surgery/Procedure: Examination under anesthesia, knee arthroscopy, meniscus root repair vs. meniscectomy  Surgery Location: [facility]  Surgeon: Dr. Leonid Bailey  Surgery Date: 03/01/2022  Time of Surgery: 1:00 PM  Where patient plans to recover: At home with family  Fax number for surgical facility: Note does not need to be faxed, will be available electronically in Epic.    Type of Anesthesia Anticipated: Choice    Assessment & Plan     The proposed surgical procedure is considered INTERMEDIATE risk.    Preop general physical exam  Patient is cleared for procedure  Recommended to hold naproxen for at least 3 to 4 days before the procedure  Patient already held all her over-the-counter supplements  - CBC with platelets; Future  - CBC with platelets    Chronic pain of left knee  as above    - CBC with platelets; Future  - CBC with platelets    Malignant neoplasm of left female breast, unspecified estrogen receptor status, unspecified site of breast (H)  Per patient's request, physical therapy referral placed for [facility] for ongoing lymphedema therapy  - Physical Therapy Referral; Future    Lymphedema  as above    - Physical Therapy Referral; Future    Mild recurrent major depression (H)  Doing well on current dose of Wellbutrin and sertraline, monitor, follow for recheck in July 2022    Anxiety  as above      Hypothyroidism due to Hashimoto's thyroiditis  TSH   Date Value Ref Range Status   10/21/2021 0.85 0.40 - 4.00 mU/L Final   06/22/2021 0.88 0.40 - 4.00 mU/L Final   08/05/2020 0.64 0.40 - 4.00 mU/L Final   06/09/2020 3.35 0.40 - 4.00 mU/L  Final   02/11/2020 2.28 0.40 - 4.00 mU/L Final   03/12/2019 0.94 0.40 - 4.00 mU/L Final     Stable on current dose of Synthroid 75 MCG daily           Risks and Recommendations:  The patient has the following additional risks and recommendations for perioperative complications:   - No identified additional risk factors other than previously addressed    Medication Instructions:  Patient is to take all scheduled medications on the day of surgery EXCEPT for modifications listed below:   - naproxen (Aleve, Naprosyn): HOLD 4 days before surgery.     RECOMMENDATION:  APPROVAL GIVEN to proceed with proposed procedure, without further diagnostic evaluation.    Review of the result(s) of each unique test - CBC                  Subjective     HPI related to upcoming procedure: Patient with past medical history significant for chronic left knee pain, mild depression, anxiety, hypothyroidism, chronic lymphedema, status post to surgery for malignant neoplasm of left breast is here for preop clearance for upcoming Examination under anesthesia, knee arthroscopy, meniscus root repair vs. Meniscectomy  She is also requesting her physical therapy referral for rhythm weakness for ongoing lymphedema    Preop Questions 2/19/2022   1. Have you ever had a heart attack or stroke? No   2. Have you ever had surgery on your heart or blood vessels, such as a stent placement, a coronary artery bypass, or surgery on an artery in your head, neck, heart, or legs? No   3. Do you have chest pain with activity? No   4. Do you have a history of  heart failure? No   5. Do you currently have a cold, bronchitis or symptoms of other infection? No   6. Do you have a cough, shortness of breath, or wheezing? No   7. Do you or anyone in your family have previous history of blood clots? No   8. Do you or does anyone in your family have a serious bleeding problem such as prolonged bleeding following surgeries or cuts? No   9. Have you ever had problems with  anemia or been told to take iron pills? No   10. Have you had any abnormal blood loss such as black, tarry or bloody stools, or abnormal vaginal bleeding? No   11. Have you ever had a blood transfusion? No   12. Are you willing to have a blood transfusion if it is medically needed before, during, or after your surgery? Yes   13. Have you or any of your relatives ever had problems with anesthesia? YES - patient has severe N/V after    14. Do you have sleep apnea, excessive snoring or daytime drowsiness? No   15. Do you have any artifical heart valves or other implanted medical devices like a pacemaker, defibrillator, or continuous glucose monitor? No   16. Do you have artificial joints? No   17. Are you allergic to latex? No   18. Is there any chance that you may be pregnant? No       Health Care Directive:  Patient has a Health Care Directive on file      Preoperative Review of :   reviewed - no record of controlled substances prescribed.      Status of Chronic Conditions:  DEPRESSION - Patient has a long history of Depression of moderate severity requiring medication for control with recent symptoms being stable..Current symptoms of depression include see PHQ-9.     HYPOTHYROIDISM - Patient has a longstanding history of chronic Hypothyroidism. Patient has been doing well, noting no tremor, insomnia, hair loss or changes in skin texture. Continues to take medications as directed, without adverse reactions or side effects. Last TSH   Lab Results   Component Value Date    TSH 0.85 10/21/2021   .        Review of Systems  CONSTITUTIONAL: NEGATIVE for fever, chills, change in weight  INTEGUMENTARY/SKIN: NEGATIVE for worrisome rashes, moles or lesions  EYES: NEGATIVE for vision changes or irritation  ENT/MOUTH: NEGATIVE for ear, mouth and throat problems  RESP: NEGATIVE for significant cough or SOB  CV: NEGATIVE for chest pain, palpitations or peripheral edema  GI: NEGATIVE for nausea, abdominal pain, heartburn, or  change in bowel habits  : NEGATIVE for frequency, dysuria, or hematuria  MUSCULOSKELETAL:Chronic left knee pain  NEURO: NEGATIVE for weakness, dizziness or paresthesias  ENDOCRINE: NEGATIVE for temperature intolerance, skin/hair changes  ENDOCRINE: Hx thyroid disease  HEME: NEGATIVE for bleeding problems  PSYCHIATRIC: NEGATIVE for changes in mood or affect  PSYCHIATRIC: HX anxiety and HX depression    Patient Active Problem List    Diagnosis Date Noted     Family history of rheumatoid arthritis 01/26/2022     Priority: Medium     Chronic pain of left knee 01/18/2022     Priority: Medium     Malignant neoplasm of left female breast, unspecified estrogen receptor status, unspecified site of breast (H) 01/17/2022     Priority: Medium     Chronic fatigue 01/17/2022     Priority: Medium     Loss of hair 08/11/2020     Priority: Medium     Anxiety 02/11/2020     Priority: Medium     Hypothyroidism due to Hashimoto's thyroiditis 10/22/2019     Priority: Medium     Bruxism (teeth grinding) 10/22/2019     Priority: Medium     Family history of skin cancer 10/22/2018     Priority: Medium     Actinic keratoses 10/22/2018     Priority: Medium     Hyperlipidemia LDL goal <160 10/22/2018     Priority: Medium     Family history of breast cancer in sister 06/29/2018     Priority: Medium     Seasonal allergic rhinitis due to other allergic trigger, unspecified chronicity 10/31/2017     Priority: Medium     Age-related nuclear cataract of both eyes 09/01/2017     Priority: Medium     Glaucoma suspect, bilateral 09/14/2016     Priority: Medium     Family history of glaucoma 09/14/2016     Priority: Medium     Seasonal allergic rhinitis 05/06/2016     Priority: Medium     Family history of ischemic heart disease 03/25/2016     Priority: Medium     Lymphedema 03/01/2016     Priority: Medium     ACP (advance care planning) 08/28/2015     Priority: Medium     Advance Care Planning 8/29/2016: Receipt of ACP document:  Received: invalid  HCD document dated 6-17-16.  Document not previously scanned. Validation form completed indicating invalid document. Copy sent to client with information and facilitation resources. Validation form sent to be scanned as notation of invalid document received.  Confirmed/documented designated decision maker(s).  Added by Claudette Kay RN Advance Care Planning Liaison with Karolina Chairez  Advance Care Planning 8/28/2015: Receipt of ACP document:  Received: Health Care Directive which was witnessed or notarized on 3-5-13.  Document previously scanned on 8-25-15.  Validation form completed and sent to be scanned.  Code Status needs to be updated to reflect choices in most recent ACP document.  Confirmed/documented designated decision maker(s).  Added by Claudette Kay RN Advance Care Planning Liaison with Karolina Chairez           Breast implant asymmetry 08/25/2015     Priority: Medium     Mixed hyperlipidemia 08/24/2015     Priority: Medium     Chronic rhinitis 08/24/2015     Priority: Medium     Seasonal allergies 07/09/2014     Priority: Medium     Plantar fasciitis, bilateral 06/20/2014     Priority: Medium     Family history of diabetes mellitus (DM) 01/20/2014     Priority: Medium     Family history of thyroid disease 01/20/2014     Priority: Medium     Personal history of breast cancer 07/08/2013     Priority: Medium     S/P bilateral mastectomy 07/08/2013     Priority: Medium     Urge incontinence of urine 07/08/2013     Priority: Medium     Overweight (BMI 25.0-29.9) 07/08/2013     Priority: Medium     Mild recurrent major depression (H) 07/08/2013     Priority: Medium      Past Medical History:   Diagnosis Date     Asthma, mild intermittent      Breast cancer (H)      Depression      Depressive disorder 8/2000    postpartum     Endometriosis      Heart murmur     Patient reported.     Loss of hair 8/11/2020     PONV (postoperative nausea and vomiting)      Thyroid disease 8/2015    Hashimotos     Past  Surgical History:   Procedure Laterality Date     ADENOIDECTOMY       BIOPSY  4/2007    left breast     COLONOSCOPY N/A 8/22/2014    Procedure: COLONOSCOPY;  Surgeon: Duane, William Charles, MD;  Location: [facility] OR     COSMETIC SURGERY       CYSTOSCOPY       GENITOURINARY SURGERY  [id number]    bladder: botox, durosphere injections     GYN SURGERY  10/1996    biopsy endometriois and cervical scraping     HC REMOVAL OF ANAL FISSURE  2007     MASTECTOMY, SIMPLE RT/LT/MABLE       RECONSTRUCT BREAST BILATERAL  2008     TONSILLECTOMY       Current Outpatient Medications   Medication Sig Dispense Refill     BIOTIN 5000 PO        buPROPion (WELLBUTRIN XL) 150 MG 24 hr tablet Take 1 tablet (150 mg) by mouth every morning Profile Rx 90 tablet 1     Calcium Carbonate-Vitamin D (CALCIUM + D PO) Take by mouth 2 times daily       Coenzyme Q10 (CO Q 10) 100 MG CAPS Take 1 capsule by mouth daily        diclofenac (VOLTAREN) 1 % topical gel Apply 4 g topically 4 times daily as needed for moderate pain 100 g 2     fluocinolone (SYNALAR) 0.025 % cream Apply topically 2 times daily Apply to affected areas of mouth as needed. 60 g 5     fluticasone (FLONASE) 50 MCG/ACT nasal spray as needed   0     Glucosamine-Chondroitin-MSM (TRIPLE FLEX PO) Take by mouth 2 times daily       hydrocortisone 2.5 % cream Apply topically as needed       ketoconazole (NIZORAL) 2 % external cream Apply topically daily 100 g 1     L-GLUTAMINE 1 capsule 2 times daily        MAGNESIUM PO Take 1 capsule by mouth daily        melatonin 5 MG tablet Take 5 mg by mouth nightly as needed for sleep       Multiple Vitamin (MULTI-VITAMIN PO) Take by mouth daily        naproxen (NAPROSYN) 500 MG tablet Take 1 tablet (500 mg) by mouth 2 times daily as needed for moderate pain 30 tablet 0     NIACIN CR PO Take 500 mg by mouth       order for DME 1: Gradient Compression Wraps; 2: LUE compression sleeve with handpiece or guantlet; 20-30 mm Hg; 3: LUE velcro compression sleeve; 4:  Compression camisole/bra/tank top 1 each 0     order for DME Compression sleeve to bilateral UE. 2 Units 6     Probiotic Product (PROBIOTIC DAILY PO) Take 1 capsule by mouth 2 times daily       sertraline (ZOLOFT) 50 MG tablet TAKE 1& 1/2 TABLETS BY MOUTH EVERY EVENING, profile Rx 135 tablet 1     SYNTHROID 75 MCG tablet TAKE 1 TABLET(75 MCG) BY MOUTH DAILY 90 tablet 3     UNABLE TO FIND Take 1 tablet by mouth daily MEDICATION NAME: Sambrocal Elderberry Supplement       zinc 50 MG TABS Take 54 mg by mouth daily         Allergies   Allergen Reactions     Erythromycin Nausea        Social History     Tobacco Use     Smoking status: Never Smoker     Smokeless tobacco: Never Used   Substance Use Topics     Alcohol use: Not Currently     Comment: 1-2 drinks per week     Family History   Problem Relation Age of Onset     Diabetes Mother         type 2     Thyroid Disease Mother      Glaucoma Mother      Macular Degeneration Mother      Hypertension Mother      Hyperlipidemia Mother      Obesity Mother      Diabetes Sister      Thyroid Disease Sister      Breast Cancer Sister         Estrogen+progesterone +     Depression Sister      Endometrial Cancer Sister 51     Breast Cancer Sister      Cancer Father         skin cancer     Glaucoma Father      Hypertension Father      Hyperlipidemia Father      Other Cancer Father         skin     Obesity Father      Coronary Artery Disease Brother          at 43     Cerebrovascular Disease Maternal Grandfather      Diabetes Sister         type 2     Depression Sister         clinical depression     Mental Illness Sister         Bipolar Disorder     Obesity Sister         fatty liver, hepatitis     Hyperlipidemia Sister      Prostate Cancer Paternal Grandfather      Other Cancer Sister         endometrial     Other Cancer Brother         skin     Obesity Brother      Other Cancer Paternal Grandmother         Lung     Osteoporosis Paternal Grandmother      Other Cancer Other          Bladder     Depression Sister         Personality disorder     Anxiety Disorder Sister      Thyroid Disease Sister         Hashimotos     Osteoporosis Sister      Anxiety Disorder Sister         unmedicated     Mental Illness Nephew         Bipolar Disorder     Substance Abuse Nephew         recreational drugs     Mental Illness Brother         unspecified     Substance Abuse Brother         recreational drugs     Substance Abuse Nephew         recreational drugs     Substance Abuse Nephew         recreational drug, alcohol     Asthma Sister         childhood asthma     History   Drug Use No         Objective     /84 (BP Location: Right arm, Patient Position: Sitting, Cuff Size: Adult Regular)   Pulse 110   Temp 98.1  F (36.7  C) (Temporal)   Resp 19   Wt 63 kg (139 lb)   SpO2 98%   BMI 25.42 kg/m      Physical Exam    GENERAL APPEARANCE: healthy, alert and no distress     EYES: EOMI, PERRL     HENT: ear canals and TM's normal and nose and mouth without ulcers or lesions     NECK: no adenopathy, no asymmetry, masses, or scars and thyroid normal to palpation     RESP: lungs clear to auscultation - no rales, rhonchi or wheezes     CV: regular rates and rhythm, normal S1 S2, no S3 or S4 and no murmur, click or rub     ABDOMEN:  soft, nontender, no HSM or masses and bowel sounds normal     MS: extremities normal- no gross deformities noted, no evidence of inflammation in joints, FROM in all extremities.     SKIN: no suspicious lesions or rashes     NEURO: Normal strength and tone, sensory exam grossly normal, mentation intact and speech normal     PSYCH: mentation appears normal. and affect normal/bright     LYMPHATICS: No cervical adenopathy    Recent Labs   Lab Test 06/22/21  0855 08/11/20  1052   HGB 14.1 14.1    222    142   POTASSIUM 4.0 3.9   CR 0.80 0.80        Diagnostics:  Results for orders placed or performed in visit on 02/22/22   CBC with platelets     Status: Normal   Result  Value Ref Range    WBC Count 5.6 4.0 - 11.0 10e3/uL    RBC Count 4.44 3.80 - 5.20 10e6/uL    Hemoglobin 13.4 11.7 - 15.7 g/dL    Hematocrit 42.0 35.0 - 47.0 %    MCV 95 78 - 100 fL    MCH 30.2 26.5 - 33.0 pg    MCHC 31.9 31.5 - 36.5 g/dL    RDW 13.2 10.0 - 15.0 %    Platelet Count 219 150 - 450 10e3/uL        No EKG required, no history of coronary heart disease, significant arrhythmia, peripheral arterial disease or other structural heart disease.    Revised Cardiac Risk Index (RCRI):  The patient has the following serious cardiovascular risks for perioperative complications:   - No serious cardiac risks = 0 points     RCRI Interpretation: 0 points: Class I (very low risk - 0.4% complication rate)           Signed Electronically by: Gabbie Hicks MD  Copy of this evaluation report is provided to requesting physician.    Chart documentation done in part with Dragon Voice recognition Software. Although reviewed after completion, some word and grammatical error may remain.

## 2022-02-22 NOTE — TELEPHONE ENCOUNTER
Please look at the OTHER ORDER section in epic and see the physical therapy referral that I placed today

## 2022-02-23 ENCOUNTER — PATIENT OUTREACH (OUTPATIENT)
Dept: CARE COORDINATION | Facility: CLINIC | Age: 57
End: 2022-02-23
Payer: COMMERCIAL

## 2022-02-23 NOTE — Clinical Note
Nereida Hicks, FYI: Ana's request for a referral for out of network care was declined, as it was determined her request for services can be provided within her care network, [facility].  If she is in agreement and desires to transition to one of our lymph providers, I will be back in touch with you for an updated referral.   Mariajose Beckman RN Clinical Product Navigator  Ambulatory Care Coordination [phone]

## 2022-02-23 NOTE — PROGRESS NOTES
Clinic Care Coordination Contact  Care Team Conversations    Situation: RN Clinical Product Navigator requested to assist patient in establishing in network care for lymphedema.  Background: patient participates in a managed care health plan, in which her care network is affiliated with her primary care system; in this instance, [facility].   Patient recently requested referral for PT-lymphedema therapy to out of network provider.   Assessment: medical review team has declined out of network referral, as services can be provided within the care network, close to patient's home.   This writer contacted central rehabilitation scheduling [phone], and confirmed there are lymphedema services at [facility]. In order to get patient scheduled, PCP would need to enter new referral and then rehabilitation services  would call patient within 1-2 business days to schedule.   Plan/Recommendation: will outreach to patient to update her of recent determination, and offer to help establish care in the network.     Mariajose Beckman RN  Clinical Product Navigator   Ambulatory Care Coordination  [phone]    CC: PCP

## 2022-02-25 ENCOUNTER — LAB (OUTPATIENT)
Dept: LAB | Facility: CLINIC | Age: 57
End: 2022-02-25
Attending: ORTHOPAEDIC SURGERY
Payer: COMMERCIAL

## 2022-02-25 DIAGNOSIS — Z11.59 ENCOUNTER FOR SCREENING FOR OTHER VIRAL DISEASES: ICD-10-CM

## 2022-02-25 PROCEDURE — U0005 INFEC AGEN DETEC AMPLI PROBE: HCPCS

## 2022-02-25 PROCEDURE — U0003 INFECTIOUS AGENT DETECTION BY NUCLEIC ACID (DNA OR RNA); SEVERE ACUTE RESPIRATORY SYNDROME CORONAVIRUS 2 (SARS-COV-2) (CORONAVIRUS DISEASE [COVID-19]), AMPLIFIED PROBE TECHNIQUE, MAKING USE OF HIGH THROUGHPUT TECHNOLOGIES AS DESCRIBED BY CMS-2020-01-R: HCPCS

## 2022-02-26 LAB — SARS-COV-2 RNA RESP QL NAA+PROBE: NEGATIVE

## 2022-02-28 ENCOUNTER — ANESTHESIA EVENT (OUTPATIENT)
Dept: SURGERY | Facility: AMBULATORY SURGERY CENTER | Age: 57
End: 2022-02-28
Payer: COMMERCIAL

## 2022-03-01 ENCOUNTER — HOSPITAL ENCOUNTER (OUTPATIENT)
Facility: AMBULATORY SURGERY CENTER | Age: 57
End: 2022-03-01
Attending: ORTHOPAEDIC SURGERY
Payer: COMMERCIAL

## 2022-03-01 ENCOUNTER — ANESTHESIA (OUTPATIENT)
Dept: SURGERY | Facility: AMBULATORY SURGERY CENTER | Age: 57
End: 2022-03-01
Payer: COMMERCIAL

## 2022-03-01 VITALS
BODY MASS INDEX: 25.58 KG/M2 | SYSTOLIC BLOOD PRESSURE: 121 MMHG | WEIGHT: 139 LBS | OXYGEN SATURATION: 98 % | HEART RATE: 89 BPM | DIASTOLIC BLOOD PRESSURE: 71 MMHG | TEMPERATURE: 98 F | HEIGHT: 62 IN | RESPIRATION RATE: 14 BRPM

## 2022-03-01 DIAGNOSIS — G89.29 CHRONIC PAIN OF LEFT KNEE: Primary | ICD-10-CM

## 2022-03-01 DIAGNOSIS — M25.562 CHRONIC PAIN OF LEFT KNEE: Primary | ICD-10-CM

## 2022-03-01 PROCEDURE — 29882 ARTHRS KNE SRG MNISC RPR M/L: CPT | Mod: LT,GC

## 2022-03-01 PROCEDURE — 29882 ARTHRS KNE SRG MNISC RPR M/L: CPT | Mod: LT | Performed by: ORTHOPAEDIC SURGERY

## 2022-03-01 DEVICE — IMP ANCHOR ARTHREX BIO-SWIVELOCK 5.5MM AR-2323BCC: Type: IMPLANTABLE DEVICE | Site: KNEE | Status: FUNCTIONAL

## 2022-03-01 RX ORDER — ONDANSETRON 2 MG/ML
INJECTION INTRAMUSCULAR; INTRAVENOUS PRN
Status: DISCONTINUED | OUTPATIENT
Start: 2022-03-01 | End: 2022-03-01

## 2022-03-01 RX ORDER — BUPIVACAINE HYDROCHLORIDE AND EPINEPHRINE 2.5; 5 MG/ML; UG/ML
INJECTION, SOLUTION INFILTRATION; PERINEURAL PRN
Status: DISCONTINUED | OUTPATIENT
Start: 2022-03-01 | End: 2022-03-01 | Stop reason: HOSPADM

## 2022-03-01 RX ORDER — HYDROMORPHONE HYDROCHLORIDE 1 MG/ML
0.2 INJECTION, SOLUTION INTRAMUSCULAR; INTRAVENOUS; SUBCUTANEOUS EVERY 5 MIN PRN
Status: DISCONTINUED | OUTPATIENT
Start: 2022-03-01 | End: 2022-03-01 | Stop reason: HOSPADM

## 2022-03-01 RX ORDER — MEPERIDINE HYDROCHLORIDE 25 MG/ML
12.5 INJECTION INTRAMUSCULAR; INTRAVENOUS; SUBCUTANEOUS
Status: DISCONTINUED | OUTPATIENT
Start: 2022-03-01 | End: 2022-03-02 | Stop reason: HOSPADM

## 2022-03-01 RX ORDER — ACETAMINOPHEN 325 MG/1
650 TABLET ORAL
Status: DISCONTINUED | OUTPATIENT
Start: 2022-03-01 | End: 2022-03-02 | Stop reason: HOSPADM

## 2022-03-01 RX ORDER — ACETAMINOPHEN 325 MG/1
650 TABLET ORAL EVERY 4 HOURS PRN
Qty: 50 TABLET | Refills: 1 | Status: SHIPPED | OUTPATIENT
Start: 2022-03-01 | End: 2022-07-19

## 2022-03-01 RX ORDER — LIDOCAINE 40 MG/G
CREAM TOPICAL
Status: DISCONTINUED | OUTPATIENT
Start: 2022-03-01 | End: 2022-03-01 | Stop reason: HOSPADM

## 2022-03-01 RX ORDER — GLYCOPYRROLATE 0.2 MG/ML
INJECTION, SOLUTION INTRAMUSCULAR; INTRAVENOUS PRN
Status: DISCONTINUED | OUTPATIENT
Start: 2022-03-01 | End: 2022-03-01

## 2022-03-01 RX ORDER — NALOXONE HYDROCHLORIDE 0.4 MG/ML
0.4 INJECTION, SOLUTION INTRAMUSCULAR; INTRAVENOUS; SUBCUTANEOUS
Status: DISCONTINUED | OUTPATIENT
Start: 2022-03-01 | End: 2022-03-02 | Stop reason: HOSPADM

## 2022-03-01 RX ORDER — FENTANYL CITRATE 50 UG/ML
25 INJECTION, SOLUTION INTRAMUSCULAR; INTRAVENOUS
Status: DISCONTINUED | OUTPATIENT
Start: 2022-03-01 | End: 2022-03-02 | Stop reason: HOSPADM

## 2022-03-01 RX ORDER — OXYCODONE HYDROCHLORIDE 5 MG/1
5 TABLET ORAL EVERY 4 HOURS PRN
Status: DISCONTINUED | OUTPATIENT
Start: 2022-03-01 | End: 2022-03-02 | Stop reason: HOSPADM

## 2022-03-01 RX ORDER — ACETAMINOPHEN 325 MG/1
975 TABLET ORAL ONCE
Status: COMPLETED | OUTPATIENT
Start: 2022-03-01 | End: 2022-03-01

## 2022-03-01 RX ORDER — ONDANSETRON 2 MG/ML
4 INJECTION INTRAMUSCULAR; INTRAVENOUS EVERY 30 MIN PRN
Status: DISCONTINUED | OUTPATIENT
Start: 2022-03-01 | End: 2022-03-02 | Stop reason: HOSPADM

## 2022-03-01 RX ORDER — ONDANSETRON 4 MG/1
4 TABLET, ORALLY DISINTEGRATING ORAL EVERY 30 MIN PRN
Status: DISCONTINUED | OUTPATIENT
Start: 2022-03-01 | End: 2022-03-02 | Stop reason: HOSPADM

## 2022-03-01 RX ORDER — CEFAZOLIN SODIUM 2 G/50ML
2 SOLUTION INTRAVENOUS
Status: COMPLETED | OUTPATIENT
Start: 2022-03-01 | End: 2022-03-01

## 2022-03-01 RX ORDER — LIDOCAINE HYDROCHLORIDE 20 MG/ML
INJECTION, SOLUTION INFILTRATION; PERINEURAL PRN
Status: DISCONTINUED | OUTPATIENT
Start: 2022-03-01 | End: 2022-03-01

## 2022-03-01 RX ORDER — CEFAZOLIN SODIUM 2 G/50ML
2 SOLUTION INTRAVENOUS SEE ADMIN INSTRUCTIONS
Status: DISCONTINUED | OUTPATIENT
Start: 2022-03-01 | End: 2022-03-01 | Stop reason: HOSPADM

## 2022-03-01 RX ORDER — SODIUM CHLORIDE, SODIUM LACTATE, POTASSIUM CHLORIDE, CALCIUM CHLORIDE 600; 310; 30; 20 MG/100ML; MG/100ML; MG/100ML; MG/100ML
INJECTION, SOLUTION INTRAVENOUS CONTINUOUS
Status: DISCONTINUED | OUTPATIENT
Start: 2022-03-01 | End: 2022-03-01 | Stop reason: HOSPADM

## 2022-03-01 RX ORDER — AMOXICILLIN 250 MG
1-2 CAPSULE ORAL 2 TIMES DAILY
Qty: 30 TABLET | Refills: 0 | Status: SHIPPED | OUTPATIENT
Start: 2022-03-01 | End: 2022-07-19

## 2022-03-01 RX ORDER — DEXAMETHASONE SODIUM PHOSPHATE 4 MG/ML
INJECTION, SOLUTION INTRA-ARTICULAR; INTRALESIONAL; INTRAMUSCULAR; INTRAVENOUS; SOFT TISSUE PRN
Status: DISCONTINUED | OUTPATIENT
Start: 2022-03-01 | End: 2022-03-01

## 2022-03-01 RX ORDER — SODIUM CHLORIDE, SODIUM LACTATE, POTASSIUM CHLORIDE, CALCIUM CHLORIDE 600; 310; 30; 20 MG/100ML; MG/100ML; MG/100ML; MG/100ML
INJECTION, SOLUTION INTRAVENOUS CONTINUOUS
Status: DISCONTINUED | OUTPATIENT
Start: 2022-03-01 | End: 2022-03-02 | Stop reason: HOSPADM

## 2022-03-01 RX ORDER — FENTANYL CITRATE 50 UG/ML
25-50 INJECTION, SOLUTION INTRAMUSCULAR; INTRAVENOUS
Status: DISCONTINUED | OUTPATIENT
Start: 2022-03-01 | End: 2022-03-01 | Stop reason: HOSPADM

## 2022-03-01 RX ORDER — FLUMAZENIL 0.1 MG/ML
0.2 INJECTION, SOLUTION INTRAVENOUS
Status: DISCONTINUED | OUTPATIENT
Start: 2022-03-01 | End: 2022-03-01 | Stop reason: HOSPADM

## 2022-03-01 RX ORDER — OXYCODONE HYDROCHLORIDE 5 MG/1
5 TABLET ORAL
Status: DISCONTINUED | OUTPATIENT
Start: 2022-03-01 | End: 2022-03-02 | Stop reason: HOSPADM

## 2022-03-01 RX ORDER — SCOLOPAMINE TRANSDERMAL SYSTEM 1 MG/1
1 PATCH, EXTENDED RELEASE TRANSDERMAL
Status: DISCONTINUED | OUTPATIENT
Start: 2022-03-01 | End: 2022-03-02 | Stop reason: HOSPADM

## 2022-03-01 RX ORDER — NALOXONE HYDROCHLORIDE 0.4 MG/ML
0.2 INJECTION, SOLUTION INTRAMUSCULAR; INTRAVENOUS; SUBCUTANEOUS
Status: DISCONTINUED | OUTPATIENT
Start: 2022-03-01 | End: 2022-03-02 | Stop reason: HOSPADM

## 2022-03-01 RX ORDER — ASPIRIN 81 MG/1
162 TABLET, CHEWABLE ORAL DAILY
Qty: 60 TABLET | Refills: 0 | Status: SHIPPED | OUTPATIENT
Start: 2022-03-02 | End: 2022-07-19

## 2022-03-01 RX ORDER — KETAMINE HYDROCHLORIDE 10 MG/ML
INJECTION, SOLUTION INTRAMUSCULAR; INTRAVENOUS PRN
Status: DISCONTINUED | OUTPATIENT
Start: 2022-03-01 | End: 2022-03-01

## 2022-03-01 RX ORDER — FENTANYL CITRATE 50 UG/ML
25 INJECTION, SOLUTION INTRAMUSCULAR; INTRAVENOUS EVERY 5 MIN PRN
Status: DISCONTINUED | OUTPATIENT
Start: 2022-03-01 | End: 2022-03-01 | Stop reason: HOSPADM

## 2022-03-01 RX ORDER — KETOROLAC TROMETHAMINE 30 MG/ML
INJECTION, SOLUTION INTRAMUSCULAR; INTRAVENOUS PRN
Status: DISCONTINUED | OUTPATIENT
Start: 2022-03-01 | End: 2022-03-01

## 2022-03-01 RX ORDER — FENTANYL CITRATE 50 UG/ML
INJECTION, SOLUTION INTRAMUSCULAR; INTRAVENOUS PRN
Status: DISCONTINUED | OUTPATIENT
Start: 2022-03-01 | End: 2022-03-01

## 2022-03-01 RX ORDER — OXYCODONE HYDROCHLORIDE 5 MG/1
5-10 TABLET ORAL EVERY 4 HOURS PRN
Qty: 16 TABLET | Refills: 0 | Status: SHIPPED | OUTPATIENT
Start: 2022-03-01 | End: 2022-07-19

## 2022-03-01 RX ORDER — LABETALOL HYDROCHLORIDE 5 MG/ML
10 INJECTION, SOLUTION INTRAVENOUS
Status: DISCONTINUED | OUTPATIENT
Start: 2022-03-01 | End: 2022-03-01 | Stop reason: HOSPADM

## 2022-03-01 RX ORDER — PROPOFOL 10 MG/ML
INJECTION, EMULSION INTRAVENOUS PRN
Status: DISCONTINUED | OUTPATIENT
Start: 2022-03-01 | End: 2022-03-01

## 2022-03-01 RX ORDER — PROPOFOL 10 MG/ML
INJECTION, EMULSION INTRAVENOUS CONTINUOUS PRN
Status: DISCONTINUED | OUTPATIENT
Start: 2022-03-01 | End: 2022-03-01

## 2022-03-01 RX ADMIN — KETOROLAC TROMETHAMINE 30 MG: 30 INJECTION, SOLUTION INTRAMUSCULAR; INTRAVENOUS at 13:48

## 2022-03-01 RX ADMIN — KETAMINE HYDROCHLORIDE 30 MG: 10 INJECTION, SOLUTION INTRAMUSCULAR; INTRAVENOUS at 13:27

## 2022-03-01 RX ADMIN — FENTANYL CITRATE 50 MCG: 50 INJECTION, SOLUTION INTRAMUSCULAR; INTRAVENOUS at 13:47

## 2022-03-01 RX ADMIN — PROPOFOL 50 MG: 10 INJECTION, EMULSION INTRAVENOUS at 13:13

## 2022-03-01 RX ADMIN — PROPOFOL 150 MG: 10 INJECTION, EMULSION INTRAVENOUS at 13:12

## 2022-03-01 RX ADMIN — SODIUM CHLORIDE, SODIUM LACTATE, POTASSIUM CHLORIDE, CALCIUM CHLORIDE: 600; 310; 30; 20 INJECTION, SOLUTION INTRAVENOUS at 13:51

## 2022-03-01 RX ADMIN — ONDANSETRON 4 MG: 2 INJECTION INTRAMUSCULAR; INTRAVENOUS at 13:10

## 2022-03-01 RX ADMIN — LIDOCAINE HYDROCHLORIDE 100 MG: 20 INJECTION, SOLUTION INFILTRATION; PERINEURAL at 13:12

## 2022-03-01 RX ADMIN — SODIUM CHLORIDE, SODIUM LACTATE, POTASSIUM CHLORIDE, CALCIUM CHLORIDE: 600; 310; 30; 20 INJECTION, SOLUTION INTRAVENOUS at 13:05

## 2022-03-01 RX ADMIN — ACETAMINOPHEN 975 MG: 325 TABLET ORAL at 11:58

## 2022-03-01 RX ADMIN — GLYCOPYRROLATE 0.2 MG: 0.2 INJECTION, SOLUTION INTRAMUSCULAR; INTRAVENOUS at 13:10

## 2022-03-01 RX ADMIN — Medication 0.5 MG: at 13:47

## 2022-03-01 RX ADMIN — FENTANYL CITRATE 50 MCG: 50 INJECTION, SOLUTION INTRAMUSCULAR; INTRAVENOUS at 13:12

## 2022-03-01 RX ADMIN — CEFAZOLIN SODIUM 2 G: 2 SOLUTION INTRAVENOUS at 13:05

## 2022-03-01 RX ADMIN — DEXAMETHASONE SODIUM PHOSPHATE 4 MG: 4 INJECTION, SOLUTION INTRA-ARTICULAR; INTRALESIONAL; INTRAMUSCULAR; INTRAVENOUS; SOFT TISSUE at 13:10

## 2022-03-01 RX ADMIN — PROPOFOL 200 MCG/KG/MIN: 10 INJECTION, EMULSION INTRAVENOUS at 13:13

## 2022-03-01 NOTE — ANESTHESIA CARE TRANSFER NOTE
Patient: Chanel Israel    Procedure: Procedure(s):  Examination under anesthesia, knee arthroscopy, meniscus root repair       Diagnosis: Left knee pain [M25.562]  Diagnosis Additional Information: No value filed.    Anesthesia Type:   General     Note:    Oropharynx: oropharynx clear of all foreign objects and spontaneously breathing  Level of Consciousness: drowsy  Oxygen Supplementation: nasal cannula  Level of Supplemental Oxygen (L/min / FiO2): 2  Independent Airway: airway patency satisfactory and stable  Dentition: dentition unchanged  Vital Signs Stable: post-procedure vital signs reviewed and stable  Report to RN Given: handoff report given  Patient transferred to: PACU    Handoff Report: Identifed the Patient, Identified the Reponsible Provider, Reviewed the pertinent medical history, Discussed the surgical course, Reviewed Intra-OP anesthesia mangement and issues during anesthesia, Set expectations for post-procedure period and Allowed opportunity for questions and acknowledgement of understanding      Vitals:  Vitals Value Taken Time   BP     Temp     Pulse     Resp     SpO2         Electronically Signed By: MAX Velasco CRNA  March 1, 2022  2:17 PM

## 2022-03-01 NOTE — OP NOTE
PREOPERATIVE DIAGNOSIS:   1. Left medial meniscus root tear    POSTOPERATIVE DIAGNOSIS:  1. Left medial meniscus tear and meniscus root tear    PROCEDURE:  1. Examination under anesthesia left knee  2. Left knee arthroscopy  3. Transosseous repair of left medial meniscus root    DATE OF SURGERY: 2/7/2020    SURGEON: Leonid Bailey MD    ASSISTANT: Sanket Goldberg PA-C. The Assistance of Ms. Goldberg was necessary for patient positioning, arthroscopic visualization, retraction, and transosseous repair of medial meniscus root.    RESIDENT OR FELLOW: Moshe Garcia    OPERATIVE INDICATIONS: Chanel Israel is a pleasant 56 year old female who I saw through my orthopedic clinic with a history, physical, imaging consistent with a medial meniscus root tears .  I reviewed with the patient the risks, benefits, complications, techniques and alternatives to surgery.  We reviewed the expected course of recovery and the potential expected outcomes.  I specifically discussed with her that there is not yet clear evidence that a successful surgery ultimately decreases the risk of knee replacement surgery.  I was very clear that it would not reverse the degenerative changes that had already occurred.  The patient understood both the risks and benefits and desired to proceed despite the risks.    OPERATIVE DETAILS: In the preoperative area the patient's informed consent was reviewed and they desired to proceed.  The left leg was marked and the patient was in agreement.  The patient was taken to the operating room where a timeout was performed and all parties were in agreement.  Preoperative antibiotics were given within 1 hour of the time of incision.  The patient was placed in the supine position and surrendered to LMA anesthesia.  No tourniquet was applied.  Egg crate was placed beneath the well leg and a side post was utilized.  The operative leg was prepped and draped in the usual sterile fashion.     Examination Under  Anesthesia: Range of motion was 0 to 125 degrees.  Stable to varus and valgus stress testing.  Stable anterior and posterior drawer testing.  No pivot shift.  Lachman 0, 1 quadrant medial lateral translation of the patella.    Anterior medial and anterolateral arthroscopic portals were created and the diagnostic arthroscopy was performed with the following findings: There were no loose bodies within the suprapatellar pouch, medial gutter, lateral gutter.  Lateral femoral condyle and lateral tibial plateau showed overall well-preserved cartilage with only some grade 1 softening of the lateral tibial plateau.  ACL and PCL were intact.  Central trochlea showed intact cartilage.  Patella showed intact cartilage.  Medial tibial plateau cartilage was showed a focal area of grade 4 change in the medial aspect.  The wider area of grade 3 change.  Medial femoral condyle was intact with the exception of a focal area of grade 4 change.  Clear tear of the medial meniscus root.  Lateral meniscus was intact.    At this time a cannula was placed on the medial portal and a meniscus scorpion was used to place 2, 0-fiber link sutures in a grasping fashion in the posterior horn of the medial meniscus.  At this time a multiuse guide was introduced and a 2.4 mm drill to pin was placed into the anatomic insertion of the medial meniscus posterior root.  We prepared the footprint with a curette prior to placing the pin.  The pin was advanced under arthroscopic localization and we overreamed with a 5 mm reamer.  A passing suture was placed through the tunnel and her sutures and the meniscus were routed through the tibia.  Maximum traction was applied to the sutures and excellent reduction of the meniscus to the tibia was noted.  No further instability probing.  Excellent fixation.    At this time on the anterior medial tibia 2.4 mm drill to pin was placed just distal to our aperture on the anteromedial tibial cortex we reamed with a 5 mm  reamer tapping was performed and a 5.5 swivel lock was placed.  Final arthroscopic images showed good position and excellent reduction of the meniscus to the tibial plateau    Copious irrigation was performed an a layered closure was initiated, sterile dressings were applied and the patient was transferred to the recovery room in stable condition with stable vital signs.    ESTIMATED BLOOD LOSS: 5 ml    TOURNIQUET TIME: No tourniquet was placed.    COMPLICATIONS: None apparent.    DRAINS: None.    SPECIMENS: None.     POSTOPERATIVE PLAN:  1. Toe-touch weightbearing x 4 weeks   2. Range of motion 0 to 90 degrees  3. Shower on day 3.  No submerging the wounds.   4. Physical therapy to start within 1 week  5. Follow-up with me in 1 week.  6. At 6 weeks begin weightbearing as tolerated  7. No running or impact sports for 4 to 6 months.  No kneeling or squatting for as long as possible

## 2022-03-01 NOTE — DISCHARGE INSTRUCTIONS
"[facility]  Home Care Following Anesthesia  For 24 hours after surgery:  1. Get plenty of rest.  A responsible adult must stay with you for at least 24 hours after you leave the surgery center.  2. Do not drive or use heavy equipment.  If you have weakness or tingling, don't drive or use heavy equipment until this feeling goes away.   3. Do not drink alcohol.   4. Avoid strenuous or risky activities.  Ask for help when climbing stairs.  5. You may feel lightheaded.  IF so, sit for a few minutes before standing.  Have someone help you get up.   6. If you have nausea (feel sick to your stomach): Drink only clear liquids such as apple juice, ginger ale, broth or 7-Up.  Rest may also help.  Be sure to drink enough fluids.  Move to a regular diet as you feel able.   7. You may have a slight fever.  Call the doctor if your fever is over 100 F (37.7 C) (taken under the tongue) or lasts longer than 24 hours.  8. You may have a dry mouth, a sore throat, muscle aches or trouble sleeping. These should go away after 24 hours.  9. Do not make important or legal decisions.   10. It is recommended to avoid smoking.        Today you received an Exparel block to numb the nerves near your surgery site.  This is a block using local anesthetic or \"numbing\" medication injected around the nerves to anesthetize or \"numb\" the area supplied by those nerves.  This block is injected into the muscle layer near your surgical site.  This medication may numb the location where you had surgery up to 72 hours.  If your surgical site is an arm or leg you should be careful with your affected limb, since it is possible to injure your limb without being aware of it due to the numbing.  Until full feeling returns, you should guard against bumping or hitting your limb, and avoid extreme hot or cold temperatures on the skin.  As the block wears off, the feeling will return as a tingling or prickly sensation near your " surgical site.  You will experince more discomfort from your incision as the feeling returns.  You may want to take a pain pill (a narcotic or Tylenol if this was prescribed by your surgeon) when you start to experience mild pain before the pain beomes more severe.  If your pain medications do not control your pain, you should notify your surgeon.    Tips for taking pain medications  To get the best pain relief possible, remember these points:    Take pain medications as directed, before pain becomes severe.    Pain medication can upset your stomach: taking it with food may help.    Constipation is a common side effect of pain medication. Drink plenty of  fluids.    Eat foods high in fiber. Take a stool softener if recommended by your doctor or pharmacist.    Do not drink alcohol, drive or operate machinery while taking pain medications.    Ask about other ways to control pain, such as with heat, ice or relaxation.    Tylenol/Acetaminophen Consumption  To help encourage the safe use of acetaminophen, the makers of TYLENOL  have lowered the maximum daily dose for single-ingredient Extra Strength TYLENOL  (acetaminophen) products sold in the [place] from 8 pills per day (4,000 mg) to 6 pills per day (3,000 mg). The dosing interval has also changed from 2 pills every 4-6 hours to 2 pills every 6 hours.    If you feel your pain relief is insufficient, you may take Tylenol/Acetaminophen in addition to your narcotic pain medication.     Be careful not to exceed 3,000 mg of Tylenol/Acetaminophen in a 24 hour period from all sources.    If you are taking extra strength Tylenol/acetaminophen (500 mg), the maximum dose is 6 tablets in 24 hours.    If you are taking regular strength acetaminophen (325 mg), the maximum dose is 9 tablets in 24 hours.    **975 mg Tylenol given at 12, can take again at 6 PM    Call a doctor for any of the followin. Signs of infection (fever, growing tenderness at the surgery site, a large amount  "of drainage or bleeding, severe pain, foul-smelling drainage, redness, swelling).  2. It has been over 8 to 10 hours since surgery and you are still not able to urinate (pass water).  3. Headache for over 24 hours.  4. Signs of Covid-19 infection (temperature over 100 degrees, shortness of breath, cough, loss of taste/smell, generalized body aches, persistent headache, chills, sore throat, nausea/vomiting/diarrhea)  Your doctor is:       Dr. Leonid Bailey, Orthopaedics: [phone]               Or dial [phone] and ask for the resident on call for:  Orthopaedics  For emergency care, call the:  [facility]: [phone] (TTY for hearing impaired: [phone])      Safety Tips for Using Crutches    Crutch Fit:    Assume good standing posture with shoulders relaxed and crutch tips 6-8 inches out from the side of the foot.    The underarm pad should fall 2-3 fingers width below the armpit.    The handgrip is positioned level with the wrist to allow 30  flexion at the elbow.    Safety Tips:    Bear weight on your hands, not on your armpits.    Do not add extra padding to the underarm pad. This will, in effect, lengthen the crutches and increase risk of nerve injury.    Wear flat, properly fitting shoes. Do not walk in stocking feet, high heels or slippers.    Household hazards:  --Throw rugs should be removed from floors.  --Stairs should be cleared of obstacles.  --Use extra caution on slippery, highly polished, littered or uneven floor surfaces.  --Check for electric cords.    Check crutch tips for excessive wear and keep wing nuts tight.    While walking, look forward with  head up  and  eyes open.  Take equal length steps.    Use BOTH crutches.    Stairs Sequence:    UP: \"Good\" leg first, followed by  bad  leg, then crutches.    DOWN: Crutches, followed by  bad  leg, \"good\" leg.     Walking with Crutches:    Move both crutches forward at the same time.    Non-Weight Bearing (NWB):  Hold " the involved leg up and swing through the crutches with the involved leg. The involved leg does not touch the floor.    Toe Touch Weight Bearing (TTWB): Move the involved leg forward. Rest it lightly on the floor for balance only. Step through the crutches with the uninvolved leg.    Partial Weight Bearing (PWB): Move the involved leg forward. Step down the weight of the leg only.  Step through the crutches with the uninvolved leg.    Weight Bearing As Tolerated (WBAT): Move the involved leg forward. Put as much pressure through the involved leg as you can tolerate comfortably. Then step through the crutches with the uninvolved leg.

## 2022-03-01 NOTE — ANESTHESIA PREPROCEDURE EVALUATION
Anesthesia Pre-Procedure Evaluation    Patient: Chanel Israel   MRN: [id number] : 1965        Procedure : Procedure(s):  Examination under anesthesia, knee arthroscopy, meniscus root repair vs. meniscectomy          Past Medical History:   Diagnosis Date     Asthma, mild intermittent      Breast cancer (H)      Depression      Depressive disorder 2000    postpartum     Endometriosis      Heart murmur     Patient reported.     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      Past Surgical History:   Procedure Laterality Date     ADENOIDECTOMY       BIOPSY  2007    left breast     COLONOSCOPY N/A 2014    Procedure: COLONOSCOPY;  Surgeon: Duane, William Charles, MD;  Location: [facility] OR     COSMETIC SURGERY       CYSTOSCOPY       GENITOURINARY SURGERY  [id number]    bladder: botox, durosphere injections     GYN SURGERY  10/1996    biopsy endometriois and cervical scraping     [facility] REMOVAL OF ANAL FISSURE       MASTECTOMY, SIMPLE RT/LT/MABLE       RECONSTRUCT BREAST BILATERAL       TONSILLECTOMY        Allergies   Allergen Reactions     Erythromycin Nausea      Social History     Tobacco Use     Smoking status: Never Smoker     Smokeless tobacco: Never Used   Substance Use Topics     Alcohol use: Not Currently     Comment: 1-2 drinks per week      Wt Readings from Last 1 Encounters:   22 63 kg (139 lb)        Anesthesia Evaluation   Pt has had prior anesthetic.     History of anesthetic complications  - PONV.      ROS/MED HX  ENT/Pulmonary:     (+) asthma     Neurologic:  - neg neurologic ROS     Cardiovascular:  - neg cardiovascular ROS     METS/Exercise Tolerance: >4 METS    Hematologic:  - neg hematologic  ROS     Musculoskeletal: Comment: L meniscus        GI/Hepatic:  - neg GI/hepatic ROS     Renal/Genitourinary:  - neg Renal ROS     Endo:     (+) thyroid problem (Hashimotos), hypothyroidism,     Psychiatric/Substance  Use:     (+) psychiatric history anxiety     Infectious Disease:  - neg infectious disease ROS     Malignancy: Comment: H/o mastectomy  (+) Malignancy,     Other:  - neg other ROS          Physical Exam    Airway        Mallampati: I   TM distance: > 3 FB   Neck ROM: full   Mouth opening: > 3 cm    Respiratory Devices and Support         Dental  no notable dental history     (+) lower retainer      Cardiovascular       Comment: Could not appreciate murmur on exam     Rhythm and rate: regular and normal     Pulmonary           breath sounds clear to auscultation           OUTSIDE LABS:  CBC:   Lab Results   Component Value Date    WBC 5.6 02/22/2022    WBC 5.3 06/22/2021    HGB 13.4 02/22/2022    HGB 14.1 06/22/2021    HCT 42.0 02/22/2022    HCT 42.2 06/22/2021     02/22/2022     06/22/2021     BMP:   Lab Results   Component Value Date     06/22/2021     08/11/2020    POTASSIUM 4.0 06/22/2021    POTASSIUM 3.9 08/11/2020    CHLORIDE 108 06/22/2021    CHLORIDE 107 08/11/2020    CO2 29 06/22/2021    CO2 29 08/11/2020    BUN 17 06/22/2021    BUN 17 08/11/2020    CR 0.80 06/22/2021    CR 0.80 08/11/2020    GLC 95 06/22/2021    GLC 86 08/11/2020     COAGS: No results found for: PTT, INR, FIBR  POC: No results found for: BGM, HCG, HCGS  HEPATIC:   Lab Results   Component Value Date    ALBUMIN 3.6 06/22/2021    PROTTOTAL 7.2 06/22/2021    ALT 36 06/22/2021    AST 20 06/22/2021    ALKPHOS 78 06/22/2021    BILITOTAL 0.4 06/22/2021     OTHER:   Lab Results   Component Value Date    A1C 5.2 03/12/2019    CHELA 8.7 06/22/2021    TSH 0.85 10/21/2021    T4 1.18 10/21/2021    T3 88 10/21/2021    CRP <2.9 08/11/2020    SED 10 08/11/2020       Anesthesia Plan    ASA Status:  2      Anesthesia Type: General.     - Airway: LMA   Induction: Intravenous.   Maintenance: TIVA.        Consents    Anesthesia Plan(s) and associated risks, benefits, and realistic alternatives discussed. Questions answered and  patient/representative(s) expressed understanding.    - Discussed:     - Discussed with:  Patient      - Extended Intubation/Ventilatory Support Discussed: No.      - Patient is DNR/DNI Status: No    Use of blood products discussed: No .     Postoperative Care    Pain management: IV analgesics.   PONV prophylaxis: Background Propofol Infusion, Dexamethasone or Solumedrol, Ondansetron (or other 5HT-3), Scopolamine patch     Comments:    Other Comments: Consented for rescue adductor canal block            Lisandro Manning

## 2022-03-01 NOTE — ANESTHESIA POSTPROCEDURE EVALUATION
Patient: Chanel Israel    Procedure: Procedure(s):  Examination under anesthesia, knee arthroscopy, meniscus root repair       Anesthesia Type:  General    Note:  Disposition: Outpatient   Postop Pain Control: Uneventful            Sign Out: Well controlled pain   PONV: No   Neuro/Psych: Uneventful            Sign Out: Acceptable/Baseline neuro status   Airway/Respiratory: Uneventful            Sign Out: Acceptable/Baseline resp. status   CV/Hemodynamics: Uneventful            Sign Out: Acceptable CV status; No obvious hypovolemia; No obvious fluid overload   Other NRE: NONE   DID A NON-ROUTINE EVENT OCCUR? No           Last vitals:  Vitals Value Taken Time   /75 03/01/22 1440   Temp 36.7  C (98  F) 03/01/22 1440   Pulse 99 03/01/22 1440   Resp 10 03/01/22 1440   SpO2 99 % 03/01/22 1440   Vitals shown include unvalidated device data.    Electronically Signed By: Tobias Schaefer MD  March 1, 2022  3:45 PM

## 2022-03-07 ENCOUNTER — THERAPY VISIT (OUTPATIENT)
Dept: PHYSICAL THERAPY | Facility: CLINIC | Age: 57
End: 2022-03-07
Attending: ORTHOPAEDIC SURGERY
Payer: COMMERCIAL

## 2022-03-07 DIAGNOSIS — G89.29 CHRONIC PAIN OF LEFT KNEE: ICD-10-CM

## 2022-03-07 DIAGNOSIS — Z98.890 S/P MEDIAL MENISCUS REPAIR OF RIGHT KNEE: Primary | ICD-10-CM

## 2022-03-07 DIAGNOSIS — M25.562 CHRONIC PAIN OF LEFT KNEE: ICD-10-CM

## 2022-03-07 PROCEDURE — 97140 MANUAL THERAPY 1/> REGIONS: CPT | Mod: GP | Performed by: PHYSICAL THERAPIST

## 2022-03-07 PROCEDURE — 97110 THERAPEUTIC EXERCISES: CPT | Mod: GP | Performed by: PHYSICAL THERAPIST

## 2022-03-07 PROCEDURE — 97161 PT EVAL LOW COMPLEX 20 MIN: CPT | Mod: GP | Performed by: PHYSICAL THERAPIST

## 2022-03-07 ASSESSMENT — ACTIVITIES OF DAILY LIVING (ADL)
SQUAT: I AM UNABLE TO DO THE ACTIVITY
LIMPING: THE SYMPTOM AFFECTS MY ACTIVITY MODERATELY
KNEE_ACTIVITY_OF_DAILY_LIVING_SCORE: 35.71
STIFFNESS: THE SYMPTOM AFFECTS MY ACTIVITY MODERATELY
KNEEL ON THE FRONT OF YOUR KNEE: I AM UNABLE TO DO THE ACTIVITY
KNEE_ACTIVITY_OF_DAILY_LIVING_SUM: 25
AS_A_RESULT_OF_YOUR_KNEE_INJURY,_HOW_WOULD_YOU_RATE_YOUR_CURRENT_LEVEL_OF_DAILY_ACTIVITY?: SEVERELY ABNORMAL
PAIN: THE SYMPTOM AFFECTS MY ACTIVITY MODERATELY
WALK: ACTIVITY IS VERY DIFFICULT
SIT WITH YOUR KNEE BENT: ACTIVITY IS MINIMALLY DIFFICULT
GO UP STAIRS: ACTIVITY IS VERY DIFFICULT
RAW_SCORE: 25
GO DOWN STAIRS: ACTIVITY IS VERY DIFFICULT
HOW_WOULD_YOU_RATE_THE_OVERALL_FUNCTION_OF_YOUR_KNEE_DURING_YOUR_USUAL_DAILY_ACTIVITIES?: NEARLY NORMAL
RISE FROM A CHAIR: ACTIVITY IS VERY DIFFICULT
SWELLING: THE SYMPTOM AFFECTS MY ACTIVITY MODERATELY
GIVING WAY, BUCKLING OR SHIFTING OF KNEE: I HAVE THE SYMPTOM BUT IT DOES NOT AFFECT MY ACTIVITY
HOW_WOULD_YOU_RATE_THE_CURRENT_FUNCTION_OF_YOUR_KNEE_DURING_YOUR_USUAL_DAILY_ACTIVITIES_ON_A_SCALE_FROM_0_TO_100_WITH_100_BEING_YOUR_LEVEL_OF_KNEE_FUNCTION_PRIOR_TO_YOUR_INJURY_AND_0_BEING_THE_INABILITY_TO_PERFORM_ANY_OF_YOUR_USUAL_DAILY_ACTIVITIES?: 80
STAND: ACTIVITY IS VERY DIFFICULT
WEAKNESS: I HAVE THE SYMPTOM BUT IT DOES NOT AFFECT MY ACTIVITY

## 2022-03-08 NOTE — PROGRESS NOTES
[facility] Initial Evaluation -- Lower Extremity    Evaluation Date: March 7, 2022  Chanel Israel is a 56 year old female with a L knee condition.   Referral: Dr. 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   Employment status: currently off work due to surgery; does work full time  Functional disability score: ADLS 35.71  VAS score 4/10  Patient goals/expectations:    Answers for HPI/ROS submitted by the patient on 3/6/2022  Reason for Visit:: Knee surgery  When problem began:: 12/20/2021  How problem occurred:: Recurrent issues of advanced torn meniscus, tripped on dog toy on hardwood floor  Number scale: 4/10  General health as reported by patient: good  Please check all that apply to your current or past medical history: asthma, cancer, depression, numbness/tingling, overweight, thyroid problems, other  Other Med Hx Detail: Hashimoto's, diverticulosis, lymphedema, ruling out thyroid eye disease  Medical allergies: other  Other Allergies Detail: Erythromycin  Surgeries: orthopedic surgery, cancer surgery, other  Other Surgery Detail: Repair anal fissure, double mastectomy, endometrial/infertility, tonsill & adenoidectomy, multiple breast reconstruction  Medications you are currently taking: anti-depressants, sleep medication, thyroid medication, other  Other Meds Detail: A bunch of supplements. 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  She notes that she has been improving since surgery for medial meniscal repair at the root, 3/1/2022.  Prior to surgery 12/20/2021 - Recurrent issues of advanced torn meniscus, tripped on dog toy on hardwood floor    Present since (onset  date): surgery date 3/1/2022    Symptoms (improving/unchanging/worsening):  improving.      Symptoms commenced as a result of: recurrent issue but tripped and fell on hardwood floor   Condition occurred in the following environment home    Constant symptoms: generalized knee ache  Intermittent symptoms: none    Symptoms are worse with the following: Sometimes Bending and Sometimes On the move   Symptoms are better with the following: Always Sitting, Always When still and Other - ice.  Has not needed to take pain medication    Continued use makes the pain (better/worse/no effect): has not tried to use her knee or WB as TTWB    Disturbed night (yes/no): no      Pain at rest (yes/no):  yes   Site (back/hip/knee/ankle/foot):  L knee    Other questions (swelling/clicking/locking/giving way/falling):  Swelling is better after surgery as compared to prior to surgery    Specific Questions:  General health (excellent/good/fair/poor):  good  Pertinent medical history includes: Asthma, Cancer, Depression, Overweight, Thyroid problems and lymphedema, plantar fasciitis, cold feet  Medications (nil/NSAIDS/analg/steroids/anticoag/other):  OTC analgesic and Other - Thyroid, Anti-depressants and has not been taking her pain medication as not needed; Tylenol prn  Medical allergies:  Erythromycin  Imaging (none/Xray/MRI/other): X-rays   MRI L knee per EPIC  1. Subtle, horizontal oblique tear of the posterior horn of the medial  meniscus, meniscal extrusion.  2. Focal near full-thickness articular cartilage defect this in the  medial tibial plateau consistent with grade III-IV chondromalacia  using the modified Outerbridge classification.  3. Modified Outerbridge classification grade 3 in the patellar  cartilage and the lateral compartment.  4. Large joint effusion and Baker's cyst.  5. No evidence of internal derangement.  6. Mild pes anserine bursitis.  Recent or major surgery (yes/no):  Breast cancer surgery 5/14/2007,   Night pain  (yes/no):  no  Accidents (yes/no):  Most recent was the trip and fall injuring the R knee in December 2021  Unexplained weight loss (yes/no):  no  Barriers at home: TTWB - not able to drive  Other red flags: no    Sites for physical examination (back/hip/knee/ankle/foot/other): R knee    EXAMINATION    Posture:  Sitting (good/fair/poor): poor/fair    Correction of Posture (better/worse/no effect/NA): no effect  Standing (good/fair/poor): fair  Other observations:  Using B axillary crutches with TTWB for the L LE    Neurological: (NA/motor/sensory/reflexes/dural): not assessed    Baselines (pain or functional activity): not assessed    Extremities (Hip / Knee / Ankle / Foot): L knee    Movement Loss Rustam Mod Min Nil Pain   Flexion     80   Extension     Lacking 2 deg   Abduction        Adduction        Internal Rotation        External Rotation        Dorsiflexion        Plantarflexion        Inversion        Eversion          Passive Movement (+/- over pressure)/(PDM/ERP):  Full extension, passive after wall slides was 90 deg  Resisted Test Response (pain): not assessed; fair quad set  Other Tests: minimal edema about the knee    Spine:  Movement loss:   Effect of repeated movements:   Effect of static positioning:   Spine testing (not relevant/relevant/secondary problem): not relevant    Baseline Symptoms: not assessed as patient is less than 1 week post op  Repeated Tests Symptom Response Mechanical Response   Active/Passive movement, resisted test, functional test During -  Produce, Abolish, Increase, Decrease, NE After -  Better, Worse, NB, NW, NE Effect -   ? or ? ROM, strength or key functional test No   Effect                                       Effect of static positioning                  Provisional Classification (Extremity/Spine):  Extremity - Inconclusive/Other - Post-Surgery      Principle of Management:   Education:  Discussed TTWB restrictions, passive flexion is limited to 90    Equipment provided:   none  Exercise and dosage:  Quad sets, flexion SLR 10 reps, 2-3 times a day,  Passive knee flexion  - wall slide 10 reps, 2-4 times a day    ASSESSMENT/PLAN:    Patient is a 56 year old female with left side knee complaints.    Patient has the following significant findings with corresponding treatment plan.                Diagnosis 1:  L knee meniscal root repair (3/1/2022)    Pain -  hot/cold therapy, manual therapy, self management, education, home program and modalities if needed  Decreased ROM/flexibility - manual therapy, therapeutic exercise and home program  Decreased strength - therapeutic exercise, therapeutic activities and home program  Edema - cryocuff, self management/home program and effelurage  Impaired gait - gait training, assistive devices and home program  Impaired muscle performance - neuro re-education and home program  Decreased function - therapeutic activities and home program    Therapy Evaluation Codes:   1) History comprised of:   Personal factors that impact the plan of care:      Time since onset of symptoms.    Comorbidity factors that impact the plan of care are:      Cancer, Depression, Menopausal, Overweight and thyroid, lymphedema.     Medications impacting care: Tylenol prn.  2) Examination of Body Systems comprised of:   Body structures and functions that impact the plan of care:      Knee.   Activity limitations that impact the plan of care are:      Bending, Driving, Dressing, Jumping, Lifting, Running, Sports, Squatting/kneeling, Stairs, Standing and Walking.  3) Clinical presentation characteristics are:   Stable/Uncomplicated.  4) Decision-Making    Moderate complexity using standardized patient assessment instrument and/or measureable assessment of functional outcome.  Cumulative Therapy Evaluation is: Low complexity.    Previous and current functional limitations:  (See Goal Flow Sheet for this information)    Short term and Long term goals: (See Goal Flow Sheet for this  information)     Communication ability:  Patient appears to be able to clearly communicate and understand verbal and written communication and follow directions correctly.  Treatment Explanation - The following has been discussed with the patient:   RX ordered/plan of care  Anticipated outcomes  Possible risks and side effects  This patient would benefit from PT intervention to resume normal activities.   Rehab potential is excellent.    Frequency:  2 X week, once daily  Duration:  for 1 weeks tapering to 1 X a week over 12 weeks decrease frequency to every other week over 12 weeks  Discharge Plan:  Achieve all LTG.  Independent in home treatment program.  Reach maximal therapeutic benefit.      Please refer to the daily flowsheet for treatment today, total treatment time and time spent performing 1:1 timed codes.

## 2022-03-10 ENCOUNTER — OFFICE VISIT (OUTPATIENT)
Dept: ORTHOPEDICS | Facility: CLINIC | Age: 57
End: 2022-03-10
Payer: COMMERCIAL

## 2022-03-10 DIAGNOSIS — G89.29 CHRONIC PAIN OF LEFT KNEE: Primary | ICD-10-CM

## 2022-03-10 DIAGNOSIS — M25.562 CHRONIC PAIN OF LEFT KNEE: Primary | ICD-10-CM

## 2022-03-10 PROCEDURE — 99024 POSTOP FOLLOW-UP VISIT: CPT | Performed by: ORTHOPAEDIC SURGERY

## 2022-03-10 NOTE — PATIENT INSTRUCTIONS
Thanks for coming today.  Ortho[facility] Medicine Clinic  [address] [place] [address]    To schedule future appointments in Ortho Clinic, you may call [phone].    To schedule ordered imaging or an injection ordered by your provider:  Call [facility] Injection scheduling line: [phone]    MyChart available online at:  [email or web address]    Please call if any further questions or concerns ([phone]).  Clinic hours 8 am to 5 pm.    Return to clinic (call) if symptoms worsen or fail to improve.

## 2022-03-10 NOTE — NURSING NOTE
Reason For Visit:   Chief Complaint   Patient presents with     Surgical Followup     10 days  Examination under anesthesia, knee arthroscopy, meniscus root repair vs. meniscectomy // DOS 3/1/2022       ?  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  Date of surgery:  Examination under anesthesia, knee arthroscopy, meniscus root repair vs. meniscectomy // DOS 3/1/2022      Sane Score  Left knee - Affected  Left Knee- 30  Right Knee- 100      Maddie Akers, ATC

## 2022-03-10 NOTE — LETTER
3/10/2022         RE: Chanel Israel  [address]  [facility] [address]        Dear Colleague,    Thank you for referring your patient, Chanel Israel, to the [facility]. Please see a copy of my visit note below.    DIAGNOSIS:   1. Meniscus root tear  2. Medial compartment chondrosis    PROCEDURES:  1. Left medial meniscus root repair; date of surgery 3/1/2022    HISTORY:  Doing well 1 week out from surgery.  Pain control.  Off opioids.    EXAM:     General: Awake, Alert, and oriented. Articulates and communicates with a normal affect     Left lower Extremity:    Incisions well healed without evidence of infection    Normal post-operative effusion and ecchymosis    Range of motion and stability exam not performed    Neurovascularly intact    IMAGING:  No new imaging    ASSESSMENT:  1. 1 week following meniscus repair.  Doing well.    PLAN:     Toe-touch weightbearing x4 weeks    Range of motion 0 to 90 degrees    Sutures removed in clinic    Leave steri-strips in place until they fall off    OK to shower allowing water to run over incision    No soaking, scrubbing, baths, or lake for 1 additional week    Continue PT as scheduled     Pain medications reviewed and no refills required.     Operative report provided and arthroscopic images reviewed    Follow up at 6 weeks from the date of surgery with no new X-Rays needed           Again, thank you for allowing me to participate in the care of your patient.        Sincerely,        Leonid Bailey MD

## 2022-03-11 ENCOUNTER — THERAPY VISIT (OUTPATIENT)
Dept: PHYSICAL THERAPY | Facility: CLINIC | Age: 57
End: 2022-03-11
Payer: COMMERCIAL

## 2022-03-11 DIAGNOSIS — Z98.890 S/P MEDIAL MENISCUS REPAIR OF RIGHT KNEE: ICD-10-CM

## 2022-03-11 PROCEDURE — 97140 MANUAL THERAPY 1/> REGIONS: CPT | Mod: GP | Performed by: PHYSICAL THERAPIST

## 2022-03-11 PROCEDURE — 97110 THERAPEUTIC EXERCISES: CPT | Mod: GP | Performed by: PHYSICAL THERAPIST

## 2022-03-11 NOTE — PROGRESS NOTES
DIAGNOSIS:   1. Meniscus root tear  2. Medial compartment chondrosis    PROCEDURES:  1. Left medial meniscus root repair; date of surgery 3/1/2022    HISTORY:  Doing well 1 week out from surgery.  Pain control.  Off opioids.    EXAM:     General: Awake, Alert, and oriented. Articulates and communicates with a normal affect     Left lower Extremity:    Incisions well healed without evidence of infection    Normal post-operative effusion and ecchymosis    Range of motion and stability exam not performed    Neurovascularly intact    IMAGING:  No new imaging    ASSESSMENT:  1. 1 week following meniscus repair.  Doing well.    PLAN:     Toe-touch weightbearing x4 weeks    Range of motion 0 to 90 degrees    Sutures removed in clinic    Leave steri-strips in place until they fall off    OK to shower allowing water to run over incision    No soaking, scrubbing, baths, or lake for 1 additional week    Continue PT as scheduled     Pain medications reviewed and no refills required.     Operative report provided and arthroscopic images reviewed    Follow up at 6 weeks from the date of surgery with no new X-Rays needed

## 2022-03-18 ENCOUNTER — THERAPY VISIT (OUTPATIENT)
Dept: PHYSICAL THERAPY | Facility: CLINIC | Age: 57
End: 2022-03-18
Payer: COMMERCIAL

## 2022-03-18 DIAGNOSIS — Z98.890 S/P MEDIAL MENISCUS REPAIR OF RIGHT KNEE: ICD-10-CM

## 2022-03-18 PROCEDURE — 97110 THERAPEUTIC EXERCISES: CPT | Mod: GP | Performed by: PHYSICAL THERAPIST

## 2022-03-18 PROCEDURE — 97140 MANUAL THERAPY 1/> REGIONS: CPT | Mod: GP | Performed by: PHYSICAL THERAPIST

## 2022-04-01 ENCOUNTER — THERAPY VISIT (OUTPATIENT)
Dept: PHYSICAL THERAPY | Facility: CLINIC | Age: 57
End: 2022-04-01
Payer: COMMERCIAL

## 2022-04-01 DIAGNOSIS — Z98.890 S/P MEDIAL MENISCUS REPAIR OF RIGHT KNEE: Primary | ICD-10-CM

## 2022-04-01 PROCEDURE — 97116 GAIT TRAINING THERAPY: CPT | Mod: GP | Performed by: PHYSICAL THERAPIST

## 2022-04-01 PROCEDURE — 97140 MANUAL THERAPY 1/> REGIONS: CPT | Mod: GP | Performed by: PHYSICAL THERAPIST

## 2022-04-01 PROCEDURE — 97110 THERAPEUTIC EXERCISES: CPT | Mod: GP | Performed by: PHYSICAL THERAPIST

## 2022-04-04 ENCOUNTER — TRANSFERRED RECORDS (OUTPATIENT)
Dept: HEALTH INFORMATION MANAGEMENT | Facility: CLINIC | Age: 57
End: 2022-04-04

## 2022-04-07 ENCOUNTER — THERAPY VISIT (OUTPATIENT)
Dept: PHYSICAL THERAPY | Facility: CLINIC | Age: 57
End: 2022-04-07
Payer: COMMERCIAL

## 2022-04-07 DIAGNOSIS — Z98.890 S/P MEDIAL MENISCUS REPAIR OF RIGHT KNEE: ICD-10-CM

## 2022-04-07 PROCEDURE — 97110 THERAPEUTIC EXERCISES: CPT | Mod: GP | Performed by: PHYSICAL THERAPIST

## 2022-04-07 PROCEDURE — 97140 MANUAL THERAPY 1/> REGIONS: CPT | Mod: GP | Performed by: PHYSICAL THERAPIST

## 2022-04-14 ENCOUNTER — THERAPY VISIT (OUTPATIENT)
Dept: PHYSICAL THERAPY | Facility: CLINIC | Age: 57
End: 2022-04-14
Payer: COMMERCIAL

## 2022-04-14 DIAGNOSIS — Z98.890 S/P MEDIAL MENISCUS REPAIR OF RIGHT KNEE: Primary | ICD-10-CM

## 2022-04-14 PROCEDURE — 97140 MANUAL THERAPY 1/> REGIONS: CPT | Mod: GP | Performed by: PHYSICAL THERAPIST

## 2022-04-14 PROCEDURE — 97110 THERAPEUTIC EXERCISES: CPT | Mod: GP | Performed by: PHYSICAL THERAPIST

## 2022-04-14 NOTE — PROGRESS NOTES
PROGRESS  REPORT    Progress reporting period is from 3/7/2022 to 4/14/2022.       SUBJECTIVE  Patient notes that her L knee was more swollen after PT session but not anymore. She hasn't started to weight bear on her LE yet because of swelling but says she will start now. She has a history of lymph edema and Hashimoto thyroid issues, which cause her to retain edema.  She is hesitant to return to work yet due to the high demand on her feet.  Current Pain level: 0/10.     Previous pain level was  4/10  .   Changes in function:  Yes, able to move about more with her crutches but now able to WBAT since 6 weeks post op.  Adverse reaction to treatment or activity: None    OBJECTIVE  Changes noted in objective findings:  Yes, increased passive knee flexion, full extension.  Improving strength  Objective:   Patient past 6 weeks post-op (3/1/2022) and now will be progressing to WBAT w crutches.   PROM 0- 0-90; at evaluation 0-2-80  Minimal puffiness back of her knee / ?Baker's cyst  Good quad set; without lag for SLR.  Able to perform partial squats with good form, without pain    ASSESSMENT/PLAN  Updated problem list and treatment plan: Diagnosis 1:    L knee meniscal root repair (3/1/2022) 6 weeks, 2 days post op today.  Pain -  hot/cold therapy, manual therapy, self management, education and home program  Decreased ROM/flexibility - manual therapy, therapeutic exercise and home program  Decreased strength - therapeutic exercise, therapeutic activities and home program  Decreased proprioception - neuro re-education, therapeutic activities and home program  Edema - cold therapy, self management/home program and effelurage/deep tissue work posterior knee.  Impaired gait - gait training, home program and therapeutic activity  Impaired muscle performance - neuro re-education and home program  Decreased function - therapeutic activities and home program  STG/LTGs have been met or progress has been made towards goals:  Yes (See  Goal flow sheet completed today.)  Assessment of Progress: The patient's condition is improving.  Self Management Plans:  Patient has been instructed in a home treatment program.  Patient  has been instructed in self management of symptoms.  I have re-evaluated this patient and find that the nature, scope, duration and intensity of the therapy is appropriate for the medical condition of the patient.  Chanel continues to require the following intervention to meet STG and LTG's:  PT    Recommendations:  This patient would benefit from continued therapy.     Frequency:  1 X week, once daily  Duration:  for 6 weeks tapering to every other week X a week over 6 weeks        Please refer to the daily flowsheet for treatment today, total treatment time and time spent performing 1:1 timed codes.

## 2022-04-21 ENCOUNTER — OFFICE VISIT (OUTPATIENT)
Dept: ORTHOPEDICS | Facility: CLINIC | Age: 57
End: 2022-04-21
Payer: COMMERCIAL

## 2022-04-21 ENCOUNTER — THERAPY VISIT (OUTPATIENT)
Dept: PHYSICAL THERAPY | Facility: CLINIC | Age: 57
End: 2022-04-21
Payer: COMMERCIAL

## 2022-04-21 DIAGNOSIS — G89.29 CHRONIC PAIN OF LEFT KNEE: Primary | ICD-10-CM

## 2022-04-21 DIAGNOSIS — M25.562 CHRONIC PAIN OF LEFT KNEE: Primary | ICD-10-CM

## 2022-04-21 DIAGNOSIS — Z98.890 S/P MEDIAL MENISCUS REPAIR OF RIGHT KNEE: Primary | ICD-10-CM

## 2022-04-21 PROCEDURE — 99024 POSTOP FOLLOW-UP VISIT: CPT | Performed by: ORTHOPAEDIC SURGERY

## 2022-04-21 PROCEDURE — 97110 THERAPEUTIC EXERCISES: CPT | Mod: GP | Performed by: PHYSICAL THERAPIST

## 2022-04-21 PROCEDURE — 97140 MANUAL THERAPY 1/> REGIONS: CPT | Mod: GP | Performed by: PHYSICAL THERAPIST

## 2022-04-21 ASSESSMENT — ACTIVITIES OF DAILY LIVING (ADL)
GIVING WAY, BUCKLING OR SHIFTING OF KNEE: THE SYMPTOM AFFECTS MY ACTIVITY SLIGHTLY
SQUAT: ACTIVITY IS MINIMALLY DIFFICULT
WEAKNESS: THE SYMPTOM AFFECTS MY ACTIVITY MODERATELY
RISE FROM A CHAIR: ACTIVITY IS NOT DIFFICULT
GO UP STAIRS: ACTIVITY IS FAIRLY DIFFICULT
PAIN: THE SYMPTOM AFFECTS MY ACTIVITY SLIGHTLY
GO DOWN STAIRS: ACTIVITY IS SOMEWHAT DIFFICULT
SWELLING: THE SYMPTOM AFFECTS MY ACTIVITY MODERATELY
KNEE_ACTIVITY_OF_DAILY_LIVING_SCORE: 62.86
AS_A_RESULT_OF_YOUR_KNEE_INJURY,_HOW_WOULD_YOU_RATE_YOUR_CURRENT_LEVEL_OF_DAILY_ACTIVITY?: NEARLY NORMAL
WALK: ACTIVITY IS SOMEWHAT DIFFICULT
HOW_WOULD_YOU_RATE_THE_CURRENT_FUNCTION_OF_YOUR_KNEE_DURING_YOUR_USUAL_DAILY_ACTIVITIES_ON_A_SCALE_FROM_0_TO_100_WITH_100_BEING_YOUR_LEVEL_OF_KNEE_FUNCTION_PRIOR_TO_YOUR_INJURY_AND_0_BEING_THE_INABILITY_TO_PERFORM_ANY_OF_YOUR_USUAL_DAILY_ACTIVITIES?: 65
HOW_WOULD_YOU_RATE_THE_OVERALL_FUNCTION_OF_YOUR_KNEE_DURING_YOUR_USUAL_DAILY_ACTIVITIES?: NEARLY NORMAL
STIFFNESS: THE SYMPTOM AFFECTS MY ACTIVITY SLIGHTLY
KNEE_ACTIVITY_OF_DAILY_LIVING_SUM: 44
RAW_SCORE: 44
KNEEL ON THE FRONT OF YOUR KNEE: I AM UNABLE TO DO THE ACTIVITY
LIMPING: I DO NOT HAVE THE SYMPTOM
STAND: ACTIVITY IS MINIMALLY DIFFICULT
SIT WITH YOUR KNEE BENT: ACTIVITY IS NOT DIFFICULT

## 2022-04-21 NOTE — NURSING NOTE
Reason For Visit:   Chief Complaint   Patient presents with     Surgical Followup     7 wks s/p left knee arthroscopy, transosseous repair of left medial meniscus root DOS: 3/1/22       ?  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  Date of surgery:  Examination under anesthesia, knee arthroscopy, meniscus root repair vs. meniscectomy // DOS 3/1/2022        Sane Score  Left knee - Affected  Left Knee- 65  Right Knee- 100    Maddie Akers, ATC

## 2022-04-21 NOTE — LETTER
4/21/2022         RE: Chanel Israel  [address]  [facility] [address]        Dear Colleague,    Thank you for referring your patient, Chanel Israel, to the [facility]. Please see a copy of my visit note below.    DIAGNOSIS:   1. Meniscus root tear  2. Medial compartment chondrosis    PROCEDURES:  1. Left medial meniscus root repair; date of surgery 3/1/2022    HISTORY:  6 weeks following the above.  Pain controlled.  Off opioids.  Still using 1 crutch today.  Feels like her recovery is going a little bit slower than expected.    EXAM:     General: Awake, Alert, and oriented. Articulates and communicates with a normal affect     Left lower Extremity:    Incisions well healed without evidence of infection    No post-operative effusion or ecchymosis    Range of motion full extension and greater than 125 degrees of flexion     stability exam not performed    Neurovascularly intact    IMAGING:  No new imaging    ASSESSMENT:  6 weeks following meniscus repair.  Doing well.    PLAN:     Weightbearing as tolerated    Range of motion as tolerated    No running jumping kneeling or squatting    Overall I am optimistic about how she will do though she does have areas of full-thickness cartilage loss.  If she fails to see lasting improvement I would offer her oral anti-inflammatory versus injection management.  She has a standing offer for these things if she does not see significant improvement    Follow-up 6 weeks to continue the discussion.           Again, thank you for allowing me to participate in the care of your patient.        Sincerely,        Leonid Bailey MD

## 2022-04-21 NOTE — LETTER
April 21, 2022      RE: Chanel Israel  [address]  [facility] [address]        To whom it may concern:    Chanel Israel is under my professional care. Patient should do no running or jumping until July 4, 2022.     Sincerely,      Leonid Bailey MD

## 2022-04-21 NOTE — PATIENT INSTRUCTIONS
Thanks for coming today.  [facility] Medicine Clinic  [address] [place] [address]    To schedule future appointments in Ortho Clinic, you may call [phone].    To schedule ordered imaging or an injection ordered by your provider:  Call [facility] In[facility] scheduling line: [phone]    MyChart available online at:  [email or web address]    Please call if any further questions or concerns ([phone]).  Clinic hours 8 am to 5 pm.    Return to clinic (call) if symptoms worsen or fail to improve.

## 2022-04-22 NOTE — PROGRESS NOTES
DIAGNOSIS:   1. Meniscus root tear  2. Medial compartment chondrosis    PROCEDURES:  1. Left medial meniscus root repair; date of surgery 3/1/2022    HISTORY:  6 weeks following the above.  Pain controlled.  Off opioids.  Still using 1 crutch today.  Feels like her recovery is going a little bit slower than expected.    EXAM:     General: Awake, Alert, and oriented. Articulates and communicates with a normal affect     Left lower Extremity:    Incisions well healed without evidence of infection    No post-operative effusion or ecchymosis    Range of motion full extension and greater than 125 degrees of flexion     stability exam not performed    Neurovascularly intact    IMAGING:  No new imaging    ASSESSMENT:  6 weeks following meniscus repair.  Doing well.    PLAN:     Weightbearing as tolerated    Range of motion as tolerated    No running jumping kneeling or squatting    Overall I am optimistic about how she will do though she does have areas of full-thickness cartilage loss.  If she fails to see lasting improvement I would offer her oral anti-inflammatory versus injection management.  She has a standing offer for these things if she does not see significant improvement    Follow-up 6 weeks to continue the discussion.

## 2022-04-25 ENCOUNTER — TELEPHONE (OUTPATIENT)
Dept: ORTHOPEDICS | Facility: CLINIC | Age: 57
End: 2022-04-25
Payer: COMMERCIAL

## 2022-04-25 NOTE — TELEPHONE ENCOUNTER
[facility] Call Center    Phone Message    May a detailed message be left on voicemail: yes     Reason for Call: Other: Patient is requesting a call back from Maddie today before the end of day. She is not able to find documents sent to her on [employer]     Action Taken: Message routed to:  [facility] ([facility] ortho    Travel Screening: Not Applicable

## 2022-04-27 ENCOUNTER — THERAPY VISIT (OUTPATIENT)
Dept: PHYSICAL THERAPY | Facility: CLINIC | Age: 57
End: 2022-04-27
Payer: COMMERCIAL

## 2022-04-27 DIAGNOSIS — G89.29 CHRONIC PAIN OF LEFT KNEE: Primary | ICD-10-CM

## 2022-04-27 DIAGNOSIS — Z98.890 S/P MEDIAL MENISCUS REPAIR OF LEFT KNEE: ICD-10-CM

## 2022-04-27 DIAGNOSIS — M25.562 CHRONIC PAIN OF LEFT KNEE: Primary | ICD-10-CM

## 2022-04-27 PROCEDURE — 97110 THERAPEUTIC EXERCISES: CPT | Mod: GP | Performed by: PHYSICAL THERAPY ASSISTANT

## 2022-04-27 PROCEDURE — 97140 MANUAL THERAPY 1/> REGIONS: CPT | Mod: GP | Performed by: PHYSICAL THERAPY ASSISTANT

## 2022-05-06 ENCOUNTER — THERAPY VISIT (OUTPATIENT)
Dept: PHYSICAL THERAPY | Facility: CLINIC | Age: 57
End: 2022-05-06
Payer: COMMERCIAL

## 2022-05-06 DIAGNOSIS — M25.562 CHRONIC PAIN OF LEFT KNEE: Primary | ICD-10-CM

## 2022-05-06 DIAGNOSIS — G89.29 CHRONIC PAIN OF LEFT KNEE: Primary | ICD-10-CM

## 2022-05-06 PROCEDURE — 97140 MANUAL THERAPY 1/> REGIONS: CPT | Mod: GP | Performed by: PHYSICAL THERAPIST

## 2022-05-06 PROCEDURE — 97110 THERAPEUTIC EXERCISES: CPT | Mod: GP | Performed by: PHYSICAL THERAPIST

## 2022-05-09 ENCOUNTER — MYC MEDICAL ADVICE (OUTPATIENT)
Dept: FAMILY MEDICINE | Facility: CLINIC | Age: 57
End: 2022-05-09

## 2022-05-09 ENCOUNTER — PATIENT OUTREACH (OUTPATIENT)
Dept: ONCOLOGY | Facility: CLINIC | Age: 57
End: 2022-05-09

## 2022-05-09 DIAGNOSIS — I89.0 LYMPHEDEMA: ICD-10-CM

## 2022-05-09 DIAGNOSIS — C50.912 MALIGNANT NEOPLASM OF LEFT FEMALE BREAST, UNSPECIFIED ESTROGEN RECEPTOR STATUS, UNSPECIFIED SITE OF BREAST (H): Primary | ICD-10-CM

## 2022-05-09 DIAGNOSIS — Z90.13 S/P BILATERAL MASTECTOMY: ICD-10-CM

## 2022-05-09 NOTE — PROGRESS NOTES
Current age: 56    Treated by Pediatric Oncologist: no    Date of Diagnosis: 05/18/07    Last Clinic Visit with [facility] Oncologist: 8/11/2020    Has patient had BMT: no    Is Diagnosis Gyn Onc: no    Patient is over 21, was treated/seen by adult oncologist, does not require established Oncologist at [facility], has a diagnosis made within past 5 years, and diagnosis is within parameters (no BMT or Gyn Onc Dx); patient appropriate for [facility]orship Clinic. Scheduling notes updated to referral and sent to NPS for completion.

## 2022-05-16 ENCOUNTER — THERAPY VISIT (OUTPATIENT)
Dept: PHYSICAL THERAPY | Facility: CLINIC | Age: 57
End: 2022-05-16
Payer: COMMERCIAL

## 2022-05-16 DIAGNOSIS — G89.29 CHRONIC PAIN OF LEFT KNEE: Primary | ICD-10-CM

## 2022-05-16 DIAGNOSIS — Z98.890 S/P MEDIAL MENISCUS REPAIR OF LEFT KNEE: ICD-10-CM

## 2022-05-16 DIAGNOSIS — M25.562 CHRONIC PAIN OF LEFT KNEE: Primary | ICD-10-CM

## 2022-05-16 PROCEDURE — 97112 NEUROMUSCULAR REEDUCATION: CPT | Mod: GP | Performed by: PHYSICAL THERAPIST

## 2022-05-16 PROCEDURE — 97110 THERAPEUTIC EXERCISES: CPT | Mod: GP | Performed by: PHYSICAL THERAPIST

## 2022-05-16 PROCEDURE — 97140 MANUAL THERAPY 1/> REGIONS: CPT | Mod: GP | Performed by: PHYSICAL THERAPIST

## 2022-05-24 ENCOUNTER — THERAPY VISIT (OUTPATIENT)
Dept: PHYSICAL THERAPY | Facility: CLINIC | Age: 57
End: 2022-05-24
Payer: COMMERCIAL

## 2022-05-24 DIAGNOSIS — Z98.890 S/P MEDIAL MENISCUS REPAIR OF RIGHT KNEE: Primary | ICD-10-CM

## 2022-05-24 PROCEDURE — 97140 MANUAL THERAPY 1/> REGIONS: CPT | Mod: GP | Performed by: PHYSICAL THERAPIST

## 2022-05-24 PROCEDURE — 97110 THERAPEUTIC EXERCISES: CPT | Mod: GP | Performed by: PHYSICAL THERAPIST

## 2022-05-24 ASSESSMENT — ACTIVITIES OF DAILY LIVING (ADL)
GO UP STAIRS: ACTIVITY IS NOT DIFFICULT
RAW_SCORE: 61
KNEEL ON THE FRONT OF YOUR KNEE: ACTIVITY IS NOT DIFFICULT
LIMPING: I DO NOT HAVE THE SYMPTOM
STAND: ACTIVITY IS NOT DIFFICULT
GIVING WAY, BUCKLING OR SHIFTING OF KNEE: I DO NOT HAVE THE SYMPTOM
HOW_WOULD_YOU_RATE_THE_OVERALL_FUNCTION_OF_YOUR_KNEE_DURING_YOUR_USUAL_DAILY_ACTIVITIES?: NEARLY NORMAL
KNEE_ACTIVITY_OF_DAILY_LIVING_SUM: 61
HOW_WOULD_YOU_RATE_THE_CURRENT_FUNCTION_OF_YOUR_KNEE_DURING_YOUR_USUAL_DAILY_ACTIVITIES_ON_A_SCALE_FROM_0_TO_100_WITH_100_BEING_YOUR_LEVEL_OF_KNEE_FUNCTION_PRIOR_TO_YOUR_INJURY_AND_0_BEING_THE_INABILITY_TO_PERFORM_ANY_OF_YOUR_USUAL_DAILY_ACTIVITIES?: 82
KNEE_ACTIVITY_OF_DAILY_LIVING_SCORE: 87.14
WALK: ACTIVITY IS NOT DIFFICULT
AS_A_RESULT_OF_YOUR_KNEE_INJURY,_HOW_WOULD_YOU_RATE_YOUR_CURRENT_LEVEL_OF_DAILY_ACTIVITY?: NEARLY NORMAL
SIT WITH YOUR KNEE BENT: ACTIVITY IS NOT DIFFICULT
STIFFNESS: THE SYMPTOM AFFECTS MY ACTIVITY SLIGHTLY
SQUAT: ACTIVITY IS NOT DIFFICULT
SWELLING: THE SYMPTOM AFFECTS MY ACTIVITY MODERATELY
PAIN: THE SYMPTOM AFFECTS MY ACTIVITY SLIGHTLY
WEAKNESS: THE SYMPTOM AFFECTS MY ACTIVITY SLIGHTLY
GO DOWN STAIRS: ACTIVITY IS NOT DIFFICULT
RISE FROM A CHAIR: ACTIVITY IS NOT DIFFICULT

## 2022-05-25 NOTE — PROGRESS NOTES
PROGRESS  REPORT    Progress reporting period is from 4/14/2022 to 5/24/2022.       SUBJECTIVE  Patient reports work has been manageable without knee pain. Will be very fatigued by the end of the day with a good amount of swelling in back of L knee.  Occasional stiffness.  She has found that she needs to be consistent with her home program to assist with the stiffness.  Feels like she continues to need to get stronger to alleviate the swelling behind her knee.  Due to lymph edema, she does have increased girth for the entire L LE.  Overall knee is much better.  Can feel a sharp pain in the knee if tries functional lunged forward.   Current Pain level: 0/10.     Previous pain level was  0/10  Initial pain level was 4/10 .   Changes in function:  Yes (See Goal flowsheet attached for changes in current functional level)  Adverse reaction to treatment or activity: None    OBJECTIVE  Changes noted in objective findings:  Yes, increased ROM, improved strength, improved function  Objective:   L knee AROM 0-0-135   Passive knee hyper extension is about 2 deg less on the L vs R; firm endfeel  4/14/2022 - At 6 weeks post op PROM 0- 0-90   Strong and pain free MMT  Mild edema posterior knee  She had a decrease with a functional lunge forward after sustained knee extension stretch.   ADLS score has improved to 87.14 from 62.86    ASSESSMENT/PLAN  Updated problem list and treatment plan: Diagnosis 1:   L knee meniscal root repair (3/1/2022)     Pain -  manual therapy, self management, education and home program  Decreased ROM/flexibility - manual therapy, therapeutic exercise and home program  Decreased strength - therapeutic exercise, therapeutic activities and home program  Decreased proprioception - neuro re-education, therapeutic activities and home program  Edema - cold therapy, self management/home program and effelurage  Decreased function - therapeutic activities and home program  STG/LTGs have been met or progress has  been made towards goals:  Yes (See Goal flow sheet completed today.)  Assessment of Progress: The patient's condition is improving.  Patient is meeting short term goals and is progressing towards long term goals.  Self Management Plans:  Patient is independent in a home treatment program.  Patient is independent in self management of symptoms.  I have re-evaluated this patient and find that the nature, scope, duration and intensity of the therapy is appropriate for the medical condition of the patient.  Chanel continues to require the following intervention to meet STG and LTG's:  PT    Recommendations:  This patient would benefit from continued therapy.     Frequency:  2 X a month, once daily  Duration:  for 1 months  Return to PT in 2 weeks if needed after follow up with MD on 6/2/2022.        Please refer to the daily flowsheet for treatment today, total treatment time and time spent performing 1:1 timed codes.

## 2022-06-02 ENCOUNTER — OFFICE VISIT (OUTPATIENT)
Dept: ORTHOPEDICS | Facility: CLINIC | Age: 57
End: 2022-06-02
Payer: COMMERCIAL

## 2022-06-02 DIAGNOSIS — M25.562 CHRONIC PAIN OF LEFT KNEE: Primary | ICD-10-CM

## 2022-06-02 DIAGNOSIS — G89.29 CHRONIC PAIN OF LEFT KNEE: Primary | ICD-10-CM

## 2022-06-02 PROCEDURE — 99212 OFFICE O/P EST SF 10 MIN: CPT | Performed by: ORTHOPAEDIC SURGERY

## 2022-06-02 ASSESSMENT — PAIN SCALES - GENERAL: PAINLEVEL: MODERATE PAIN (5)

## 2022-06-02 NOTE — NURSING NOTE
Reason For Visit:   Chief Complaint   Patient presents with     Left Knee - Surgical Followup     Left knee       ?  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  Date of surgery:  Examination under anesthesia, knee arthroscopy, meniscus root repair vs. meniscectomy // DOS 3/1/2022        Sane Score  Left knee - Affected  Left Knee- 87  Right Knee- 100    Leonid Hunt, EMT

## 2022-06-02 NOTE — PROGRESS NOTES
DIAGNOSIS:   1. Meniscus root tear  2. Medial compartment chondrosis    PROCEDURES:  1. Left medial meniscus root repair; date of surgery 3/1/2022    HISTORY:  12 weeks following the above. SANE 87 vs. 100. Gets swelling in back of knee, Doing PT    EXAM:     General: Awake, Alert, and oriented. Articulates and communicates with a normal affect     Left lower Extremity:    Incisions well healed without evidence of infection    No post-operative effusion or ecchymosis    Range of motion shows  full extension and greater than 125 degrees of flexion     stability exam not performed    Neurovascularly intact    IMAGING:  No new imaging    ASSESSMENT:  12 weeks following meniscus repair.  Doing well.    PLAN:     WBAT    ROM as [place]    Try to limit running or jumping, kneeling or squatting    F/u prn

## 2022-06-02 NOTE — LETTER
6/2/2022         RE: Chanel Israel  [address]  [place] [place] [address]        Dear Colleague,    Thank you for referring your patient, Chanel Israel, to the [facility]. Please see a copy of my visit note below.    DIAGNOSIS:   1. Meniscus root tear  2. Medial compartment chondrosis    PROCEDURES:  1. Left medial meniscus root repair; date of surgery 3/1/2022    HISTORY:  12 weeks following the above. SANE 87 vs. 100. Gets swelling in back of knee, Doing PT    EXAM:     General: Awake, Alert, and oriented. Articulates and communicates with a normal affect     Left lower Extremity:    Incisions well healed without evidence of infection    No post-operative effusion or ecchymosis    Range of motion shows  full extension and greater than 125 degrees of flexion     stability exam not performed    Neurovascularly intact    IMAGING:  No new imaging    ASSESSMENT:  12 weeks following meniscus repair.  Doing well.    PLAN:     WBAT    ROM as [place]    Try to limit running or jumping, kneeling or squatting    F/u prn           Again, thank you for allowing me to participate in the care of your patient.        Sincerely,        Leonid Bailey MD

## 2022-06-15 ENCOUNTER — VIRTUAL VISIT (OUTPATIENT)
Dept: PSYCHOLOGY | Facility: CLINIC | Age: 57
End: 2022-06-15
Attending: FAMILY MEDICINE
Payer: COMMERCIAL

## 2022-06-15 DIAGNOSIS — F33.0 MILD RECURRENT MAJOR DEPRESSION (H): ICD-10-CM

## 2022-06-15 DIAGNOSIS — F41.9 ANXIETY: ICD-10-CM

## 2022-06-15 PROCEDURE — 90837 PSYTX W PT 60 MINUTES: CPT | Mod: GT | Performed by: COUNSELOR

## 2022-06-15 ASSESSMENT — COLUMBIA-SUICIDE SEVERITY RATING SCALE - C-SSRS
2. HAVE YOU ACTUALLY HAD ANY THOUGHTS OF KILLING YOURSELF?: NO
1. HAVE YOU WISHED YOU WERE DEAD OR WISHED YOU COULD GO TO SLEEP AND NOT WAKE UP?: NO

## 2022-06-15 ASSESSMENT — PATIENT HEALTH QUESTIONNAIRE - PHQ9
SUM OF ALL RESPONSES TO PHQ QUESTIONS 1-9: 3
SUM OF ALL RESPONSES TO PHQ QUESTIONS 1-9: 3
10. IF YOU CHECKED OFF ANY PROBLEMS, HOW DIFFICULT HAVE THESE PROBLEMS MADE IT FOR YOU TO DO YOUR WORK, TAKE CARE OF THINGS AT HOME, OR GET ALONG WITH OTHER PEOPLE: SOMEWHAT DIFFICULT

## 2022-06-15 NOTE — PROGRESS NOTES
[facility]   Mental Health & Addiction Services     Progress Note - Initial Visit    Patient  Name:  Chanel Israel Date: 6/15/22         Service Type: Individual     Visit Start Time: 2 pm  Visit End Time: 2:54    Visit #: 1    Attendees: Client attended alone    Service Modality:  Video Visit:      Provider verified identity through the following two step process.  Patient provided:  Patient     Telemedicine Visit: The patient's condition can be safely assessed and treated via synchronous audio and visual telemedicine encounter.      Reason for Telemedicine Visit: Services only offered telehealth    Originating Site (Patient Location): Patient's home    Distant Site (Provider Location): Provider Remote Setting- Home Office    Consent:  The patient/guardian has verbally consented to: the potential risks and benefits of telemedicine (video visit) versus in person care; bill my insurance or make self-payment for services provided; and responsibility for payment of non-covered services.     Patient would like the video invitation sent by:  My Chart    Mode of Communication:  Video Conference via [employer]well    As the provider I attest to compliance with applicable laws and regulations related to telemedicine.    Answers for HPI/ROS submitted by the patient on 6/15/2022  If you checked off any problems, how difficult have these problems made it for you to do your work, take care of things at home, or get along with other people?: Somewhat difficult  PHQ9 TOTAL SCORE: 3         DATA:   Interactive Complexity: No   Crisis: No     Presenting Concerns/  Current Stressors:   Client reports attending therapy due to wanting to address childhood trauma and how this impacts how she presently reacts to things as a result. Has been utilized medication since mid-30s following postpartum. Has worked with a grief counselor and couples counseling many years ago. Reports childhood trauma stems from family of  origin, and that this impacts current relationships.      ASSESSMENT:  Mental Status Assessment:  Appearance:   Appropriate   Eye Contact:   Good   Psychomotor Behavior: Normal   Attitude:   Cooperative   Orientation:   All  Speech   Rate / Production: Normal/ Responsive   Volume:  Normal   Mood:    Anxious   Affect:    Appropriate   Thought Content:  Clear   Thought Form:  Coherent  Logical   Insight:    Good       Safety Issues and Plan for Safety and Risk Management:     [place] Suicide Severity Rating Scale (Lifetime/Recent)  [place] Suicide Severity Rating (Lifetime/Recent) 6/15/2022   1. Wish to be Dead (Lifetime) 0   2. Non-Specific Active Suicidal Thoughts (Lifetime) 0     Patient denies current fears or concerns for personal safety.  Patient denies current or recent suicidal ideation or behaviors.  Patient denies current or recent homicidal ideation or behaviors.  Patient denies current or recent self injurious behavior or ideation.  Patient denies other safety concerns.  Recommended that patient call 911 or go to the local ED should there be a change in any of these risk factors.  Patient reports there are firearms in the house. The firearms are secured in a locked space.     Diagnostic Criteria:  Major Depressive Disorder   - Depressed mood. Note: In children and adolescents, can be irritable mood.     - Diminished interest or pleasure in all, or almost all, activities.    - Increased sleep.    - Fatigue or loss of energy.    - Feelings of worthlessness or inappropriate and excessive guilt.       DSM5 Diagnoses: (Sustained by DSM5 Criteria Listed Above)  Diagnoses: 296.31 (F33.0) Major Depressive Disorder, Recurrent Episode, Mild _  Psychosocial & Contextual Factors: Trauma history, conflict with family of origin  WHODAS 2.0 (12 item):   WHODAS 2.0 Total Score 6/15/2022   Total Score 19   Total Score MyChart 19     Intervention:   Completed through review of safety issues and safety interventions and  Educated on treatment planning and started identifying goals and interventions for treatment plan  Collateral Reports Completed:  Not Applicable      PLAN: (Homework, other):  1. Provider will continue Diagnostic Assessment.  Patient was given the following to do until next session:  Client has completed all necessary intake forms at this time.     2. Provider recommended the following referrals: None at this time.      3.  Suicide Risk and Safety Concerns were assessed for Chanel Israel.    Patient meets the following risk assessment and triage: Patient denied any current/recent/lifetime history of suicidal ideation and/or behaviors.  No safety plan indicated at this time.       Chanel Carballo, [facility]  Rosanna 15, 2022

## 2022-06-21 ENCOUNTER — IMMUNIZATION (OUTPATIENT)
Dept: NURSING | Facility: CLINIC | Age: 57
End: 2022-06-21
Payer: COMMERCIAL

## 2022-06-21 PROCEDURE — 91306 COVID-19,PF,MODERNA (18+ YRS BOOSTER .25ML): CPT

## 2022-06-21 PROCEDURE — 0064A COVID-19,PF,MODERNA (18+ YRS BOOSTER .25ML): CPT

## 2022-06-30 ENCOUNTER — OFFICE VISIT (OUTPATIENT)
Dept: ORTHOPEDICS | Facility: CLINIC | Age: 57
End: 2022-06-30
Payer: COMMERCIAL

## 2022-06-30 DIAGNOSIS — M54.9 SPINE PAIN: Primary | ICD-10-CM

## 2022-06-30 PROCEDURE — 99213 OFFICE O/P EST LOW 20 MIN: CPT | Performed by: ORTHOPAEDIC SURGERY

## 2022-06-30 ASSESSMENT — PAIN SCALES - GENERAL: PAINLEVEL: SEVERE PAIN (6)

## 2022-06-30 NOTE — NURSING NOTE
Reason For Visit:   Chief Complaint   Patient presents with     Left Knee - Surgical Followup     Left knee       ?  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  Date of surgery:  Examination under anesthesia, knee arthroscopy, meniscus root repair vs. meniscectomy // DOS 3/1/2022        Sane Score  Left knee - Affected  Left Knee- 75  Right Knee- 100    Leonid Hunt, EMT

## 2022-06-30 NOTE — PROGRESS NOTES
DIAGNOSIS:   1. Meniscus root tear  2. Medial compartment chondrosis    PROCEDURES:  1. Left medial meniscus root repair; date of surgery 3/1/2022    HISTORY:  Patient returns to my clinic today for interval follow-up I just had a chance to see her very recently for following her meniscus root tear.  This is actually done well unfortunately she has had about 3 to 4 weeks now of ongoing profound sciatic symptoms with pain shooting down her back.  This is persisted despite a detailed course of physical therapy and oral anti-inflammatories.  She has positive straight leg raising.  She denies changes with bowel or bladder symptoms    EXAM:     General: Awake, Alert, and oriented. Articulates and communicates with a normal affect     Left lower Extremity:    Incisions well healed without evidence of infection    No post-operative effusion or ecchymosis    Range of motion shows  full extension and greater than 125 degrees of flexion     Knee is stable to examination    Examination of her lumbar spine today does show positive difficulty with straight leg raising.  Overall preserved strength for tibialis anterior gastrocsoleus, quadriceps, hamstring, EHL, FHL    IMAGING:  AP and lateral radiographs of her lumbar spine today do show some degenerative changes though overall it appears to me that the alignment appears normal.  I do not see evidence of fractures    ASSESSMENT:  Approximately 4 months following meniscus root repair.  Doing well.  Significant sciatica that has been recalcitrant to nonsurgical intervention    PLAN:   MRI lumbar spine.  Follow-up for telephone visit afterwards.  As far as knee goes to continue to do activities as able

## 2022-06-30 NOTE — LETTER
6/30/2022         RE: Chanel Israel  [address]  [facility] [address]        Dear Colleague,    Thank you for referring your patient, Chanel Israel, to the [facility]. Please see a copy of my visit note below.    DIAGNOSIS:   1. Meniscus root tear  2. Medial compartment chondrosis    PROCEDURES:  1. Left medial meniscus root repair; date of surgery 3/1/2022    HISTORY:  Patient returns to my clinic today for interval follow-up I just had a chance to see her very recently for following her meniscus root tear.  This is actually done well unfortunately she has had about 3 to 4 weeks now of ongoing profound sciatic symptoms with pain shooting down her back.  This is persisted despite a detailed course of physical therapy and oral anti-inflammatories.  She has positive straight leg raising.  She denies changes with bowel or bladder symptoms    EXAM:     General: Awake, Alert, and oriented. Articulates and communicates with a normal affect     Left lower Extremity:    Incisions well healed without evidence of infection    No post-operative effusion or ecchymosis    Range of motion shows  full extension and greater than 125 degrees of flexion     Knee is stable to examination    Examination of her lumbar spine today does show positive difficulty with straight leg raising.  Overall preserved strength for tibialis anterior gastrocsoleus, quadriceps, hamstring, EHL, FHL    IMAGING:  AP and lateral radiographs of her lumbar spine today do show some degenerative changes though overall it appears to me that the alignment appears normal.  I do not see evidence of fractures    ASSESSMENT:  Approximately 4 months following meniscus root repair.  Doing well.  Significant sciatica that has been recalcitrant to nonsurgical intervention    PLAN:   MRI lumbar spine.  Follow-up for telephone visit afterwards.  As far as knee goes to continue to do activities as able        Again, thank you for  allowing me to participate in the care of your patient.        Sincerely,        Leonid Bailey MD

## 2022-07-06 ENCOUNTER — ANCILLARY PROCEDURE (OUTPATIENT)
Dept: MRI IMAGING | Facility: CLINIC | Age: 57
End: 2022-07-06
Attending: ORTHOPAEDIC SURGERY
Payer: COMMERCIAL

## 2022-07-06 DIAGNOSIS — M54.9 SPINE PAIN: ICD-10-CM

## 2022-07-06 PROBLEM — Z98.890 S/P MEDIAL MENISCUS REPAIR OF RIGHT KNEE: Status: RESOLVED | Noted: 2022-03-07 | Resolved: 2022-07-06

## 2022-07-06 PROCEDURE — 72148 MRI LUMBAR SPINE W/O DYE: CPT | Mod: TC | Performed by: RADIOLOGY

## 2022-07-07 ENCOUNTER — TELEPHONE (OUTPATIENT)
Dept: ORTHOPEDICS | Facility: CLINIC | Age: 57
End: 2022-07-07

## 2022-07-07 ENCOUNTER — OFFICE VISIT (OUTPATIENT)
Dept: ORTHOPEDICS | Facility: CLINIC | Age: 57
End: 2022-07-07
Payer: COMMERCIAL

## 2022-07-07 DIAGNOSIS — M54.16 LEFT LUMBAR RADICULOPATHY: Primary | ICD-10-CM

## 2022-07-07 PROCEDURE — 99214 OFFICE O/P EST MOD 30 MIN: CPT | Performed by: ORTHOPAEDIC SURGERY

## 2022-07-07 ASSESSMENT — PAIN SCALES - GENERAL: PAINLEVEL: MODERATE PAIN (5)

## 2022-07-07 NOTE — LETTER
7/7/2022         RE: Chanel Israel  [address]  [facility] [address]        Dear Colleague,    Thank you for referring your patient, Chanel Israel, to the [facility]. Please see a copy of my visit note below.    DIAGNOSIS:   1. Meniscus root tear  2. Medial compartment chondrosis    PROCEDURES:  1. Left medial meniscus root repair; date of surgery 3/1/2022    HISTORY:  Patient returns to my clinic today to review the MRI results.  It does show on my review as well as with the radiologist but annular fissures on the left and the right, worse on the left than the right at the L4-5 level I think this is consistent with her radicular symptoms.  And I have suggested the patient that she undergo an injection, transforaminal epidural steroid injection at the L4-5 level.  The patient also has questions today about her cervical spine    EXAM:     General: Awake, Alert, and oriented. Articulates and communicates with a normal affect       No examination was completed today as this was an MRI follow-up visit    IMAGING:  MRI of her lumbar spine show overall no significant stenosis annular fissure is noted at the L4-5 level worse on the left than the right    ASSESSMENT:  Approximately 4 months following meniscus root repair.  Doing well.  L4-5 annular fissure greater on left than the right    PLAN:   At this time I am going to recommend a left L4-L5 transforaminal epidural steroid injection  I am going to place a referral to primary care sports medicine for evaluation work-up of her cervical spine  She will follow-up with me on an as-needed basis  If she has continued symptoms from either her neck or back I would have her follow-up with one of our spine surgeons after completion of her work-up and results/effects of her lumbar spine injection          Again, thank you for allowing me to participate in the care of your patient.        Sincerely,        Leonid Bailey MD

## 2022-07-07 NOTE — NURSING NOTE
Reason For Visit:   Chief Complaint   Patient presents with     Left Knee - Follow Up       ?  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  Date of surgery:  Examination under anesthesia, knee arthroscopy, meniscus root repair vs. meniscectomy // DOS 3/1/2022        Sane Score  Left knee - Affected  Left Knee- 75  Right Knee- 100    Leonid Hunt, EMT

## 2022-07-07 NOTE — TELEPHONE ENCOUNTER
Left message on Mobile number to see if patient wanted to change todays appointment to a phone visit. Asked her to call back to confirm.  Bruna Guzmán RN

## 2022-07-07 NOTE — PROGRESS NOTES
DIAGNOSIS:   1. Meniscus root tear  2. Medial compartment chondrosis    PROCEDURES:  1. Left medial meniscus root repair; date of surgery 3/1/2022    HISTORY:  Patient returns to my clinic today to review the MRI results.  It does show on my review as well as with the radiologist but annular fissures on the left and the right, worse on the left than the right at the L4-5 level I think this is consistent with her radicular symptoms.  And I have suggested the patient that she undergo an injection, transforaminal epidural steroid injection at the L4-5 level.  The patient also has questions today about her cervical spine    EXAM:     General: Awake, Alert, and oriented. Articulates and communicates with a normal affect       No examination was completed today as this was an MRI follow-up visit    IMAGING:  MRI of her lumbar spine show overall no significant stenosis annular fissure is noted at the L4-5 level worse on the left than the right    ASSESSMENT:  Approximately 4 months following meniscus root repair.  Doing well.  L4-5 annular fissure greater on left than the right    PLAN:   At this time I am going to recommend a left L4-L5 transforaminal epidural steroid injection  I am going to place a referral to primary care sports medicine for evaluation work-up of her cervical spine  She will follow-up with me on an as-needed basis  If she has continued symptoms from either her neck or back I would have her follow-up with one of our spine surgeons after completion of her work-up and results/effects of her lumbar spine injection

## 2022-07-07 NOTE — PROGRESS NOTES
Patient did not return for further treatment and no additional progress was noted.  Please refer to the progress note and goal flowsheet completed on 05/24/22 for discharge information.

## 2022-07-18 ENCOUNTER — LAB (OUTPATIENT)
Dept: LAB | Facility: CLINIC | Age: 57
End: 2022-07-18
Payer: COMMERCIAL

## 2022-07-18 ENCOUNTER — DOCUMENTATION ONLY (OUTPATIENT)
Dept: LAB | Facility: CLINIC | Age: 57
End: 2022-07-18

## 2022-07-18 DIAGNOSIS — Z13.0 SCREENING FOR DEFICIENCY ANEMIA: Primary | ICD-10-CM

## 2022-07-18 DIAGNOSIS — Z13.1 SCREENING FOR DIABETES MELLITUS (DM): ICD-10-CM

## 2022-07-18 DIAGNOSIS — Z13.220 SCREENING FOR HYPERLIPIDEMIA: ICD-10-CM

## 2022-07-18 DIAGNOSIS — G89.29 CHRONIC PAIN OF LEFT KNEE: ICD-10-CM

## 2022-07-18 DIAGNOSIS — Z82.61 FAMILY HISTORY OF RHEUMATOID ARTHRITIS: ICD-10-CM

## 2022-07-18 DIAGNOSIS — E03.9 ACQUIRED HYPOTHYROIDISM: ICD-10-CM

## 2022-07-18 DIAGNOSIS — M25.562 CHRONIC PAIN OF LEFT KNEE: ICD-10-CM

## 2022-07-18 LAB
ALBUMIN SERPL-MCNC: 3.8 G/DL (ref 3.4–5)
ALP SERPL-CCNC: 84 U/L (ref 40–150)
ALT SERPL W P-5'-P-CCNC: 30 U/L (ref 0–50)
ANION GAP SERPL CALCULATED.3IONS-SCNC: 6 MMOL/L (ref 3–14)
AST SERPL W P-5'-P-CCNC: 24 U/L (ref 0–45)
BILIRUB SERPL-MCNC: 0.4 MG/DL (ref 0.2–1.3)
BUN SERPL-MCNC: 18 MG/DL (ref 7–30)
CALCIUM SERPL-MCNC: 9.1 MG/DL (ref 8.5–10.1)
CHLORIDE BLD-SCNC: 111 MMOL/L (ref 94–109)
CHOLEST SERPL-MCNC: 243 MG/DL
CO2 SERPL-SCNC: 25 MMOL/L (ref 20–32)
CREAT SERPL-MCNC: 0.92 MG/DL (ref 0.52–1.04)
ERYTHROCYTE [DISTWIDTH] IN BLOOD BY AUTOMATED COUNT: 12.5 % (ref 10–15)
GFR SERPL CREATININE-BSD FRML MDRD: 72 ML/MIN/1.73M2
GLUCOSE BLD-MCNC: 96 MG/DL (ref 70–99)
HCT VFR BLD AUTO: 43 % (ref 35–47)
HDLC SERPL-MCNC: 58 MG/DL
HGB BLD-MCNC: 14.2 G/DL (ref 11.7–15.7)
HOLD SPECIMEN: NORMAL
LDLC SERPL CALC-MCNC: 152 MG/DL
MCH RBC QN AUTO: 30.9 PG (ref 26.5–33)
MCHC RBC AUTO-ENTMCNC: 33 G/DL (ref 31.5–36.5)
MCV RBC AUTO: 94 FL (ref 78–100)
NONHDLC SERPL-MCNC: 185 MG/DL
PLATELET # BLD AUTO: 230 10E3/UL (ref 150–450)
POTASSIUM BLD-SCNC: 4 MMOL/L (ref 3.4–5.3)
PROT SERPL-MCNC: 7 G/DL (ref 6.8–8.8)
RBC # BLD AUTO: 4.6 10E6/UL (ref 3.8–5.2)
SODIUM SERPL-SCNC: 142 MMOL/L (ref 133–144)
TRIGL SERPL-MCNC: 166 MG/DL
TSH SERPL DL<=0.005 MIU/L-ACNC: 1.11 MU/L (ref 0.4–4)
WBC # BLD AUTO: 5.3 10E3/UL (ref 4–11)

## 2022-07-18 PROCEDURE — 80053 COMPREHEN METABOLIC PANEL: CPT | Performed by: FAMILY MEDICINE

## 2022-07-18 PROCEDURE — 84443 ASSAY THYROID STIM HORMONE: CPT | Performed by: FAMILY MEDICINE

## 2022-07-18 PROCEDURE — 86431 RHEUMATOID FACTOR QUANT: CPT

## 2022-07-18 PROCEDURE — 36415 COLL VENOUS BLD VENIPUNCTURE: CPT

## 2022-07-18 PROCEDURE — 80061 LIPID PANEL: CPT | Performed by: FAMILY MEDICINE

## 2022-07-18 PROCEDURE — 85027 COMPLETE CBC AUTOMATED: CPT | Performed by: FAMILY MEDICINE

## 2022-07-18 NOTE — PROGRESS NOTES
Chanel Israel has an upcoming lab appointment:    Future Appointments   Date Time Provider Department Center   7/19/2022  1:00 PM Gabbie Hicks MD [facility]   7/21/2022 11:20 AM Armani Storey DO [facility] [place]   7/25/2022  9:15 AM Moisés Wu MD [facility] [place]   8/2/2022  1:30 PM Chanel Carballo [facility]   8/10/2022  2:00 PM Chanel Carballo [facility]   8/16/2022  2:30 PM Chanel Carballo [facility]   8/23/2022  8:00 AM [id number] [place]   10/3/2022  2:00 PM Mariella Tripathi MD [facility]     Patient is scheduled for the following lab(s): Patient has upcoming visit appointment with  tomorrow 07/19/22. She came in for labs on 07/18/22, lab marito extra tubes and patient was fasting. She wanted labs for her thyroid and cholesterol,     There is no order available. Please review and place either future orders or HMPO (Review of Health Maintenance Protocol Orders), as appropriate.    Health Maintenance Due   Topic     ANNUAL REVIEW OF HM ORDERS      Razia Rutledge

## 2022-07-19 ENCOUNTER — OFFICE VISIT (OUTPATIENT)
Dept: FAMILY MEDICINE | Facility: CLINIC | Age: 57
End: 2022-07-19
Payer: COMMERCIAL

## 2022-07-19 VITALS
TEMPERATURE: 98.8 F | HEART RATE: 99 BPM | BODY MASS INDEX: 25.43 KG/M2 | DIASTOLIC BLOOD PRESSURE: 74 MMHG | HEIGHT: 62 IN | SYSTOLIC BLOOD PRESSURE: 117 MMHG | WEIGHT: 138.2 LBS | OXYGEN SATURATION: 95 % | RESPIRATION RATE: 16 BRPM

## 2022-07-19 DIAGNOSIS — I89.0 LYMPHEDEMA: ICD-10-CM

## 2022-07-19 DIAGNOSIS — E06.3 HYPOTHYROIDISM DUE TO HASHIMOTO'S THYROIDITIS: ICD-10-CM

## 2022-07-19 DIAGNOSIS — R53.82 CHRONIC FATIGUE: ICD-10-CM

## 2022-07-19 DIAGNOSIS — F41.9 ANXIETY: ICD-10-CM

## 2022-07-19 DIAGNOSIS — Z00.00 ROUTINE GENERAL MEDICAL EXAMINATION AT A HEALTH CARE FACILITY: Primary | ICD-10-CM

## 2022-07-19 DIAGNOSIS — M54.16 LUMBAR RADICULOPATHY: ICD-10-CM

## 2022-07-19 DIAGNOSIS — F33.0 MILD RECURRENT MAJOR DEPRESSION (H): ICD-10-CM

## 2022-07-19 DIAGNOSIS — Z90.13 S/P BILATERAL MASTECTOMY: ICD-10-CM

## 2022-07-19 DIAGNOSIS — E78.5 HYPERLIPIDEMIA LDL GOAL <130: ICD-10-CM

## 2022-07-19 LAB — RHEUMATOID FACT SER NEPH-ACNC: <6 IU/ML

## 2022-07-19 PROCEDURE — 99214 OFFICE O/P EST MOD 30 MIN: CPT | Mod: 25 | Performed by: FAMILY MEDICINE

## 2022-07-19 PROCEDURE — 99396 PREV VISIT EST AGE 40-64: CPT | Performed by: FAMILY MEDICINE

## 2022-07-19 RX ORDER — LEVOTHYROXINE SODIUM 75 MCG
TABLET ORAL
Qty: 90 TABLET | Refills: 3 | Status: SHIPPED | OUTPATIENT
Start: 2022-07-19

## 2022-07-19 RX ORDER — BUPROPION HYDROCHLORIDE 150 MG/1
150 TABLET ORAL EVERY MORNING
Qty: 90 TABLET | Refills: 1 | Status: SHIPPED | OUTPATIENT
Start: 2022-07-19

## 2022-07-19 ASSESSMENT — ENCOUNTER SYMPTOMS
NAUSEA: 0
ABDOMINAL PAIN: 0
FEVER: 0
ARTHRALGIAS: 1
CONSTIPATION: 0
EYE PAIN: 0
DIARRHEA: 0
PALPITATIONS: 0
DYSURIA: 0
COUGH: 0
MYALGIAS: 1
PARESTHESIAS: 0
HEADACHES: 0
DIZZINESS: 0
WEAKNESS: 0
SHORTNESS OF BREATH: 0
CHILLS: 0
HEMATURIA: 0
HEMATOCHEZIA: 0
FREQUENCY: 0
JOINT SWELLING: 1
BREAST MASS: 0
NERVOUS/ANXIOUS: 0
HEARTBURN: 0
SORE THROAT: 0

## 2022-07-19 ASSESSMENT — PAIN SCALES - GENERAL: PAINLEVEL: NO PAIN (0)
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
## 2022-07-21 ENCOUNTER — OFFICE VISIT (OUTPATIENT)
Dept: ORTHOPEDICS | Facility: CLINIC | Age: 57
End: 2022-07-21
Payer: COMMERCIAL

## 2022-07-21 DIAGNOSIS — M54.12 CERVICAL RADICULOPATHY: Primary | ICD-10-CM

## 2022-07-21 PROCEDURE — 99214 OFFICE O/P EST MOD 30 MIN: CPT | Performed by: FAMILY MEDICINE

## 2022-07-21 ASSESSMENT — PAIN SCALES - GENERAL: PAINLEVEL: SEVERE PAIN (6)

## 2022-07-21 NOTE — PROGRESS NOTES
HISTORY OF PRESENT ILLNESS  Ms. Israel is a pleasant 57 year old female who presents to clinic today with neck pain.  Chanel has been experiencing pain in her neck that has been worsening since last seeing me.  This is a chronic issue, although getting worse recently with no single inciting event.  She feels pain that radiates down both trapezius muscles, to her shoulders.  At times she feels weakness in her left arm and hand.    Her low back symptoms are becoming severe.  She has a lumbar spine injection that is scheduled for the near future.    Additional history: as documented    MEDICAL HISTORY  Patient Active Problem List   Diagnosis     Personal history of breast cancer     S/P bilateral mastectomy     Urge incontinence of urine     Overweight (BMI 25.0-29.9)     Mild recurrent major depression (H)     Family history of diabetes mellitus (DM)     Family history of thyroid disease     Plantar fasciitis, bilateral     Seasonal allergies     Mixed hyperlipidemia     Chronic rhinitis     Breast implant asymmetry     ACP (advance care planning)     Lymphedema     Family history of ischemic heart disease     Seasonal allergic rhinitis     Glaucoma suspect, bilateral     Family history of glaucoma     Age-related nuclear cataract of both eyes     Seasonal allergic rhinitis due to other allergic trigger, unspecified chronicity     Family history of breast cancer in sister     Family history of skin cancer     Actinic keratoses     Hyperlipidemia LDL goal <130     Hypothyroidism due to Hashimoto's thyroiditis     Bruxism (teeth grinding)     Anxiety     Loss of hair     Malignant neoplasm of left female breast, unspecified estrogen receptor status, unspecified site of breast (H)     Chronic fatigue     Chronic pain of left knee     Family history of rheumatoid arthritis     Lumbar radiculopathy       Current Outpatient Medications   Medication Sig Dispense Refill     BIOTIN 5000 PO        buPROPion (WELLBUTRIN XL)  150 MG 24 hr tablet Take 1 tablet (150 mg) by mouth every morning Profile Rx 90 tablet 1     Calcium Carbonate-Vitamin D (CALCIUM + D PO) Take by mouth 2 times daily       Coenzyme Q10 (CO Q 10) 100 MG CAPS Take 1 capsule by mouth daily        diclofenac (VOLTAREN) 1 % topical gel Apply 4 g topically 4 times daily as needed for moderate pain 100 g 2     fluocinolone (SYNALAR) 0.025 % cream Apply topically 2 times daily Apply to affected areas of mouth as needed. 60 g 5     fluticasone (FLONASE) 50 MCG/ACT nasal spray as needed   0     Glucosamine-Chondroitin-MSM (TRIPLE FLEX PO) Take by mouth 2 times daily       hydrocortisone 2.5 % cream Apply topically as needed       ketoconazole (NIZORAL) 2 % external cream Apply topically daily 100 g 1     L-GLUTAMINE 1 capsule 2 times daily        MAGNESIUM PO Take 1 capsule by mouth daily        melatonin 5 MG tablet Take 5 mg by mouth nightly as needed for sleep       Multiple Vitamin (MULTI-VITAMIN PO) Take by mouth daily        naproxen (NAPROSYN) 500 MG tablet Take 1 tablet (500 mg) by mouth 2 times daily as needed for moderate pain 30 tablet 0     NIACIN CR PO Take 500 mg by mouth       order for DME 1: Gradient Compression Wraps; 2: LUE compression sleeve with handpiece or guantlet; 20-30 mm Hg; 3: LUE velcro compression sleeve; 4: Compression camisole/bra/tank top 1 each 0     order for DME Compression sleeve to bilateral UE. 2 Units 6     Probiotic Product (PROBIOTIC DAILY PO) Take 1 capsule by mouth 2 times daily       sertraline (ZOLOFT) 50 MG tablet TAKE 1& 1/2 TABLETS BY MOUTH EVERY EVENING, profile Rx 135 tablet 1     SYNTHROID 75 MCG tablet TAKE 1 TABLET(75 MCG) BY MOUTH DAILY 90 tablet 3     UNABLE TO FIND Take 1 tablet by mouth daily MEDICATION NAME: Sambrocal Elderberry Supplement       zinc 50 MG TABS Take 54 mg by mouth daily         Allergies   Allergen Reactions     Erythromycin Nausea       Family History   Problem Relation Age of Onset     Diabetes  Mother         type 2     Thyroid Disease Mother      Glaucoma Mother      Macular Degeneration Mother      Hypertension Mother      Hyperlipidemia Mother      Obesity Mother      Diabetes Sister      Thyroid Disease Sister      Breast Cancer Sister         Estrogen+progesterone +     Depression Sister      Endometrial Cancer Sister 51     Breast Cancer Sister      Cancer Father         skin cancer     Glaucoma Father      Hypertension Father      Hyperlipidemia Father      Other Cancer Father         skin     Obesity Father      Coronary Artery Disease Brother          at 43     Cerebrovascular Disease Maternal Grandfather      Diabetes Sister         type 2     Depression Sister         clinical depression     Mental Illness Sister         Bipolar Disorder     Obesity Sister         fatty liver, hepatitis     Hyperlipidemia Sister      Prostate Cancer Paternal Grandfather      Other Cancer Sister         endometrial     Other Cancer Brother         skin     Obesity Brother      Other Cancer Paternal Grandmother         Lung     Osteoporosis Paternal Grandmother      Other Cancer Other         Bladder     Depression Sister         Personality disorder     Anxiety Disorder Sister      Thyroid Disease Sister         Hashimotos     Osteoporosis Sister      Anxiety Disorder Sister         unmedicated     Mental Illness Nephew         Bipolar Disorder     Substance Abuse Nephew         recreational drugs     Mental Illness Brother         unspecified     Substance Abuse Brother         recreational drugs     Substance Abuse Nephew         recreational drugs     Substance Abuse Nephew         recreational drug, alcohol     Asthma Sister         childhood asthma       Additional medical/Social/Surgical histories reviewed in EPIC and updated as appropriate.        PHYSICAL EXAM  General  - normal appearance, in no obvious distress  HEENT  - conjunctivae not injected, moist mucous membranes  CV  - normal peripheral  perfusion  Pulm  - normal respiratory pattern, non-labored  Musculoskeletal - cervical spine  - inspection: normal bone and joint alignment, no obvious kyphosis  - palpation: no paravertebral or bony tenderness  - ROM: pain with rotation and sidebending  - strength: upper extremities 5/5 in all planes  - special tests:  (-) Spurling bilaterally  (-) Henri's reflex  Neuro  - C5-7 DTRs 2+ bilaterally, no sensory or motor deficit, grossly normal coordination, normal muscle tone  Skin  - no ecchymosis, erythema, warmth, or induration, no obvious rash  Psych  - interactive, appropriate, normal mood and affect             ASSESSMENT & PLAN  Ms. Israel is a 57 year old female who presents to clinic today with neck and bilateral shoulder pain.    I reviewed her previous cervical spine x-ray in the room with her, this does show straightening of the normal lordotic curve at multiple levels of degenerative change.  Given her failure of conservative activities thus far I am ordering an MRI.  I will get in touch with her with the results.    For the meantime she is going to continue physical therapy for her low back and for her neck, which are mildly and intermittently helpful.    It was a pleasure seeing Ana today.    Armani Storey DO, [facility]  Primary Care Sports Medicine      This note was constructed using Dragon dictation software, please excuse any minor errors in spelling, grammar, or syntax.

## 2022-07-21 NOTE — PATIENT INSTRUCTIONS
Thanks for coming today.  Ortho/Sports Medicine Clinic  [address] [place] [address]    To schedule future appointments in Ortho Clinic, you may call [phone].    To schedule ordered imaging by your Provider: Call [facility] at [phone]    [employer] available online at:   [email or web address]    Please call if any further questions or concerns [phone] and ask for the Orthopedic Department. Clinic hours 8 am to 5 pm.    Return to clinic if symptoms worsen.

## 2022-07-21 NOTE — LETTER
7/21/2022         RE: Chanel Israel  [address]  [facility] [address]        Dear Colleague,    Thank you for referring your patient, Chanel Israel, to the [facility] [place]. Please see a copy of my visit note below.      HISTORY OF PRESENT ILLNESS  Ms. Israel is a pleasant 57 year old female who presents to clinic today with neck pain.  Chanel has been experiencing pain in her neck that has been worsening since last seeing me.  This is a chronic issue, although getting worse recently with no single inciting event.  She feels pain that radiates down both trapezius muscles, to her shoulders.  At times she feels weakness in her left arm and hand.    Her low back symptoms are becoming severe.  She has a lumbar spine injection that is scheduled for the near future.    Additional history: as documented    MEDICAL HISTORY  Patient Active Problem List   Diagnosis     Personal history of breast cancer     S/P bilateral mastectomy     Urge incontinence of urine     Overweight (BMI 25.0-29.9)     Mild recurrent major depression (H)     Family history of diabetes mellitus (DM)     Family history of thyroid disease     Plantar fasciitis, bilateral     Seasonal allergies     Mixed hyperlipidemia     Chronic rhinitis     Breast implant asymmetry     ACP (advance care planning)     Lymphedema     Family history of ischemic heart disease     Seasonal allergic rhinitis     Glaucoma suspect, bilateral     Family history of glaucoma     Age-related nuclear cataract of both eyes     Seasonal allergic rhinitis due to other allergic trigger, unspecified chronicity     Family history of breast cancer in sister     Family history of skin cancer     Actinic keratoses     Hyperlipidemia LDL goal <130     Hypothyroidism due to Hashimoto's thyroiditis     Bruxism (teeth grinding)     Anxiety     Loss of hair     Malignant neoplasm of left female breast, unspecified estrogen receptor status,  unspecified site of breast (H)     Chronic fatigue     Chronic pain of left knee     Family history of rheumatoid arthritis     Lumbar radiculopathy       Current Outpatient Medications   Medication Sig Dispense Refill     BIOTIN 5000 PO        buPROPion (WELLBUTRIN XL) 150 MG 24 hr tablet Take 1 tablet (150 mg) by mouth every morning Profile Rx 90 tablet 1     Calcium Carbonate-Vitamin D (CALCIUM + D PO) Take by mouth 2 times daily       Coenzyme Q10 (CO Q 10) 100 MG CAPS Take 1 capsule by mouth daily        diclofenac (VOLTAREN) 1 % topical gel Apply 4 g topically 4 times daily as needed for moderate pain 100 g 2     fluocinolone (SYNALAR) 0.025 % cream Apply topically 2 times daily Apply to affected areas of mouth as needed. 60 g 5     fluticasone (FLONASE) 50 MCG/ACT nasal spray as needed   0     Glucosamine-Chondroitin-MSM (TRIPLE FLEX PO) Take by mouth 2 times daily       hydrocortisone 2.5 % cream Apply topically as needed       ketoconazole (NIZORAL) 2 % external cream Apply topically daily 100 g 1     L-GLUTAMINE 1 capsule 2 times daily        MAGNESIUM PO Take 1 capsule by mouth daily        melatonin 5 MG tablet Take 5 mg by mouth nightly as needed for sleep       Multiple Vitamin (MULTI-VITAMIN PO) Take by mouth daily        naproxen (NAPROSYN) 500 MG tablet Take 1 tablet (500 mg) by mouth 2 times daily as needed for moderate pain 30 tablet 0     NIACIN CR PO Take 500 mg by mouth       order for DME 1: Gradient Compression Wraps; 2: LUE compression sleeve with handpiece or guantlet; 20-30 mm Hg; 3: LUE velcro compression sleeve; 4: Compression camisole/bra/tank top 1 each 0     order for DME Compression sleeve to bilateral UE. 2 Units 6     Probiotic Product (PROBIOTIC DAILY PO) Take 1 capsule by mouth 2 times daily       sertraline (ZOLOFT) 50 MG tablet TAKE 1& 1/2 TABLETS BY MOUTH EVERY EVENING, profile Rx 135 tablet 1     SYNTHROID 75 MCG tablet TAKE 1 TABLET(75 MCG) BY MOUTH DAILY 90 tablet 3      UNABLE TO FIND Take 1 tablet by mouth daily MEDICATION NAME: Marisela Elderberry Supplement       zinc 50 MG TABS Take 54 mg by mouth daily         Allergies   Allergen Reactions     Erythromycin Nausea       Family History   Problem Relation Age of Onset     Diabetes Mother         type 2     Thyroid Disease Mother      Glaucoma Mother      Macular Degeneration Mother      Hypertension Mother      Hyperlipidemia Mother      Obesity Mother      Diabetes Sister      Thyroid Disease Sister      Breast Cancer Sister         Estrogen+progesterone +     Depression Sister      Endometrial Cancer Sister 51     Breast Cancer Sister      Cancer Father         skin cancer     Glaucoma Father      Hypertension Father      Hyperlipidemia Father      Other Cancer Father         skin     Obesity Father      Coronary Artery Disease Brother          at 43     Cerebrovascular Disease Maternal Grandfather      Diabetes Sister         type 2     Depression Sister         clinical depression     Mental Illness Sister         Bipolar Disorder     Obesity Sister         fatty liver, hepatitis     Hyperlipidemia Sister      Prostate Cancer Paternal Grandfather      Other Cancer Sister         endometrial     Other Cancer Brother         skin     Obesity Brother      Other Cancer Paternal Grandmother         Lung     Osteoporosis Paternal Grandmother      Other Cancer Other         Bladder     Depression Sister         Personality disorder     Anxiety Disorder Sister      Thyroid Disease Sister         Hashimotos     Osteoporosis Sister      Anxiety Disorder Sister         unmedicated     Mental Illness Nephew         Bipolar Disorder     Substance Abuse Nephew         recreational drugs     Mental Illness Brother         unspecified     Substance Abuse Brother         recreational drugs     Substance Abuse Nephew         recreational drugs     Substance Abuse Nephew         recreational drug, alcohol     Asthma Sister          childhood asthma       Additional medical/Social/Surgical [facility] and updated as appropriate.        PHYSICAL EXAM  General  - normal appearance, in no obvious distress  HEENT  - conjunctivae not injected, moist mucous membranes  CV  - normal peripheral perfusion  Pulm  - normal respiratory pattern, non-labored  Musculoskeletal - cervical spine  - inspection: normal bone and joint alignment, no obvious kyphosis  - palpation: no paravertebral or bony tenderness  - ROM: pain with rotation and sidebending  - strength: upper extremities 5/5 in all planes  - special tests:  (-) Spurling bilaterally  (-) Henri's reflex  Neuro  - C5-7 DTRs 2+ bilaterally, no sensory or motor deficit, grossly normal coordination, normal muscle tone  Skin  - no ecchymosis, erythema, warmth, or induration, no obvious rash  Psych  - interactive, appropriate, normal mood and affect             ASSESSMENT & PLAN  Ms. Israel is a 57 year old female who presents to clinic today with neck and bilateral shoulder pain.    I reviewed her previous cervical spine x-ray in the room with her, this does show straightening of the normal lordotic curve at multiple levels of degenerative change.  Given her failure of conservative activities thus far I am ordering an MRI.  I will get in touch with her with the results.    For the meantime she is going to continue physical therapy for her low back and for her neck, which are mildly and intermittently helpful.    It was a pleasure seeing Ana today.    Armani Storey DO, [facility]  Primary Care Sports Medicine      This note was constructed using Dragon dictation software, please excuse any minor errors in spelling, grammar, or syntax.        Again, thank you for allowing me to participate in the care of your patient.        Sincerely,        Armani Storey DO

## 2022-07-21 NOTE — NURSING NOTE
Chief Complaint   Patient presents with     Neck - Pain     Lower Back - Pain       There were no vitals filed for this visit.    There is no height or weight on file to calculate BMI.      AUBREY Jaquez NREMT

## 2022-07-25 ENCOUNTER — OFFICE VISIT (OUTPATIENT)
Dept: DERMATOLOGY | Facility: CLINIC | Age: 57
End: 2022-07-25
Payer: COMMERCIAL

## 2022-07-25 DIAGNOSIS — D18.01 CHERRY ANGIOMA: ICD-10-CM

## 2022-07-25 DIAGNOSIS — L81.4 SOLAR LENTIGO: Primary | ICD-10-CM

## 2022-07-25 DIAGNOSIS — D22.9 MULTIPLE BENIGN NEVI: ICD-10-CM

## 2022-07-25 DIAGNOSIS — L82.1 SEBORRHEIC KERATOSES: ICD-10-CM

## 2022-07-25 PROCEDURE — 99213 OFFICE O/P EST LOW 20 MIN: CPT | Performed by: DERMATOLOGY

## 2022-07-25 NOTE — PATIENT INSTRUCTIONS
Patient Education     Checking for Skin Cancer  You can find cancer early by checking your skin each month. There are 3 kinds of skin cancer. They are melanoma, basal cell carcinoma, and squamous cell carcinoma. Doing monthly skin checks is the best way to find new marks or skin changes. Follow the instructions below for checking your skin.   The ABCDEs of checking moles for melanoma   Check your moles or growths for signs of melanoma using ABCDE:   Asymmetry: the sides of the mole or growth don t match  Border: the edges are ragged, notched, or blurred  Color: the color within the mole or growth varies  Diameter: the mole or growth is larger than 6 mm (size of a pencil eraser)  Evolving: the size, shape, or color of the mole or growth is changing (evolving is not shown in the images below)    Checking for other types of skin cancer  Basal cell carcinoma or squamous cell carcinoma have symptoms such as:     A spot or mole that looks different from all other marks on your skin  Changes in how an area feels, such as itching, tenderness, or pain  Changes in the skin's surface, such as oozing, bleeding, or scaliness  A sore that does not heal  New swelling or redness beyond the border of a mole    Who s at risk?  Anyone can get skin cancer. But you are at greater risk if you have:   Fair skin, light-colored hair, or light-colored eyes  Many moles or abnormal moles on your skin  A history of sunburns from sunlight or tanning beds  A family history of skin cancer  A history of exposure to radiation or chemicals  A weakened immune system  If you have had skin cancer in the past, you are at risk for recurring skin cancer.   How to check your skin  Do your monthly skin checkups in front of a full-length mirror. Check all parts of your body, including your:   Head (ears, face, neck, and scalp)  Torso (front, back, and sides)  Arms (tops, undersides, upper, and lower armpits)  Hands (palms, backs, and fingers, including  under the nails)  Buttocks and genitals  Legs (front, back, and sides)  Feet (tops, soles, toes, including under the nails, and between toes)  If you have a lot of moles, take digital photos of them each month. Make sure to take photos both up close and from a distance. These can help you see if any moles change over time.   Most skin changes are not cancer. But if you see any changes in your skin, call your doctor right away. Only he or she can diagnose a problem. If you have skin cancer, seeing your doctor can be the first step toward getting the treatment that could save your life.   [employer] last reviewed this educational content on 4/1/2019 2000-2020 [employer]. [address] [place] [address], [place] [address]. All rights reserved. This information is not intended as a substitute for professional medical care. Always follow your healthcare professional's instructions.       When should I call my doctor?  If you are worsening or not improving, please, contact us or seek urgent care as noted below.     Who should I call with questions (adults)?  [facility] (adult and pediatric): [phone]  [facility] (adult): [phone]  For urgent needs outside of business hours call the [facility] at [phone] and ask for the dermatology resident on call to be paged  If this is a medical emergency and you are unable to reach an ER, Call [phone]    Who should I call with questions (pediatric)?  [facility]- Pediatric Dermatology  Dr. Mary Herron, Dr. Jackie Amaya, Dr. Danna Keating, ABELINO García, Dr. Trixie Serra, Dr. Pamela Izquierdo & Dr. Armani Cotto  Non-urgent nurse triage line; [phone]- Tiff and Mare KELLY Care Coordinatorjohn Carr (/Complex ) [phone]    If you need a prescription refill, please contact your pharmacy. Refills are approved or denied by our  Physicians during normal business hours, Monday through Fridays  Per office policy, refills will not be granted if you have not been seen within the past year (or sooner depending on your child's condition)    Scheduling Information:  Pediatric Appointment Scheduling and Call Center [phone]  Radiology Scheduling- [phone]  Sedation Unit Scheduling- [phone]  [place] Scheduling- General [phone]; Pediatric Dermatology [phone]  Main  Services: [phone]  Nepali: [phone]  Angolan: [phone]  Hmong/Maltese/Syriac: [phone]  Preadmission Nursing Department Fax Number: [phone] (Fax all pre-operative paperwork to this number)    For urgent matters arising during evenings, weekends, or holidays that cannot wait for normal business hours please call [phone] and ask for the dermatology resident on call to be paged.

## 2022-07-25 NOTE — NURSING NOTE
Chanel Israel's goals for this visit include:   Chief Complaint   Patient presents with     Skin Check     Area of concern on back/ no hx of skin cancer- brother has had nmsc     She requests these members of her care team be copied on today's visit information:     PCP: Gabbie Hicks    Referring Provider:  Referred Self, MD  No address on file    There were no vitals taken for this visit.    Do you need any medication refills at today's visit? No  Janel Woodard, LITA on 7/25/2022 at 9:06 AM

## 2022-07-25 NOTE — PROGRESS NOTES
[facility] Dermatology Note  Encounter Date: Jul 25, 2022  Office Visit     Dermatology Problem List:  1.Relevant medical history : breast cancer s/p bilateral mastectomy and chemotherapy  2. History of telogen effluvium  3. History of perleche  4. AKs: cryo  ____________________________________________    Assessment & Plan:    # Benign lesions: Multiple benign nevi, solar lentigos, seborrheic keratoses, cherry angiomas. Explained to patient benign nature of lesion. No treatment is necessary at this time unless the lesion changes or becomes symptomatic.   - ABCDs of melanoma were discussed and self skin checks were advised.  - Sun precaution was advised including the use of sun screens of SPF 30 or higher, sun protective clothing, and avoidance of tanning beds.    Procedures Performed:   None.    Follow-up: 1 year(s) in-person, or earlier for new or changing lesions    Staff and Scribe:     Scribe Disclosure:   I, Calin De La Rosa, am serving as a scribe to document services personally performed by this physician, Dr. Moisés Wu, based on data collection and the provider's statements to me.     Provider Disclosure:   The documentation recorded by the scribe accurately reflects the services I personally performed and the decisions made by me.    Moisés Wu MD    Department of Dermatology  [facility]: Phone: [phone], Fax:[phone]  [facility]: Phone: [phone] Fax: [phone]  ____________________________________________    CC: Skin Check (Area of concern on back/ no hx of skin cancer- brother has had nmsc)    HPI:  Ms. Chanel Israel is a(n) 57 year old female who presents today as a return patient for a skin check.    Last seeen 11/1/2019 for a skin check with Dr. Tapia. One AK was treated at that time.    Today, she has an area of concern on her  back. She notes painful blistering after sunburn.    The patient otherwise denies any new or concerning lesions. No bleeding, painful, pruritic, or changing lesions. They report no personal history of skin cancer. There is a family history of skin cancer (brother, NMSC). No history of immunosuppression. A history of indoor tanning. They do regularly use sunscreen and protective clothing when outdoors for sun protection. No occupational exposure to ultraviolet light or other forms of radiation. Patient is a special . Health otherwise stable. No other skin concerns.       Labs Reviewed:  N/A    Physical Exam:  Vitals: There were no vitals taken for this visit.  SKIN: Full skin, which includes the head/face, both arms, chest, back, abdomen,both legs, genitalia and/or groin buttocks, digits and/or nails, was examined.  - There are dome shaped bright red papules on the trunk and extremities.   - Multiple regular brown pigmented macules and papules are identified on the trunk and extremities.   - Scattered brown macules on sun exposed areas.  - There are waxy stuck on tan to brown papules on the trunk and extremities.    - No other lesions of concern on areas examined.     Medications:  Current Outpatient Medications   Medication     BIOTIN 5000 PO     buPROPion (WELLBUTRIN XL) 150 MG 24 hr tablet     Calcium Carbonate-Vitamin D (CALCIUM + D PO)     Coenzyme Q10 (CO Q 10) 100 MG CAPS     diclofenac (VOLTAREN) 1 % topical gel     fluocinolone (SYNALAR) 0.025 % cream     fluticasone (FLONASE) 50 MCG/ACT nasal spray     Glucosamine-Chondroitin-MSM (TRIPLE FLEX PO)     hydrocortisone 2.5 % cream     ketoconazole (NIZORAL) 2 % external cream     L-GLUTAMINE     MAGNESIUM PO     melatonin 5 MG tablet     Multiple Vitamin (MULTI-VITAMIN PO)     naproxen (NAPROSYN) 500 MG tablet     NIACIN CR PO     order for DME     order for DME     Probiotic Product (PROBIOTIC DAILY PO)     sertraline (ZOLOFT) 50 MG tablet      SYNTHROID 75 MCG tablet     UNABLE TO FIND     zinc 50 MG TABS     Current Facility-Administered Medications   Medication     triamcinolone (KENALOG-40) injection 40 mg     triamcinolone (KENALOG-40) injection 40 mg      Past Medical History:   Patient Active Problem List   Diagnosis     Personal history of breast cancer     S/P bilateral mastectomy     Urge incontinence of urine     Overweight (BMI 25.0-29.9)     Mild recurrent major depression (H)     Family history of diabetes mellitus (DM)     Family history of thyroid disease     Plantar fasciitis, bilateral     Seasonal allergies     Mixed hyperlipidemia     Chronic rhinitis     Breast implant asymmetry     ACP (advance care planning)     Lymphedema     Family history of ischemic heart disease     Seasonal allergic rhinitis     Glaucoma suspect, bilateral     Family history of glaucoma     Age-related nuclear cataract of both eyes     Seasonal allergic rhinitis due to other allergic trigger, unspecified chronicity     Family history of breast cancer in sister     Family history of skin cancer     Actinic keratoses     Hyperlipidemia LDL goal <130     Hypothyroidism due to Hashimoto's thyroiditis     Bruxism (teeth grinding)     Anxiety     Loss of hair     Malignant neoplasm of left female breast, unspecified estrogen receptor status, unspecified site of breast (H)     Chronic fatigue     Chronic pain of left knee     Family history of rheumatoid arthritis     Lumbar radiculopathy     Past Medical History:   Diagnosis Date     Anal fissure 10/01/2021     Asthma, mild intermittent      Breast cancer (H)      Depression      Depressive disorder 08/2000    postpartum     Endometriosis      Heart murmur     Patient reported.     Loss of hair 08/11/2020     PONV (postoperative nausea and vomiting)      Thyroid disease 08/2015    Hashimotos

## 2022-07-25 NOTE — LETTER
7/25/2022         RE: Chanel Israel  [address]  [facility] [address]        Dear Colleague,    Thank you for referring your patient, Chanel Israel, to the [facility]. Please see a copy of my visit note below.    [facility] Dermatology Note  Encounter Date: Jul 25, 2022  Office Visit     Dermatology Problem List:  1.Relevant medical history : breast cancer s/p bilateral mastectomy and chemotherapy  2. History of telogen effluvium  3. History of perleche  4. AKs: cryo  ____________________________________________    Assessment & Plan:    # Benign lesions: Multiple benign nevi, solar lentigos, seborrheic keratoses, cherry angiomas. Explained to patient benign nature of lesion. No treatment is necessary at this time unless the lesion changes or becomes symptomatic.   - ABCDs of melanoma were discussed and self skin checks were advised.  - Sun precaution was advised including the use of sun screens of SPF 30 or higher, sun protective clothing, and avoidance of tanning beds.    Procedures Performed:   None.    Follow-up: 1 year(s) in-person, or earlier for new or changing lesions    Staff and Scribe:     Scribe Disclosure:   I, Calin De La Rosa, am serving as a scribe to document services personally performed by this physician, Dr. Moisés Wu, based on data collection and the provider's statements to me.     Provider Disclosure:   The documentation recorded by the scribe accurately reflects the services I personally performed and the decisions made by me.    Moisés Wu MD    Department of Dermatology  [facility]: Phone: [phone], Fax:[phone]  [facility]: Phone: [phone] Fax: [phone]  ____________________________________________    CC: Skin Check (Area of concern on back/ no hx of skin cancer- brother has had  nmsc)    HPI:  Ms. Chanel Israel is a(n) 57 year old female who presents today as a return patient for a skin check.    Last seeen 11/1/2019 for a skin check with Dr. Tapia. One AK was treated at that time.    Today, she has an area of concern on her back. She notes painful blistering after sunburn.    The patient otherwise denies any new or concerning lesions. No bleeding, painful, pruritic, or changing lesions. They report no personal history of skin cancer. There is a family history of skin cancer (brother, NMSC). No history of immunosuppression. A history of indoor tanning. They do regularly use sunscreen and protective clothing when outdoors for sun protection. No occupational exposure to ultraviolet light or other forms of radiation. Patient is a special . Health otherwise stable. No other skin concerns.       Labs Reviewed:  N/A    Physical Exam:  Vitals: There were no vitals taken for this visit.  SKIN: Full skin, which includes the head/face, both arms, chest, back, abdomen,both legs, genitalia and/or groin buttocks, digits and/or nails, was examined.  - There are dome shaped bright red papules on the trunk and extremities.   - Multiple regular brown pigmented macules and papules are identified on the trunk and extremities.   - Scattered brown macules on sun exposed areas.  - There are waxy stuck on tan to brown papules on the trunk and extremities.    - No other lesions of concern on areas examined.     Medications:  Current Outpatient Medications   Medication     BIOTIN 5000 PO     buPROPion (WELLBUTRIN XL) 150 MG 24 hr tablet     Calcium Carbonate-Vitamin D (CALCIUM + D PO)     Coenzyme Q10 (CO Q 10) 100 MG CAPS     diclofenac (VOLTAREN) 1 % topical gel     fluocinolone (SYNALAR) 0.025 % cream     fluticasone (FLONASE) 50 MCG/ACT nasal spray     Glucosamine-Chondroitin-MSM (TRIPLE FLEX PO)     hydrocortisone 2.5 % cream     ketoconazole (NIZORAL) 2 % external cream     L-GLUTAMINE      MAGNESIUM PO     melatonin 5 MG tablet     Multiple Vitamin (MULTI-VITAMIN PO)     naproxen (NAPROSYN) 500 MG tablet     NIACIN CR PO     order for DME     order for DME     Probiotic Product (PROBIOTIC DAILY PO)     sertraline (ZOLOFT) 50 MG tablet     SYNTHROID 75 MCG tablet     UNABLE TO FIND     zinc 50 MG TABS     Current Facility-Administered Medications   Medication     triamcinolone (KENALOG-40) injection 40 mg     triamcinolone (KENALOG-40) injection 40 mg      Past Medical History:   Patient Active Problem List   Diagnosis     Personal history of breast cancer     S/P bilateral mastectomy     Urge incontinence of urine     Overweight (BMI 25.0-29.9)     Mild recurrent major depression (H)     Family history of diabetes mellitus (DM)     Family history of thyroid disease     Plantar fasciitis, bilateral     Seasonal allergies     Mixed hyperlipidemia     Chronic rhinitis     Breast implant asymmetry     ACP (advance care planning)     Lymphedema     Family history of ischemic heart disease     Seasonal allergic rhinitis     Glaucoma suspect, bilateral     Family history of glaucoma     Age-related nuclear cataract of both eyes     Seasonal allergic rhinitis due to other allergic trigger, unspecified chronicity     Family history of breast cancer in sister     Family history of skin cancer     Actinic keratoses     Hyperlipidemia LDL goal <130     Hypothyroidism due to Hashimoto's thyroiditis     Bruxism (teeth grinding)     Anxiety     Loss of hair     Malignant neoplasm of left female breast, unspecified estrogen receptor status, unspecified site of breast (H)     Chronic fatigue     Chronic pain of left knee     Family history of rheumatoid arthritis     Lumbar radiculopathy     Past Medical History:   Diagnosis Date     Anal fissure 10/01/2021     Asthma, mild intermittent      Breast cancer (H)      Depression      Depressive disorder 08/2000    postpartum     Endometriosis      Heart murmur     Patient  reported.     Loss of hair 08/11/2020     PONV (postoperative nausea and vomiting)      Thyroid disease 08/2015    Hashimotos            Again, thank you for allowing me to participate in the care of your patient.        Sincerely,        Moisés Wu MD

## 2022-08-02 ENCOUNTER — VIRTUAL VISIT (OUTPATIENT)
Dept: PSYCHOLOGY | Facility: CLINIC | Age: 57
End: 2022-08-02
Payer: COMMERCIAL

## 2022-08-02 DIAGNOSIS — F33.0 MILD RECURRENT MAJOR DEPRESSION (H): Primary | ICD-10-CM

## 2022-08-02 DIAGNOSIS — F41.9 ANXIETY: ICD-10-CM

## 2022-08-02 PROCEDURE — 90791 PSYCH DIAGNOSTIC EVALUATION: CPT | Mod: GT | Performed by: COUNSELOR

## 2022-08-02 ASSESSMENT — PATIENT HEALTH QUESTIONNAIRE - PHQ9
SUM OF ALL RESPONSES TO PHQ QUESTIONS 1-9: 7
SUM OF ALL RESPONSES TO PHQ QUESTIONS 1-9: 7
10. IF YOU CHECKED OFF ANY PROBLEMS, HOW DIFFICULT HAVE THESE PROBLEMS MADE IT FOR YOU TO DO YOUR WORK, TAKE CARE OF THINGS AT HOME, OR GET ALONG WITH OTHER PEOPLE: SOMEWHAT DIFFICULT
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
## 2022-08-08 ENCOUNTER — ANCILLARY PROCEDURE (OUTPATIENT)
Dept: MRI IMAGING | Facility: CLINIC | Age: 57
End: 2022-08-08
Attending: FAMILY MEDICINE
Payer: COMMERCIAL

## 2022-08-08 DIAGNOSIS — M54.12 CERVICAL RADICULOPATHY: ICD-10-CM

## 2022-08-08 PROCEDURE — 72141 MRI NECK SPINE W/O DYE: CPT | Mod: GC | Performed by: RADIOLOGY

## 2022-08-16 DIAGNOSIS — M54.16 LUMBAR RADICULOPATHY: Primary | ICD-10-CM

## 2022-08-16 DIAGNOSIS — M54.12 CERVICAL RADICULOPATHY: ICD-10-CM

## 2022-08-23 ENCOUNTER — ANCILLARY PROCEDURE (OUTPATIENT)
Dept: GENERAL RADIOLOGY | Facility: CLINIC | Age: 57
End: 2022-08-23
Attending: ORTHOPAEDIC SURGERY
Payer: COMMERCIAL

## 2022-08-23 DIAGNOSIS — M54.16 LEFT LUMBAR RADICULOPATHY: ICD-10-CM

## 2022-08-23 PROCEDURE — 62323 NJX INTERLAMINAR LMBR/SAC: CPT | Performed by: RADIOLOGY

## 2022-08-23 RX ORDER — LIDOCAINE HYDROCHLORIDE 10 MG/ML
5 INJECTION, SOLUTION EPIDURAL; INFILTRATION; INTRACAUDAL; PERINEURAL ONCE
Status: COMPLETED | OUTPATIENT
Start: 2022-08-23 | End: 2022-08-23

## 2022-08-23 RX ORDER — METHYLPREDNISOLONE ACETATE 80 MG/ML
80 INJECTION, SUSPENSION INTRA-ARTICULAR; INTRALESIONAL; INTRAMUSCULAR; SOFT TISSUE ONCE
Status: COMPLETED | OUTPATIENT
Start: 2022-08-23 | End: 2022-08-23

## 2022-08-23 RX ORDER — BUPIVACAINE HYDROCHLORIDE 2.5 MG/ML
4 INJECTION, SOLUTION INFILTRATION; PERINEURAL ONCE
Status: COMPLETED | OUTPATIENT
Start: 2022-08-23 | End: 2022-08-23

## 2022-08-23 RX ORDER — IOPAMIDOL 408 MG/ML
2 INJECTION, SOLUTION INTRATHECAL ONCE
Status: COMPLETED | OUTPATIENT
Start: 2022-08-23 | End: 2022-08-23

## 2022-08-23 RX ADMIN — BUPIVACAINE HYDROCHLORIDE 10 MG: 2.5 INJECTION, SOLUTION INFILTRATION; PERINEURAL at 08:44

## 2022-08-23 RX ADMIN — IOPAMIDOL 2 ML: 408 INJECTION, SOLUTION INTRATHECAL at 08:44

## 2022-08-23 RX ADMIN — LIDOCAINE HYDROCHLORIDE 5 ML: 10 INJECTION, SOLUTION EPIDURAL; INFILTRATION; INTRACAUDAL; PERINEURAL at 08:44

## 2022-08-23 RX ADMIN — METHYLPREDNISOLONE ACETATE 80 MG: 80 INJECTION, SUSPENSION INTRA-ARTICULAR; INTRALESIONAL; INTRAMUSCULAR; SOFT TISSUE at 08:44

## 2022-08-23 NOTE — PROGRESS NOTES
Chanel was seen in X-ray today for a Lumbar epidural injection. Patient rated pain before procedure 6/10. After procedure patient rated pain 5/10.   This pain level is acceptable to patient. Patient discharged home with .

## 2022-08-23 NOTE — DISCHARGE SUMMARY
Radiology Discharge Instructions Post Lumbar Puncture  AFTER YOU GO HOME  ? DO relax and take it easy for 24 hours; we suggest bed rest until the next morning, except for getting up to the bathroom  ? You may resume normal activity tomorrow  ? You may remove the bandage on your back in the evening or next morning  ? You may resume bathing the next day  ? Drink at least 4 glasses of extra fluid today if not on a fluid restriction.  ? DO NOT drive or operate machinery at home or at work for at least 24 hours.    CALL YOUR PRIMARY PHYSICIAN IF:  ? If you do start to leak a large amount of fluid from the puncture site, lie down flat on your back. Call your primary physician they will tell you if you need to return to the hospital.  ? You develop severe headache  ? You develop nausea or vomiting.  ? You develop a temperature of 101 degrees F or greater.    ADDITIONAL INSTRUCTIONS:   ? If you are taking a blood thinner, you may resume your medication at your regular dose today.  Follow up with your physician to have your INR rechecked if indicated.  ? You may use over the counter pain reliever, do not use aspirin for 24 hours.    Contacts:  During business hours from 8 to 5 pm, you may call [phone] to reach a nurse advisor.  After hours call [facility] [phone].  Ask for the Radiologist on call.  Someone is on call 24 hrs/day.  [facility] Toll Free Number   [phone]

## 2022-09-02 ENCOUNTER — PRE VISIT (OUTPATIENT)
Dept: NEUROSURGERY | Facility: CLINIC | Age: 57
End: 2022-09-02

## 2022-09-02 ENCOUNTER — OFFICE VISIT (OUTPATIENT)
Dept: NEUROSURGERY | Facility: CLINIC | Age: 57
End: 2022-09-02
Payer: COMMERCIAL

## 2022-09-02 ENCOUNTER — ANCILLARY PROCEDURE (OUTPATIENT)
Dept: GENERAL RADIOLOGY | Facility: CLINIC | Age: 57
End: 2022-09-02
Attending: ORTHOPAEDIC SURGERY
Payer: COMMERCIAL

## 2022-09-02 VITALS
HEART RATE: 105 BPM | WEIGHT: 138 LBS | BODY MASS INDEX: 25.4 KG/M2 | HEIGHT: 62 IN | SYSTOLIC BLOOD PRESSURE: 123 MMHG | DIASTOLIC BLOOD PRESSURE: 69 MMHG

## 2022-09-02 DIAGNOSIS — M54.12 CERVICAL RADICULOPATHY: Primary | ICD-10-CM

## 2022-09-02 DIAGNOSIS — M54.12 CERVICAL RADICULOPATHY: ICD-10-CM

## 2022-09-02 PROCEDURE — 72050 X-RAY EXAM NECK SPINE 4/5VWS: CPT | Mod: GC | Performed by: RADIOLOGY

## 2022-09-02 PROCEDURE — 99214 OFFICE O/P EST MOD 30 MIN: CPT | Performed by: ORTHOPAEDIC SURGERY

## 2022-09-02 ASSESSMENT — PAIN SCALES - GENERAL: PAINLEVEL: SEVERE PAIN (6)

## 2022-09-02 NOTE — LETTER
9/2/2022         RE: Chanel Israel  [address]  [facility] [address]        Dear Colleague,    Thank you for referring your patient, Chanel Israel, to the [facility] [place]. Please see a copy of my visit note below.    Spine Surgery Consultation    REFERRING PHYSICIAN: No ref. provider found   PRIMARY CARE PHYSICIAN: Gabbie Hicks           Chief Complaint:   Consult      History of Present Illness:  Symptom Profile Including: location of symptoms, onset, severity, exacerbating/alleviating factors, previous treatments:        Chanel Israel is a 57 year old female who presents as an evaluation for chronic neck and low back pain.  The neck pain is worse with activity and improved with rest, similar for the low back pain.  It is axial in nature, nonradiating into the arms, aching sometimes popping with motion.  In the past she has done physical therapy but found it to be unhelpful.  She recently had a lumbar injection on the left side which was a transforaminal injection and this was quite helpful for her back pain.  Right now the neck is the most painful thing.  It keeps her awake sometimes at night and occasionally she will wake up with some numbness in the arms and hands but nothing really during the day and nothing persistent.  She takes occasional naproxen.         Past Medical History:     Past Medical History:   Diagnosis Date     Anal fissure 10/01/2021     Asthma, mild intermittent      Breast cancer (H)      Depression      Depressive disorder 08/2000    postpartum     Endometriosis      Heart murmur     Patient reported.     Loss of hair 08/11/2020     PONV (postoperative nausea and vomiting)      Thyroid disease 08/2015    Hashimotos            Past Surgical History:     Past Surgical History:   Procedure Laterality Date     ADENOIDECTOMY       ARTHROSCOPY KNEE WITH MENISCAL REPAIR Left 3/1/2022    Procedure: Examination under anesthesia, knee  arthroscopy, meniscus root repair;  Surgeon: Leonid Bailey MD;  Location: [facility] OR     BIOPSY  2007    left breast     COLONOSCOPY N/A 2014    Procedure: COLONOSCOPY;  Surgeon: Duane, William Charles, MD;  Location: [facility] OR     COSMETIC SURGERY       CYSTOSCOPY       GENITOURINARY SURGERY  [id number]    bladder: botox, durosphere injections     GYN SURGERY  10/1996    biopsy endometriois and cervical scraping     HC REMOVAL OF ANAL FISSURE       MASTECTOMY, SIMPLE RT/LT/MABLE       RECONSTRUCT BREAST BILATERAL       TONSILLECTOMY              Social History:     Social History     Tobacco Use     Smoking status: Never Smoker     Smokeless tobacco: Never Used   Substance Use Topics     Alcohol use: Not Currently     Comment: 1-2 drinks per week            Family History:     Family History   Problem Relation Age of Onset     Diabetes Mother         type 2     Thyroid Disease Mother      Glaucoma Mother      Macular Degeneration Mother      Hypertension Mother      Hyperlipidemia Mother      Obesity Mother      Diabetes Sister      Thyroid Disease Sister      Breast Cancer Sister         Estrogen+progesterone +     Depression Sister      Endometrial Cancer Sister 51     Breast Cancer Sister      Cancer Father         skin cancer     Glaucoma Father      Hypertension Father      Hyperlipidemia Father      Other Cancer Father         skin     Obesity Father      Coronary Artery Disease Brother          at 43     Cerebrovascular Disease Maternal Grandfather      Diabetes Sister         type 2     Depression Sister         clinical depression     Mental Illness Sister         Bipolar Disorder     Obesity Sister         fatty liver, hepatitis     Hyperlipidemia Sister      Prostate Cancer Paternal Grandfather      Other Cancer Sister         endometrial     Other Cancer Brother         skin     Obesity Brother      Other Cancer Paternal Grandmother         Lung     Osteoporosis Paternal  "Grandmother      Other Cancer Other         Bladder     Depression Sister         Personality disorder     Anxiety Disorder Sister      Thyroid Disease Sister         Hashimotos     Osteoporosis Sister      Anxiety Disorder Sister         unmedicated     Mental Illness Nephew         Bipolar Disorder     Substance Abuse Nephew         recreational drugs     Mental Illness Brother         unspecified     Substance Abuse Brother         recreational drugs     Substance Abuse Nephew         recreational drugs     Substance Abuse Nephew         recreational drug, alcohol     Asthma Sister         childhood asthma            Allergies:     Allergies   Allergen Reactions     Erythromycin Nausea            Medications:     Current Outpatient Medications   Medication     BIOTIN 5000 PO     buPROPion (WELLBUTRIN XL) 150 MG 24 hr tablet     Calcium Carbonate-Vitamin D (CALCIUM + D PO)     Coenzyme Q10 (CO Q 10) 100 MG CAPS     diclofenac (VOLTAREN) 1 % topical gel     fluocinolone (SYNALAR) 0.025 % cream     fluticasone (FLONASE) 50 MCG/ACT nasal spray     Glucosamine-Chondroitin-MSM (TRIPLE FLEX PO)     hydrocortisone 2.5 % cream     ketoconazole (NIZORAL) 2 % external cream     L-GLUTAMINE     MAGNESIUM PO     melatonin 5 MG tablet     Multiple Vitamin (MULTI-VITAMIN PO)     naproxen (NAPROSYN) 500 MG tablet     NIACIN CR PO     order for DME     order for DME     Probiotic Product (PROBIOTIC DAILY PO)     sertraline (ZOLOFT) 50 MG tablet     SYNTHROID 75 MCG tablet     UNABLE TO FIND     zinc 50 MG TABS     Current Facility-Administered Medications   Medication     triamcinolone (KENALOG-40) injection 40 mg     triamcinolone (KENALOG-40) injection 40 mg             Review of Systems:     A 10 point ROS was performed and reviewed. Specific responses to these questions are noted at the end of the document.         Physical Exam:   Vitals: /69   Pulse 105   Ht 1.568 m (5' 1.75\")   Wt 62.6 kg (138 lb)   BMI 25.45 " kg/m    Constitutional: awake, alert, cooperative, no apparent distress, appears stated age.    Eyes: The sclera are white.  Ears, Nose, Throat: The trachea is midline.  Psychiatric: The patient has a normal affect.  Respiratory: breathing non-labored  Cardiovascular: The extremities are warm and perfused.  Skin: no obvious rashes or lesions.  Musculoskeletal, Neurologic, and Spine:   Cervical spine:    Appearance -no gross step-offs, kyphosis.    Motor -     C5: Deltoids R 5/5 and L 5/5 strength    C6: Biceps R 5/5 and L 5/5 strength     C7: Triceps R 5/5 and L 5/5 strength     C8:  R 5/5 and L 5/5 strength     T1: Dorsal interossei R 5/5 and L 5/5 strength        Sensation: intact to light touch in C5-T1      Special Tests -      Lhermitte's Test - Negative     Spurling's Test - Negative      Hernández's Test - Negative       Lumbar Spine:    Appearance - No gross stepoffs or deformities    Motor -     L2-3: Hip flexion 5/5 R and 5/5 L strength          L3/4:  Knee extension R 5/5 and L 5/5 strength         L4/5:  Foot dorsiflexion R 5/5 L 5/5 and                S1:  Plantarflexion/Peroneal Muscles  R 5/5 and L 5/5 strength    Sensation: intact to light touch L3-S1 distribution BLE      Neurologic:      REFLEXES Left Right   Biceps 1+ 1+   Triceps 1+ 1+   Brachioradialis 1+ 1+   Patella 1+ 1+   Ankle jerk 1+ 1+   Babinski No upgoing great toe No upgoing great toe   Clonus 0 beats 0 beats     Hip Exam:  No pain with hip log roll and no tenderness over the greater trochanters.    Alignment:  Patient stands with a neutral standing sagittal balance.         Imaging:   We ordered and independently reviewed new radiographs at this clinic visit. The results were discussed with the patient.  Findings include:    Cervical radiographs show neutral to partially kyphotic alignment, mild spondylosis lumbar radiographs show preserved alignment mild spondylosis    I reviewed her recent cervical and lumbar MRI.  On the lumbar  MRI there is an annular tear worse on the left at L4-5 but no neurologic compression.  On the cervical films there is spondylosis and some disc bulging noted at C3-4 and C6-7, perhaps mild to moderate foraminal stenosis on the left at C6-7 and some mild CSF effacement with ventral contact of the cord at C3-4             Assessment and Plan:   Assessment:  57 year old female with mild cervical and lumbar spondylosis, no severe neurologic compression or deformity     Plan:  1. Given the mild imaging findings and only intermittent symptoms, I recommended nonoperative treatment to her.  Physical therapy remains an option if she would like a referral back to this she will let me know, but given that it was not helpful in the past she wished to hold off on that for now.  I think naproxen would be a good medication for her and I suggested she reach out to her primary care team for refill.  In terms of injections she would like to try 1 in the neck given her good results in the lumbar spine I ordered a C7-T1 interlaminar epidural injection.  No surgery is indicated and thus no return with the surgical office is needed at this time.      Respectfully,  Librado Tolbert MD  Spine Surgery  [facility]          Again, thank you for allowing me to participate in the care of your patient.        Sincerely,        Librado Tolbert MD

## 2022-09-02 NOTE — PROGRESS NOTES
Spine Surgery Consultation    REFERRING PHYSICIAN: No ref. provider found   PRIMARY CARE PHYSICIAN: Gabbie Hicks           Chief Complaint:   Consult      History of Present Illness:  Symptom Profile Including: location of symptoms, onset, severity, exacerbating/alleviating factors, previous treatments:        Chanel Israel is a 57 year old female who presents as an evaluation for chronic neck and low back pain.  The neck pain is worse with activity and improved with rest, similar for the low back pain.  It is axial in nature, nonradiating into the arms, aching sometimes popping with motion.  In the past she has done physical therapy but found it to be unhelpful.  She recently had a lumbar injection on the left side which was a transforaminal injection and this was quite helpful for her back pain.  Right now the neck is the most painful thing.  It keeps her awake sometimes at night and occasionally she will wake up with some numbness in the arms and hands but nothing really during the day and nothing persistent.  She takes occasional naproxen.         Past Medical History:     Past Medical History:   Diagnosis Date     Anal fissure 10/01/2021     Asthma, mild intermittent      Breast cancer (H)      Depression      Depressive disorder 08/2000    postpartum     Endometriosis      Heart murmur     Patient reported.     Loss of hair 08/11/2020     PONV (postoperative nausea and vomiting)      Thyroid disease 08/2015    Hashimotos            Past Surgical History:     Past Surgical History:   Procedure Laterality Date     ADENOIDECTOMY       ARTHROSCOPY KNEE WITH MENISCAL REPAIR Left 3/1/2022    Procedure: Examination under anesthesia, knee arthroscopy, meniscus root repair;  Surgeon: Leonid Bailey MD;  Location: [facility] OR     BIOPSY  4/2007    left breast     COLONOSCOPY N/A 8/22/2014    Procedure: COLONOSCOPY;  Surgeon: Duane, William Charles, MD;  Location:  OR     COSMETIC SURGERY        CYSTOSCOPY       GENITOURINARY SURGERY  [id number]    bladder: botox, durosphere injections     GYN SURGERY  10/1996    biopsy endometriois and cervical scraping     HC REMOVAL OF ANAL FISSURE  2007     MASTECTOMY, SIMPLE RT/LT/MABLE       RECONSTRUCT BREAST BILATERAL  2008     TONSILLECTOMY              Social History:     Social History     Tobacco Use     Smoking status: Never Smoker     Smokeless tobacco: Never Used   Substance Use Topics     Alcohol use: Not Currently     Comment: 1-2 drinks per week            Family History:     Family History   Problem Relation Age of Onset     Diabetes Mother         type 2     Thyroid Disease Mother      Glaucoma Mother      Macular Degeneration Mother      Hypertension Mother      Hyperlipidemia Mother      Obesity Mother      Diabetes Sister      Thyroid Disease Sister      Breast Cancer Sister         Estrogen+progesterone +     Depression Sister      Endometrial Cancer Sister 51     Breast Cancer Sister      Cancer Father         skin cancer     Glaucoma Father      Hypertension Father      Hyperlipidemia Father      Other Cancer Father         skin     Obesity Father      Coronary Artery Disease Brother          at 43     Cerebrovascular Disease Maternal Grandfather      Diabetes Sister         type 2     Depression Sister         clinical depression     Mental Illness Sister         Bipolar Disorder     Obesity Sister         fatty liver, hepatitis     Hyperlipidemia Sister      Prostate Cancer Paternal Grandfather      Other Cancer Sister         endometrial     Other Cancer Brother         skin     Obesity Brother      Other Cancer Paternal Grandmother         Lung     Osteoporosis Paternal Grandmother      Other Cancer Other         Bladder     Depression Sister         Personality disorder     Anxiety Disorder Sister      Thyroid Disease Sister         Hashimotos     Osteoporosis Sister      Anxiety Disorder Sister         unmedicated     Mental Illness Nephew   "       Bipolar Disorder     Substance Abuse Nephew         recreational drugs     Mental Illness Brother         unspecified     Substance Abuse Brother         recreational drugs     Substance Abuse Nephew         recreational drugs     Substance Abuse Nephew         recreational drug, alcohol     Asthma Sister         childhood asthma            Allergies:     Allergies   Allergen Reactions     Erythromycin Nausea            Medications:     Current Outpatient Medications   Medication     BIOTIN 5000 PO     buPROPion (WELLBUTRIN XL) 150 MG 24 hr tablet     Calcium Carbonate-Vitamin D (CALCIUM + D PO)     Coenzyme Q10 (CO Q 10) 100 MG CAPS     diclofenac (VOLTAREN) 1 % topical gel     fluocinolone (SYNALAR) 0.025 % cream     fluticasone (FLONASE) 50 MCG/ACT nasal spray     Glucosamine-Chondroitin-MSM (TRIPLE FLEX PO)     hydrocortisone 2.5 % cream     ketoconazole (NIZORAL) 2 % external cream     L-GLUTAMINE     MAGNESIUM PO     melatonin 5 MG tablet     Multiple Vitamin (MULTI-VITAMIN PO)     naproxen (NAPROSYN) 500 MG tablet     NIACIN CR PO     order for DME     order for DME     Probiotic Product (PROBIOTIC DAILY PO)     sertraline (ZOLOFT) 50 MG tablet     SYNTHROID 75 MCG tablet     UNABLE TO FIND     zinc 50 MG TABS     Current Facility-Administered Medications   Medication     triamcinolone (KENALOG-40) injection 40 mg     triamcinolone (KENALOG-40) injection 40 mg             Review of Systems:     A 10 point ROS was performed and reviewed. Specific responses to these questions are noted at the end of the document.         Physical Exam:   Vitals: /69   Pulse 105   Ht 1.568 m (5' 1.75\")   Wt 62.6 kg (138 lb)   BMI 25.45 kg/m    Constitutional: awake, alert, cooperative, no apparent distress, appears stated age.    Eyes: The sclera are white.  Ears, Nose, Throat: The trachea is midline.  Psychiatric: The patient has a normal affect.  Respiratory: breathing non-labored  Cardiovascular: The " extremities are warm and perfused.  Skin: no obvious rashes or lesions.  Musculoskeletal, Neurologic, and Spine:   Cervical spine:    Appearance -no gross step-offs, kyphosis.    Motor -     C5: Deltoids R 5/5 and L 5/5 strength    C6: Biceps R 5/5 and L 5/5 strength     C7: Triceps R 5/5 and L 5/5 strength     C8:  R 5/5 and L 5/5 strength     T1: Dorsal interossei R 5/5 and L 5/5 strength        Sensation: intact to light touch in C5-T1      Special Tests -      Lhermitte's Test - Negative     Spurling's Test - Negative      Hernández's Test - Negative       Lumbar Spine:    Appearance - No gross stepoffs or deformities    Motor -     L2-3: Hip flexion 5/5 R and 5/5 L strength          L3/4:  Knee extension R 5/5 and L 5/5 strength         L4/5:  Foot dorsiflexion R 5/5 L 5/5 and                S1:  Plantarflexion/Peroneal Muscles  R 5/5 and L 5/5 strength    Sensation: intact to light touch L3-S1 distribution BLE      Neurologic:      REFLEXES Left Right   Biceps 1+ 1+   Triceps 1+ 1+   Brachioradialis 1+ 1+   Patella 1+ 1+   Ankle jerk 1+ 1+   Babinski No upgoing great toe No upgoing great toe   Clonus 0 beats 0 beats     Hip Exam:  No pain with hip log roll and no tenderness over the greater trochanters.    Alignment:  Patient stands with a neutral standing sagittal balance.         Imaging:   We ordered and independently reviewed new radiographs at this clinic visit. The results were discussed with the patient.  Findings include:    Cervical radiographs show neutral to partially kyphotic alignment, mild spondylosis lumbar radiographs show preserved alignment mild spondylosis    I reviewed her recent cervical and lumbar MRI.  On the lumbar MRI there is an annular tear worse on the left at L4-5 but no neurologic compression.  On the cervical films there is spondylosis and some disc bulging noted at C3-4 and C6-7, perhaps mild to moderate foraminal stenosis on the left at C6-7 and some mild CSF effacement with  ventral contact of the cord at C3-4             Assessment and Plan:   Assessment:  57 year old female with mild cervical and lumbar spondylosis, no severe neurologic compression or deformity     Plan:  1. Given the mild imaging findings and only intermittent symptoms, I recommended nonoperative treatment to her.  Physical therapy remains an option if she would like a referral back to this she will let me know, but given that it was not helpful in the past she wished to hold off on that for now.  I think naproxen would be a good medication for her and I suggested she reach out to her primary care team for refill.  In terms of injections she would like to try 1 in the neck given her good results in the lumbar spine I ordered a C7-T1 interlaminar epidural injection.  No surgery is indicated and thus no return with the surgical office is needed at this time.      Respectfully,  Librado Tolbert MD  Spine Surgery  [facility]

## 2022-09-06 DIAGNOSIS — M54.12 CERVICAL RADICULOPATHY: Primary | ICD-10-CM

## 2022-09-06 RX ORDER — SODIUM CHLORIDE 9 MG/ML
500 INJECTION, SOLUTION INTRAVENOUS CONTINUOUS
Status: CANCELLED | OUTPATIENT
Start: 2022-09-06 | End: 2022-09-06

## 2022-09-06 RX ORDER — LIDOCAINE 40 MG/G
CREAM TOPICAL
Status: CANCELLED | OUTPATIENT
Start: 2022-09-06

## 2022-09-07 ENCOUNTER — TELEPHONE (OUTPATIENT)
Dept: PALLIATIVE MEDICINE | Facility: CLINIC | Age: 57
End: 2022-09-07

## 2022-09-07 NOTE — TELEPHONE ENCOUNTER
Called patient and LVM to call back to schedule injection with Dr. Marvin. Provided direct call back number to writer.

## 2022-09-09 PROBLEM — M54.12 CERVICAL RADICULOPATHY: Status: ACTIVE | Noted: 2022-09-09

## 2022-09-09 NOTE — TELEPHONE ENCOUNTER
Called patient and scheduled injection for 9/30 with Dr. Marvin in .    She will take an at home COVID test 1-2 days before the injection and bring a picture the morning of.    No further questions/concerns at this time.

## 2022-09-30 ENCOUNTER — HOSPITAL ENCOUNTER (OUTPATIENT)
Facility: AMBULATORY SURGERY CENTER | Age: 57
Discharge: HOME | End: 2022-09-30
Attending: PHYSICAL MEDICINE & REHABILITATION | Admitting: PHYSICAL MEDICINE & REHABILITATION
Payer: COMMERCIAL

## 2022-09-30 VITALS
OXYGEN SATURATION: 98 % | SYSTOLIC BLOOD PRESSURE: 118 MMHG | TEMPERATURE: 98.5 F | BODY MASS INDEX: 26.06 KG/M2 | RESPIRATION RATE: 16 BRPM | HEIGHT: 61 IN | WEIGHT: 138 LBS | DIASTOLIC BLOOD PRESSURE: 69 MMHG | HEART RATE: 97 BPM

## 2022-09-30 DIAGNOSIS — M54.12 CERVICAL RADICULOPATHY: ICD-10-CM

## 2022-09-30 PROCEDURE — 62321 NJX INTERLAMINAR CRV/THRC: CPT

## 2022-09-30 PROCEDURE — G8907 PT DOC NO EVENTS ON DISCHARG: HCPCS

## 2022-09-30 PROCEDURE — G8918 PT W/O PREOP ORDER IV AB PRO: HCPCS

## 2022-09-30 RX ORDER — LIDOCAINE 40 MG/G
CREAM TOPICAL
Status: DISCONTINUED | OUTPATIENT
Start: 2022-09-30 | End: 2022-10-01 | Stop reason: HOSPADM

## 2022-09-30 RX ORDER — ONDANSETRON 2 MG/ML
4 INJECTION INTRAMUSCULAR; INTRAVENOUS ONCE
Status: DISCONTINUED | OUTPATIENT
Start: 2022-09-30 | End: 2022-10-01 | Stop reason: HOSPADM

## 2022-09-30 RX ORDER — LIDOCAINE HYDROCHLORIDE 10 MG/ML
INJECTION, SOLUTION EPIDURAL; INFILTRATION; INTRACAUDAL; PERINEURAL PRN
Status: DISCONTINUED | OUTPATIENT
Start: 2022-09-30 | End: 2022-09-30 | Stop reason: HOSPADM

## 2022-09-30 RX ORDER — DEXAMETHASONE SODIUM PHOSPHATE 10 MG/ML
INJECTION INTRAMUSCULAR; INTRAVENOUS PRN
Status: DISCONTINUED | OUTPATIENT
Start: 2022-09-30 | End: 2022-09-30 | Stop reason: HOSPADM

## 2022-09-30 RX ORDER — SODIUM CHLORIDE 9 MG/ML
500 INJECTION, SOLUTION INTRAVENOUS CONTINUOUS
Status: DISPENSED | OUTPATIENT
Start: 2022-09-30 | End: 2022-09-30

## 2022-09-30 RX ORDER — FENTANYL CITRATE 50 UG/ML
INJECTION, SOLUTION INTRAMUSCULAR; INTRAVENOUS PRN
Status: DISCONTINUED | OUTPATIENT
Start: 2022-09-30 | End: 2022-09-30 | Stop reason: HOSPADM

## 2022-09-30 RX ORDER — IOPAMIDOL 408 MG/ML
INJECTION, SOLUTION INTRATHECAL PRN
Status: DISCONTINUED | OUTPATIENT
Start: 2022-09-30 | End: 2022-09-30 | Stop reason: HOSPADM

## 2022-09-30 NOTE — PROCEDURES
PROCEDURE NOTE: Cervical Interlaminar Epidural Steroid Injection    PROCEDURE DATE: 2022    PATIENT NAME: Chanel Israel  YOB: 1965    ATTENDING PHYSICIAN: Jose Maria Marvin MD    PREOPERATIVE DIAGNOSIS: Cervical radicular pain  POSTOPERATIVE DIAGNOSIS: Same    PROCEDURE PERFORMED: Interlaminar Epidural Steroid Injection at the C7-T1 level, with a Left paramedian approach under fluoroscopic guidance    IV SEDATION #1: Midazolam: 2mg   IV SEDATION #2  Fentanyl: 25mcg  Sedation nurse was present for additional monitoring and administration     ESTIMATED BLOOD LOSS:  None    FLUOROSCOPY WAS USED.    TOTAL SEDATION TIME: 12 minutes.    INDICATIONS FOR PROCEDURE:   Chanel Israel is a 57 year old female with a clinical picture consistent with the above-mentioned diagnosis, resulting in radicular pain to the bilateral upper extremity.    PROCEDURE AND FINDINGS:   Chanel Israel was greeted in the pre-procedure holding area. The risk, benefits and alternatives to the procedure were again reviewed with the patient and written informed consent was placed in the chart. An IV line was placed.  A 500 mL bag of NS was connected to the patient. She was taken to the procedure room and positioned prone on the fluoroscopy table.  Routine monitors were applied including blood pressure cuff, and pulse oximetry. Prior to the procedure a time out was completed, verifying correct patient, procedure, site, positioning, and implants and/or special equipment.     An AP fluoroscpic  film was taken to identify the C7 and T1 vertebral bodies, as well as the C7-T1 interspace. The skin was prepped with chlorhexidine and draped in the usual sterile fashion. The skin and subcutaneous tissue overlying the C7-T1 interspace was anesthetized using a 27-guage 1-1/4 inch needle with 1% preservative-free lidocaine for a total volume of 4 mls. Then an 20 cm Tuohy needle was advanced utilizing AP, contralateral oblique  (45*), and lateral fluoroscopic views into the C7-T1 interlaminar space. Once the needle tip was engaged in the ligamentum flavum, a loss of resistance syringe was attached and loss of resistance to saline.     After negative aspiration, 1mls of isovue-M contrast was injected under AP view with live fluoroscopy and confirmed adequate spread along the epidural space. There was no evidence of intravascular uptake or intrathecal spread on imaging. Contralateral oblique/lateral view(s) were also taken confirming adequate epidural spread.     At this point, after negative aspiration, a total 4mL volume of treatment injectate, consisting of 1mL of 10mg/mL dexamethasone, 3mL of PFNS was injected easily.  The needle was then flushed with 0.3 ml of PFNS, restyletted  and removed. The needle insertion site was dressed appropriately.     Chanel was taken to the recovery room where she was monitored for a brief period of time. She tolerated the procedure well and was discharged home in stable condition with post procedural instructions.     Follow-up will be in clinic or as needed.     COMPLICATIONS: None    COMMENTS: None

## 2022-09-30 NOTE — DISCHARGE INSTRUCTIONS
PAIN INJECTION HOME CARE INSTRUCTIONS  Activity  -You may resume most normal activity levels with the exception of strenuous activity. It may help to move in ways that hurt before the injection, to see if the pain is still there, but do not overdo it.     -DO NOT remove bandaid for 24 hours  -DO NOT shower for 24 hours      Pain  -You may feel immediate pain relief and numbness for a period of time after the injection. This may indicate the medication has reached the right spot.  -Your pain may return after this short pain-free period, or may even be a little worse for a day or two. It may be caused by needle irritation or by the medication itself. The medications usually take two or three days to start working, but can take as long as a week.    -You may use an ice pack for 20 minutes every 2 hours for the first 24 hours  -You may use a heating pad after the first 24 hours  -You may use Tylenol (acetaminophen) every 4 hours or other pain medicines as directed by your physician      DID YOU RECEIVE SEDATION TODAY?  Yes    If you received sedation please follow these additional safety measures.    Sedation medicine, if given, may remain active for many hours. It is important for the next 24 hours that you do not:  -Drive a car  -Operate machines or power tools  -Consume alcohol, including beer  -Sign any important papers or legal documents    DID YOU RECEIVE STEROIDS TODAY?  Yes    Common side effects of steroids:  Not everyone will experience corticosteroid side effects. If side effects are experienced, they will gradually subside in the 7-10 day period following an injection. Most common side effects include:  -Flushed face and/or chest  -Feeling of warmth, particularly in the face but could be an overall feeling of warmth  -Increased blood sugar in diabetic patients  -Menstrual irregularities my occur. If taking hormone-based birth control an alternate method of birth control is recommended  -Sleep disturbances  and/or mood swings are possible  -Leg cramps    PLEASE KEEP TRACK OF YOUR SYMPTOMS AND NOTE ANY CHANGES FOR YOUR DOCTOR.       Please contact us if you have:  -Severe pain  -Fever more than 101.5 degrees Fahrenheit  -Signs of infection at the injection site (redness, swelling, or drainage)      FOR PM&R PATIENTS of Dr Marvin:  For patients seen by the PM and R service, please call [phone].(Monday through Friday 8a-5p.  After business hours and weekends call [phone] and ask for the PM and R doctor on call). If you need to fax a pain diary to PM&R the fax number is [phone]. If you are unable to fax, uploading to [employer] is encouraged, then send to provider. If you have procedure scheduling questions please call [phone] Option #2

## 2022-10-03 ENCOUNTER — VIRTUAL VISIT (OUTPATIENT)
Dept: ENDOCRINOLOGY | Facility: CLINIC | Age: 57
End: 2022-10-03
Payer: COMMERCIAL

## 2022-10-03 DIAGNOSIS — E06.3 HYPOTHYROIDISM DUE TO HASHIMOTO'S THYROIDITIS: Primary | ICD-10-CM

## 2022-10-03 PROCEDURE — 99214 OFFICE O/P EST MOD 30 MIN: CPT | Mod: GT | Performed by: INTERNAL MEDICINE

## 2022-10-03 ASSESSMENT — ENCOUNTER SYMPTOMS
STIFFNESS: 0
SMELL DISTURBANCE: 0
ARTHRALGIAS: 1
DOUBLE VISION: 0
MUSCLE CRAMPS: 1
ALTERED TEMPERATURE REGULATION: 1
WEIGHT LOSS: 0
POLYPHAGIA: 0
MYALGIAS: 0
EYE WATERING: 1
TROUBLE SWALLOWING: 0
TASTE DISTURBANCE: 0
SINUS PAIN: 0
BACK PAIN: 1
EYE PAIN: 0
NIGHT SWEATS: 0
NECK PAIN: 1
DECREASED APPETITE: 0
CHILLS: 0
WEIGHT GAIN: 0
SORE THROAT: 0
FEVER: 0
EYE REDNESS: 1
SINUS CONGESTION: 1
NECK MASS: 0
HOARSE VOICE: 0
INCREASED ENERGY: 0
POLYDIPSIA: 0
FATIGUE: 1
JOINT SWELLING: 0
MUSCLE WEAKNESS: 0
HALLUCINATIONS: 0
EYE IRRITATION: 1

## 2022-10-03 NOTE — PROGRESS NOTES
Chanel Israel  is being evaluated via a billable video visit.      How would you like to obtain your AVS? [employer]  For the video visit, send the invitation by: Text to cell phone: [phone]  Will anyone else be joining your video visit? No               Endocrinology Note         Chanel is a 57 year old female who has a visit today for follow up Hashimoto's hypothyroidism.    HPI  Chanel Israel is a 57 years old female with hypothyroidism, hyperlipidemia, previous hx of breast cancer diagnosed 2007 s/p left mastectomy and prophylactic right mastectomy, chemotherapy who has a visit for f/u hypothyroidism.    She was diagnosed with Hashimoto's hypothyroidism in August 2015 when she presented with hair loss, fatigue, sluggish and weight gain. At the diagnosis, TSH was 16.49, FT4 0.71.  She has been on Synthroid since.     Interim history  Last seen 2021. She did have meniscus surgery of the left knee this summer. She has continued to have back/neck pain from cervical and lumbar spondylosis. Otherwise she is doing well. She is now using Xiidra eye drop for dry eye. Her eye symptoms are dry eye, and light sensitivity.    She has been on brand name Synthroid 75 mcg daily. Lab on 7/18/2022 showed TSH 1.11. She is taking biotin 5000 mcg daily for hair. She denied chest pain, SOB, altered bowel movement, temperature intolerance. She sleeps ok.  She is on niacin for hyperlipidemia. She could not tolerate statin due to lymphedema.    Past Medical History  Past Medical History:   Diagnosis Date     Anal fissure 10/01/2021     Asthma, mild intermittent      Breast cancer (H)      Depression      Depressive disorder 08/2000    postpartum     Endometriosis      Heart murmur     Patient reported.     Loss of hair 08/11/2020     PONV (postoperative nausea and vomiting)      Thyroid disease 08/2015    Hashimotos       Allergies  Allergies   Allergen Reactions     Erythromycin Nausea     Medications  Current Outpatient  Medications   Medication Sig Dispense Refill     BIOTIN 5000 PO        buPROPion (WELLBUTRIN XL) 150 MG 24 hr tablet Take 1 tablet (150 mg) by mouth every morning Profile Rx 90 tablet 1     Calcium Carbonate-Vitamin D (CALCIUM + D PO) Take by mouth 2 times daily       Coenzyme Q10 (CO Q 10) 100 MG CAPS Take 1 capsule by mouth daily        diclofenac (VOLTAREN) 1 % topical gel Apply 4 g topically 4 times daily as needed for moderate pain 100 g 2     fluocinolone (SYNALAR) 0.025 % cream Apply topically 2 times daily Apply to affected areas of mouth as needed. 60 g 5     fluticasone (FLONASE) 50 MCG/ACT nasal spray as needed   0     Glucosamine-Chondroitin-MSM (TRIPLE FLEX PO) Take by mouth 2 times daily       hydrocortisone 2.5 % cream Apply topically as needed       ketoconazole (NIZORAL) 2 % external cream Apply topically daily 100 g 1     L-GLUTAMINE 1 capsule 2 times daily        MAGNESIUM PO Take 1 capsule by mouth daily        melatonin 5 MG tablet Take 5 mg by mouth nightly as needed for sleep       Multiple Vitamin (MULTI-VITAMIN PO) Take by mouth daily        naproxen (NAPROSYN) 500 MG tablet Take 1 tablet (500 mg) by mouth 2 times daily as needed for moderate pain 30 tablet 0     NIACIN CR PO Take 500 mg by mouth       order for DME 1: Gradient Compression Wraps; 2: LUE compression sleeve with handpiece or guantlet; 20-30 mm Hg; 3: LUE velcro compression sleeve; 4: Compression camisole/bra/tank top 1 each 0     order for DME Compression sleeve to bilateral UE. 2 Units 6     Probiotic Product (PROBIOTIC DAILY PO) Take 1 capsule by mouth 2 times daily       sertraline (ZOLOFT) 50 MG tablet TAKE 1& 1/2 TABLETS BY MOUTH EVERY EVENING, profile Rx 135 tablet 1     SYNTHROID 75 MCG tablet TAKE 1 TABLET(75 MCG) BY MOUTH DAILY 90 tablet 3     UNABLE TO FIND Take 1 tablet by mouth daily MEDICATION NAME: Sambrocal Elderberry Supplement       zinc 50 MG TABS Take 54 mg by mouth daily       Family History  family history  includes Anxiety Disorder in her sister and sister; Asthma in her sister; Breast Cancer in her sister and sister; Cancer in her father; Cerebrovascular Disease in her maternal grandfather; Coronary Artery Disease in her brother; Depression in her sister, sister, and sister; Diabetes in her mother, sister, and sister; Endometrial Cancer (age of onset: 51) in her sister; Glaucoma in her father and mother; Hyperlipidemia in her father, mother, and sister; Hypertension in her father and mother; Macular Degeneration in her mother; Mental Illness in her brother, nephew, and sister; Obesity in her brother, father, mother, and sister; Osteoporosis in her paternal grandmother and sister; Other Cancer in her brother, father, paternal grandmother, sister, and another family member; Prostate Cancer in her paternal grandfather; Substance Abuse in her brother, nephew, nephew, and nephew; Thyroid Disease in her mother, sister, and sister.     Her mother, maternal grandmother and sister have hypothyroidism.     Social History  Social History     Tobacco Use     Smoking status: Never Smoker     Smokeless tobacco: Never Used   Substance Use Topics     Alcohol use: Not Currently     Comment: 1-2 drinks per week     Drug use: No   lives with family  She is special .    ROS  Constitutional: low energy  Eyes: no vision change, diplopia or red eyes   Neck: no difficulty swallowing, no choking, no neck pain, no neck swelling  Cardiovascular: no chest pain, palpitations  Respiratory: no dyspnea, cough, shortness of breath or wheezing   GI: no nausea, vomiting, diarrhea or constipation, no abdominal pain   : no change in urine, no dysuria or hematuria  Musculoskeletal: no joint or muscle pain or swelling   Integumentary: no concerning lesions   Neuro: no loss of strength or sensation, no numbness or tingling, no tremor, no dizziness, no headache   Endo: no polyuria or polydipsia, no temperature intolerance   Heme/Lymph: no  "concerning bumps, no bleeding problems   Allergy: no environmental allergies   Psych: +anxiety    Physical Exam  Vital signs:   Limited due to virtual visit  Estimated body mass index is 26.07 kg/m  as calculated from the following:    Height as of 9/30/22: 1.549 m (5' 1\").    Weight as of 9/30/22: 62.6 kg (138 lb).  MERISSA: no distress, comfortable, pleasant     RESULTS  Lab from [facility]  10/20/2016; TSH 0.41, FT4 1.38, FT3 2.8        ASSESSMENT:    Chanel Israel is a 57 years old female with hypothyroidism, hyperlipidemia, previous hx of breast cancer diagnosed 2007 s/p left mastectomy and prophylactic right mastectomy, chemotherapy who has VDO visit for hypothyroidism.    1) Hashimoto's hypothyroidism: diagnosed in 2015 when presented with hair loss, low energy and some weight gain. Recent lab is normal.   Her current dose is 75 mcg daily. She is clinically and biochemically euthyroid. Will continue current dose.     2) Dry eyes: I don't see eyelid retraction or exophthalmos. TSI is negative. Follow up eye exam with ophthalmologist    PLAN:   - Continue Synthroid 75 mcg daily  - she will follow up with PCP annually    Phone start 0205 pm  Phone end 0215 pm  Total duration 10 minutes    External notes/medical records independently reviewed, labs and imaging independently reviewed, medical management and tests to be discussed/communicated to patient.    Time: I spent 33 minutes spent on the date of the encounter preparing to see patient (including chart review and preparation), obtaining and or reviewing additional medical history, performing a physical exam and evaluation, documenting clinical information in the electronic health record, independently interpreting results, communicating results to the patient and coordinating care.      Mariella Tripathi MD  Division of Diabetes and Endocrinology  Department of Medicine  [phone]      "

## 2022-10-03 NOTE — PATIENT INSTRUCTIONS
- continue Synthroid 75 mcg daily  - see  or endocrine clinic for thyroid in 1 year    If you have any questions, please do not hesitate to call [facility] at [phone] and ask for Endocrinology clinic.    Sincerely,    Mariella Tripathi MD  Endocrinology

## 2022-10-03 NOTE — LETTER
10/3/2022         RE: Chanel Israel  [address]  [place] [place] [address]        Dear Colleague,    Thank you for referring your patient, Chanel Israel, to the [facility]. Please see a copy of my visit note below.    Chanel Israel  is being evaluated via a billable video visit.      How would you like to obtain your AVS? [facility]  For the video visit, send the invitation by: Text to cell phone: [phone]  Will anyone else be joining your video visit? No               Endocrinology Note         Chanel is a 57 year old female who has a visit today for follow up Hashimoto's hypothyroidism.    HPI  Chanel Israel is a 57 years old female with hypothyroidism, hyperlipidemia, previous hx of breast cancer diagnosed 2007 s/p left mastectomy and prophylactic right mastectomy, chemotherapy who has a visit for f/u hypothyroidism.    She was diagnosed with Hashimoto's hypothyroidism in August 2015 when she presented with hair loss, fatigue, sluggish and weight gain. At the diagnosis, TSH was 16.49, FT4 0.71.  She has been on Synthroid since.     Interim history  Last seen 2021. She did have meniscus surgery of the left knee this summer. She has continued to have back/neck pain from cervical and lumbar spondylosis. Otherwise she is doing well. She is now using Xiidra eye drop for dry eye. Her eye symptoms are dry eye, and light sensitivity.    She has been on brand name Synthroid 75 mcg daily. Lab on 7/18/2022 showed TSH 1.11. She is taking biotin 5000 mcg daily for hair. She denied chest pain, SOB, altered bowel movement, temperature intolerance. She sleeps ok.  She is on niacin for hyperlipidemia. She could not tolerate statin due to lymphedema.    Past Medical History  Past Medical History:   Diagnosis Date     Anal fissure 10/01/2021     Asthma, mild intermittent      Breast cancer (H)      Depression      Depressive disorder 08/2000    postpartum     Endometriosis      Heart  murmur     Patient reported.     Loss of hair 08/11/2020     PONV (postoperative nausea and vomiting)      Thyroid disease 08/2015    Hashimotos       Allergies  Allergies   Allergen Reactions     Erythromycin Nausea     Medications  Current Outpatient Medications   Medication Sig Dispense Refill     BIOTIN 5000 PO        buPROPion (WELLBUTRIN XL) 150 MG 24 hr tablet Take 1 tablet (150 mg) by mouth every morning Profile Rx 90 tablet 1     Calcium Carbonate-Vitamin D (CALCIUM + D PO) Take by mouth 2 times daily       Coenzyme Q10 (CO Q 10) 100 MG CAPS Take 1 capsule by mouth daily        diclofenac (VOLTAREN) 1 % topical gel Apply 4 g topically 4 times daily as needed for moderate pain 100 g 2     fluocinolone (SYNALAR) 0.025 % cream Apply topically 2 times daily Apply to affected areas of mouth as needed. 60 g 5     fluticasone (FLONASE) 50 MCG/ACT nasal spray as needed   0     Glucosamine-Chondroitin-MSM (TRIPLE FLEX PO) Take by mouth 2 times daily       hydrocortisone 2.5 % cream Apply topically as needed       ketoconazole (NIZORAL) 2 % external cream Apply topically daily 100 g 1     L-GLUTAMINE 1 capsule 2 times daily        MAGNESIUM PO Take 1 capsule by mouth daily        melatonin 5 MG tablet Take 5 mg by mouth nightly as needed for sleep       Multiple Vitamin (MULTI-VITAMIN PO) Take by mouth daily        naproxen (NAPROSYN) 500 MG tablet Take 1 tablet (500 mg) by mouth 2 times daily as needed for moderate pain 30 tablet 0     NIACIN CR PO Take 500 mg by mouth       order for DME 1: Gradient Compression Wraps; 2: LUE compression sleeve with handpiece or guantlet; 20-30 mm Hg; 3: LUE velcro compression sleeve; 4: Compression camisole/bra/tank top 1 each 0     order for DME Compression sleeve to bilateral UE. 2 Units 6     Probiotic Product (PROBIOTIC DAILY PO) Take 1 capsule by mouth 2 times daily       sertraline (ZOLOFT) 50 MG tablet TAKE 1& 1/2 TABLETS BY MOUTH EVERY EVENING, profile Rx 135 tablet 1      SYNTHROID 75 MCG tablet TAKE 1 TABLET(75 MCG) BY MOUTH DAILY 90 tablet 3     UNABLE TO FIND Take 1 tablet by mouth daily MEDICATION NAME: Sambrocal Elderberry Supplement       zinc 50 MG TABS Take 54 mg by mouth daily       Family History  family history includes Anxiety Disorder in her sister and sister; Asthma in her sister; Breast Cancer in her sister and sister; Cancer in her father; Cerebrovascular Disease in her maternal grandfather; Coronary Artery Disease in her brother; Depression in her sister, sister, and sister; Diabetes in her mother, sister, and sister; Endometrial Cancer (age of onset: 51) in her sister; Glaucoma in her father and mother; Hyperlipidemia in her father, mother, and sister; Hypertension in her father and mother; Macular Degeneration in her mother; Mental Illness in her brother, nephew, and sister; Obesity in her brother, father, mother, and sister; Osteoporosis in her paternal grandmother and sister; Other Cancer in her brother, father, paternal grandmother, sister, and another family member; Prostate Cancer in her paternal grandfather; Substance Abuse in her brother, nephew, nephew, and nephew; Thyroid Disease in her mother, sister, and sister.     Her mother, maternal grandmother and sister have hypothyroidism.     Social History  Social History     Tobacco Use     Smoking status: Never Smoker     Smokeless tobacco: Never Used   Substance Use Topics     Alcohol use: Not Currently     Comment: 1-2 drinks per week     Drug use: No   lives with family  She is special .    ROS  Constitutional: low energy  Eyes: no vision change, diplopia or red eyes   Neck: no difficulty swallowing, no choking, no neck pain, no neck swelling  Cardiovascular: no chest pain, palpitations  Respiratory: no dyspnea, cough, shortness of breath or wheezing   GI: no nausea, vomiting, diarrhea or constipation, no abdominal pain   : no change in urine, no dysuria or hematuria  Musculoskeletal: no joint  "or muscle pain or swelling   Integumentary: no concerning lesions   Neuro: no loss of strength or sensation, no numbness or tingling, no tremor, no dizziness, no headache   Endo: no polyuria or polydipsia, no temperature intolerance   Heme/Lymph: no concerning bumps, no bleeding problems   Allergy: no environmental allergies   Psych: +anxiety    Physical Exam  Vital signs:   Limited due to virtual visit  Estimated body mass index is 26.07 kg/m  as calculated from the following:    Height as of 9/30/22: 1.549 m (5' 1\").    Weight as of 9/30/22: 62.6 kg (138 lb).  MERISSA: no distress, comfortable, pleasant     RESULTS  Lab from [facility]  10/20/2016; TSH 0.41, FT4 1.38, FT3 2.8        ASSESSMENT:    Chanel Israel is a 57 years old female with hypothyroidism, hyperlipidemia, previous hx of breast cancer diagnosed 2007 s/p left mastectomy and prophylactic right mastectomy, chemotherapy who has VDO visit for hypothyroidism.    1) Hashimoto's hypothyroidism: diagnosed in 2015 when presented with hair loss, low energy and some weight gain. Recent lab is normal.   Her current dose is 75 mcg daily. She is clinically and biochemically euthyroid. Will continue current dose.     2) Dry eyes: I don't see eyelid retraction or exophthalmos. TSI is negative. Follow up eye exam with ophthalmologist    PLAN:   - Continue Synthroid 75 mcg daily  - she will follow up with PCP annually    Phone start 0205 pm  Phone end 0215 pm  Total duration 10 minutes    External notes/medical records independently reviewed, labs and imaging independently reviewed, medical management and tests to be discussed/communicated to patient.    Time: I spent 33 minutes spent on the date of the encounter preparing to see patient (including chart review and preparation), obtaining and or reviewing additional medical history, performing a physical exam and evaluation, documenting clinical information in the electronic health record, " independently interpreting results, communicating results to the patient and coordinating care.      Mariella Tripathi MD  Division of Diabetes and Endocrinology  Department of Medicine  [phone]

## 2022-10-06 ENCOUNTER — TELEPHONE (OUTPATIENT)
Dept: OPTOMETRY | Facility: CLINIC | Age: 57
End: 2022-10-06

## 2022-10-06 ENCOUNTER — OFFICE VISIT (OUTPATIENT)
Dept: OPTOMETRY | Facility: CLINIC | Age: 57
End: 2022-10-06
Payer: COMMERCIAL

## 2022-10-06 DIAGNOSIS — H25.13 AGE-RELATED NUCLEAR CATARACT OF BOTH EYES: ICD-10-CM

## 2022-10-06 DIAGNOSIS — H04.123 DRY EYE SYNDROME OF BOTH EYES: Primary | ICD-10-CM

## 2022-10-06 DIAGNOSIS — H52.4 PRESBYOPIA: ICD-10-CM

## 2022-10-06 DIAGNOSIS — Z83.511 FAMILY HISTORY OF GLAUCOMA: ICD-10-CM

## 2022-10-06 DIAGNOSIS — H52.11 MYOPIA OF RIGHT EYE: ICD-10-CM

## 2022-10-06 DIAGNOSIS — E06.3 HYPOTHYROIDISM DUE TO HASHIMOTO'S THYROIDITIS: ICD-10-CM

## 2022-10-06 DIAGNOSIS — H40.003 GLAUCOMA SUSPECT, BILATERAL: ICD-10-CM

## 2022-10-06 PROCEDURE — 92014 COMPRE OPH EXAM EST PT 1/>: CPT | Performed by: OPTOMETRIST

## 2022-10-06 PROCEDURE — 92015 DETERMINE REFRACTIVE STATE: CPT | Performed by: OPTOMETRIST

## 2022-10-06 RX ORDER — LIFITEGRAST 50 MG/ML
1 SOLUTION/ DROPS OPHTHALMIC 2 TIMES DAILY
Qty: 23 EACH | Refills: 3 | Status: SHIPPED | OUTPATIENT
Start: 2022-10-06 | End: 2022-10-31

## 2022-10-06 ASSESSMENT — EXTERNAL EXAM - RIGHT EYE: OD_EXAM: NORMAL

## 2022-10-06 ASSESSMENT — TONOMETRY
OS_IOP_MMHG: 13
IOP_METHOD: TONOPEN
OD_IOP_MMHG: 15

## 2022-10-06 ASSESSMENT — CUP TO DISC RATIO
OS_RATIO: 0.65
OD_RATIO: .5/.4

## 2022-10-06 ASSESSMENT — VISUAL ACUITY
OD_CC: 20/25
OD_CC: 20/20
OS_SC: 20/20
OS_CC: 20/20
CORRECTION_TYPE: GLASSES
OD_SC: 20/20
METHOD: SNELLEN - LINEAR
OS_CC: 20/20

## 2022-10-06 ASSESSMENT — REFRACTION_WEARINGRX
OD_SPHERE: -0.75
OS_AXIS: 010
OD_CYLINDER: SPHERE
OD_ADD: +2.25
SPECS_TYPE: PAL
OS_ADD: +2.25
OS_CYLINDER: +0.25
OS_SPHERE: PLANO

## 2022-10-06 ASSESSMENT — KERATOMETRY
OD_K2POWER_DIOPTERS: 43.75
OS_AXISANGLE2_DEGREES: 178
OS_AXISANGLE_DEGREES: 088
OS_K2POWER_DIOPTERS: 43.25
OD_K1POWER_DIOPTERS: 43.00
OD_AXISANGLE2_DEGREES: 176
OS_K1POWER_DIOPTERS: 42.50
OD_AXISANGLE_DEGREES: 086

## 2022-10-06 ASSESSMENT — REFRACTION_MANIFEST
OS_CYLINDER: +0.25
OD_SPHERE: -0.50
OS_ADD: +2.50
OD_ADD: +2.50
OS_SPHERE: PLANO
OD_CYLINDER: SPHERE
OS_AXIS: 010

## 2022-10-06 ASSESSMENT — CONF VISUAL FIELD
OS_NORMAL: 1
OD_NORMAL: 1

## 2022-10-06 ASSESSMENT — EXTERNAL EXAM - LEFT EYE: OS_EXAM: NORMAL

## 2022-10-06 NOTE — PROGRESS NOTES
Chief Complaint   Patient presents with     Annual Eye Exam     Refill Xiidra        Last Eye Exam: 10- at [facility]    Dilated Previously: Yes    What are you currently using to see?  glasses     Distance Vision Acuity: Satisfied with vision    Near Vision Acuity: Not satisfied     Eye Comfort: dry and sore  Do you use eye drops? : Yes: Xiirda and artificial tears   Occupation or Hobbies: special      At last eye exam at [facility] was referred to an oculoplastic specialist due to thyroid eye disease- Dr. Shirley and was told to monitor with yearly eye exams.  She does not have any new symptoms such as double vision.  She may have had a previous episode of the right eye protruding.  She has dry eyes which were not symptomatically controlled with artificial tears so she was prescribed Xiidra about a year ago.   She called today to schedule with an oculoplastic specialist to follow up at the [place] and was scheduled with Dr. Cash in Feb. 2023.  The  seemed new.    Notes copied from 10/29/2021 rheumatologist visit-    1) Hashimoto's hypothyroidism: diagnosed in 2015 when presented with hair loss, low energy and some weight gain. Recent lab is normal.   Her current dose is 75 mcg daily. She is clinically and biochemically euthyroid. Will continue current dose.      2) concern of thyroid eye disease: I don't see eyelid retraction or exophthalmos. TSI is negative. Follow up eye exam with ophthalmologist    Records release form signed to get records from [facility]    Sofía Reich Optometric Assistant, A.B.O.C.        Medical, surgical and family histories reviewed and updated 10/6/2022.       OBJECTIVE: See Ophthalmology exam    ASSESSMENT:    ICD-10-CM    1. Dry eye syndrome of both eyes  H04.123 lifitegrast (XIIDRA) 5 % opthalmic solution   2. Age-related nuclear cataract of both eyes  H25.13 EYE EXAM (SIMPLE-NONBILLABLE)   3. Glaucoma suspect,  bilateral  H40.003 EYE EXAM (SIMPLE-NONBILLABLE)    Secondary to asymmetric C/Ds  May have had glaucoma testing last year at [facility]   4. Family history of glaucoma  Z83.511 lifitegrast (XIIDRA) 5 % opthalmic solution     EYE EXAM (SIMPLE-NONBILLABLE)   5. Hypothyroidism due to Hashimoto's thyroiditis  E03.8     E06.3    6. Presbyopia  H52.4 REFRACTION   7. Myopia of right eye  H52.11 REFRACTION       PLAN:     Patient Instructions   Xiidra- (Liftegras ophthalmic solution 5 %) 1 drop both eyes 2 x day.    Artificial tears- 1 drop both eyes 2-4 x daily.    Heat to the eyes daily for 10-15 minutes nightly with warm washcloth or reusable gel masks from the pharmacy or  CellPly heat masks can be purchased at [employer].    Ocusoft Hypochlor- spray solution onto cotton pad.  Close eyes and gently apply to eyelids and eyelashes using side to side motion.  Use morning and evening.    You have the start of mild cataracts.  You may notice some blurred vision or glare with night driving.  It is important that you wear good sunglasses to protect your eyes from the ultraviolet light from the sun.  I recommend that you return in 1 year for an eye exam unless there are any sudden changes in your vision.       Request records from [facility] about glaucoma testing.    Will change referral to Dr. Niya Lancaster at the [facility].    Eyeglass prescription given.    Return in 1 year for a complete eye exam or sooner if needed.    Jeet Mccabe, OD

## 2022-10-06 NOTE — PATIENT INSTRUCTIONS
Xiidra- (Liftegras ophthalmic solution 5 %) 1 drop both eyes 2 x day.    Artificial tears- 1 drop both eyes 2-4 x daily.    Heat to the eyes daily for 10-15 minutes nightly with warm washcloth or reusable gel masks from the pharmacy or  C-Vibes heat masks can be purchased at [employer].    Ocusoft Hypochlor- spray solution onto cotton pad.  Close eyes and gently apply to eyelids and eyelashes using side to side motion.  Use morning and evening.    You have the start of mild cataracts.  You may notice some blurred vision or glare with night driving.  It is important that you wear good sunglasses to protect your eyes from the ultraviolet light from the sun.  I recommend that you return in 1 year for an eye exam unless there are any sudden changes in your vision.       Request records from [facility] about glaucoma testing.    Will change referral to Dr. Niya Lancaster at the [facility].    Eyeglass prescription given.    Return in 1 year for a complete eye exam or sooner if needed.    Jeet Mccabe, OD    The affects of the dilating drops last for 4- 6 hours.  You will be more sensitive to light and vision will be blurry up close.  Do not drive if you do not feel comfortable.  Mydriatic sunglasses were given if needed.      Optometry Providers       Clinic Locations                                 Telephone Number   Dr. Tangela [place]  [place] [phone]     [facility] Hours:                [facility] [place] [facility] Hours:       [facility] Hours:   [address] [address]   [address] [address]      [address] [place] [address]   [place] [address]    JASON [place] [address]  Phone: [phone]                    Phone: [phone]     Phone: [phone]                      Monday 8:00-6:00                          Monday 8:00-6:00                           Monday 8:00-6:00              Tuesday 8:00-6:00                          Tuesday 8:00-6:00                          Tuesday 8:00-6:00              Wednesday 8:00-6:00                  Wednesday 8:00-6:00                   Wednesday 8:00-6:00      Thursday 8:00-6:00                        Thursday 8:00-6:00                         Thursday 8:00-6:00            Friday 8:00-5:00                              Friday 8:00-5:00                              Friday 8:00-5:00    [facility] Hours:   [address] [place] [address]  [phone]    Monday 9:00-6:00  Tuesday 9:00-6:00  Wednesday 9:00-6:00  Thursday 9:00-6:00  Friday 9:00-5:00  Please log on to [email or web address] to order your contact lenses.  The link is found on the [facility] page.  As always, Thank you for trusting us with your health care needs!

## 2022-10-06 NOTE — TELEPHONE ENCOUNTER
[facility]    Phone Message    May a detailed message be left on voicemail: yes     Reason for Call: Medication Question or concern regarding medication   Prescription Clarification  Name of Medication: Xiidra 5%  Prescribing Provider: Dr. Mccabe   Pharmacy: [facility] [address]    What on the order needs clarification? Pt gets this medication through [facility] but [facility] will not authorize her to go there anymore so she needs to refill these meds ASAP as she is almost out. Pt is wondering if that is something that she can be seen for by you, whille she waits until Feb to get in with Dr. Cha or Dr. Cash. Please call pt and let her know. Thank you.           Action Taken: Message routed to:  [facility] EYE    Travel Screening: Not Applicable

## 2022-10-06 NOTE — TELEPHONE ENCOUNTER
Patient has appointment scheduled today and we can address medication refill.    Jeet Mccabe, ABY    Patient was called and will come to appointment and appreciated the call.

## 2022-10-06 NOTE — LETTER
10/6/2022         RE: Chanel Israel  [address]  [facility] [address]        Dear Colleague,    Thank you for referring your patient, Chanel Israel, to the [facility]. Please see a copy of my visit note below.    Chief Complaint   Patient presents with     Annual Eye Exam     Refill Xiidra        Last Eye Exam: 10- at [facility]    Dilated Previously: Yes    What are you currently using to see?  glasses     Distance Vision Acuity: Satisfied with vision    Near Vision Acuity: Not satisfied     Eye Comfort: dry and sore  Do you use eye drops? : Yes: Xiirda and artificial tears   Occupation or Hobbies: special      At last eye exam at [facility] was referred to an oculoplastic specialist due to thyroid eye disease- Dr. Shirley and was told to monitor with yearly eye exams.  She does not have any new symptoms such as double vision.  She may have had a previous episode of the right eye protruding.  She has dry eyes which were not symptomatically controlled with artificial tears so she was prescribed Xiidra about a year ago.   She called today to schedule with an oculoplastic specialist to follow up at the [facility] and was scheduled with Dr. aCsh in Feb. 2023.  The  seemed new.    Notes copied from 10/29/2021 rheumatologist visit-    1) Hashimoto's hypothyroidism: diagnosed in 2015 when presented with hair loss, low energy and some weight gain. Recent lab is normal.   Her current dose is 75 mcg daily. She is clinically and biochemically euthyroid. Will continue current dose.      2) concern of thyroid eye disease: I don't see eyelid retraction or exophthalmos. TSI is negative. Follow up eye exam with ophthalmologist    Records release form signed to get records from [facility]    Sofía Reich Optometric Assistant, A.B.O.C.        Medical, surgical and family histories reviewed and updated 10/6/2022.       OBJECTIVE:  See Ophthalmology exam    ASSESSMENT:    ICD-10-CM    1. Dry eye syndrome of both eyes  H04.123 lifitegrast (XIIDRA) 5 % opthalmic solution   2. Age-related nuclear cataract of both eyes  H25.13 EYE EXAM (SIMPLE-NONBILLABLE)   3. Glaucoma suspect, bilateral  H40.003 EYE EXAM (SIMPLE-NONBILLABLE)    Secondary to asymmetric C/Ds  May have had glaucoma testing last year at [facility]   4. Family history of glaucoma  Z83.511 lifitegrast (XIIDRA) 5 % opthalmic solution     EYE EXAM (SIMPLE-NONBILLABLE)   5. Hypothyroidism due to Hashimoto's thyroiditis  E03.8     E06.3    6. Presbyopia  H52.4 REFRACTION   7. Myopia of right eye  H52.11 REFRACTION       PLAN:     Patient Instructions   Xiidra- (Liftegras ophthalmic solution 5 %) 1 drop both eyes 2 x day.    Artificial tears- 1 drop both eyes 2-4 x daily.    Heat to the eyes daily for 10-15 minutes nightly with warm washcloth or reusable gel masks from the pharmacy or  Haoguihua heat masks can be purchased at [employer].    Ocusoft Hypochlor- spray solution onto cotton pad.  Close eyes and gently apply to eyelids and eyelashes using side to side motion.  Use morning and evening.    You have the start of mild cataracts.  You may notice some blurred vision or glare with night driving.  It is important that you wear good sunglasses to protect your eyes from the ultraviolet light from the sun.  I recommend that you return in 1 year for an eye exam unless there are any sudden changes in your vision.       Request records from [facility] about glaucoma testing.    Will change referral to Dr. Niya Lancaster at the [facility].    Eyeglass prescription given.    Return in 1 year for a complete eye exam or sooner if needed.    Jeet Mccabe, OD           Again, thank you for allowing me to participate in the care of your patient.        Sincerely,        Jete Mccabe, OD

## 2022-10-06 NOTE — Clinical Note
Con Michael!  Could you do me a favor?  Ana called the central scheduling line for thyroid exam and was scheduled with Dr. Cash in Feb.  She should be seeing oculoplastics.  She saw Dr. Shirley at [facility] and needs to change to [place].  I am requesting her previous records.  Would you be willing to schedule her at [facility] and cancel her appt with Shreya.  I can put in a referral after she is scheduled so it won't be so confusing for main scheduling.    She has no new symptoms such as double vision, proptosis, etc- just dry eyes. She was told by Dr. Shirley to follow with him in a year.  Thanks!  Jeet Mccabe, OD

## 2022-10-11 ENCOUNTER — TELEPHONE (OUTPATIENT)
Dept: OPHTHALMOLOGY | Facility: CLINIC | Age: 57
End: 2022-10-11

## 2022-10-11 NOTE — TELEPHONE ENCOUNTER
Called patient per Dr. Mccabe's request. Pt currently scheduled with Dr. Cash in February 2023, should be scheduled for Thyroid Eye appointment with Dr. Núñez in [place]. Left scheduling number for pt to call, needs to cancel Dr. Cash appt and schedule next available appt with Dr. Núñez.     Fernanda Oliver, COA, 11:02 AM 10/11/2022

## 2022-10-12 ENCOUNTER — TELEPHONE (OUTPATIENT)
Dept: FAMILY MEDICINE | Facility: CLINIC | Age: 57
End: 2022-10-12

## 2022-10-12 NOTE — TELEPHONE ENCOUNTER
Pt has scheduled a flu shot only appt for 10/20.  Pt is requesting to have flu shot in administered in gluteus due to lymphedema in her arms.     Per our patient care supervisor we are unable to administer in gluteus. [place] currently has practice and education team looking into it.    Vaccine insert states  Administer Flublok Quadrivalent as a single 0.5 mL dose.  2.2 Administration  The preferred site for injection is the deltoid muscle.

## 2022-10-13 NOTE — TELEPHONE ENCOUNTER
Follow up with pt care supervisor who stated flu injection may be given vastus lateralis but absolutely not dorsal gluteal.

## 2022-10-14 NOTE — TELEPHONE ENCOUNTER
Pt has cancelled appt.      Of note I had not yet discussed this with pt. She cancelled herself on mychart and at this time has not rescheduled.Pacheco HENRIQUEZ, CMA

## 2022-10-21 ENCOUNTER — IMMUNIZATION (OUTPATIENT)
Dept: FAMILY MEDICINE | Facility: CLINIC | Age: 57
End: 2022-10-21
Payer: COMMERCIAL

## 2022-10-21 DIAGNOSIS — Z23 NEED FOR PROPHYLACTIC VACCINATION AND INOCULATION AGAINST INFLUENZA: Primary | ICD-10-CM

## 2022-10-21 PROCEDURE — 90682 RIV4 VACC RECOMBINANT DNA IM: CPT

## 2022-10-21 PROCEDURE — 99207 PR NO CHARGE NURSE ONLY: CPT

## 2022-10-21 PROCEDURE — 90471 IMMUNIZATION ADMIN: CPT

## 2022-10-24 ENCOUNTER — MYC MEDICAL ADVICE (OUTPATIENT)
Dept: FAMILY MEDICINE | Facility: CLINIC | Age: 57
End: 2022-10-24

## 2022-10-25 NOTE — TELEPHONE ENCOUNTER
Patient calling in requesting to speak with RN to schedule COVID treatment. Patient states that she started having symptoms on 10/21/22 and she would like treatment options. Informed patient that since there are no same day appts available with PCP, we would recommend her talking with scheduling to see if there are any other providers who would be able to get her treatment quicker. Patient verbalized understanding. Also asked patient if they would like any information on COVID resources or at-home treatment, patient declined at this time, stating no questions or concerns.    Patient warm transferred to scheduling.        Mary Riley, DOMINICKN, RN  [facility]

## 2022-10-26 ENCOUNTER — VIRTUAL VISIT (OUTPATIENT)
Dept: FAMILY MEDICINE | Facility: OTHER | Age: 57
End: 2022-10-26
Payer: COMMERCIAL

## 2022-10-26 DIAGNOSIS — U07.1 INFECTION DUE TO 2019 NOVEL CORONAVIRUS: Primary | ICD-10-CM

## 2022-10-26 PROCEDURE — 99213 OFFICE O/P EST LOW 20 MIN: CPT | Mod: CS | Performed by: PHYSICIAN ASSISTANT

## 2022-10-26 NOTE — PROGRESS NOTES
"Ana is a 57 year old who is being evaluated via a billable video visit.      How would you like to obtain your AVS? MyChart  If the video visit is dropped, the invitation should be resent by: Text to cell phone: [phone]  Will anyone else be joining your video visit? No    Assessment & Plan     ICD-10-CM    1. Infection due to 2019 novel coronavirus  U07.1            COVID-19 positive patient.  Encounter for consideration of medication intervention. Patient does qualify for a prescription. Full discussion with patient including medication options, risks and benefits. Potential drug interactions reviewed with patient.     Treatment Planned: Discussed options, patient elected not to treat as is already feeling better   - Discussed options for symptomatic cares, patient declined   - Discussed warning signs that would warrant return to clinic/ED      Temporary change to home medications:  None     Estimated body mass index is 26.07 kg/m  as calculated from the following:    Height as of 9/30/22: 1.549 m (5' 1\").    Weight as of 9/30/22: 62.6 kg (138 lb).     GFR Estimate   Date Value Ref Range Status   07/18/2022 72 >60 mL/min/1.73m2 Final     Comment:     Effective December 21, 2021 eGFRcr in adults is calculated using the 2021 CKD-EPI creatinine equation which includes age and gender (Sharath et al., NEJ, DOI: 10.1056/LZRQkx4902994)   06/22/2021 81 >60 mL/min/[1.73_m2] Final     Comment:     Non  GFR Calc  Starting 12/18/2018, serum creatinine based estimated GFR (eGFR) will be   calculated using the Chronic Kidney Disease Epidemiology Collaboration   (CKD-EPI) equation.       Lab Results   Component Value Date    [id number] Negative 02/25/2022       Review of the result(s) of each unique test - None    26 minutes spent on the date of the encounter doing chart review, history and exam, documentation and further activities per the note    PA student as below was present and participated in shadow " capacity only    ABELINO Foley-S2  [facility]       MAX Andrea [facility] FAHAD Perez is a 57 year old, presenting for the following health issues:  Covid Concern      HPI     COVID-19 Symptom Review  How many days ago did these symptoms start? 4 - 10/22/22, at home test     Are any of the following symptoms significant for you?    New or worsening difficulty breathing? No    Worsening cough? Yes, I am coughing up mucus.    Fever or chills? No, not now, highest was 101.7    Headache: No    Sore throat: YES    Chest pain: No    Diarrhea: No    Body aches? No, improved but had them     - Special    - First time having COVID-19   - Congestion       What treatments has patient tried? Guaifenesin (mucinex) and nyquil   Does patient live in a nursing home, group home, or shelter? No  Does patient have a way to get food/medications during quarantined? Yes, I have a friend or family member who can help me.        Review of Systems   Constitutional, HEENT, cardiovascular, pulmonary, gi and gu systems are negative, except as otherwise noted.      Objective       Vitals:  No vitals were obtained today due to virtual visit.    Physical Exam   GENERAL: Mildly ill appearing, alert and no distress  EYES: Eyes grossly normal to inspection.  No discharge or erythema, or obvious scleral/conjunctival abnormalities.  RESP: No audible wheeze, cough, or visible cyanosis.  No visible retractions or increased work of breathing.    SKIN: Visible skin clear. No significant rash, abnormal pigmentation or lesions.  NEURO: Cranial nerves grossly intact.  Mentation and speech appropriate for age.  PSYCH: Mentation appears normal, affect normal/bright, judgement and insight intact, normal speech and appearance well-groomed.      Diagnostics: None         Video-Visit Details    Video Start Time: 8:12 AM    Type of service:  Video Visit    Video End Time:8:24  AM    Originating Location (pt. Location): Home    Distant Location (provider location):  Off-site    Platform used for Video Visit: Luis

## 2022-10-27 ENCOUNTER — TELEPHONE (OUTPATIENT)
Dept: DERMATOLOGY | Facility: CLINIC | Age: 57
End: 2022-10-27

## 2022-10-27 NOTE — TELEPHONE ENCOUNTER
Left Voicemail (1st Attempt) for the patient to call back and schedule the following:    Appointment type: return   Provider: dr. smart  Return date: 7/25/2023  Specialty phone number: [phone]   Additonal Notes: Return in about 1 year (around 7/25/2023) for skin check    Alma khan Procedure   Orthopedics, Podiatry, Sports Medicine, Ent ,Eye , Audiology, Adult Endocrine & Diabetes, Nutrition & Medication Therapy Management Specialties   [facility]

## 2022-10-31 RX ORDER — LIFITEGRAST 50 MG/ML
1 SOLUTION/ DROPS OPHTHALMIC 2 TIMES DAILY
Qty: 180 EACH | Refills: 3 | Status: SHIPPED | OUTPATIENT
Start: 2022-10-31

## 2022-11-25 ENCOUNTER — ALLIED HEALTH/NURSE VISIT (OUTPATIENT)
Dept: FAMILY MEDICINE | Facility: CLINIC | Age: 57
End: 2022-11-25
Payer: COMMERCIAL

## 2022-11-25 DIAGNOSIS — Z23 HIGH PRIORITY FOR 2019-NCOV VACCINE: Primary | ICD-10-CM

## 2022-11-25 PROCEDURE — 0134A COVID-19 VACCINE BIVALENT BOOSTER 18+ (MODERNA): CPT

## 2022-11-25 PROCEDURE — 91313 COVID-19 VACCINE BIVALENT BOOSTER 18+ (MODERNA): CPT

## 2022-11-25 PROCEDURE — 99207 PR NO CHARGE NURSE ONLY: CPT

## 2022-12-12 ENCOUNTER — OFFICE VISIT (OUTPATIENT)
Dept: ORTHOPEDICS | Facility: CLINIC | Age: 57
End: 2022-12-12
Payer: COMMERCIAL

## 2022-12-12 DIAGNOSIS — M25.562 CHRONIC PAIN OF LEFT KNEE: ICD-10-CM

## 2022-12-12 DIAGNOSIS — G89.29 CHRONIC PAIN OF LEFT KNEE: ICD-10-CM

## 2022-12-12 DIAGNOSIS — G89.29 CHRONIC LEFT SHOULDER PAIN: Primary | ICD-10-CM

## 2022-12-12 DIAGNOSIS — M25.512 CHRONIC LEFT SHOULDER PAIN: Primary | ICD-10-CM

## 2022-12-12 PROCEDURE — 20610 DRAIN/INJ JOINT/BURSA W/O US: CPT | Mod: LT | Performed by: FAMILY MEDICINE

## 2022-12-12 PROCEDURE — 99213 OFFICE O/P EST LOW 20 MIN: CPT | Mod: 25 | Performed by: FAMILY MEDICINE

## 2022-12-12 RX ORDER — TRIAMCINOLONE ACETONIDE 40 MG/ML
40 INJECTION, SUSPENSION INTRA-ARTICULAR; INTRAMUSCULAR
Status: SHIPPED | OUTPATIENT
Start: 2022-12-12

## 2022-12-12 RX ADMIN — TRIAMCINOLONE ACETONIDE 40 MG: 40 INJECTION, SUSPENSION INTRA-ARTICULAR; INTRAMUSCULAR at 14:40

## 2022-12-12 ASSESSMENT — PAIN SCALES - GENERAL: PAINLEVEL: SEVERE PAIN (7)

## 2022-12-12 NOTE — LETTER
12/12/2022         RE: Chanel Israel  [address]  [facility] [address]        Dear Colleague,    Thank you for referring your patient, Chanel Israel, to the [facility] [place]. Please see a copy of my visit note below.    HISTORY OF PRESENT ILLNESS  Ms. Israel is a pleasant 57 year old female following up with left shoulder pain.  Ana last saw me for her shoulder in July 2021, I referred her to physical therapy at that visit.  Unfortunately her pain never fully went away, she also is experiencing popping and clicking in certain ranges of motion.  She feels pain whenever she flexes or rotates.  She does have a history of recurrent adhesive capsulitis in the shoulder.  Her knee is also bothering her quite a bit.  She points to the lateral aspect of her left knee, this has been worsening over the past few months     PHYSICAL EXAM  General  - normal appearance, in no obvious distress  Musculoskeletal - left shoulder  - inspection: normal bone and joint alignment, no obvious deformity, no scapular winging, no AC step-off  - palpation: mildly tender AC  - ROM:  Painful and limited flexion at 120 deg, painful IR  - strength: 5/5  strength, 5/5 in all shoulder planes  - special tests:  (-) Speed's  (+) Neer  (+) Hawkin's  (-) Masha's   Neuro  - no sensory or motor deficit, grossly normal coordination, normal muscle tone          ASSESSMENT & PLAN  Ms. Israel is a 57 year old female following up with left shoulder pain, as well as left knee pain.    Through shared decision making we did decide to inject her left knee today.    With regards to her shoulder I am ordering an MRI, specifically to rule out adhesive capsulitis versus rotator cuff disease.  I will get in touch with her with the results.    It was a pleasure seeing Ana.        Armani Storey, , CAQSM      This note was constructed using Dragon dictation software, please excuse any minor errors in spelling,  grammar, or syntax.      Large Joint Injection/Arthocentesis: L knee joint    Date/Time: 12/12/2022 2:40 PM  Performed by: Armani Storey DO  Authorized by: Armani Storey DO     Indications:  Pain  Needle Size:  22 G  Guidance: landmark guided    Approach:  Lateral  Location:  Knee      Medications:  40 mg triamcinolone 40 MG/ML  Outcome:  Tolerated well, no immediate complications  Procedure discussed: discussed risks, benefits, and alternatives    Consent Given by:  Patient  Timeout: timeout called immediately prior to procedure    Prep: patient was prepped and draped in usual sterile fashion       PROCEDURE    Knee Injection - Intraarticular  The patient was informed of the risks and the benefits of the procedure and a written consent was signed.  The patient s left knee was prepped with chlorhexidine in sterile fashion.   40 mg of triamcinolone suspension was drawn up into a 5 mL syringe with 4 mL of 1% lidocaine.  Injection was performed using substerile technique.  A 1.5-inch 22-gauge needle was used to enter the lateral aspect of the knee.  Injection performed successfully without difficulty.  There were no complications. The patient tolerated the procedure well. There was negligible bleeding.                   Again, thank you for allowing me to participate in the care of your patient.        Sincerely,        Armani Storey DO

## 2022-12-12 NOTE — NURSING NOTE
[facility]   ORTHOPEDICS & SPORTS MEDICINE  [address] [place] [address]  Dept: [phone]  ______________________________________________________________________________    Patient: Chanel Israel   : 1965   MRN: [id number]   2022    INVASIVE PROCEDURE SAFETY CHECKLIST    Date: 2022   Procedure: Left knee injection  Patient Name: Chanel Israel  MRN: [id number]  YOB: 1965    Action: Complete sections as appropriate. Any discrepancy results in a HARD COPY until resolved.     PRE PROCEDURE:  Patient ID verified with 2 identifiers (name and  or MRN): Yes  Procedure and site verified with patient/designee (when able): Yes  Accurate consent documentation in medical record: Yes  H&P (or appropriate assessment) documented in medical record: Yes  H&P must be up to 20 days prior to procedure and updates within 24 hours of procedure as applicable: NA  Relevant diagnostic and radiology test results appropriately labeled and displayed as applicable: NA  Procedure site(s) marked with provider initials: NA    TIMEOUT:  Time-Out performed immediately prior to starting procedure, including verbal and active participation of all team members addressing the following:Yes  * Correct patient identify  * Confirmed that the correct side and site are marked  * An accurate procedure consent form  * Agreement on the procedure to be done  * Correct patient position  * Relevant images and results are properly labeled and appropriately displayed  * The need to administer antibiotics or fluids for irrigation purposes during the procedure as applicable   * Safety precautions based on patient history or medication use    DURING PROCEDURE: Verification of correct person, site, and procedures any time the responsibility for care of the patient is transferred to another member of the care team.       Prior to injection, verified patient identity using patient's name and date of  birth.  Due to injection administration, patient instructed to remain in clinic for 15 minutes  afterwards, and to report any adverse reaction to me immediately.    Joint injection was performed.      Drug Amount Wasted:  None.  Vial/Syringe: Single dose vial  Expiration Date:  8/30/2024      Leonid Hunt, EMT  December 12, 2022

## 2022-12-12 NOTE — NURSING NOTE
Chief Complaint   Patient presents with     Left Shoulder - Pain, Follow Up     Left Knee - Pain, Follow Up       There were no vitals filed for this visit.    There is no height or weight on file to calculate BMI.      AUBREY Jaquez NREMT

## 2022-12-12 NOTE — PROGRESS NOTES
HISTORY OF PRESENT ILLNESS  Ms. Israel is a pleasant 57 year old female following up with left shoulder pain.  Ana last saw me for her shoulder in July 2021, I referred her to physical therapy at that visit.  Unfortunately her pain never fully went away, she also is experiencing popping and clicking in certain ranges of motion.  She feels pain whenever she flexes or rotates.  She does have a history of recurrent adhesive capsulitis in the shoulder.  Her knee is also bothering her quite a bit.  She points to the lateral aspect of her left knee, this has been worsening over the past few months     PHYSICAL EXAM  General  - normal appearance, in no obvious distress  Musculoskeletal - left shoulder  - inspection: normal bone and joint alignment, no obvious deformity, no scapular winging, no AC step-off  - palpation: mildly tender AC  - ROM:  Painful and limited flexion at 120 deg, painful IR  - strength: 5/5  strength, 5/5 in all shoulder planes  - special tests:  (-) Speed's  (+) Neer  (+) Hawkin's  (-) Masha's   Neuro  - no sensory or motor deficit, grossly normal coordination, normal muscle tone          ASSESSMENT & PLAN  Ms. Israel is a 57 year old female following up with left shoulder pain, as well as left knee pain.    Through shared decision making we did decide to inject her left knee today.    With regards to her shoulder I am ordering an MRI, specifically to rule out adhesive capsulitis versus rotator cuff disease.  I will get in touch with her with the results.    It was a pleasure seeing Ana.        Armani Storey DO, CAQSM      This note was constructed using Dragon dictation software, please excuse any minor errors in spelling, grammar, or syntax.      Large Joint Injection/Arthocentesis: L knee joint    Date/Time: 12/12/2022 2:40 PM  Performed by: Armani Storey DO  Authorized by: Armani Storey DO     Indications:  Pain  Needle Size:  22 G  Guidance: landmark guided    Approach:   Lateral  Location:  Knee      Medications:  40 mg triamcinolone 40 MG/ML  Outcome:  Tolerated well, no immediate complications  Procedure discussed: discussed risks, benefits, and alternatives    Consent Given by:  Patient  Timeout: timeout called immediately prior to procedure    Prep: patient was prepped and draped in usual sterile fashion       PROCEDURE    Knee Injection - Intraarticular  The patient was informed of the risks and the benefits of the procedure and a written consent was signed.  The patient s left knee was prepped with chlorhexidine in sterile fashion.   40 mg of triamcinolone suspension was drawn up into a 5 mL syringe with 4 mL of 1% lidocaine.  Injection was performed using substerile technique.  A 1.5-inch 22-gauge needle was used to enter the lateral aspect of the knee.  Injection performed successfully without difficulty.  There were no complications. The patient tolerated the procedure well. There was negligible bleeding.

## 2022-12-27 ENCOUNTER — ANCILLARY PROCEDURE (OUTPATIENT)
Dept: MRI IMAGING | Facility: CLINIC | Age: 57
End: 2022-12-27
Attending: FAMILY MEDICINE
Payer: COMMERCIAL

## 2022-12-27 DIAGNOSIS — M25.512 CHRONIC LEFT SHOULDER PAIN: ICD-10-CM

## 2022-12-27 DIAGNOSIS — G89.29 CHRONIC LEFT SHOULDER PAIN: ICD-10-CM

## 2022-12-27 PROCEDURE — 73221 MRI JOINT UPR EXTREM W/O DYE: CPT | Mod: TC | Performed by: RADIOLOGY

## 2023-06-19 ENCOUNTER — PATIENT OUTREACH (OUTPATIENT)
Dept: CARE COORDINATION | Facility: CLINIC | Age: 58
End: 2023-06-19
Payer: COMMERCIAL

## 2023-07-03 ENCOUNTER — PATIENT OUTREACH (OUTPATIENT)
Dept: CARE COORDINATION | Facility: CLINIC | Age: 58
End: 2023-07-03
Payer: COMMERCIAL

## 2023-08-09 NOTE — LETTER
"    8/11/2020         RE: Chanel Israel  [address]  [facility] [address]        Dear Colleague,    Thank you for referring your patient, Chanel Israel, to the [facility]. Please see a copy of my visit note below.    Chanel Israel is a 55 year old female who is being evaluated via a billable video visit.      The patient has been notified of following:     \"This video visit will be conducted via a call between you and your physician/provider. We have found that certain health care needs can be provided without the need for an in-person physical exam.  This service lets us provide the care you need with a video conversation.  If a prescription is necessary we can send it directly to your pharmacy.  If lab work is needed we can place an order for that and you can then stop by our lab to have the test done at a later time.    Video visits are billed at different rates depending on your insurance coverage.  Please reach out to your insurance provider with any questions.    If during the course of the call the physician/provider feels a video visit is not appropriate, you will not be charged for this service.\"    Patient has given verbal consent for Video visit?yes    Video-Visit Details    Type of service:  Video Visit    Video visit duration: 16 min  Originating Location (pt. Location): home  Distant Location (provider location):  [facility]     Platform used for Video Visit:DORI Lopez MD, MD    Oncology Follow-up visit:  Date on this visit: Aug 11, 2020      Primary Care Physician: Gabbie Pate   Dx:   1. Breast cancer  She has a history of stage IIB breast cancer. She underwent L MRM and right prophylactic mastectomy on 5/14/2007. Pathology from the surgery showed  Grade 3 invasive ductal carcinoma, measuring 3.5x3.3 cm. One left axillary SLN was positive for malignancy and additional 13 resected left axillary LNs " -- DO NOT REPLY / DO NOT REPLY ALL --  -- Message is from Engagement Center Operations ([facility]) --    Offered Waitlist if Available for the Visit Type? No    Caller is requesting an appointment - at a sooner time than what was available.      Caller wants sooner appointment - offered other approved options    Reason for Visit: routine follow up , want to get patient tin to be seen by PCP before school starts on 8/21    Is the patient currently scheduled? Yes.  Appointment date:  8/9(cancelled by PCP )    Preferred time to be seen: anytime     Caller Information       Type Contact Phone/Fax    08/09/2023 07:32 AM CDT Phone (Incoming) JOANA CORBIN (Mother) [phone]          Alternative phone number: n/a     Can a detailed message be left? Yes    Message Turnaround:     [place]:    Please give this turnaround time to the caller:   \"This message will be sent to [state Provider's name]. The clinical team will fulfill your request as soon as they review your message.\"   were negative for malignancy. 3 right  SLNs were negative for malignancy. The tumor was ER negative VT negative HER2 Lisa negative by FISH.  She was given adjuvant chemotherapy per clinical trial  with weekly Adriamycin IV x15 weeks and also took oral Cytoxan daily x15 weeks. She was seen by the genetic counselor and the testing was negative for BRCA 1 and 2.  She was under the care of Dr. Ramirez at [facility] and preferred to continue to f/up with oncologist and has transferred her care to us in 05/2014.    She underwent bilateral removal and replacement with alloderm and larger silicone implants by  Dr. Sarah Todd on 6/21/2016      2. Personal and Fhx of breast cancer-  her sister was diagnosed with breast cancer at the age of 56. Her sister had endometrial cancer 5 years ago. Her sister's cancer was ER+VT+ and her sister was on oral HRT for years prior. They are two youngest sisters of 7 siblings. Pt tested negative (July 2018) for 28 genes -[facility] panel  - (APC, LENNY, BARD1, BRCA1, BRCA2, BRIP1, BMPR1A, CDH1, CDK4, CDKN2A (p14ARF and f28PBK1h), CHEK2, EPCAM, GREM1, MLH1, MSH2, MSH6, MUTYH, NBN, PALB2, PMS2, POLD1, POLE, PTEN, RAD51C, RAD51D, SMAD4, STK11, and TP53).      History Of Present Illness:  Ms. Israel is  postmenopausal female who presents for f/up of Hx of triple negative breast cancer. She has had hair loss for two months and had a normal TSH. She was seen by dermatology and was felt to likely have androgenic hair loss. She has also been feeling fatigued although in the last couple of days this has been improving.  She remains on Zoloft for depression. She has no chest wall related complaints and no lumps or bumps.   In addition, a complete 12 point  review of systems is negative.      Past Medical/Surgical History:  Past Medical History:   Diagnosis Date     Asthma, mild intermittent      Breast cancer (H)      Depression      Endometriosis      Heart murmur     Patient reported.     PONV  (postoperative nausea and vomiting)      Past Surgical History:   Procedure Laterality Date     ADENOIDECTOMY       COLONOSCOPY N/A 8/22/2014    Procedure: COLONOSCOPY;  Surgeon: Duane, William Charles, MD;  Location: [facility] OR     COSMETIC SURGERY       CYSTOSCOPY       HC REMOVAL OF ANAL FISSURE  2007     MASTECTOMY, SIMPLE RT/LT/MABLE       RECONSTRUCT BREAST BILATERAL  2008     TONSILLECTOMY       FHx and social Hx reviewed.  Employer: [employer]. Never smoker.    Allergies:  Allergies as of 08/11/2020 - Reviewed 03/31/2020   Allergen Reaction Noted     Erythromycin Nausea 07/08/2013     Current Medications:  Current Outpatient Medications   Medication     Calcium Carbonate-Vitamin D (CALCIUM + D PO)     Coenzyme Q10 (CO Q 10) 100 MG CAPS     fluocinolone (SYNALAR) 0.025 % cream     fluticasone (FLONASE) 50 MCG/ACT nasal spray     Glucosamine-Chondroitin-MSM (TRIPLE FLEX PO)     hydrocortisone 2.5 % cream     L-GLUTAMINE     MAGNESIUM PO     Multiple Vitamin (MULTI-VITAMIN PO)     Multiple Vitamins-Minerals (ZINC PO)     NIACIN CR PO     order for DME     order for DME     order for DME     Probiotic Product (PROBIOTIC DAILY PO)     sertraline (ZOLOFT) 50 MG tablet     SYNTHROID 75 MCG tablet     UNABLE TO FIND     No current facility-administered medications for this visit.        Physical Exam:  Wt 63 kg (139 lb)   BMI 25.84 kg/m        Constitutional: alert and in no distress  Eyes: No redness or discharge  Respiratory: No cough or labored breathing.  Musculoskeletal: Full range of motion in extremities.  Skin: no visible skin lesions or discoloration  Neurological: No tremors and denies headache.  Psychiatric: Mentation appears normal and affect is normal as well.  Alert and oriented x3.  The rest the comprehensive physical examination is deferred due to public health emergency video visit restrictions.    Laboratory/Imaging Studies    Component      Latest Ref Rng & Units 8/11/2020   WBC       4.0 - 11.0 10e9/L 5.2   RBC Count      3.8 - 5.2 10e12/L 4.63   Hemoglobin      11.7 - 15.7 g/dL 14.1   Hematocrit      35.0 - 47.0 % 42.6   MCV      78 - 100 fl 92   MCH      26.5 - 33.0 pg 30.5   MCHC      31.5 - 36.5 g/dL 33.1   RDW      10.0 - 15.0 % 12.2   Platelet Count      150 - 450 10e9/L 222   Diff Method       Automated Method   % Neutrophils      % 63.7   % Lymphocytes      % 27.3   % Monocytes      % 7.1   % Eosinophils      % 1.3   % Basophils      % 0.4   % Immature Granulocytes      % 0.2   Absolute Neutrophil      1.6 - 8.3 10e9/L 3.3   Absolute Lymphocytes      0.8 - 5.3 10e9/L 1.4   Absolute Monocytes      0.0 - 1.3 10e9/L 0.4   Absolute Eosinophils      0.0 - 0.7 10e9/L 0.1   Absolute Basophils      0.0 - 0.2 10e9/L 0.0   Abs Immature Granulocytes      0 - 0.4 10e9/L 0.0   Sodium      133 - 144 mmol/L 142   Potassium      3.4 - 5.3 mmol/L 3.9   Chloride      94 - 109 mmol/L 107   Carbon Dioxide      20 - 32 mmol/L 29   Anion Gap      3 - 14 mmol/L 6   Glucose      70 - 99 mg/dL 86   Urea Nitrogen      7 - 30 mg/dL 17   Creatinine      0.52 - 1.04 mg/dL 0.80   GFR Estimate      >60 mL/min/1.73:m2 83   GFR Estimate If Black      >60 mL/min/1.73:m2 >90   Calcium      8.5 - 10.1 mg/dL 9.1   Bilirubin Total      0.2 - 1.3 mg/dL 0.3   Albumin      3.4 - 5.0 g/dL 3.7   Protein Total      6.8 - 8.8 g/dL 7.2   Alkaline Phosphatase      40 - 150 U/L 88   ALT      0 - 50 U/L 29   AST      0 - 45 U/L 18   CRP Inflammation      0.0 - 8.0 mg/L <2.9   Sed Rate      0 - 30 mm/h 10       ASSESSMENT/PLAN:  Chanel is a very pleasant 55 year old woman with Hx of stage IIB invasive ductal breast cancer, grade 3, triple negative, s/p L MRM in 05/2007, s/p adjuvant Adriamycin and Cytoxan, with no evidence of disease recurrence since. She is also s/p right prophylactic mastectomy.   1. Personal Hx of triple negative breast cancer - She prefers to continue to follow-up with us annually for chest wall exam. She plans to  have annual labs done during her visits  with Dr. Hicks  -cbcd, creat, LFTs given prior exposure to chemotherapy. She is 13 years out from her treatment and could transfer all her f/up care to Dr. Hicks with annual labs as above when she is ready.   2.  She will f/up with Dr. Hicks or Dr. Swenson  for all her other medical needs.  3. Hair loss, fatigue- CHITO added to today's labs and negative. She will report back if her fatigue does not improve. She will be seeing endocrinology in f/up of hypothyroidism.  At the end of our visit patient verbalized understanding and concurred with the plan.        Again, thank you for allowing me to participate in the care of your patient.        Sincerely,        Ghislaine Lopez MD, MD

## 2023-09-02 ENCOUNTER — HEALTH MAINTENANCE LETTER (OUTPATIENT)
Age: 58
End: 2023-09-02

## 2024-10-26 ENCOUNTER — HEALTH MAINTENANCE LETTER (OUTPATIENT)
Age: 59
End: 2024-10-26

## 2024-11-06 NOTE — PROGRESS NOTES
Jose Perez is a 56 year old who presents for the following health issues   HPI   Acute Illness  Acute illness concerns:   Onset/Duration:   Symptoms:  Fever: no  Chills/Sweats: no  Headache (location?): no  Sinus Pressure: no  Conjunctivitis:  no  Ear Pain: YES- R ear with decrease hearing, but no drainage.  No dizziness, ringing or recent swimming.  She does have TMJ but this has gone into her ear now  Rhinorrhea: no  Congestion: no  Sore Throat: no  Cough: no  Wheeze: no  Decreased Appetite: no  Nausea: no  Vomiting: no  Diarrhea: no  Dysuria/Freq.: no  Dysuria or Hematuria: no  Fatigue/Achiness: no  Sick/Strep Exposure: no  Therapies tried and outcome: leftover ear drops with minimal relief    Patient Active Problem List   Diagnosis     Personal history of breast cancer     S/P bilateral mastectomy     Urge incontinence of urine     Overweight (BMI 25.0-29.9)     Mild recurrent major depression (H)     Family history of diabetes mellitus (DM)     Family history of thyroid disease     Plantar fasciitis, bilateral     Seasonal allergies     Mixed hyperlipidemia     Chronic rhinitis     Breast implant asymmetry     ACP (advance care planning)     Lymphedema     Family history of ischemic heart disease     Seasonal allergic rhinitis     Glaucoma suspect, bilateral     Family history of glaucoma     Age-related nuclear cataract of both eyes     Seasonal allergic rhinitis due to other allergic trigger, unspecified chronicity     Family history of breast cancer in sister     Family history of skin cancer     Actinic keratoses     Hyperlipidemia LDL goal <160     Hypothyroidism due to Hashimoto's thyroiditis     Bruxism (teeth grinding)     Anxiety     Loss of hair     Current Outpatient Medications   Medication     BIOTIN 5000 PO     buPROPion (WELLBUTRIN XL) 150 MG 24 hr tablet     Calcium Carbonate-Vitamin D (CALCIUM + D PO)     Coenzyme Q10 (CO Q 10) 100 MG CAPS     diclofenac (VOLTAREN) 1 % topical gel      GYN Addendum:    Pt with postmenopausal bleeding, EMB in office showed benign results.  Pt is here for scheduled D&C, hysteroscopy, possible polypectomy for definitive diagnosis and management.  R/B/A of procedure reviewed in detail, including risks of bleeding, infection, uterine perforation, injury to surrounding organs, etc.  All questions answered and pt agrees to proceed.   fluocinolone (SYNALAR) 0.025 % cream     fluticasone (FLONASE) 50 MCG/ACT nasal spray     Glucosamine-Chondroitin-MSM (TRIPLE FLEX PO)     hydrocortisone 2.5 % cream     ketoconazole (NIZORAL) 2 % external cream     L-GLUTAMINE     MAGNESIUM PO     melatonin 5 MG tablet     Multiple Vitamin (MULTI-VITAMIN PO)     NIACIN CR PO     order for DME     order for DME     Probiotic Product (PROBIOTIC DAILY PO)     sertraline (ZOLOFT) 50 MG tablet     SYNTHROID 75 MCG tablet     UNABLE TO FIND     zinc 50 MG TABS     Current Facility-Administered Medications   Medication     triamcinolone (KENALOG-40) injection 40 mg        Allergies   Allergen Reactions     Erythromycin Nausea       Review of Systems   Constitutional: Negative.  Negative for chills, fatigue and fever.   HENT: Positive for ear pain and hearing loss. Negative for congestion, ear discharge, rhinorrhea, sinus pressure, sinus pain and sore throat.    Respiratory: Negative.  Negative for cough, shortness of breath and wheezing.    Cardiovascular: Negative.  Negative for chest pain, palpitations and peripheral edema.   Gastrointestinal: Negative.    All other systems reviewed and are negative.           Objective    /71 (BP Location: Right arm, Patient Position: Sitting, Cuff Size: Adult Regular)   Pulse 96   Temp 98.7  F (37.1  C) (Oral)   Resp 20   Wt 62.9 kg (138 lb 9.6 oz)   SpO2 97%   BMI 25.51 kg/m    Body mass index is 25.51 kg/m .  Physical Exam  Vitals and nursing note reviewed.   Constitutional:       General: She is not in acute distress.     Appearance: Normal appearance. She is well-developed and normal weight. She is not ill-appearing.   HENT:      Head: Normocephalic and atraumatic.      Comments: TMs are intact without any erythema or bulging bilaterally.  Airway is patent.     Right Ear: Swelling and tenderness present. No drainage.      Left Ear: No drainage, swelling or tenderness.      Nose: Nose normal.      Mouth/Throat:       Lips: Pink.      Mouth: Mucous membranes are moist.      Pharynx: Oropharynx is clear. Uvula midline. No pharyngeal swelling, oropharyngeal exudate or posterior oropharyngeal erythema.      Tonsils: No tonsillar exudate.   Eyes:      General: No scleral icterus.     Extraocular Movements: Extraocular movements intact.      Conjunctiva/sclera: Conjunctivae normal.      Pupils: Pupils are equal, round, and reactive to light.   Neck:      Thyroid: No thyromegaly.   Cardiovascular:      Rate and Rhythm: Normal rate and regular rhythm.      Pulses: Normal pulses.      Heart sounds: Normal heart sounds, S1 normal and S2 normal. No murmur heard.  No friction rub. No gallop.    Pulmonary:      Effort: Pulmonary effort is normal. No accessory muscle usage, respiratory distress or retractions.      Breath sounds: Normal breath sounds and air entry. No stridor. No decreased breath sounds, wheezing, rhonchi or rales.   Musculoskeletal:      Cervical back: Normal range of motion and neck supple.   Lymphadenopathy:      Cervical: No cervical adenopathy.   Skin:     General: Skin is warm and dry.      Nails: There is no clubbing.   Neurological:      Mental Status: She is alert and oriented to person, place, and time.   Psychiatric:         Mood and Affect: Mood normal.         Behavior: Behavior normal.         Thought Content: Thought content normal.         Judgment: Judgment normal.          Assessment/Plan:  Infective otitis externa, right:  Will treat with cortisporin otic [id number] for OE.  Recommend tylenol/ibuprofen prn pain/fever and keep clean and dry.   Rest, fluids, chicken soup.  Recheck in clinic if symptoms worsen or if symptoms do not improve.    -     neomycin-polymyxin-hydrocortisone (CORTISPORIN) 3.5-41920-2 otic suspension; Place 4 drops into the right ear 4 times daily for 7 days Not for below 2 years of age        Luci Vaughan PA-C

## 2024-11-10 NOTE — PATIENT INSTRUCTIONS
Recommend new glasses.    You have the start of mild cataracts.  You may notice some blurred vision or glare with night driving.  It is important that you wear good sunglasses to protect your eyes from the ultraviolet light from the sun.  I recommend that you return in 1 year for an eye exam unless there are any sudden changes in your vision.    You are a glaucoma suspect.  We will continue to monitor your intraocular pressures and optic nerves.  Return for OCT/Visual Field.    Return in 1 year for a complete eye exam or sooner if needed.    Jeet Mccabe, OD     Unable to assess due to medical condition

## (undated) DEVICE — SU FIBERLINK 0 BLUE W/CLOSED LOOP ON ONE END AR-7258

## (undated) DEVICE — SUCTION MANIFOLD NEPTUNE 2 SYS 4 PORT 0702-020-000

## (undated) DEVICE — TRAY PAIN INJECTION 97A 640

## (undated) DEVICE — GLOVE PROTEXIS POWDER FREE SMT 8.0  2D72PT80X

## (undated) DEVICE — LINEN ORTHO PACK 5446

## (undated) DEVICE — GLOVE PROTEXIS BLUE W/NEU-THERA 8.0  2D73EB80

## (undated) DEVICE — TUBING SYSTEM ARTHREX PATIENT REDEUCE AR-6421

## (undated) DEVICE — ESU GROUND PAD ADULT W/CORD E7507

## (undated) DEVICE — SOL NACL 0.9% IRRIG 3000ML BAG 2B7477

## (undated) DEVICE — PREP DURAPREP REMOVER 4OZ 8611

## (undated) DEVICE — PAD ARMBOARD FOAM EGGCRATE COVIDEN 3114367

## (undated) DEVICE — PREP CHLORAPREP W/ORANGE TINT 10.5ML 930715

## (undated) DEVICE — PACK ARTHROSCOPY CUSTOM ASC

## (undated) DEVICE — SU TIGERSTICK #2 TIGERWIRE 50" STIFF END 12" AR-7209T

## (undated) DEVICE — GLOVE PROTEXIS W/NEU-THERA 7.0  2D73TE70

## (undated) DEVICE — CANNULA PASSPORT BUTTON 8X3CM AR-6592-08-30

## (undated) DEVICE — NDL ARTHREX SCORPION KNEE 2-0 AR-12990N

## (undated) DEVICE — LINEN GOWN XLG 5407

## (undated) DEVICE — BUR ARTHREX COOLCUT SABRE 4.0MMX13CM AR-8400SR

## (undated) DEVICE — PREP DURAPREP 26ML APL 8630

## (undated) DEVICE — SYR 10ML PERFIX LL 332152

## (undated) DEVICE — ESU PENCIL SMOKE EVAC W/ROCKER SWITCH 0703-047-000

## (undated) RX ORDER — ACETAMINOPHEN 325 MG/1
TABLET ORAL
Status: DISPENSED
Start: 2022-03-01

## (undated) RX ORDER — ONDANSETRON 2 MG/ML
INJECTION INTRAMUSCULAR; INTRAVENOUS
Status: DISPENSED
Start: 2022-09-30

## (undated) RX ORDER — GLYCOPYRROLATE 0.2 MG/ML
INJECTION INTRAMUSCULAR; INTRAVENOUS
Status: DISPENSED
Start: 2022-03-01

## (undated) RX ORDER — PROPOFOL 10 MG/ML
INJECTION, EMULSION INTRAVENOUS
Status: DISPENSED
Start: 2022-03-01

## (undated) RX ORDER — FENTANYL CITRATE 50 UG/ML
INJECTION, SOLUTION INTRAMUSCULAR; INTRAVENOUS
Status: DISPENSED
Start: 2022-09-30

## (undated) RX ORDER — HYDROMORPHONE HYDROCHLORIDE 1 MG/ML
INJECTION, SOLUTION INTRAMUSCULAR; INTRAVENOUS; SUBCUTANEOUS
Status: DISPENSED
Start: 2022-03-01

## (undated) RX ORDER — KETOROLAC TROMETHAMINE 30 MG/ML
INJECTION, SOLUTION INTRAMUSCULAR; INTRAVENOUS
Status: DISPENSED
Start: 2022-03-01

## (undated) RX ORDER — FENTANYL CITRATE 50 UG/ML
INJECTION, SOLUTION INTRAMUSCULAR; INTRAVENOUS
Status: DISPENSED
Start: 2022-03-01

## (undated) RX ORDER — DEXAMETHASONE SODIUM PHOSPHATE 4 MG/ML
INJECTION, SOLUTION INTRA-ARTICULAR; INTRALESIONAL; INTRAMUSCULAR; INTRAVENOUS; SOFT TISSUE
Status: DISPENSED
Start: 2022-03-01

## (undated) RX ORDER — LIDOCAINE HYDROCHLORIDE 20 MG/ML
INJECTION, SOLUTION EPIDURAL; INFILTRATION; INTRACAUDAL; PERINEURAL
Status: DISPENSED
Start: 2022-03-01

## (undated) RX ORDER — ONDANSETRON 2 MG/ML
INJECTION INTRAMUSCULAR; INTRAVENOUS
Status: DISPENSED
Start: 2022-03-01

## (undated) RX ORDER — CEFAZOLIN SODIUM 1 G/3ML
INJECTION, POWDER, FOR SOLUTION INTRAMUSCULAR; INTRAVENOUS
Status: DISPENSED
Start: 2022-03-01